# Patient Record
Sex: MALE | Race: WHITE | NOT HISPANIC OR LATINO | Employment: UNEMPLOYED | URBAN - NONMETROPOLITAN AREA
[De-identification: names, ages, dates, MRNs, and addresses within clinical notes are randomized per-mention and may not be internally consistent; named-entity substitution may affect disease eponyms.]

---

## 2017-08-31 ENCOUNTER — OFFICE VISIT (OUTPATIENT)
Dept: URGENT CARE | Facility: PHYSICIAN GROUP | Age: 57
End: 2017-08-31
Payer: COMMERCIAL

## 2017-08-31 VITALS
TEMPERATURE: 98 F | HEIGHT: 74 IN | DIASTOLIC BLOOD PRESSURE: 80 MMHG | RESPIRATION RATE: 16 BRPM | HEART RATE: 85 BPM | OXYGEN SATURATION: 98 % | WEIGHT: 254.3 LBS | BODY MASS INDEX: 32.64 KG/M2 | SYSTOLIC BLOOD PRESSURE: 118 MMHG

## 2017-08-31 DIAGNOSIS — E78.00 HYPERCHOLESTEREMIA: ICD-10-CM

## 2017-08-31 DIAGNOSIS — F32.A DEPRESSION, UNSPECIFIED DEPRESSION TYPE: ICD-10-CM

## 2017-08-31 DIAGNOSIS — E11.9 CONTROLLED TYPE 2 DIABETES MELLITUS WITHOUT COMPLICATION, WITHOUT LONG-TERM CURRENT USE OF INSULIN (HCC): ICD-10-CM

## 2017-08-31 DIAGNOSIS — E03.9 HYPOTHYROIDISM, UNSPECIFIED TYPE: ICD-10-CM

## 2017-08-31 DIAGNOSIS — G47.00 INSOMNIA, UNSPECIFIED TYPE: ICD-10-CM

## 2017-08-31 PROCEDURE — 99203 OFFICE O/P NEW LOW 30 MIN: CPT | Performed by: PHYSICIAN ASSISTANT

## 2017-08-31 RX ORDER — ESCITALOPRAM OXALATE 20 MG/1
20 TABLET ORAL DAILY
COMMUNITY
End: 2017-09-28 | Stop reason: SDUPTHER

## 2017-08-31 RX ORDER — SIMVASTATIN 20 MG
10 TABLET ORAL NIGHTLY
COMMUNITY
End: 2017-09-28 | Stop reason: SDUPTHER

## 2017-08-31 RX ORDER — TRAZODONE HYDROCHLORIDE 100 MG/1
200 TABLET ORAL NIGHTLY PRN
Qty: 60 TAB | Refills: 0 | Status: SHIPPED | OUTPATIENT
Start: 2017-08-31 | End: 2017-09-28 | Stop reason: SDUPTHER

## 2017-08-31 RX ORDER — LEVOTHYROXINE SODIUM 0.03 MG/1
25 TABLET ORAL
COMMUNITY
End: 2017-09-28 | Stop reason: SDUPTHER

## 2017-08-31 RX ORDER — TRAZODONE HYDROCHLORIDE 100 MG/1
200 TABLET ORAL NIGHTLY
COMMUNITY
End: 2017-08-31 | Stop reason: SDUPTHER
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
## 2017-08-31 NOTE — LETTER
August 31, 2017     Farzad Bryant  [address] [place] [address]      Dear Farzad:    Thank you for enrolling in [employer]. Please follow the instructions below to securely access your online medical record. [employer] allows you to send messages to your healthcare team, view certain test results, renew your prescriptions, schedule appointments, and more.     How Do I Sign Up?  1. In your Internet browser, go to  [email or web address]  2. Click on the Sign Up Now link in the Sign In box. You will see the New Member Sign Up page.  3. Enter your [employer] Access Code exactly as it appears below (case sensitive). You will not need to use this code after you’ve completed the sign-up process. If you do not sign up before the expiration date, you must request a new code.  [employer] Access Code: [email or web address]  Expires: 9/30/2017 10:46 AM    4. Enter your Email address and Date of Birth (mm/dd/yyyy) as indicated and click Submit. You will be taken to the next sign-up page.  5. Create a [employer] ID (case sensitive) . This will be your [employer] login ID and cannot be changed, so think of one that is secure and easy to remember.  6. Create a [employer] password  (case sensitive).   · Your password must be a length of at least 6 characters/digits.  · It must include at least 1 numeric.  · You can change your password at any time.  7. Enter your Password Reset Question and Answer. This can be used at a later time if you forget your password.   8. Enter your e-mail address. You will receive e-mail notification when new information is available in [employer].  9. Click Sign Up. You can now view your medical record.     Additional Information  Please contact [employer] Customer Support at [phone] for any questions . Remember, [employer] is NOT to be used for urgent needs. For medical emergencies, dial 911.          Introducing [employer]    [facility]’s Secure, Online Health Connection      [facility] now offers  convenient, online access to your healthcare team and personal health information.  [employer] makes managing your healthcare easier than ever, and allows you to:  • Email your healthcare provider securely and privately  • Access your test results  • Request prescription refills 24 hours a day  • View your personal medical records from home  • Schedule or change your appointments  • View your Insurance and Billing Information  • Pay bills online ? Coming soon!        Sign below to get started:  I hereby request access to [employer] application.  I agree to abide by the [employer] Terms and Conditions, which will be provided to me upon activating my account.       __________________________________        _________________________  Name (Please Print)          Date of Birth     __________________________________       _________________________  Signature          Primary Care Provider      _______________  Date                          *For Internal Use Only: Please scan this form into the patient’s chart. Click on  - Select Patient - Attach to Encounter:  - Document Type: Consent   - Document Description: MyChart Consent

## 2017-09-06 ENCOUNTER — HOSPITAL ENCOUNTER (OUTPATIENT)
Dept: LAB | Facility: MEDICAL CENTER | Age: 57
End: 2017-09-06
Attending: PHYSICIAN ASSISTANT
Payer: COMMERCIAL

## 2017-09-06 DIAGNOSIS — E11.9 CONTROLLED TYPE 2 DIABETES MELLITUS WITHOUT COMPLICATION, WITHOUT LONG-TERM CURRENT USE OF INSULIN (HCC): ICD-10-CM

## 2017-09-06 DIAGNOSIS — G47.00 INSOMNIA, UNSPECIFIED TYPE: ICD-10-CM

## 2017-09-06 DIAGNOSIS — E78.00 HYPERCHOLESTEREMIA: ICD-10-CM

## 2017-09-06 DIAGNOSIS — E03.9 HYPOTHYROIDISM, UNSPECIFIED TYPE: ICD-10-CM

## 2017-09-06 LAB
25(OH)D3 SERPL-MCNC: 32 NG/ML (ref 30–100)
ALBUMIN SERPL BCP-MCNC: 4.1 G/DL (ref 3.2–4.9)
ALBUMIN/GLOB SERPL: 1.2 G/DL
ALP SERPL-CCNC: 83 U/L (ref 30–99)
ALT SERPL-CCNC: 18 U/L (ref 2–50)
ANION GAP SERPL CALC-SCNC: 12 MMOL/L (ref 0–11.9)
AST SERPL-CCNC: 13 U/L (ref 12–45)
BASOPHILS # BLD AUTO: 0.7 % (ref 0–1.8)
BASOPHILS # BLD: 0.04 K/UL (ref 0–0.12)
BILIRUB SERPL-MCNC: 0.6 MG/DL (ref 0.1–1.5)
BUN SERPL-MCNC: 22 MG/DL (ref 8–22)
CALCIUM SERPL-MCNC: 9.1 MG/DL (ref 8.5–10.5)
CHLORIDE SERPL-SCNC: 103 MMOL/L (ref 96–112)
CHOLEST SERPL-MCNC: 190 MG/DL (ref 100–199)
CO2 SERPL-SCNC: 20 MMOL/L (ref 20–33)
CREAT SERPL-MCNC: 0.92 MG/DL (ref 0.5–1.4)
CREAT UR-MCNC: 79.4 MG/DL
EOSINOPHIL # BLD AUTO: 0.09 K/UL (ref 0–0.51)
EOSINOPHIL NFR BLD: 1.5 % (ref 0–6.9)
ERYTHROCYTE [DISTWIDTH] IN BLOOD BY AUTOMATED COUNT: 43.3 FL (ref 35.9–50)
EST. AVERAGE GLUCOSE BLD GHB EST-MCNC: 303 MG/DL
FASTING STATUS PATIENT QL REPORTED: NORMAL
GFR SERPL CREATININE-BSD FRML MDRD: >60 ML/MIN/1.73 M 2
GLOBULIN SER CALC-MCNC: 3.4 G/DL (ref 1.9–3.5)
GLUCOSE SERPL-MCNC: 362 MG/DL (ref 65–99)
HBA1C MFR BLD: 12.2 % (ref 0–5.6)
HCT VFR BLD AUTO: 44 % (ref 42–52)
HDLC SERPL-MCNC: 39 MG/DL
HGB BLD-MCNC: 14.7 G/DL (ref 14–18)
IMM GRANULOCYTES # BLD AUTO: 0.05 K/UL (ref 0–0.11)
IMM GRANULOCYTES NFR BLD AUTO: 0.8 % (ref 0–0.9)
LDLC SERPL CALC-MCNC: 114 MG/DL
LYMPHOCYTES # BLD AUTO: 1.37 K/UL (ref 1–4.8)
LYMPHOCYTES NFR BLD: 22.5 % (ref 22–41)
MCH RBC QN AUTO: 30.2 PG (ref 27–33)
MCHC RBC AUTO-ENTMCNC: 33.4 G/DL (ref 33.7–35.3)
MCV RBC AUTO: 90.5 FL (ref 81.4–97.8)
MICROALBUMIN UR-MCNC: 3.6 MG/DL
MICROALBUMIN/CREAT UR: 45 MG/G (ref 0–30)
MONOCYTES # BLD AUTO: 0.58 K/UL (ref 0–0.85)
MONOCYTES NFR BLD AUTO: 9.5 % (ref 0–13.4)
NEUTROPHILS # BLD AUTO: 3.95 K/UL (ref 1.82–7.42)
NEUTROPHILS NFR BLD: 65 % (ref 44–72)
NRBC # BLD AUTO: 0 K/UL
NRBC BLD AUTO-RTO: 0 /100 WBC
PLATELET # BLD AUTO: 260 K/UL (ref 164–446)
PMV BLD AUTO: 10.7 FL (ref 9–12.9)
POTASSIUM SERPL-SCNC: 3.9 MMOL/L (ref 3.6–5.5)
PROT SERPL-MCNC: 7.5 G/DL (ref 6–8.2)
RBC # BLD AUTO: 4.86 M/UL (ref 4.7–6.1)
SODIUM SERPL-SCNC: 135 MMOL/L (ref 135–145)
T4 FREE SERPL-MCNC: 1.02 NG/DL (ref 0.53–1.43)
TRIGL SERPL-MCNC: 183 MG/DL (ref 0–149)
TSH SERPL DL<=0.005 MIU/L-ACNC: 2.61 UIU/ML (ref 0.3–3.7)
WBC # BLD AUTO: 6.1 K/UL (ref 4.8–10.8)

## 2017-09-06 PROCEDURE — 84439 ASSAY OF FREE THYROXINE: CPT

## 2017-09-06 PROCEDURE — 82306 VITAMIN D 25 HYDROXY: CPT

## 2017-09-06 PROCEDURE — 80053 COMPREHEN METABOLIC PANEL: CPT

## 2017-09-06 PROCEDURE — 82043 UR ALBUMIN QUANTITATIVE: CPT

## 2017-09-06 PROCEDURE — 84443 ASSAY THYROID STIM HORMONE: CPT

## 2017-09-06 PROCEDURE — 82570 ASSAY OF URINE CREATININE: CPT

## 2017-09-06 PROCEDURE — 80061 LIPID PANEL: CPT

## 2017-09-06 PROCEDURE — 85025 COMPLETE CBC W/AUTO DIFF WBC: CPT

## 2017-09-06 PROCEDURE — 83036 HEMOGLOBIN GLYCOSYLATED A1C: CPT

## 2017-09-06 PROCEDURE — 36415 COLL VENOUS BLD VENIPUNCTURE: CPT

## 2017-09-28 ENCOUNTER — OFFICE VISIT (OUTPATIENT)
Dept: MEDICAL GROUP | Facility: PHYSICIAN GROUP | Age: 57
End: 2017-09-28
Payer: COMMERCIAL

## 2017-09-28 VITALS
SYSTOLIC BLOOD PRESSURE: 130 MMHG | TEMPERATURE: 98.2 F | DIASTOLIC BLOOD PRESSURE: 80 MMHG | HEIGHT: 74 IN | OXYGEN SATURATION: 93 % | BODY MASS INDEX: 32.6 KG/M2 | HEART RATE: 118 BPM | WEIGHT: 254 LBS

## 2017-09-28 DIAGNOSIS — F51.01 PRIMARY INSOMNIA: ICD-10-CM

## 2017-09-28 DIAGNOSIS — G89.29 CHRONIC SHOULDER PAIN, UNSPECIFIED LATERALITY: ICD-10-CM

## 2017-09-28 DIAGNOSIS — E11.8 TYPE 2 DIABETES MELLITUS WITH COMPLICATION, WITHOUT LONG-TERM CURRENT USE OF INSULIN (HCC): ICD-10-CM

## 2017-09-28 DIAGNOSIS — M25.519 CHRONIC SHOULDER PAIN, UNSPECIFIED LATERALITY: ICD-10-CM

## 2017-09-28 DIAGNOSIS — F33.9 EPISODE OF RECURRENT MAJOR DEPRESSIVE DISORDER, UNSPECIFIED DEPRESSION EPISODE SEVERITY (HCC): ICD-10-CM

## 2017-09-28 DIAGNOSIS — E66.9 OBESITY (BMI 30-39.9): ICD-10-CM

## 2017-09-28 DIAGNOSIS — E03.9 ACQUIRED HYPOTHYROIDISM: ICD-10-CM

## 2017-09-28 DIAGNOSIS — E78.5 DYSLIPIDEMIA: ICD-10-CM

## 2017-09-28 PROCEDURE — 99204 OFFICE O/P NEW MOD 45 MIN: CPT | Performed by: INTERNAL MEDICINE

## 2017-09-28 RX ORDER — TRAZODONE HYDROCHLORIDE 100 MG/1
200 TABLET ORAL NIGHTLY PRN
Qty: 180 TAB | Refills: 1 | Status: SHIPPED | OUTPATIENT
Start: 2017-09-28 | End: 2018-09-21 | Stop reason: SDUPTHER

## 2017-09-28 RX ORDER — DIPHENHYDRAMINE HCL 25 MG
50 CAPSULE ORAL
COMMUNITY
End: 2021-03-02

## 2017-09-28 RX ORDER — LEVOTHYROXINE SODIUM 0.03 MG/1
25 TABLET ORAL
Qty: 90 TAB | Refills: 1 | Status: SHIPPED | OUTPATIENT
Start: 2017-09-28 | End: 2018-07-27 | Stop reason: SDUPTHER

## 2017-09-28 RX ORDER — UBIDECARENONE 75 MG
100 CAPSULE ORAL DAILY
COMMUNITY
End: 2018-07-10

## 2017-09-28 RX ORDER — SIMVASTATIN 20 MG
10 TABLET ORAL EVERY EVENING
Qty: 45 TAB | Refills: 1 | Status: SHIPPED | OUTPATIENT
Start: 2017-09-28 | End: 2019-04-20 | Stop reason: CLARIF

## 2017-09-28 RX ORDER — GLIPIZIDE 5 MG/1
5 TABLET ORAL 2 TIMES DAILY
Qty: 180 TAB | Refills: 1 | Status: SHIPPED | OUTPATIENT
Start: 2017-09-28 | End: 2018-08-20 | Stop reason: SDUPTHER

## 2017-09-28 RX ORDER — ESCITALOPRAM OXALATE 20 MG/1
20 TABLET ORAL DAILY
Qty: 90 TAB | Refills: 1 | Status: SHIPPED | OUTPATIENT
Start: 2017-09-28 | End: 2018-06-19 | Stop reason: SDUPTHER

## 2017-09-28 RX ORDER — CHLORAL HYDRATE 500 MG
2000 CAPSULE ORAL EVERY MORNING
COMMUNITY

## 2017-09-28 RX ORDER — M-VIT,TX,IRON,MINS/CALC/FOLIC 27MG-0.4MG
1 TABLET ORAL EVERY MORNING
COMMUNITY

## 2017-09-28 RX ORDER — LANCETS 30 GAUGE
EACH MISCELLANEOUS
Qty: 100 EACH | Refills: 3 | Status: SHIPPED | OUTPATIENT
Start: 2017-09-28 | End: 2018-07-10

## 2017-09-28 ASSESSMENT — PATIENT HEALTH QUESTIONNAIRE - PHQ9: CLINICAL INTERPRETATION OF PHQ2 SCORE: 0

## 2017-09-28 ASSESSMENT — PAIN SCALES - GENERAL: PAINLEVEL: 4=SLIGHT-MODERATE PAIN
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
## 2017-09-28 NOTE — ASSESSMENT & PLAN NOTE
Pt has long standing diabetes mellitus. He is currently on metformin 1000 mg BID. Recent A1C was 12.2, pt states he had been off medication for a week when it was drawn.     Pt denies neuropathy, blurry vision. He reports increased urinary frequency at times. Denies symptoms indicative of hypoglycemic episodes (sweating, shaking, loss of consciousness).

## 2017-09-28 NOTE — ASSESSMENT & PLAN NOTE
Pt reports chronic OA pain in his left shoulder. He also had a previous hip replacement surgery that has chronic pain associated with it. He is not on medication for this. He sees a chiropractor for this who told him there was severe OA on x-ray.

## 2017-09-28 NOTE — ASSESSMENT & PLAN NOTE
Pt is on levothyroxine 25 mcg daily. Patient is taking medication as prescribed.  He has difficulty losing weight.   Denies heat/cold intolerance, constipation/diarrhea, changes in skin or hair.

## 2017-09-28 NOTE — ASSESSMENT & PLAN NOTE
Pt is on simvastatin 10 mg daily, fish oil 1000 mg TID. Patient is taking medication as prescribed. Patient denies myalgias

## 2017-09-28 NOTE — ASSESSMENT & PLAN NOTE
Pt is on Lexapro 20 mg daily for known depression. He has been on the medication for about 3-4 years. He states that the medication works well and that his mood is well controlled. He denies suicidal or homicidal ideation.
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
## 2018-02-09 ENCOUNTER — TELEPHONE (OUTPATIENT)
Dept: MEDICAL GROUP | Facility: PHYSICIAN GROUP | Age: 58
End: 2018-02-09

## 2018-02-09 NOTE — TELEPHONE ENCOUNTER
----- Message from Yandy Madrigal M.D. sent at 1/9/2018 12:38 PM PST -----  Please advise patient that I reviewed lab results. There are some results that need further evaluation. Please schedule an office visit.  Yandy Madrigal M.D.

## 2018-02-09 NOTE — TELEPHONE ENCOUNTER
Called [phone] left message for patient to call [phone] to schedule an appointment with [facility] for [facility]

## 2018-03-10 ENCOUNTER — HOSPITAL ENCOUNTER (OUTPATIENT)
Dept: LAB | Facility: MEDICAL CENTER | Age: 58
End: 2018-03-10
Attending: INTERNAL MEDICINE
Payer: COMMERCIAL

## 2018-03-10 DIAGNOSIS — E78.5 DYSLIPIDEMIA: ICD-10-CM

## 2018-03-10 DIAGNOSIS — E11.8 TYPE 2 DIABETES MELLITUS WITH COMPLICATION, WITHOUT LONG-TERM CURRENT USE OF INSULIN (HCC): ICD-10-CM

## 2018-03-10 LAB
ALBUMIN SERPL BCP-MCNC: 4.5 G/DL (ref 3.2–4.9)
ALBUMIN/GLOB SERPL: 1.5 G/DL
ALP SERPL-CCNC: 60 U/L (ref 30–99)
ALT SERPL-CCNC: 34 U/L (ref 2–50)
ANION GAP SERPL CALC-SCNC: 11 MMOL/L (ref 0–11.9)
AST SERPL-CCNC: 20 U/L (ref 12–45)
BILIRUB SERPL-MCNC: 0.4 MG/DL (ref 0.1–1.5)
BUN SERPL-MCNC: 39 MG/DL (ref 8–22)
CALCIUM SERPL-MCNC: 9.6 MG/DL (ref 8.5–10.5)
CHLORIDE SERPL-SCNC: 105 MMOL/L (ref 96–112)
CHOLEST SERPL-MCNC: 185 MG/DL (ref 100–199)
CO2 SERPL-SCNC: 20 MMOL/L (ref 20–33)
CREAT SERPL-MCNC: 1.12 MG/DL (ref 0.5–1.4)
EST. AVERAGE GLUCOSE BLD GHB EST-MCNC: 169 MG/DL
GLOBULIN SER CALC-MCNC: 3.1 G/DL (ref 1.9–3.5)
GLUCOSE SERPL-MCNC: 206 MG/DL (ref 65–99)
HBA1C MFR BLD: 7.5 % (ref 0–5.6)
HDLC SERPL-MCNC: 44 MG/DL
LDLC SERPL CALC-MCNC: 91 MG/DL
POTASSIUM SERPL-SCNC: 4.3 MMOL/L (ref 3.6–5.5)
PROT SERPL-MCNC: 7.6 G/DL (ref 6–8.2)
SODIUM SERPL-SCNC: 136 MMOL/L (ref 135–145)
TRIGL SERPL-MCNC: 251 MG/DL (ref 0–149)

## 2018-03-10 PROCEDURE — 36415 COLL VENOUS BLD VENIPUNCTURE: CPT

## 2018-03-10 PROCEDURE — 80053 COMPREHEN METABOLIC PANEL: CPT

## 2018-03-10 PROCEDURE — 83036 HEMOGLOBIN GLYCOSYLATED A1C: CPT

## 2018-03-10 PROCEDURE — 80061 LIPID PANEL: CPT

## 2018-03-12 ENCOUNTER — TELEPHONE (OUTPATIENT)
Dept: MEDICAL GROUP | Facility: PHYSICIAN GROUP | Age: 58
End: 2018-03-12

## 2018-03-12 NOTE — TELEPHONE ENCOUNTER
----- Message from BENJA Rodriguez sent at 3/12/2018  8:50 AM PDT -----  Please let patient know that labs are back.  He will need to establish with a new PCP as Dr. Shepard has left [facility].  His labs show his Fasting Glucose at 206 which is down from 6 months ago.  His A1c came in at 7.5 which has come down from 12.2.  Great work on this.  Continue with the Metformin and Glipizide as discussed with Dr. Shepard.  Please reach out to scheduling office to get an appointment with a new PCP to help support you in your health goals.  BENJA Rodriguez

## 2018-03-27 ENCOUNTER — OFFICE VISIT (OUTPATIENT)
Dept: MEDICAL GROUP | Facility: PHYSICIAN GROUP | Age: 58
End: 2018-03-27
Payer: COMMERCIAL

## 2018-03-27 VITALS
DIASTOLIC BLOOD PRESSURE: 76 MMHG | BODY MASS INDEX: 35.04 KG/M2 | OXYGEN SATURATION: 98 % | SYSTOLIC BLOOD PRESSURE: 128 MMHG | RESPIRATION RATE: 16 BRPM | WEIGHT: 273 LBS | HEIGHT: 74 IN | HEART RATE: 74 BPM | TEMPERATURE: 98.1 F

## 2018-03-27 DIAGNOSIS — L85.3 DRY SKIN: ICD-10-CM

## 2018-03-27 DIAGNOSIS — E78.5 DYSLIPIDEMIA: ICD-10-CM

## 2018-03-27 DIAGNOSIS — E03.9 ACQUIRED HYPOTHYROIDISM: ICD-10-CM

## 2018-03-27 DIAGNOSIS — E66.9 OBESITY (BMI 30-39.9): ICD-10-CM

## 2018-03-27 DIAGNOSIS — E11.8 TYPE 2 DIABETES MELLITUS WITH COMPLICATION, WITHOUT LONG-TERM CURRENT USE OF INSULIN (HCC): ICD-10-CM

## 2018-03-27 LAB — RETINAL SCREEN: NEGATIVE

## 2018-03-27 PROCEDURE — 99214 OFFICE O/P EST MOD 30 MIN: CPT | Performed by: NURSE PRACTITIONER

## 2018-03-27 PROCEDURE — 92250 FUNDUS PHOTOGRAPHY W/I&R: CPT | Mod: TC | Performed by: NURSE PRACTITIONER

## 2018-03-27 RX ORDER — LOPERAMIDE HYDROCHLORIDE 2 MG/1
4 CAPSULE ORAL
COMMUNITY
End: 2021-03-02

## 2018-03-27 RX ORDER — GUAIFENESIN/EPHEDRINE HCL 200-12.5MG
200 TABLET ORAL
COMMUNITY

## 2018-03-27 NOTE — PATIENT INSTRUCTIONS
Labs in 3 months--then see me and Jenifer    Stay well moisturized and hydrated    Apply emollient (lubiderm, Evi, Cetaphil) to damp skin after shower    Continue to work on diet and exercise

## 2018-03-28 PROBLEM — L85.3 DRY SKIN: Status: ACTIVE | Noted: 2018-03-28

## 2018-03-28 NOTE — ASSESSMENT & PLAN NOTE
This is chronic, uncontrolled, but patient states this is improving. His weight is 273 pounds, BMI is 35.53. He has made significant dietary changes including healthier eating, increase physical activity. We will reassess this at this follow-up appointment in 3 months.

## 2018-03-28 NOTE — ASSESSMENT & PLAN NOTE
This is a chronic condition, stable and fairly well-controlled on current dose of levothyroxine, he is currently on 25 µg daily, last TSH in September 2017 was within normal limits. He tolerates medications well with no significant bothersome side effects, and he denies symptoms of hypothyroidism. He does understand to take this medication on an empty stomach apart from his other medications. We will plan on repeating labs again in September 2018.

## 2018-03-28 NOTE — ASSESSMENT & PLAN NOTE
This is a chronic condition, stable and well controlled on simvastatin 20 mg daily. His last lipid profile is as follows:  Component      Latest Ref Rng & Units 3/10/2018          10:24 AM   Cholesterol,Tot      100 - 199 mg/dL 185   Triglycerides      0 - 149 mg/dL 251 (H)   HDL      >=40 mg/dL 44   LDL      <100 mg/dL 91   Triglycerides have markedly increased since September, but does admit to being on a nearly no carb, increased protein/fat diet consisting mostly of non-lean meats. I did discuss with him this is likely accounts for the increased triglycerides. I did advise him to choose more lean meats and decrease the amounts of cheeses that he is eating. We will plan on rechecking this again in 6 months.

## 2018-03-28 NOTE — ASSESSMENT & PLAN NOTE
Patient has noticed significantly more dry skin to his bilateral lower extremities, he scratches it so much that he will sometimes develop a rash. He has noticed this since he has moved to the area with the claimant is a little bit more dry. We discussed the importance of staying well hydrated and use of emollients type creams after his shower when his skin is a bit damp. If this does not improve his symptoms, we will consider steroid cream.

## 2018-03-28 NOTE — PROGRESS NOTES
Chief Complaint   Patient presents with   • Diabetes     est care, fv labs         This is a 57 y.o.male patient that presents today with the following: Follow-up diabetes    Acquired hypothyroidism  This is a chronic condition, stable and fairly well-controlled on current dose of levothyroxine, he is currently on 25 µg daily, last TSH in September 2017 was within normal limits. He tolerates medications well with no significant bothersome side effects, and he denies symptoms of hypothyroidism. He does understand to take this medication on an empty stomach apart from his other medications. We will plan on repeating labs again in September 2018.    Type 2 diabetes mellitus with complication, without long-term current use of insulin (CMS-[facility])  This is a chronic condition, suboptimally controlled on current regimen significantly improved since September 2017. At that time his hemoglobin A1c was 12.2%, most recently his hemoglobin A1c was down to 7.5%. He is on metformin, 1000 mg twice a day as well as glipizide 5 mg twice daily. He has made significant dietary changes, he has increased his physical activity and he has lost some weight. He would like to continue with his lifestyle modifications instead of making changes to his medication regimen, we will recheck labs again in 3 months. He is going to see me as well as the diabetes nurse educator at that time. He is appropriately on statin, but I do not see that he is on an ACE inhibitor which we will likely start as he does have mild micro-albuminuria. We will do an office treadmill exam today and monofilament exam. He does deny neuropathy.    Dyslipidemia  This is a chronic condition, stable and well controlled on simvastatin 20 mg daily. His last lipid profile is as follows:  Component      Latest Ref Rng & Units 3/10/2018          10:24 AM   Cholesterol,Tot      100 - 199 mg/dL 185   Triglycerides      0 - 149 mg/dL 251 (H)   HDL      >=40 mg/dL 44   LDL      <100  mg/dL 91   Triglycerides have markedly increased since September, but does admit to being on a nearly no carb, increased protein/fat diet consisting mostly of non-lean meats. I did discuss with him this is likely accounts for the increased triglycerides. I did advise him to choose more lean meats and decrease the amounts of cheeses that he is eating. We will plan on rechecking this again in 6 months.    Obesity (BMI 30-39.9)  This is chronic, uncontrolled, but patient states this is improving. His weight is 273 pounds, BMI is 35.53. He has made significant dietary changes including healthier eating, increase physical activity. We will reassess this at this follow-up appointment in 3 months.    Dry skin  Patient has noticed significantly more dry skin to his bilateral lower extremities, he scratches it so much that he will sometimes develop a rash. He has noticed this since he has moved to the area with the claimant is a little bit more dry. We discussed the importance of staying well hydrated and use of emollients type creams after his shower when his skin is a bit damp. If this does not improve his symptoms, we will consider steroid cream.      Hospital Outpatient Visit on 03/10/2018   Component Date Value   • Sodium 03/10/2018 136    • Potassium 03/10/2018 4.3    • Chloride 03/10/2018 105    • Co2 03/10/2018 20    • Anion Gap 03/10/2018 11.0    • Glucose 03/10/2018 206*   • Bun 03/10/2018 39*   • Creatinine 03/10/2018 1.12    • Calcium 03/10/2018 9.6    • AST(SGOT) 03/10/2018 20    • ALT(SGPT) 03/10/2018 34    • Alkaline Phosphatase 03/10/2018 60    • Total Bilirubin 03/10/2018 0.4    • Albumin 03/10/2018 4.5    • Total Protein 03/10/2018 7.6    • Globulin 03/10/2018 3.1    • A-G Ratio 03/10/2018 1.5    • Cholesterol,Tot 03/10/2018 185    • Triglycerides 03/10/2018 251*   • HDL 03/10/2018 44    • LDL 03/10/2018 91    • Glycohemoglobin 03/10/2018 7.5*   • Est Avg Glucose 03/10/2018 169    • GFR If   03/10/2018 >60    • GFR If Non  Ameri* 03/10/2018 >60          clinical course has been stable    Past Medical History:   Diagnosis Date   • Depression    • Diabetes (CMS-HCC)    • Hyperlipidemia        Past Surgical History:   Procedure Laterality Date   • ATHROPLASTY      hip replacement   • CHOLECYSTECTOMY         Family History   Problem Relation Age of Onset   • Diabetes Mother    • Stroke Father        Patient has no known allergies.    Current Outpatient Prescriptions Ordered in [facility]   Medication Sig Dispense Refill   • loperamide (ANTI-DIARRHEAL) 2 MG Cap Take 2 mg by mouth 4 times a day as needed for Diarrhea.     • 5-Hydroxytryptophan (5-HTP) 100 MG Cap Take  by mouth.     • Omega-3 Fatty Acids (FISH OIL) 1000 MG Cap capsule Take 1,000 mg by mouth 3 times a day, with meals.     • diphenhydrAMINE (BENADRYL ALLERGY) 25 MG capsule Take 25 mg by mouth every 6 hours as needed.     • Melatonin 1 MG Cap Take  by mouth.     • CINNAMON PO Take  by mouth.     • therapeutic multivitamin-minerals (THERAGRAN-M) Tab Take 1 Tab by mouth every day.     • B Complex Vitamins (VITAMIN B COMPLEX PO) Take  by mouth.     • cyanocobalamin (VITAMIN B-12) 100 MCG Tab Take 100 mcg by mouth every day.     • Blood Glucose Monitoring Suppl Supplies Misc Test strips order: Test strips for glucometer. Test daily and  Strip 3   • Lancets Misc Lancets order: Lancets for glucometer. Test daily and prn 100 Each 3   • escitalopram (LEXAPRO) 20 MG tablet Take 1 Tab by mouth every day. 90 Tab 1   • levothyroxine (SYNTHROID) 25 MCG Tab Take 1 Tab by mouth Every morning on an empty stomach. 90 Tab 1   • metformin (GLUCOPHAGE) 1000 MG tablet Take 1 Tab by mouth 2 times a day, with meals. 180 Tab 1   • simvastatin (ZOCOR) 20 MG Tab Take 0.5 Tabs by mouth every evening. 45 Tab 1   • trazodone (DESYREL) 100 MG Tab Take 2 Tabs by mouth at bedtime as needed for Sleep. 180 Tab 1   • glipiZIDE (GLUCOTROL) 5 MG Tab Take 1 Tab by mouth 2  "times a day. 180 Tab 1     No current Epic-ordered facility-administered medications on file.        Constitutional ROS: No unexpected change in weight, No weakness, No unexplained fevers, sweats, or chills  Pulmonary ROS: No chronic cough, sputum, or hemoptysis, No shortness of breath, No recent change in breathing  Cardiovascular ROS: No chest pain, No edema, No palpitations  Gastrointestinal ROS: No abdominal pain, No nausea, vomiting, diarrhea, or constipation  Musculoskeletal/Extremities ROS: No clubbing, No peripheral edema, No pain, redness or swelling on the joints  Skin/Integumentary ROS: Positive for dry skin, per history of present illness  Neurologic ROS: Normal development, No seizures, No weakness  Endocrine ROS: Positive per history of present illness    Physical exam:  /76   Pulse 74   Temp 36.7 °C (98.1 °F)   Resp 16   Ht 1.867 m (6' 1.5\")   Wt 123.8 kg (273 lb)   SpO2 98%   BMI 35.53 kg/m²   General Appearance: Middle-aged male, alert, no distress, obese, well-groomed  Skin: Skin color, texture, turgor normal. No rashes or lesions.  Lungs: negative findings: normal respiratory rate and rhythm, lungs clear to auscultation  Heart: negative. RRR without murmur, gallop, or rubs.  No ectopy.  Abdomen: Abdomen soft, non-tender. BS normal. No masses,  No organomegaly  Musculoskeletal: negative findings: ROM of all joints is normal, no evidence of joint instability, strength normal, no deformities present  Neurologic: intact, oriented, mood appropriate, judgment intact. Cranial nerves II through XII grossly intact  Diabetic Foot Exam: No ulcers, erythema or skin lesions present, patient tested with monofilament (10g) and tuning fork found to be sensitive bilaterally throughout the ball of the foot, great toe and heel.      Medical decision making/discussion: Patient to follow-up with me in 3 months with labs done before visit. He is to stay on his current medications as prescribed and call for " refills as needed. He is to continue working on [place] on modifications including healthy diet, regular physical activity and continued efforts towards weight loss. With regards to his dry skin, we discussed the importance of staying well hydrated, as well as applying emollient cream to damp to skin after shower.    Farzad was seen today for diabetes.    Diagnoses and all orders for this visit:    Type 2 diabetes mellitus with complication, without long-term current use of insulin (CMS-[facility])  -     COMP METABOLIC PANEL; Future  -     HEMOGLOBIN A1C; Future  -     MICROALBUMIN CREAT RATIO URINE; Future  -     POCT Retinal Eye Exam  -     Diabetic Monofilament Lower Extremity Exam    Acquired hypothyroidism    Dyslipidemia    Obesity (BMI 30-39.9)    Dry skin          Please note that this dictation was created using voice recognition software. I have made every reasonable attempt to correct obvious errors, but I expect that there are errors of grammar and possibly content that I did not discover before finalizing the note.

## 2018-03-28 NOTE — ASSESSMENT & PLAN NOTE
This is a chronic condition, suboptimally controlled on current regimen significantly improved since September 2017. At that time his hemoglobin A1c was 12.2%, most recently his hemoglobin A1c was down to 7.5%. He is on metformin, 1000 mg twice a day as well as glipizide 5 mg twice daily. He has made significant dietary changes, he has increased his physical activity and he has lost some weight. He would like to continue with his lifestyle modifications instead of making changes to his medication regimen, we will recheck labs again in 3 months. He is going to see me as well as the diabetes nurse educator at that time. He is appropriately on statin, but I do not see that he is on an ACE inhibitor which we will likely start as he does have mild micro-albuminuria. We will do an office treadmill exam today and monofilament exam. He does deny neuropathy.

## 2018-03-31 ENCOUNTER — TELEPHONE (OUTPATIENT)
Dept: MEDICAL GROUP | Facility: PHYSICIAN GROUP | Age: 58
End: 2018-03-31

## 2018-03-31 NOTE — TELEPHONE ENCOUNTER
ARMANDO Curtis. covering for BENJA Curtis    received call on Saturday, patient requesting refill on trazodone as he was completely out. Refill was called in for him.

## 2018-06-19 DIAGNOSIS — F33.9 EPISODE OF RECURRENT MAJOR DEPRESSIVE DISORDER, UNSPECIFIED DEPRESSION EPISODE SEVERITY (HCC): ICD-10-CM

## 2018-06-19 RX ORDER — ESCITALOPRAM OXALATE 20 MG/1
20 TABLET ORAL DAILY
Qty: 90 TAB | Refills: 1 | Status: SHIPPED | OUTPATIENT
Start: 2018-06-19 | End: 2018-07-20

## 2018-06-19 NOTE — TELEPHONE ENCOUNTER
Was the patient seen in the last year in this department? Yes     Does patient have an active prescription for medications requested? No     Received Request Via: Pharmacy      Pt met protocol?: Yes pt last ov 3/2018

## 2018-06-20 RX ORDER — ESCITALOPRAM OXALATE 20 MG/1
20 TABLET ORAL DAILY
Qty: 90 TAB | Refills: 0 | OUTPATIENT
Start: 2018-06-20

## 2018-06-28 ENCOUNTER — OFFICE VISIT (OUTPATIENT)
Dept: MEDICAL GROUP | Facility: PHYSICIAN GROUP | Age: 58
End: 2018-06-28
Payer: COMMERCIAL

## 2018-06-28 VITALS
BODY MASS INDEX: 35.16 KG/M2 | HEIGHT: 74 IN | DIASTOLIC BLOOD PRESSURE: 88 MMHG | SYSTOLIC BLOOD PRESSURE: 142 MMHG | RESPIRATION RATE: 16 BRPM | HEART RATE: 94 BPM | WEIGHT: 274 LBS | TEMPERATURE: 98.7 F | OXYGEN SATURATION: 94 %

## 2018-06-28 DIAGNOSIS — R07.9 CHEST PAIN ON EXERTION: ICD-10-CM

## 2018-06-28 PROCEDURE — 99214 OFFICE O/P EST MOD 30 MIN: CPT | Performed by: NURSE PRACTITIONER

## 2018-06-28 NOTE — ASSESSMENT & PLAN NOTE
Pt started having CP and shortness of breath 2 days ago when walking up stairs at work, this started in the morning. Pain lasted 3-4 hours, off and on. Pt describes the pain as substernal. He also reports that he had bilateral UE pain, an achy dullness. SOB stopped as soon as he caught his breath after climbing stairs. EKG in office reviewed by myself and supervising physician show sinus rhythm, no ST elevation or depression, no Q waves and no inverted T waves.   Denies CP and SOB at visit today and has not had any since last night. Was given very strict ER precautions and will put in cardiology. Was advised to take it very easy at work, try not to climb stairs. Declined letter for work, cannot afford to take the time off work.

## 2018-06-28 NOTE — PROGRESS NOTES
Chief Complaint   Patient presents with   • Chest Pain     dull ache, center chest x 2 days         This is a 57 y.o.male patient that presents today with the following: CP, SOB    Chest pain on exertion  Pt started having CP and shortness of breath 2 days ago when walking up stairs at work, this started in the morning. Pain lasted 3-4 hours, off and on. Pt describes the pain as substernal. He also reports that he had bilateral UE pain, an achy dullness. SOB stopped as soon as he caught his breath after climbing stairs. EKG in office reviewed by myself and supervising physician show sinus rhythm, no ST elevation or depression, no Q waves and no inverted T waves.   Denies CP and SOB at visit today and has not had any since last night. Was given very strict ER precautions and will put in cardiology. Was advised to take it very easy at work, try not to climb stairs. Declined letter for work, cannot afford to take the time off work.       No visits with results within 1 Month(s) from this visit.   Latest known visit with results is:   Hospital Outpatient Visit on 03/10/2018   Component Date Value   • Sodium 03/10/2018 136    • Potassium 03/10/2018 4.3    • Chloride 03/10/2018 105    • Co2 03/10/2018 20    • Anion Gap 03/10/2018 11.0    • Glucose 03/10/2018 206*   • Bun 03/10/2018 39*   • Creatinine 03/10/2018 1.12    • Calcium 03/10/2018 9.6    • AST(SGOT) 03/10/2018 20    • ALT(SGPT) 03/10/2018 34    • Alkaline Phosphatase 03/10/2018 60    • Total Bilirubin 03/10/2018 0.4    • Albumin 03/10/2018 4.5    • Total Protein 03/10/2018 7.6    • Globulin 03/10/2018 3.1    • A-G Ratio 03/10/2018 1.5    • Cholesterol,Tot 03/10/2018 185    • Triglycerides 03/10/2018 251*   • HDL 03/10/2018 44    • LDL 03/10/2018 91    • Glycohemoglobin 03/10/2018 7.5*   • Est Avg Glucose 03/10/2018 169    • GFR If  03/10/2018 >60    • GFR If Non  Ameri* 03/10/2018 >60          Past Medical History:   Diagnosis Date   •  Depression    • Diabetes (HCC)    • Hyperlipidemia        Past Surgical History:   Procedure Laterality Date   • ATHROPLASTY      hip replacement   • CHOLECYSTECTOMY         Family History   Problem Relation Age of Onset   • Diabetes Mother    • Stroke Father        Patient has no known allergies.    Current Outpatient Prescriptions Ordered in [facility]   Medication Sig Dispense Refill   • escitalopram (LEXAPRO) 20 MG tablet Take 1 Tab by mouth every day. 90 Tab 1   • loperamide (ANTI-DIARRHEAL) 2 MG Cap Take 2 mg by mouth 4 times a day as needed for Diarrhea.     • 5-Hydroxytryptophan (5-HTP) 100 MG Cap Take  by mouth.     • Omega-3 Fatty Acids (FISH OIL) 1000 MG Cap capsule Take 1,000 mg by mouth 3 times a day, with meals.     • diphenhydrAMINE (BENADRYL ALLERGY) 25 MG capsule Take 25 mg by mouth every 6 hours as needed.     • Melatonin 1 MG Cap Take  by mouth.     • CINNAMON PO Take  by mouth.     • therapeutic multivitamin-minerals (THERAGRAN-M) Tab Take 1 Tab by mouth every day.     • B Complex Vitamins (VITAMIN B COMPLEX PO) Take  by mouth.     • cyanocobalamin (VITAMIN B-12) 100 MCG Tab Take 100 mcg by mouth every day.     • Blood Glucose Monitoring Suppl Supplies Misc Test strips order: Test strips for glucometer. Test daily and  Strip 3   • Lancets Misc Lancets order: Lancets for glucometer. Test daily and prn 100 Each 3   • levothyroxine (SYNTHROID) 25 MCG Tab Take 1 Tab by mouth Every morning on an empty stomach. 90 Tab 1   • metformin (GLUCOPHAGE) 1000 MG tablet Take 1 Tab by mouth 2 times a day, with meals. 180 Tab 1   • simvastatin (ZOCOR) 20 MG Tab Take 0.5 Tabs by mouth every evening. 45 Tab 1   • trazodone (DESYREL) 100 MG Tab Take 2 Tabs by mouth at bedtime as needed for Sleep. 180 Tab 1   • glipiZIDE (GLUCOTROL) 5 MG Tab Take 1 Tab by mouth 2 times a day. 180 Tab 1     No current Epic-ordered facility-administered medications on file.        Constitutional ROS: No unexpected change in weight, No  "weakness, No unexplained fevers, sweats, or chills  Pulmonary ROS: Positive for SOB per HPI  Cardiovascular ROS: Positive for CP per HPI  Musculoskeletal/Extremities ROS: positive for BUE pain, per HPI  Neurologic ROS: Normal development, No seizures, No weakness    Physical exam:  /88   Pulse 94   Temp 37.1 °C (98.7 °F)   Resp 16   Ht 1.867 m (6' 1.5\")   Wt 124.3 kg (274 lb)   SpO2 94%   BMI 35.66 kg/m²   General Appearance: alert, no distress, obese, well groomed  Skin: Skin color, texture, turgor normal. No rashes or lesions.  Lungs: negative findings: normal respiratory rate and rhythm, lungs clear to auscultation  Heart: negative. RRR, with occasional ectopic beat. No murmur, rub, or gallop.  Abdomen: Abdomen soft, non-tender. BS normal. No masses,  No organomegaly  Musculoskeletal: negative findings: ROM of all joints is normal, no evidence of joint instability, strength normal, no deformities present  Neurologic: intact    Medical decision making/discussion: pt was urgently referred to cardiology and was given very strict ER precautions. He declines note for work until he is seen by cardiology, as he cannot take time off work--even though his office is on the third floor and no elevator available. I would like close follow up with me in 2-3 weeks, sooner if needed    Farzad was seen today for chest pain.    Diagnoses and all orders for this visit:    Chest pain on exertion  -     REFERRAL TO CARDIOLOGY          Please note that this dictation was created using voice recognition software. I have made every reasonable attempt to correct obvious errors, but I expect that there are errors of grammar and possibly content that I did not discover before finalizing the note.        "

## 2018-07-05 DIAGNOSIS — R07.9 CHEST PAIN ON EXERTION: ICD-10-CM

## 2018-07-10 ENCOUNTER — APPOINTMENT (OUTPATIENT)
Dept: RADIOLOGY | Facility: MEDICAL CENTER | Age: 58
End: 2018-07-10
Attending: INTERNAL MEDICINE
Payer: COMMERCIAL

## 2018-07-10 ENCOUNTER — APPOINTMENT (OUTPATIENT)
Dept: RADIOLOGY | Facility: MEDICAL CENTER | Age: 58
End: 2018-07-10
Attending: EMERGENCY MEDICINE
Payer: COMMERCIAL

## 2018-07-10 ENCOUNTER — HOSPITAL ENCOUNTER (OUTPATIENT)
Facility: MEDICAL CENTER | Age: 58
End: 2018-07-11
Attending: EMERGENCY MEDICINE | Admitting: INTERNAL MEDICINE
Payer: COMMERCIAL

## 2018-07-10 DIAGNOSIS — K85.90 ACUTE PANCREATITIS, UNSPECIFIED COMPLICATION STATUS, UNSPECIFIED PANCREATITIS TYPE: ICD-10-CM

## 2018-07-10 DIAGNOSIS — R07.9 CHEST PAIN, UNSPECIFIED TYPE: ICD-10-CM

## 2018-07-10 LAB
ALBUMIN SERPL BCP-MCNC: 3.9 G/DL (ref 3.2–4.9)
ALBUMIN/GLOB SERPL: 1.2 G/DL
ALP SERPL-CCNC: 73 U/L (ref 30–99)
ALT SERPL-CCNC: 29 U/L (ref 2–50)
ANION GAP SERPL CALC-SCNC: 6 MMOL/L (ref 0–11.9)
APTT PPP: 25.8 SEC (ref 24.7–36)
AST SERPL-CCNC: 21 U/L (ref 12–45)
BASOPHILS # BLD AUTO: 0.4 % (ref 0–1.8)
BASOPHILS # BLD: 0.03 K/UL (ref 0–0.12)
BILIRUB SERPL-MCNC: 0.6 MG/DL (ref 0.1–1.5)
BNP SERPL-MCNC: 25 PG/ML (ref 0–100)
BUN SERPL-MCNC: 20 MG/DL (ref 8–22)
CALCIUM SERPL-MCNC: 9.1 MG/DL (ref 8.4–10.2)
CHLORIDE SERPL-SCNC: 103 MMOL/L (ref 96–112)
CO2 SERPL-SCNC: 27 MMOL/L (ref 20–33)
CREAT SERPL-MCNC: 1.08 MG/DL (ref 0.5–1.4)
DEPRECATED D DIMER PPP IA-ACNC: 259 NG/ML(D-DU)
EKG IMPRESSION: NORMAL
EOSINOPHIL # BLD AUTO: 0.23 K/UL (ref 0–0.51)
EOSINOPHIL NFR BLD: 2.9 % (ref 0–6.9)
ERYTHROCYTE [DISTWIDTH] IN BLOOD BY AUTOMATED COUNT: 42.5 FL (ref 35.9–50)
GLOBULIN SER CALC-MCNC: 3.2 G/DL (ref 1.9–3.5)
GLUCOSE BLD-MCNC: 160 MG/DL (ref 65–99)
GLUCOSE SERPL-MCNC: 224 MG/DL (ref 65–99)
HCT VFR BLD AUTO: 41.5 % (ref 42–52)
HGB BLD-MCNC: 13.8 G/DL (ref 14–18)
IMM GRANULOCYTES # BLD AUTO: 0.05 K/UL (ref 0–0.11)
IMM GRANULOCYTES NFR BLD AUTO: 0.6 % (ref 0–0.9)
INR PPP: 0.89 (ref 0.87–1.13)
LIPASE SERPL-CCNC: 141 U/L (ref 7–58)
LYMPHOCYTES # BLD AUTO: 1.45 K/UL (ref 1–4.8)
LYMPHOCYTES NFR BLD: 18.5 % (ref 22–41)
MCH RBC QN AUTO: 30.2 PG (ref 27–33)
MCHC RBC AUTO-ENTMCNC: 33.3 G/DL (ref 33.7–35.3)
MCV RBC AUTO: 90.8 FL (ref 81.4–97.8)
MONOCYTES # BLD AUTO: 0.87 K/UL (ref 0–0.85)
MONOCYTES NFR BLD AUTO: 11.1 % (ref 0–13.4)
NEUTROPHILS # BLD AUTO: 5.21 K/UL (ref 1.82–7.42)
NEUTROPHILS NFR BLD: 66.5 % (ref 44–72)
NRBC # BLD AUTO: 0 K/UL
NRBC BLD-RTO: 0 /100 WBC
PLATELET # BLD AUTO: 253 K/UL (ref 164–446)
PMV BLD AUTO: 9.6 FL (ref 9–12.9)
POTASSIUM SERPL-SCNC: 4.3 MMOL/L (ref 3.6–5.5)
PROT SERPL-MCNC: 7.1 G/DL (ref 6–8.2)
PROTHROMBIN TIME: 12 SEC (ref 12–14.6)
RBC # BLD AUTO: 4.57 M/UL (ref 4.7–6.1)
SODIUM SERPL-SCNC: 136 MMOL/L (ref 135–145)
TROPONIN I SERPL-MCNC: <0.02 NG/ML (ref 0–0.04)
WBC # BLD AUTO: 7.8 K/UL (ref 4.8–10.8)

## 2018-07-10 PROCEDURE — G0378 HOSPITAL OBSERVATION PER HR: HCPCS

## 2018-07-10 PROCEDURE — 700102 HCHG RX REV CODE 250 W/ 637 OVERRIDE(OP): Performed by: INTERNAL MEDICINE

## 2018-07-10 PROCEDURE — 85730 THROMBOPLASTIN TIME PARTIAL: CPT

## 2018-07-10 PROCEDURE — 85610 PROTHROMBIN TIME: CPT

## 2018-07-10 PROCEDURE — 93005 ELECTROCARDIOGRAM TRACING: CPT

## 2018-07-10 PROCEDURE — 99219 PR INITIAL OBSERVATION CARE,LEVL II: CPT | Performed by: INTERNAL MEDICINE

## 2018-07-10 PROCEDURE — 700117 HCHG RX CONTRAST REV CODE 255: Performed by: INTERNAL MEDICINE

## 2018-07-10 PROCEDURE — 93005 ELECTROCARDIOGRAM TRACING: CPT | Performed by: EMERGENCY MEDICINE

## 2018-07-10 PROCEDURE — 80053 COMPREHEN METABOLIC PANEL: CPT

## 2018-07-10 PROCEDURE — 71275 CT ANGIOGRAPHY CHEST: CPT

## 2018-07-10 PROCEDURE — 85025 COMPLETE CBC W/AUTO DIFF WBC: CPT

## 2018-07-10 PROCEDURE — 84484 ASSAY OF TROPONIN QUANT: CPT

## 2018-07-10 PROCEDURE — 85379 FIBRIN DEGRADATION QUANT: CPT

## 2018-07-10 PROCEDURE — 83880 ASSAY OF NATRIURETIC PEPTIDE: CPT

## 2018-07-10 PROCEDURE — 36415 COLL VENOUS BLD VENIPUNCTURE: CPT

## 2018-07-10 PROCEDURE — 71045 X-RAY EXAM CHEST 1 VIEW: CPT

## 2018-07-10 PROCEDURE — 96374 THER/PROPH/DIAG INJ IV PUSH: CPT

## 2018-07-10 PROCEDURE — A9270 NON-COVERED ITEM OR SERVICE: HCPCS | Performed by: INTERNAL MEDICINE

## 2018-07-10 PROCEDURE — 99285 EMERGENCY DEPT VISIT HI MDM: CPT

## 2018-07-10 PROCEDURE — 82962 GLUCOSE BLOOD TEST: CPT

## 2018-07-10 PROCEDURE — 83690 ASSAY OF LIPASE: CPT

## 2018-07-10 PROCEDURE — 700111 HCHG RX REV CODE 636 W/ 250 OVERRIDE (IP): Performed by: INTERNAL MEDICINE

## 2018-07-10 RX ORDER — SIMVASTATIN 10 MG
10 TABLET ORAL EVERY EVENING
Status: DISCONTINUED | OUTPATIENT
Start: 2018-07-10 | End: 2018-07-11 | Stop reason: HOSPADM

## 2018-07-10 RX ORDER — BISACODYL 10 MG
10 SUPPOSITORY, RECTAL RECTAL
Status: DISCONTINUED | OUTPATIENT
Start: 2018-07-10 | End: 2018-07-11 | Stop reason: HOSPADM

## 2018-07-10 RX ORDER — PROMETHAZINE HYDROCHLORIDE 25 MG/1
12.5-25 SUPPOSITORY RECTAL EVERY 4 HOURS PRN
Status: DISCONTINUED | OUTPATIENT
Start: 2018-07-10 | End: 2018-07-11 | Stop reason: HOSPADM

## 2018-07-10 RX ORDER — DEXTROSE MONOHYDRATE 25 G/50ML
25 INJECTION, SOLUTION INTRAVENOUS
Status: DISCONTINUED | OUTPATIENT
Start: 2018-07-10 | End: 2018-07-11 | Stop reason: HOSPADM

## 2018-07-10 RX ORDER — ONDANSETRON 2 MG/ML
4 INJECTION INTRAMUSCULAR; INTRAVENOUS EVERY 4 HOURS PRN
Status: DISCONTINUED | OUTPATIENT
Start: 2018-07-10 | End: 2018-07-11 | Stop reason: HOSPADM

## 2018-07-10 RX ORDER — ENALAPRILAT 1.25 MG/ML
1.25 INJECTION INTRAVENOUS EVERY 6 HOURS PRN
Status: DISCONTINUED | OUTPATIENT
Start: 2018-07-10 | End: 2018-07-11 | Stop reason: HOSPADM

## 2018-07-10 RX ORDER — PROMETHAZINE HYDROCHLORIDE 25 MG/1
12.5-25 TABLET ORAL EVERY 4 HOURS PRN
Status: DISCONTINUED | OUTPATIENT
Start: 2018-07-10 | End: 2018-07-11 | Stop reason: HOSPADM

## 2018-07-10 RX ORDER — OXYCODONE HYDROCHLORIDE 5 MG/1
5 TABLET ORAL EVERY 4 HOURS PRN
Status: DISCONTINUED | OUTPATIENT
Start: 2018-07-10 | End: 2018-07-11 | Stop reason: HOSPADM

## 2018-07-10 RX ORDER — ESCITALOPRAM OXALATE 10 MG/1
20 TABLET ORAL DAILY
Status: DISCONTINUED | OUTPATIENT
Start: 2018-07-10 | End: 2018-07-11 | Stop reason: HOSPADM

## 2018-07-10 RX ORDER — ONDANSETRON 4 MG/1
4 TABLET, ORALLY DISINTEGRATING ORAL EVERY 4 HOURS PRN
Status: DISCONTINUED | OUTPATIENT
Start: 2018-07-10 | End: 2018-07-11 | Stop reason: HOSPADM

## 2018-07-10 RX ORDER — POLYETHYLENE GLYCOL 3350 17 G/17G
1 POWDER, FOR SOLUTION ORAL
Status: DISCONTINUED | OUTPATIENT
Start: 2018-07-10 | End: 2018-07-11 | Stop reason: HOSPADM

## 2018-07-10 RX ORDER — LEVOTHYROXINE SODIUM 0.05 MG/1
25 TABLET ORAL
Status: DISCONTINUED | OUTPATIENT
Start: 2018-07-10 | End: 2018-07-11 | Stop reason: HOSPADM

## 2018-07-10 RX ORDER — TRAZODONE HYDROCHLORIDE 50 MG/1
200 TABLET ORAL NIGHTLY PRN
Status: DISCONTINUED | OUTPATIENT
Start: 2018-07-10 | End: 2018-07-11 | Stop reason: HOSPADM

## 2018-07-10 RX ORDER — LABETALOL HYDROCHLORIDE 5 MG/ML
10 INJECTION, SOLUTION INTRAVENOUS EVERY 4 HOURS PRN
Status: DISCONTINUED | OUTPATIENT
Start: 2018-07-10 | End: 2018-07-11 | Stop reason: HOSPADM

## 2018-07-10 RX ORDER — ASPIRIN 325 MG
325 TABLET ORAL DAILY
Status: DISCONTINUED | OUTPATIENT
Start: 2018-07-10 | End: 2018-07-11 | Stop reason: HOSPADM

## 2018-07-10 RX ORDER — ASPIRIN 81 MG/1
324 TABLET, CHEWABLE ORAL DAILY
Status: DISCONTINUED | OUTPATIENT
Start: 2018-07-10 | End: 2018-07-11 | Stop reason: HOSPADM

## 2018-07-10 RX ORDER — AMOXICILLIN 250 MG
2 CAPSULE ORAL 2 TIMES DAILY
Status: DISCONTINUED | OUTPATIENT
Start: 2018-07-10 | End: 2018-07-11 | Stop reason: HOSPADM

## 2018-07-10 RX ORDER — ASPIRIN 600 MG/1
300 SUPPOSITORY RECTAL DAILY
Status: DISCONTINUED | OUTPATIENT
Start: 2018-07-10 | End: 2018-07-11 | Stop reason: HOSPADM

## 2018-07-10 RX ORDER — ACETAMINOPHEN 325 MG/1
650 TABLET ORAL EVERY 6 HOURS PRN
Status: DISCONTINUED | OUTPATIENT
Start: 2018-07-10 | End: 2018-07-11 | Stop reason: HOSPADM

## 2018-07-10 RX ADMIN — ACETAMINOPHEN 650 MG: 325 TABLET, FILM COATED ORAL at 15:07

## 2018-07-10 RX ADMIN — SIMVASTATIN 10 MG: 10 TABLET, FILM COATED ORAL at 17:10

## 2018-07-10 RX ADMIN — OXYCODONE HYDROCHLORIDE 5 MG: 5 TABLET ORAL at 16:19

## 2018-07-10 RX ADMIN — IOHEXOL 100 ML: 350 INJECTION, SOLUTION INTRAVENOUS at 20:34

## 2018-07-10 RX ADMIN — LIDOCAINE HYDROCHLORIDE 15 ML: 20 SOLUTION OROPHARYNGEAL at 13:08

## 2018-07-10 RX ADMIN — OXYCODONE HYDROCHLORIDE 5 MG: 5 TABLET ORAL at 21:13

## 2018-07-10 RX ADMIN — PROCHLORPERAZINE EDISYLATE 10 MG: 5 INJECTION INTRAMUSCULAR; INTRAVENOUS at 21:13

## 2018-07-10 RX ADMIN — ENOXAPARIN SODIUM 40 MG: 100 INJECTION SUBCUTANEOUS at 13:09

## 2018-07-10 RX ADMIN — TRAZODONE HYDROCHLORIDE 200 MG: 50 TABLET ORAL at 21:12

## 2018-07-10 ASSESSMENT — LIFESTYLE VARIABLES
EVER FELT BAD OR GUILTY ABOUT YOUR DRINKING: NO
EVER HAD A DRINK FIRST THING IN THE MORNING TO STEADY YOUR NERVES TO GET RID OF A HANGOVER: NO
HAVE PEOPLE ANNOYED YOU BY CRITICIZING YOUR DRINKING: NO
TOTAL SCORE: 0
TOTAL SCORE: 0
ON A TYPICAL DAY WHEN YOU DRINK ALCOHOL HOW MANY DRINKS DO YOU HAVE: 6
CONSUMPTION TOTAL: POSITIVE
EVER_SMOKED: NEVER
AVERAGE NUMBER OF DAYS PER WEEK YOU HAVE A DRINK CONTAINING ALCOHOL: 0
ALCOHOL_USE: YES
TOTAL SCORE: 0
HOW MANY TIMES IN THE PAST YEAR HAVE YOU HAD 5 OR MORE DRINKS IN A DAY: 2
HAVE YOU EVER FELT YOU SHOULD CUT DOWN ON YOUR DRINKING: NO

## 2018-07-10 ASSESSMENT — ENCOUNTER SYMPTOMS
DEPRESSION: 0
SHORTNESS OF BREATH: 1
CHILLS: 0
SPUTUM PRODUCTION: 0
ABDOMINAL PAIN: 0
DIZZINESS: 0
MYALGIAS: 0
TINGLING: 0
STRIDOR: 0
NAUSEA: 0
VOMITING: 0
FALLS: 0
CONSTIPATION: 0
COUGH: 0
DIARRHEA: 0
FEVER: 0
PALPITATIONS: 0
WEAKNESS: 0
HEADACHES: 0
LOSS OF CONSCIOUSNESS: 0

## 2018-07-10 ASSESSMENT — COGNITIVE AND FUNCTIONAL STATUS - GENERAL
SUGGESTED CMS G CODE MODIFIER DAILY ACTIVITY: CH
SUGGESTED CMS G CODE MODIFIER MOBILITY: CH
DAILY ACTIVITIY SCORE: 24
MOBILITY SCORE: 24

## 2018-07-10 ASSESSMENT — PATIENT HEALTH QUESTIONNAIRE - PHQ9
SUM OF ALL RESPONSES TO PHQ9 QUESTIONS 1 AND 2: 0
2. FEELING DOWN, DEPRESSED, IRRITABLE, OR HOPELESS: NOT AT ALL
1. LITTLE INTEREST OR PLEASURE IN DOING THINGS: NOT AT ALL

## 2018-07-10 ASSESSMENT — PAIN SCALES - GENERAL
PAINLEVEL_OUTOF10: 5
PAINLEVEL_OUTOF10: 7
PAINLEVEL_OUTOF10: 4
PAINLEVEL_OUTOF10: 8
PAINLEVEL_OUTOF10: 8

## 2018-07-10 NOTE — ED PROVIDER NOTES
"ED Provider Note    CHIEF COMPLAINT  Chief Complaint   Patient presents with   • Chest Pain     started about 2 hours PTA, described as \"jie a dull pain\", radiates down LA       HPI  Farzad Bryant is a 57 y.o. male who presents complaining of intermittent chest pain for the last several days.  He describes it as a dull ache that started in his mid chest and is reading over the left side of his chest.  He states that 2 hours prior to arrival this morning he had pain with exertion and some numbness in his left arm.  He states that intermittently he has had chest pain with exertion.  He has intermittent sweating and nausea.  He has never had symptoms like this before and went to his primary care doctor who referred him to a cardiologist.  He supposed to see cardiology Friday for this problem.  He became concerned today because of the arm numbness and presented for further evaluation.  The patient is a type II diabetic.  He is on cholesterol medication.  He does not smoke.  He does have some high blood pressure.  He has no history of heart attack or stroke in the past.  Denies any slurred speech headache leg swelling PND orthopnea.  He does get dyspnea on exertion.  He has not ever had a stress test.  He states that over the last few days he has had occasionally coughing with green sputum.  He has not had fevers.    REVIEW OF SYSTEMS     HEENT:  No ear pain, congestion or sore throat   EYES: no discharge redness or vision changes  CARDIAC: See history of present illness  PULMONARY: no dyspnea, cough or congestion   GI: no vomiting diarrhea or abdominal pain   : no dysuria, back pain or hematuria   Neuro: no weakness, numbness aphasia or headache  Musculoskeletal: no swelling deformity or pain no joint swelling  Endocrine: no fevers, sweating, weight loss   SKIN: no rash, erythema or contusions     See history of present illness all other systems are negative      PAST MEDICAL HISTORY  Past Medical History: " "  Diagnosis Date   • Depression    • Diabetes (HCC)    • Hyperlipidemia        FAMILY HISTORY  Family History   Problem Relation Age of Onset   • Diabetes Mother    • Stroke Father        SOCIAL HISTORY  Social History     Social History   • Marital status:      Spouse name: N/A   • Number of children: N/A   • Years of education: N/A     Social History Main Topics   • Smoking status: Never Smoker   • Smokeless tobacco: Former User   • Alcohol use Yes   • Drug use: No   • Sexual activity: Not on file     Other Topics Concern   • Not on file     Social History Narrative   • No narrative on file       SURGICAL HISTORY  Past Surgical History:   Procedure Laterality Date   • ATHROPLASTY      hip replacement   • CHOLECYSTECTOMY         CURRENT MEDICATIONS  Home Medications     Reviewed by Giselle Ruiz (Pharmacy Tech) on 07/10/18 at 1020  Med List Status: Complete   Medication Last Dose Status   5-Hydroxytryptophan (5-HTP) 100 MG Cap 7/9/2018 Active   B Complex Vitamins (VITAMIN B COMPLEX PO) 7/9/2018 Active   CINNAMON PO 7/9/2018 Active   diphenhydrAMINE (BENADRYL ALLERGY) 25 MG capsule 7/9/2018 Active   escitalopram (LEXAPRO) 20 MG tablet 7/9/2018 Active   glipiZIDE (GLUCOTROL) 5 MG Tab 7/9/2018 Active   levothyroxine (SYNTHROID) 25 MCG Tab 7/9/2018 Active   loperamide (ANTI-DIARRHEAL) 2 MG Cap 7/9/2018 Active   Melatonin 1 MG Cap 7/9/2018 Active   metformin (GLUCOPHAGE) 1000 MG tablet 7/9/2018 Active   Omega-3 Fatty Acids (FISH OIL) 1000 MG Cap capsule 7/9/2018 Active   simvastatin (ZOCOR) 20 MG Tab 7/9/2018 Active   therapeutic multivitamin-minerals (THERAGRAN-M) Tab 7/9/2018 Active   trazodone (DESYREL) 100 MG Tab 7/9/2018 Active                ALLERGIES  No Known Allergies    PHYSICAL EXAM  VITAL SIGNS: /92   Pulse 90   Temp 36 °C (96.8 °F)   Resp 17   Ht 1.88 m (6' 2\")   Wt (!) 128.8 kg (283 lb 15.2 oz)   SpO2 94%   BMI 36.46 kg/m²  Room air O2: 93    Constitutional: Well developed, " Well nourished, No acute distress, Non-toxic appearance.   HENT: Normocephalic, Atraumatic, Bilateral external ears normal, Oropharynx moist, No oral exudates, Nose normal.   Eyes: PERRLA, EOMI, Conjunctiva normal, No discharge.   Neck: Normal range of motion, No tenderness, Supple, No stridor.   Cardiovascular: Regular rate and rhythm with a gallop no murmurs  Thorax & Lungs: Clear to auscultation bilaterally without wheezes rales or rhonchi  Abdomen: Bowel sounds normal, Soft, No tenderness, No masses, No pulsatile masses.   Skin: Warm, Dry, No erythema, No rash.  Positive bilateral ankle rash where his socks are that is macular and irregularly-shaped no fluctuance or purulent drainage  Back: No tenderness, No CVA tenderness.   Extremities: Intact distal pulses, No tenderness, No cyanosis, No clubbing.  Negative edema negative ccording negative Homans sign  Neurologic: Alert & oriented x 3, Normal motor function, Normal sensory function, No focal deficits noted.     EKG  EKG Interpretation    Interpreted by emergency department physician    Rhythm: normal sinus   Rate: normal  Axis: normal  Ectopy: none  Conduction: normal  ST Segments: normal  T Waves: normal  Q Waves: none    Clinical Impression: Normal EKG        RADIOLOGY/PROCEDURES  DX-CHEST-PORTABLE (1 VIEW)   Final Result      No evidence of acute cardiopulmonary process.      CT-CTA CHEST WITH & W/O-POST PROCESS    (Results Pending)   NM-CARDIAC STRESS TEST    (Results Pending)         COURSE & MEDICAL DECISION MAKING  Pertinent Labs & Imaging studies reviewed. (See chart for details)  Differential diagnosis: Angina, cardiovascular disease, atypical chest pain, bronchitis, pulmonary embolus    The patient took an aspirin prior to arrival.      Results for orders placed or performed during the hospital encounter of 07/10/18   CBC with Differential   Result Value Ref Range    WBC 7.8 4.8 - 10.8 K/uL    RBC 4.57 (L) 4.70 - 6.10 M/uL    Hemoglobin 13.8 (L)  14.0 - 18.0 g/dL    Hematocrit 41.5 (L) 42.0 - 52.0 %    MCV 90.8 81.4 - 97.8 fL    MCH 30.2 27.0 - 33.0 pg    MCHC 33.3 (L) 33.7 - 35.3 g/dL    RDW 42.5 35.9 - 50.0 fL    Platelet Count 253 164 - 446 K/uL    MPV 9.6 9.0 - 12.9 fL    Neutrophils-Polys 66.50 44.00 - 72.00 %    Lymphocytes 18.50 (L) 22.00 - 41.00 %    Monocytes 11.10 0.00 - 13.40 %    Eosinophils 2.90 0.00 - 6.90 %    Basophils 0.40 0.00 - 1.80 %    Immature Granulocytes 0.60 0.00 - 0.90 %    Nucleated RBC 0.00 /100 WBC    Neutrophils (Absolute) 5.21 1.82 - 7.42 K/uL    Lymphs (Absolute) 1.45 1.00 - 4.80 K/uL    Monos (Absolute) 0.87 (H) 0.00 - 0.85 K/uL    Eos (Absolute) 0.23 0.00 - 0.51 K/uL    Baso (Absolute) 0.03 0.00 - 0.12 K/uL    Immature Granulocytes (abs) 0.05 0.00 - 0.11 K/uL    NRBC (Absolute) 0.00 K/uL   Complete Metabolic Panel (CMP)   Result Value Ref Range    Sodium 136 135 - 145 mmol/L    Potassium 4.3 3.6 - 5.5 mmol/L    Chloride 103 96 - 112 mmol/L    Co2 27 20 - 33 mmol/L    Anion Gap 6.0 0.0 - 11.9    Glucose 224 (H) 65 - 99 mg/dL    Bun 20 8 - 22 mg/dL    Creatinine 1.08 0.50 - 1.40 mg/dL    Calcium 9.1 8.4 - 10.2 mg/dL    AST(SGOT) 21 12 - 45 U/L    ALT(SGPT) 29 2 - 50 U/L    Alkaline Phosphatase 73 30 - 99 U/L    Total Bilirubin 0.6 0.1 - 1.5 mg/dL    Albumin 3.9 3.2 - 4.9 g/dL    Total Protein 7.1 6.0 - 8.2 g/dL    Globulin 3.2 1.9 - 3.5 g/dL    A-G Ratio 1.2 g/dL   Btype Natriuretic Peptide (BNP)   Result Value Ref Range    B Natriuretic Peptide 25 0 - 100 pg/mL   Prothrombin Time (PT/INR)   Result Value Ref Range    PT 12.0 12.0 - 14.6 sec    INR 0.89 0.87 - 1.13   APTT   Result Value Ref Range    APTT 25.8 24.7 - 36.0 sec   Lipase   Result Value Ref Range    Lipase 141 (H) 7 - 58 U/L   Troponin STAT   Result Value Ref Range    Troponin I <0.02 0.00 - 0.04 ng/mL   D-DIMER   Result Value Ref Range    D-Dimer Screen 259 (H) <250 ng/mL(D-DU)   ESTIMATED GFR   Result Value Ref Range    GFR If African American >60 >60 mL/min/1.73  m 2    GFR If Non African American >60 >60 mL/min/1.73 m 2   EKG (ER)   Result Value Ref Range    Report       [facility] Emergency Dept.    Test Date:  2018-07-10  Pt Name:    LEANDRA DEVINE                Department: [facility]  MRN:        [id number]                      Room:  Gender:     Male                         Technician: ALE  :        1960                   Requested By:ER TRIAGE PROTOCOL  Order #:    [id number]                    Reading MD:    Measurements  Intervals                                Axis  Rate:       92                           P:          70  LA:         157                          QRS:        47  QRSD:       109                          T:          25  QT:         359  QTc:        445    Interpretive Statements  Sinus rhythm  No previous ECG available for comparison            10:54 AM   The patient's lab work shows elevated lipase slightly elevated d-dimer.  His cardiac enzymes are negative so far.  Given his risk factors he does need a stress test.  He will be admitted to the hospitalist service for further chest pain workup and a stress test.  The patient understands his need to stay for further workup of this problem.  He will be admitted in stable condition.  He is currently pain-free.      11:00 am I spoke with the hospitalist Dr. Nguyen  who accepts the patient for admission    FINAL IMPRESSION  1. Chest pain, unspecified type    2. Acute pancreatitis, unspecified complication status, unspecified pancreatitis type            Electronically signed by: Palmira Dunlap, 7/10/2018 10:01 AM

## 2018-07-10 NOTE — CARE PLAN
Problem: Communication  Goal: The ability to communicate needs accurately and effectively will improve    Intervention: [place] patient and significant other/support system to call light to alert staff of needs   07/10/18 1330   OTHER   Oriented to: All of the Following : Location of Bathroom, Visiting Policy, Unit Routine, Call Light and Bedside Controls, Bedside Rail Policy, Smoking Policy, Rights and Responsibilities, Bedside Report, and Patient Education Notebook         Problem: Safety  Goal: Will remain free from falls    Intervention: Implement fall precautions   07/10/18 1330   OTHER   Environmental Precautions Treaded Slipper Socks on Patient;Report Given to Other Health Care Providers Regarding Fall Risk;Mobility Assessed & Appropriate Sign Placed;Bed in Low Position;Communication Sign for Patients & Families;Transferred to Stronger Side;Personal Belongings, Wastebasket, Call Bell etc. in Easy Reach   IV Pole on Same Side of Bed as Bathroom Yes   Bedrails Bedrails Closest to Bathroom Down   Chair/Bed Strip Alarm Patient Educated Regarding Fall Risk and Need for Bed Alarm, Understands and Continues to Refuse

## 2018-07-10 NOTE — ED NOTES
"Chief Complaint   Patient presents with   • Chest Pain     started about 2 hours PTA, described as \"jie a dull pain\", radiates down LA     /92   Pulse 92   Temp 36 °C (96.8 °F)   Resp 16   Ht 1.88 m (6' 2\")   Wt (!) 128.8 kg (283 lb 15.2 oz)   SpO2 93%   BMI 36.46 kg/m²     "

## 2018-07-10 NOTE — PROGRESS NOTES
Received patient from ED, admit profile completed. Assessment completed. Pt sitting up eating lunch at this time.

## 2018-07-10 NOTE — H&P
Hospital Medicine History & Physical Note    Date of Service  7/10/2018    Primary Care Physician  ARMANDO Curtis.    Consultants  None    Code Status  Full    Chief Complaint  Chest pain    History of Presenting Illness  57 y.o. male who presented 7/10/2018 with chest pain.  Patient states he has had intermittent chest pain for weeks, this is worse when he is at work.  When he is off work his pain only last for approximately 10 minutes, when he is at work it lasts for approximately an hour.  Patient states that initially was substernal, now it is more on the left side of his chest.  Patient states it is usually a dull pain, today it is sharp, 7/10 at its worse, associated with shortness of breath.  Patient states the first time he noticed that he was asleep and he was woken up with shortness of breath which became chest pain.  Patient states the pain is worse with a deep breath.  Patient states today he began having numbness in his left fingers during chest pain episode.  Patient has seen his primary care doctor for this, has a cardiology appointment on Friday.  Patient states he does have left shoulder arthritis however her shoulder was not significantly bothering him when he had the numbness.    Review of Systems  Review of Systems   Constitutional: Negative for chills, fever and malaise/fatigue.   HENT: Negative for congestion.    Respiratory: Positive for shortness of breath. Negative for cough, sputum production and stridor.    Cardiovascular: Positive for chest pain. Negative for palpitations and leg swelling.   Gastrointestinal: Negative for abdominal pain, constipation, diarrhea, nausea and vomiting.   Genitourinary: Negative for dysuria and urgency.   Musculoskeletal: Negative for falls and myalgias.   Neurological: Negative for dizziness, tingling, loss of consciousness, weakness and headaches.   Psychiatric/Behavioral: Negative for depression and suicidal ideas.   All other systems reviewed  and are negative.      Past Medical History   has a past medical history of Depression; Diabetes (HCC); and Hyperlipidemia.    Surgical History   has a past surgical history that includes athroplasty and cholecystectomy.     Family History  family history includes Diabetes in his mother; Stroke in his father.  MI in multiple family members     Social History   reports that he has never smoked. He has quit using smokeless tobacco. He reports that he drinks alcohol. He reports that he does not use drugs.    Allergies  No Known Allergies    Medications  Prior to Admission Medications   Prescriptions Last Dose Informant Patient Reported? Taking?   5-Hydroxytryptophan (5-HTP) 100 MG Cap 7/9/2018 at am Patient Yes No   Sig: Take 1 Cap by mouth every day.   B Complex Vitamins (VITAMIN B COMPLEX PO) 7/9/2018 at am Patient Yes No   Sig: Take 1 Tab by mouth every day.   CINNAMON PO 7/9/2018 at am Patient Yes No   Sig: Take 1 Cap by mouth every day.   Melatonin 1 MG Cap 7/9/2018 at pm Patient Yes No   Sig: Take 1 Cap by mouth every bedtime.   Omega-3 Fatty Acids (FISH OIL) 1000 MG Cap capsule 7/9/2018 at pm Patient Yes No   Sig: Take 1,000 mg by mouth 3 times a day, with meals.   diphenhydrAMINE (BENADRYL ALLERGY) 25 MG capsule 7/9/2018 at pm Patient Yes No   Sig: Take 25 mg by mouth every 6 hours as needed.   escitalopram (LEXAPRO) 20 MG tablet 7/9/2018 at am Patient No No   Sig: Take 1 Tab by mouth every day.   glipiZIDE (GLUCOTROL) 5 MG Tab 7/9/2018 at pm Patient No No   Sig: Take 1 Tab by mouth 2 times a day.   levothyroxine (SYNTHROID) 25 MCG Tab 7/9/2018 at am Patient No No   Sig: Take 1 Tab by mouth Every morning on an empty stomach.   loperamide (ANTI-DIARRHEAL) 2 MG Cap 7/9/2018 at pm Patient Yes No   Sig: Take 2 mg by mouth 4 times a day as needed for Diarrhea.   metformin (GLUCOPHAGE) 1000 MG tablet 7/9/2018 at pm Patient No No   Sig: Take 1 Tab by mouth 2 times a day, with meals.   simvastatin (ZOCOR) 20 MG Tab  7/9/2018 at pm Patient No No   Sig: Take 0.5 Tabs by mouth every evening.   therapeutic multivitamin-minerals (THERAGRAN-M) Tab 7/9/2018 at am Patient Yes No   Sig: Take 1 Tab by mouth every day.   trazodone (DESYREL) 100 MG Tab 7/9/2018 at pm Patient No No   Sig: Take 2 Tabs by mouth at bedtime as needed for Sleep.      Facility-Administered Medications: None       Physical Exam  Blood Pressure: 155/92   Temperature: 36 °C (96.8 °F)   Pulse: 87   Respiration: (!) 21   Pulse Oximetry: 94 %     Physical Exam   Constitutional: He is oriented to person, place, and time. He appears well-developed.  Non-toxic appearance. No distress.   HENT:   Head: Normocephalic and atraumatic.   Mouth/Throat: Oropharynx is clear and moist. No oropharyngeal exudate.   Eyes: Right eye exhibits no discharge. Left eye exhibits no discharge.   Neck: Neck supple. No tracheal deviation, no edema and no erythema present.   Cardiovascular: Normal rate and regular rhythm.  Exam reveals no gallop and no friction rub.    No murmur heard.  Pulmonary/Chest: Effort normal and breath sounds normal. No stridor. No respiratory distress. He has no wheezes. He has no rales. He exhibits no tenderness.   Abdominal: Soft. Bowel sounds are normal. He exhibits no distension. There is no tenderness.   Musculoskeletal: Normal range of motion. He exhibits no edema or tenderness.   Lymphadenopathy:     He has no cervical adenopathy.   Neurological: He is alert and oriented to person, place, and time. No cranial nerve deficit.   Skin: Skin is warm and dry. No rash noted. He is not diaphoretic. No erythema.   Psychiatric: He has a normal mood and affect. His behavior is normal. Judgment and thought content normal.   Nursing note and vitals reviewed.      Laboratory:  Recent Labs      07/10/18   1020   WBC  7.8   RBC  4.57*   HEMOGLOBIN  13.8*   HEMATOCRIT  41.5*   MCV  90.8   MCH  30.2   MCHC  33.3*   RDW  42.5   PLATELETCT  253   MPV  9.6     Recent Labs       07/10/18   1020   SODIUM  136   POTASSIUM  4.3   CHLORIDE  103   CO2  27   GLUCOSE  224*   BUN  20   CREATININE  1.08   CALCIUM  9.1     Recent Labs      07/10/18   1020   ALTSGPT  29   ASTSGOT  21   ALKPHOSPHAT  73   TBILIRUBIN  0.6   LIPASE  141*   GLUCOSE  224*     Recent Labs      07/10/18   1020   APTT  25.8   INR  0.89     Recent Labs      07/10/18   1020   BNPBTYPENAT  25         Lab Results   Component Value Date    TROPONINI <0.02 07/10/2018       Urinalysis:    No results found for: SPECGRAVITY, GLUCOSEUR, KETONES, NITRITE, WBCURINE, RBCURINE, BACTERIA, EPITHELCELL     Imaging:  DX-CHEST-PORTABLE (1 VIEW)   Final Result      No evidence of acute cardiopulmonary process.      NM-CARDIAC STRESS TEST    (Results Pending)   CT-CTA CHEST PULMONARY ARTERY W/ RECONS    (Results Pending)         Assessment/Plan:  I anticipate this patient is appropriate for observation status at this time.    Chest pain- (present on admission)   Assessment & Plan    -Significant past medical history of coronary artery disease with myocardial infarction in the family  -Continue to trend troponin as well as monitor on telemetry and repeat EKG  -If no worsening, obtain stress test  -Patient also states the pain is worse whenever he has a deep breath, d-dimer was obtained which was mildly elevated, unlikely to be a pulmonary embolism but due to these 2 considerations will obtain CTA of the chest  -Symptomatic care        Episode of recurrent major depressive disorder (HCC)- (present on admission)   Assessment & Plan    -Continue Lexapro        Dyslipidemia- (present on admission)   Assessment & Plan    -Continue statin        Type 2 diabetes mellitus with complication, without long-term current use of insulin (HCC)- (present on admission)   Assessment & Plan    -Hold oral home medication  -Start insulin sliding scale and adjust as needed        Acquired hypothyroidism- (present on admission)   Assessment & Plan    -Continue  Synthroid            VTE prophylaxis: Lovenox

## 2018-07-10 NOTE — ASSESSMENT & PLAN NOTE
-Significant past medical history of coronary artery disease with myocardial infarction in the family  -Continue to trend troponin as well as monitor on telemetry and repeat EKG  -If no worsening, obtain stress test  -Patient also states the pain is worse whenever he has a deep breath, d-dimer was obtained which was mildly elevated, unlikely to be a pulmonary embolism but due to these 2 considerations will obtain CTA of the chest  -Symptomatic care

## 2018-07-10 NOTE — ED NOTES
Pt transferred to Tele - 3012-2 with bedside report to be given. Pt transferred via gurney on monitor with all belongings.

## 2018-07-11 ENCOUNTER — APPOINTMENT (OUTPATIENT)
Dept: RADIOLOGY | Facility: MEDICAL CENTER | Age: 58
End: 2018-07-11
Attending: INTERNAL MEDICINE
Payer: COMMERCIAL

## 2018-07-11 VITALS
OXYGEN SATURATION: 94 % | WEIGHT: 280.65 LBS | BODY MASS INDEX: 36.02 KG/M2 | HEIGHT: 74 IN | HEART RATE: 92 BPM | SYSTOLIC BLOOD PRESSURE: 163 MMHG | TEMPERATURE: 97.7 F | RESPIRATION RATE: 17 BRPM | DIASTOLIC BLOOD PRESSURE: 101 MMHG

## 2018-07-11 PROBLEM — M94.0 COSTOCHONDRITIS: Status: ACTIVE | Noted: 2018-07-11

## 2018-07-11 LAB
GLUCOSE BLD-MCNC: 175 MG/DL (ref 65–99)
GLUCOSE BLD-MCNC: 280 MG/DL (ref 65–99)

## 2018-07-11 PROCEDURE — G0378 HOSPITAL OBSERVATION PER HR: HCPCS

## 2018-07-11 PROCEDURE — 700102 HCHG RX REV CODE 250 W/ 637 OVERRIDE(OP): Performed by: INTERNAL MEDICINE

## 2018-07-11 PROCEDURE — A9502 TC99M TETROFOSMIN: HCPCS

## 2018-07-11 PROCEDURE — A9270 NON-COVERED ITEM OR SERVICE: HCPCS | Performed by: INTERNAL MEDICINE

## 2018-07-11 PROCEDURE — 82962 GLUCOSE BLOOD TEST: CPT

## 2018-07-11 PROCEDURE — 99217 PR OBSERVATION CARE DISCHARGE: CPT | Performed by: HOSPITALIST

## 2018-07-11 PROCEDURE — 700111 HCHG RX REV CODE 636 W/ 250 OVERRIDE (IP)

## 2018-07-11 RX ORDER — REGADENOSON 0.08 MG/ML
INJECTION, SOLUTION INTRAVENOUS
Status: COMPLETED
Start: 2018-07-11 | End: 2018-07-11

## 2018-07-11 RX ADMIN — REGADENOSON 0.4 MG: 0.08 INJECTION, SOLUTION INTRAVENOUS at 08:48

## 2018-07-11 RX ADMIN — INSULIN HUMAN 5 UNITS: 100 INJECTION, SOLUTION PARENTERAL at 11:20

## 2018-07-11 RX ADMIN — OXYCODONE HYDROCHLORIDE 5 MG: 5 TABLET ORAL at 14:39

## 2018-07-11 ASSESSMENT — PATIENT HEALTH QUESTIONNAIRE - PHQ9
2. FEELING DOWN, DEPRESSED, IRRITABLE, OR HOPELESS: NOT AT ALL
SUM OF ALL RESPONSES TO PHQ9 QUESTIONS 1 AND 2: 0
1. LITTLE INTEREST OR PLEASURE IN DOING THINGS: NOT AT ALL

## 2018-07-11 ASSESSMENT — PAIN SCALES - GENERAL
PAINLEVEL_OUTOF10: 4
PAINLEVEL_OUTOF10: 0

## 2018-07-11 NOTE — PROGRESS NOTES
[facility] Plan of care discussed. Safety measures in place. Pt resting in bed, no complaints as of this time.

## 2018-07-11 NOTE — DISCHARGE INSTRUCTIONS
Discharge Instructions    Discharged to home by car with relative. Discharged via walking, hospital escort: Yes.  Special equipment needed: Not Applicable    Be sure to schedule a follow-up appointment with your primary care doctor or any specialists as instructed.     Discharge Plan:   Diet Plan: Discussed  Activity Level: Discussed  Confirmed Follow up Appointment: Appointment Scheduled  Confirmed Symptoms Management: Discussed  Medication Reconciliation Updated: Yes  Pneumococcal Vaccine Administered/Refused: Not given - Patient refused pneumococcal vaccine  Influenza Vaccine Indication: Patient Refuses    I understand that a diet low in cholesterol, fat, and sodium is recommended for good health. Unless I have been given specific instructions below for another diet, I accept this instruction as my diet prescription.   Other diet: Diabetic    Special Instructions: None    · Is patient discharged on Warfarin / Coumadin?   No     Depression / Suicide Risk    As you are discharged from this [facility] facility, it is important to learn how to keep safe from harming yourself.    Recognize the warning signs:  · Abrupt changes in personality, positive or negative- including increase in energy   · Giving away possessions  · Change in eating patterns- significant weight changes-  positive or negative  · Change in sleeping patterns- unable to sleep or sleeping all the time   · Unwillingness or inability to communicate  · Depression  · Unusual sadness, discouragement and loneliness  · Talk of wanting to die  · Neglect of personal appearance   · Rebelliousness- reckless behavior  · Withdrawal from people/activities they love  · Confusion- inability to concentrate     If you or a loved one observes any of these behaviors or has concerns about self-harm, here's what you can do:  · Talk about it- your feelings and reasons for harming yourself  · Remove any means that you might use to hurt yourself (examples: pills, rope,  extension cords, firearm)  · Get professional help from the community (Mental Health, Substance Abuse, psychological counseling)  · Do not be alone:Call your Safe Contact- someone whom you trust who will be there for you.  · Call your local CRISIS HOTLINE 249-1577 or 098-177-3237  · Call your local Children's Mobile Crisis Response Team [place] (282) 385-1382 or www.[employer]  · Call the toll free National Suicide Prevention Hotlines   · National Suicide Prevention Lifeline 649-179-JFPE (5708)  · [employer] Line Network 800-SUICIDE (392-6761)      Arthritis  Introduction  Arthritis means joint pain. It can also mean joint disease. A joint is a place where bones come together. People who have arthritis may have:  · Red joints.  · Swollen joints.  · Stiff joints.  · Warm joints.  · A fever.  · A feeling of being sick.  Follow these instructions at home:  Pay attention to any changes in your symptoms. Take these actions to help with your pain and swelling.  Medicines  · Take over-the-counter and prescription medicines only as told by your doctor.  · Do not take aspirin for pain if your doctor says that you may have gout.  Activity  · Rest your joint if your doctor tells you to.  · Avoid activities that make the pain worse.  · Exercise your joint regularly as told by your doctor. Try doing exercises like:  ¨ Swimming.  ¨ Water aerobics.  ¨ Biking.  ¨ Walking.  Joint Care   · If your joint is swollen, keep it raised (elevated) if told by your doctor.  · If your joint feels stiff in the morning, try taking a warm shower.  · If you have diabetes, do not apply heat without asking your doctor.  · If told, apply heat to the joint:  ¨ Put a towel between the joint and the hot pack or heating pad.  ¨ Leave the heat on the area for 20-30 minutes.  · If told, apply ice to the joint:  ¨ Put ice in a plastic bag.  ¨ Place a towel between your skin and the bag.  ¨ Leave the ice on for 20 minutes, 2-3 times per  day.  · Keep all follow-up visits as told by your doctor.  Contact a doctor if:  · The pain gets worse.  · You have a fever.  Get help right away if:  · You have very bad pain in your joint.  · You have swelling in your joint.  · Your joint is red.  · Many joints become painful and swollen.  · You have very bad back pain.  · Your leg is very weak.  · You cannot control your pee (urine) or poop (stool).  This information is not intended to replace advice given to you by your health care provider. Make sure you discuss any questions you have with your health care provider.  Document Released: 03/14/2011 Document Revised: 05/25/2017 Document Reviewed: 03/14/2016  © 2017 Elsevier

## 2018-07-11 NOTE — PROGRESS NOTES
A&O x 4, no complaints made, discharge instructions discussed, pt and spouse verbalized understanding, pt stated they will be driving home, pt left by foot with spouse, steady on feet, no assistance required.

## 2018-07-11 NOTE — CARE PLAN
Problem: Safety  Goal: Will remain free from injury  Outcome: PROGRESSING AS EXPECTED  Pt encouraged to call for assistance as needed. Safety precautions in place. Regular rounding.    Problem: Pain Management  Goal: Pain level will decrease to patient's comfort goal  Outcome: PROGRESSING AS EXPECTED  Pain assessed throughout shift. Meds given per MAR. Non pharmacologic interventions offered.

## 2018-07-11 NOTE — PROGRESS NOTES
Initial assessments done, A&O x 4, no complaints of pain made, no signs of distress noted, educated pt on up coming procedure, pt able to turn from side to side and follow simple commands, laying in bed resting.

## 2018-07-11 NOTE — PROGRESS NOTES
Pt in bed asleep, eyes closed, no signs of pain/distress. Unlabored breathing, will continue to monitor.

## 2018-07-11 NOTE — DISCHARGE SUMMARY
"Discharge Summary    CHIEF COMPLAINT ON ADMISSION  Chief Complaint   Patient presents with   • Chest Pain     started about 2 hours PTA, described as \"jie a dull pain\", radiates down LA       Reason for Admission  chest pain     Admission Date  7/10/2018    CODE STATUS  Full Code    HPI & HOSPITAL COURSE  This is a 57 y.o. male here with chest pain, radiating down the left arm and associated with some shortness of breath.  He had a CT the chest which ruled out any kind of intrathoracic process including pulmonary embolism.  Troponin and EKG were within normal limits.  Patient underwent myocardial perfusion imaging study which was within normal limits as well.  He is having some chest discomfort still but this is reproducible and he has tenderness at the left costochondral joints.  He does perform repetitive motions of pushing and pulling at work.  I advised him to follow-up with his primary care provider tomorrow as he has scheduled and follow-up with his telemedicine and a cardiology appointment on Friday as well.       Therefore, he is discharged in good and stable condition to home with close outpatient follow-up.        Discharge Date  July 11, 2018    FOLLOW UP ITEMS POST DISCHARGE  Primary care and cardiology    DISCHARGE DIAGNOSES  Active Problems:    Chest pain POA: Yes    Acquired hypothyroidism POA: Yes    Type 2 diabetes mellitus with complication, without long-term current use of insulin (HCC) POA: Yes    Dyslipidemia POA: Yes    Episode of recurrent major depressive disorder (HCC) POA: Yes    Costochondritis POA: Unknown  Resolved Problems:    * No resolved hospital problems. *      FOLLOW UP  Future Appointments  Date Time Provider Department Center   7/12/2018 4:40 PM BENJA Curtis   7/13/2018 8:00 AM ILENE Cruz None   8/16/2018 3:40 PM [facility] ANAI MAHMOOD     No follow-up provider specified.    MEDICATIONS ON DISCHARGE     Medication List    "   CONTINUE taking these medications      Instructions   5- MG Caps   Take 1 Cap by mouth every day.  Dose:  1 Cap     ANTI-DIARRHEAL 2 MG Caps  Generic drug:  loperamide   Take 2 mg by mouth 4 times a day as needed for Diarrhea.  Dose:  2 mg     BENADRYL ALLERGY 25 MG capsule  Generic drug:  diphenhydrAMINE   Take 25 mg by mouth every 6 hours as needed.  Dose:  25 mg     CINNAMON PO   Take 1 Cap by mouth every day.  Dose:  1 Cap     escitalopram 20 MG tablet  Commonly known as:  LEXAPRO   Take 1 Tab by mouth every day.  Dose:  20 mg     fish oil 1000 MG Caps capsule   Take 1,000 mg by mouth 3 times a day, with meals.  Dose:  1000 mg     glipiZIDE 5 MG Tabs  Commonly known as:  GLUCOTROL   Take 1 Tab by mouth 2 times a day.  Dose:  5 mg     levothyroxine 25 MCG Tabs  Commonly known as:  SYNTHROID   Take 1 Tab by mouth Every morning on an empty stomach.  Dose:  25 mcg     Melatonin 1 MG Caps   Take 1 Cap by mouth every bedtime.  Dose:  1 Cap     metformin 1000 MG tablet  Commonly known as:  GLUCOPHAGE   Take 1 Tab by mouth 2 times a day, with meals.  Dose:  1000 mg     simvastatin 20 MG Tabs  Commonly known as:  ZOCOR   Take 0.5 Tabs by mouth every evening.  Dose:  10 mg     therapeutic multivitamin-minerals Tabs   Take 1 Tab by mouth every day.  Dose:  1 Tab     traZODone 100 MG Tabs  Commonly known as:  DESYREL   Take 2 Tabs by mouth at bedtime as needed for Sleep.  Dose:  200 mg     VITAMIN B COMPLEX PO   Take 1 Tab by mouth every day.  Dose:  1 Tab            Allergies  No Known Allergies    DIET  Orders Placed This Encounter   Procedures   • Diet Order Diabetic     Standing Status:   Standing     Number of Occurrences:   1     Order Specific Question:   Diet:     Answer:   Diabetic [3]     Order Specific Question:   Miscellaneous modifications:     Answer:   No Decaf, No Caffeine(for test) [11]     Comments:   Protocol 1313 Patient to have no caffeine for 12 hours prior to exam (decaf, coffee, cola, tea,  chocolate)       ACTIVITY  As tolerated.  Weight bearing as tolerated    CONSULTATIONS  None    PROCEDURES  None    LABORATORY  Lab Results   Component Value Date    SODIUM 136 07/10/2018    POTASSIUM 4.3 07/10/2018    CHLORIDE 103 07/10/2018    CO2 27 07/10/2018    GLUCOSE 224 (H) 07/10/2018    BUN 20 07/10/2018    CREATININE 1.08 07/10/2018        Lab Results   Component Value Date    WBC 7.8 07/10/2018    HEMOGLOBIN 13.8 (L) 07/10/2018    HEMATOCRIT 41.5 (L) 07/10/2018    PLATELETCT 253 07/10/2018

## 2018-07-11 NOTE — PROGRESS NOTES
Pt complained of L chest/shoulder pain, VS WDL, pt has history of arthritis in the shoulder. PRN meds given (See MARs)

## 2018-07-11 NOTE — CARE PLAN
Problem: Safety  Goal: Will remain free from injury  Outcome: PROGRESSING AS EXPECTED  Pt up self, educated on fall risk, pt verbalized understanding, safety precautions in place.    Problem: Knowledge Deficit  Goal: Knowledge of disease process/condition, treatment plan, diagnostic tests, and medications will improve  Outcome: PROGRESSING AS EXPECTED  Educated pt on current plan of care, allowed time for questions, all questions answered.

## 2018-07-11 NOTE — PROGRESS NOTES
Bedside report received from Stephany OSPINA. Plan of care discussed. Pt resting in bed with safety precautions in place.

## 2018-07-11 NOTE — PROGRESS NOTES
Nursing care plan includes knowledge deficit, potential for discomfort, potential for compromised cardiac output.  POC includes teaching, comfort measures and reassurance, and access to code cart, cardiology stand by and availability of rapid response team.  Pt verbalizes good understanding of benefits and risks of pharmacological cardiac stress test.  Informed consent obtained.  Lexiscan given, pt developed the following signs/symptoms:flush.    VS stable, symptoms resolved.  To waiting room, fluids and/or snack given, awaiting second scan.  Nursing goals met.

## 2018-07-11 NOTE — PROGRESS NOTES
Bedside report given to Grayson OSPINA. Plan of care discussed. Pt resting in bed with safety precautions in place.

## 2018-07-12 ENCOUNTER — OFFICE VISIT (OUTPATIENT)
Dept: MEDICAL GROUP | Facility: PHYSICIAN GROUP | Age: 58
End: 2018-07-12
Payer: COMMERCIAL

## 2018-07-12 VITALS
HEART RATE: 84 BPM | DIASTOLIC BLOOD PRESSURE: 82 MMHG | WEIGHT: 275 LBS | BODY MASS INDEX: 35.29 KG/M2 | RESPIRATION RATE: 16 BRPM | SYSTOLIC BLOOD PRESSURE: 168 MMHG | OXYGEN SATURATION: 98 % | HEIGHT: 74 IN | TEMPERATURE: 97.8 F

## 2018-07-12 DIAGNOSIS — M25.532 LEFT WRIST PAIN: ICD-10-CM

## 2018-07-12 DIAGNOSIS — E11.8 TYPE 2 DIABETES MELLITUS WITH COMPLICATION, WITHOUT LONG-TERM CURRENT USE OF INSULIN (HCC): ICD-10-CM

## 2018-07-12 DIAGNOSIS — R07.9 CHEST PAIN, UNSPECIFIED TYPE: ICD-10-CM

## 2018-07-12 DIAGNOSIS — M94.0 COSTOCHONDRITIS: ICD-10-CM

## 2018-07-12 DIAGNOSIS — R03.0 ELEVATED BLOOD PRESSURE READING: ICD-10-CM

## 2018-07-12 PROCEDURE — 99214 OFFICE O/P EST MOD 30 MIN: CPT | Performed by: NURSE PRACTITIONER

## 2018-07-12 RX ORDER — NAPROXEN 500 MG/1
500 TABLET ORAL 2 TIMES DAILY WITH MEALS
Qty: 60 TAB | Refills: 0 | Status: SHIPPED | OUTPATIENT
Start: 2018-07-12 | End: 2019-04-20 | Stop reason: CLARIF

## 2018-07-12 RX ORDER — LISINOPRIL 20 MG/1
20 TABLET ORAL DAILY
Qty: 90 TAB | Refills: 1 | Status: SHIPPED | OUTPATIENT
Start: 2018-07-12 | End: 2018-07-20

## 2018-07-12 NOTE — ASSESSMENT & PLAN NOTE
Pt was in hospital for atypical chest pain, but had negative work. Diagnosed with costochondritis. He was advised to take the anti-inflammatory as prescribed. He was given educational material on costochondritis.

## 2018-07-12 NOTE — PROGRESS NOTES
Tele strip at 0717 shows SR w/ HR of 71  Measurements: .20/.08/.40    Tele Shift Summary:  Rhythm: SR  Rate: 70's-90's  Ectopy: Per CCT Maura, pt had frequent PAC, occ PVC's.    Telemetry monitoring strips placed in pt chart.

## 2018-07-12 NOTE — PROGRESS NOTES
Chief Complaint   Patient presents with   • Hospital Follow-up     chest pain, SOB         This is a 57 y.o.male patient that presents today with the following:hospital follow up    Costochondritis  Pt was in hospital for atypical chest pain, but had negative work. Diagnosed with costochondritis. He was advised to take the anti-inflammatory as prescribed. He was given educational material on costochondritis.    Type 2 diabetes mellitus with complication, without long-term current use of insulin (HCC)  This is a chronic condition, he is on oral medications at this this time. Last know A1c was 7.5, he is due for labs before he sees me again in August.     Left wrist pain  Pt continues to have L wrist and arm pain, but describes it as pain that starts in the L wrist and hand and radiates up him arm. He was having this pain associated with the atypical chest pain (see additional notes). He does have a negative Phalen and Tinel's test in office today.     Chest pain  Pt recently hospitalized for chest pain. Was told that pain was not due to cardiac cause, but due to costochondritis and was discharged on anti-inflammatories. He has upcoming appt with cardiology and is wondering if he should still keep it. He was advised to keep appt as he continue to have intermittent chest pain. States he will keep appt.       Admission on 07/10/2018, Discharged on 2018   Component Date Value   • Report 07/10/2018                      Value:[facility].    Test Date:  2018-07-10  Pt Name:    LEANDRA DEVINE                Department: [facility]  MRN:        [id number]                      Room:  Gender:     Male                         Technician: ALE  :        1960                   Requested By:ER TRIAGE PROTOCOL  Order #:    [id number]                    Wero MATTHEW:    Measurements  Intervals                                Axis  Rate:       92                           P:          70  CO:          157                          QRS:        47  QRSD:       109                          T:          25  QT:         359  QTc:        445    Interpretive Statements  Sinus rhythm  No previous ECG available for comparison     • WBC 07/10/2018 7.8    • RBC 07/10/2018 4.57*   • Hemoglobin 07/10/2018 13.8*   • Hematocrit 07/10/2018 41.5*   • MCV 07/10/2018 90.8    • MCH 07/10/2018 30.2    • MCHC 07/10/2018 33.3*   • RDW 07/10/2018 42.5    • Platelet Count 07/10/2018 253    • MPV 07/10/2018 9.6    • Neutrophils-Polys 07/10/2018 66.50    • Lymphocytes 07/10/2018 18.50*   • Monocytes 07/10/2018 11.10    • Eosinophils 07/10/2018 2.90    • Basophils 07/10/2018 0.40    • Immature Granulocytes 07/10/2018 0.60    • Nucleated RBC 07/10/2018 0.00    • Neutrophils (Absolute) 07/10/2018 5.21    • Lymphs (Absolute) 07/10/2018 1.45    • Monos (Absolute) 07/10/2018 0.87*   • Eos (Absolute) 07/10/2018 0.23    • Baso (Absolute) 07/10/2018 0.03    • Immature Granulocytes (a* 07/10/2018 0.05    • NRBC (Absolute) 07/10/2018 0.00    • Sodium 07/10/2018 136    • Potassium 07/10/2018 4.3    • Chloride 07/10/2018 103    • Co2 07/10/2018 27    • Anion Gap 07/10/2018 6.0    • Glucose 07/10/2018 224*   • Bun 07/10/2018 20    • Creatinine 07/10/2018 1.08    • Calcium 07/10/2018 9.1    • AST(SGOT) 07/10/2018 21    • ALT(SGPT) 07/10/2018 29    • Alkaline Phosphatase 07/10/2018 73    • Total Bilirubin 07/10/2018 0.6    • Albumin 07/10/2018 3.9    • Total Protein 07/10/2018 7.1    • Globulin 07/10/2018 3.2    • A-G Ratio 07/10/2018 1.2    • B Natriuretic Peptide 07/10/2018 25    • PT 07/10/2018 12.0    • INR 07/10/2018 0.89    • APTT 07/10/2018 25.8    • Lipase 07/10/2018 141*   • Troponin I 07/10/2018 <0.02    • D-Dimer Screen 07/10/2018 259*   • GFR If  07/10/2018 >60    • GFR If Non  Ameri* 07/10/2018 >60    • Troponin I 07/10/2018 <0.02    • Troponin I 07/10/2018 <0.02    • Glucose - Accu-Ck 07/10/2018 160*   • Glucose -  Accu-Ck 07/11/2018 175*   • Glucose - Accu-Ck 07/11/2018 280*             Past Medical History:   Diagnosis Date   • Depression    • Diabetes (HCC)    • Hyperlipidemia        Past Surgical History:   Procedure Laterality Date   • ATHROPLASTY      hip replacement   • CHOLECYSTECTOMY         Family History   Problem Relation Age of Onset   • Diabetes Mother    • Stroke Father        Patient has no known allergies.    Current Outpatient Prescriptions Ordered in [facility]   Medication Sig Dispense Refill   • lisinopril (PRINIVIL) 20 MG Tab Take 1 Tab by mouth every day. 90 Tab 1   • naproxen (NAPROSYN) 500 MG Tab Take 1 Tab by mouth 2 times a day, with meals. 60 Tab 0   • escitalopram (LEXAPRO) 20 MG tablet Take 1 Tab by mouth every day. 90 Tab 1   • loperamide (ANTI-DIARRHEAL) 2 MG Cap Take 2 mg by mouth 4 times a day as needed for Diarrhea.     • 5-Hydroxytryptophan (5-HTP) 100 MG Cap Take 1 Cap by mouth every day.     • Omega-3 Fatty Acids (FISH OIL) 1000 MG Cap capsule Take 1,000 mg by mouth every day.     • diphenhydrAMINE (BENADRYL ALLERGY) 25 MG capsule Take 25 mg by mouth every bedtime.     • Melatonin 1 MG Cap Take 1 Cap by mouth every bedtime.     • CINNAMON PO Take 1 Cap by mouth every day.     • therapeutic multivitamin-minerals (THERAGRAN-M) Tab Take 1 Tab by mouth every day.     • B Complex Vitamins (VITAMIN B COMPLEX PO) Take 1 Tab by mouth every day.     • levothyroxine (SYNTHROID) 25 MCG Tab Take 1 Tab by mouth Every morning on an empty stomach. 90 Tab 1   • simvastatin (ZOCOR) 20 MG Tab Take 0.5 Tabs by mouth every evening. 45 Tab 1   • trazodone (DESYREL) 100 MG Tab Take 2 Tabs by mouth at bedtime as needed for Sleep. 180 Tab 1   • metformin (GLUCOPHAGE) 1000 MG tablet Take 1 Tab by mouth 2 times a day, with meals. 180 Tab 1   • glipiZIDE (GLUCOTROL) 5 MG Tab Take 1 Tab by mouth 2 times a day. 180 Tab 1     No current Epic-ordered facility-administered medications on file.        Constitutional ROS: No  "unexpected change in weight, No weakness, No unexplained fevers, sweats, or chills  Pulmonary ROS: No chronic cough, sputum, or hemoptysis, No shortness of breath, No recent change in breathing  Cardiovascular ROS: positive per HPI  Gastrointestinal ROS: No abdominal pain, No nausea, vomiting, diarrhea, or constipation  Musculoskeletal/Extremities ROS: positive for L hand and wrist pain  Neurologic ROS: Normal development, No seizures, No weakness  Endocrine ROS: positive per HPI    Physical exam:  BP (!) 168/82   Pulse 84   Temp 36.6 °C (97.8 °F)   Resp 16   Ht 1.867 m (6' 1.5\")   Wt 124.7 kg (275 lb)   SpO2 98%   BMI 35.79 kg/m²   General Appearance: alert, no distress, obese, well groomed  Skin: Skin color, texture, turgor normal. No rashes or lesions.  Lungs: negative findings: normal respiratory rate and rhythm, lungs clear to auscultation  Heart: negative. RRR without murmur, gallop, or rubs.  No ectopy.  Abdomen: Abdomen soft, non-tender. BS normal. No masses,  No organomegaly  Musculoskeletal: negative findings: ROM of all joints is normal, no evidence of muscle atrophy, no deformities present, negative Phalen and Tinel's test bilaterally  Neurologic: intact, CN 2-12 grossly intact    Medical decision making/discussion: pt to keep upcoming appt with cardiology and was given strict ER precautions. He was advised to take naproxen for costochondritis pain, he can try applying ice. He is to keep upcoming appt with me in August to discuss lab results.    Farzad was seen today for hospital follow-up.    Diagnoses and all orders for this visit:    Costochondritis  -     naproxen (NAPROSYN) 500 MG Tab; Take 1 Tab by mouth 2 times a day, with meals.    Chest pain, unspecified type    Left wrist pain    Type 2 diabetes mellitus with complication, without long-term current use of insulin (HCC)  -     lisinopril (PRINIVIL) 20 MG Tab; Take 1 Tab by mouth every day.    Elevated blood pressure reading  -     " lisinopril (PRINIVIL) 20 MG Tab; Take 1 Tab by mouth every day.          Please note that this dictation was created using voice recognition software. I have made every reasonable attempt to correct obvious errors, but I expect that there are errors of grammar and possibly content that I did not discover before finalizing the note.

## 2018-07-12 NOTE — PATIENT INSTRUCTIONS
Costochondritis    Can take anti-inflammatory, Naproxen 500 mg twice daily with food    Ice, rest when you can    Costochondritis  Costochondritis is swelling and irritation (inflammation) of the tissue (cartilage) that connects your ribs to your breastbone (sternum). This causes pain in the front of your chest. Usually, the pain:  · Starts gradually.  · Is in more than one rib.  This condition usually goes away on its own over time.  Follow these instructions at home:  · Do not do anything that makes your pain worse.  · If directed, put ice on the painful area:  ¨ Put ice in a plastic bag.  ¨ Place a towel between your skin and the bag.  ¨ Leave the ice on for 20 minutes, 2-3 times a day.  · If directed, put heat on the affected area as often as told by your doctor. Use the heat source that your doctor tells you to use, such as a moist heat pack or a heating pad.  ¨ Place a towel between your skin and the heat source.  ¨ Leave the heat on for 20-30 minutes.  ¨ Take off the heat if your skin turns bright red. This is very important if you cannot feel pain, heat, or cold. You may have a greater risk of getting burned.  · Take over-the-counter and prescription medicines only as told by your doctor.  · Return to your normal activities as told by your doctor. Ask your doctor what activities are safe for you.  · Keep all follow-up visits as told by your doctor. This is important.  Contact a doctor if:  · You have chills or a fever.  · Your pain does not go away or it gets worse.  · You have a cough that does not go away.  Get help right away if:  · You are short of breath.  This information is not intended to replace advice given to you by your health care provider. Make sure you discuss any questions you have with your health care provider.  Document Released: 06/05/2009 Document Revised: 07/07/2017 Document Reviewed: 04/12/2017  Elsevier Interactive Patient Education © 2017 [employer]

## 2018-07-13 ENCOUNTER — TELEPHONE (OUTPATIENT)
Dept: CARDIOLOGY | Facility: MEDICAL CENTER | Age: 58
End: 2018-07-13

## 2018-07-13 ENCOUNTER — TELEMEDICINE2 (OUTPATIENT)
Dept: CARDIOLOGY | Facility: MEDICAL CENTER | Age: 58
End: 2018-07-13
Payer: COMMERCIAL

## 2018-07-13 VITALS
HEIGHT: 74 IN | OXYGEN SATURATION: 96 % | DIASTOLIC BLOOD PRESSURE: 86 MMHG | BODY MASS INDEX: 35.65 KG/M2 | WEIGHT: 277.8 LBS | RESPIRATION RATE: 18 BRPM | TEMPERATURE: 98.2 F | HEART RATE: 104 BPM | SYSTOLIC BLOOD PRESSURE: 144 MMHG

## 2018-07-13 DIAGNOSIS — I10 ESSENTIAL HYPERTENSION, BENIGN: ICD-10-CM

## 2018-07-13 DIAGNOSIS — E78.5 DYSLIPIDEMIA: ICD-10-CM

## 2018-07-13 DIAGNOSIS — R93.1 ABNORMAL NUCLEAR CARDIAC IMAGING TEST: ICD-10-CM

## 2018-07-13 DIAGNOSIS — R07.89 ATYPICAL CHEST PAIN: ICD-10-CM

## 2018-07-13 PROCEDURE — 99204 OFFICE O/P NEW MOD 45 MIN: CPT | Performed by: INTERNAL MEDICINE

## 2018-07-13 ASSESSMENT — ENCOUNTER SYMPTOMS
ORTHOPNEA: 0
SHORTNESS OF BREATH: 1
FEVER: 0
INSOMNIA: 0
ABDOMINAL PAIN: 0
PND: 0
LOSS OF CONSCIOUSNESS: 0
CHILLS: 0
DIZZINESS: 0
PALPITATIONS: 0
MYALGIAS: 0
BLURRED VISION: 0

## 2018-07-13 NOTE — PROGRESS NOTES
Chief Complaint   Patient presents with   • Chest Pain       Subjective:   Farzad Bryant is a 57 y.o. male who presents today referred by his PCP Cleo MARES for cardiology consultation for evaluation of chest pain and abnormal myocardial perfusion scan.    The patient developed a left parasternal sharp chest pain with some left hand and forearm numbness which occurs with and without physical activity.  His chest pain symptoms became severe which prompted him to seek medical attention at [facility] on 7/10/2018.  In hospital evaluation included negative troponin level, slightly elevated d-dimer prompting a chest CT angiogram which was negative for pulmonary emboli leading to a pharmacologic MPI on 2018 which was abnormal showing a fixed basal and mid inferolateral infarction with minimal lili-infarction ischemia with a left ventricular ejection fraction of 49%.    The patient was told that his heart test was normal and that he had an inflammation of his chest wall and take Naprosyn which she is yet to  and use.    The patient continues to have his chest pain symptoms and notices exertional shortness of breath with moderate physical activity.    The patient has significant past medical history of diabetes mellitus, hypercholesterolemia, hypothyroidism on thyroid supplementation ×1 year and hypertension recently started on lisinopril.    The patient has a family history of heart disease with his father age 78 currently hospitalized to undergo a repeat coronary angiogram 6 months after having a coronary intervention.  His mother also 78 years old has a history of a heart attack.  He had a younger brother age 45 that  of unknown reasons.    The patient reports significant insomnia and that he has undergone 2 sleep studies both negative for sleep apnea.    Past Medical History:   Diagnosis Date   • Depression    • Diabetes (HCC)    • Hyperlipidemia      Past Surgical  History:   Procedure Laterality Date   • ATHROPLASTY      hip replacement   • CHOLECYSTECTOMY Laparoscopic   1993     Family History   Problem Relation Age of Onset   • Diabetes Mother    • Stroke Father      Social History     Social History   • Marital status:      Spouse name: N/A   • Number of children: N/A   • Years of education: N/A     Occupational History   • Not on file.     Social History Main Topics   • Smoking status: Never Smoker   • Smokeless tobacco: Former User     Types: Chew     Quit date: 1/1/1986   • Alcohol use Yes   • Drug use: No   • Sexual activity: Not on file     Other Topics Concern   • Not on file     Social History Narrative   • No narrative on file     No Known Allergies  Outpatient Encounter Prescriptions as of 7/13/2018   Medication Sig Dispense Refill   • lisinopril (PRINIVIL) 20 MG Tab Take 1 Tab by mouth every day. 90 Tab 1   • naproxen (NAPROSYN) 500 MG Tab Take 1 Tab by mouth 2 times a day, with meals. 60 Tab 0   • escitalopram (LEXAPRO) 20 MG tablet Take 1 Tab by mouth every day. 90 Tab 1   • loperamide (ANTI-DIARRHEAL) 2 MG Cap Take 2 mg by mouth 4 times a day as needed for Diarrhea.     • 5-Hydroxytryptophan (5-HTP) 100 MG Cap Take 1 Cap by mouth every day.     • Omega-3 Fatty Acids (FISH OIL) 1000 MG Cap capsule Take 1,000 mg by mouth every day.     • diphenhydrAMINE (BENADRYL ALLERGY) 25 MG capsule Take 25 mg by mouth every bedtime.     • Melatonin 1 MG Cap Take 1 Cap by mouth every bedtime.     • CINNAMON PO Take 1 Cap by mouth every day.     • therapeutic multivitamin-minerals (THERAGRAN-M) Tab Take 1 Tab by mouth every day.     • B Complex Vitamins (VITAMIN B COMPLEX PO) Take 1 Tab by mouth every day.     • levothyroxine (SYNTHROID) 25 MCG Tab Take 1 Tab by mouth Every morning on an empty stomach. 90 Tab 1   • metformin (GLUCOPHAGE) 1000 MG tablet Take 1 Tab by mouth 2 times a day, with meals. 180 Tab 1   • simvastatin (ZOCOR) 20 MG Tab Take 0.5 Tabs by mouth every  "evening. 45 Tab 1   • trazodone (DESYREL) 100 MG Tab Take 2 Tabs by mouth at bedtime as needed for Sleep. 180 Tab 1   • glipiZIDE (GLUCOTROL) 5 MG Tab Take 1 Tab by mouth 2 times a day. 180 Tab 1     No facility-administered encounter medications on file as of 7/13/2018.      Review of Systems   Constitutional: Negative for chills and fever.   HENT: Negative for congestion.    Eyes: Negative for blurred vision.   Respiratory: Positive for shortness of breath.    Cardiovascular: Positive for chest pain. Negative for palpitations, orthopnea, leg swelling and PND.   Gastrointestinal: Negative for abdominal pain.   Genitourinary: Negative for dysuria.   Musculoskeletal: Negative for joint pain and myalgias.   Skin: Negative for rash.   Neurological: Negative for dizziness and loss of consciousness.   Psychiatric/Behavioral: The patient does not have insomnia.         Objective:   /86   Pulse (!) 104   Temp 36.8 °C (98.2 °F)   Resp 18   Ht 1.88 m (6' 2\")   Wt (!) 126 kg (277 lb 12.8 oz)   SpO2 96%   BMI 35.67 kg/m²     Physical Exam   Constitutional: He is oriented to person, place, and time. He appears well-developed and well-nourished.   Cardiovascular: Normal rate, regular rhythm and normal heart sounds.    Pulmonary/Chest: Effort normal and breath sounds normal. No respiratory distress. He has no wheezes. He has no rales.   Abdominal:   Protuberant.   Musculoskeletal: He exhibits no edema.   Neurological: He is alert and oriented to person, place, and time.   Skin: Skin is warm and dry.   Psychiatric: He has a normal mood and affect. His behavior is normal.     07/10/2018 EKG: Normal sinus rhythm, rate 92.  Reviewed by myself.    Assessment:     1. Atypical chest pain     2. Abnormal nuclear cardiac imaging test     3. Essential hypertension, benign     4. Dyslipidemia         Medical Decision Making:  Today's Assessment / Status / Plan:   1.  Atypical chest pain but with an abnormal myocardial perfusion " scan indicating ischemic heart disease in addition to multiple coronary risk factors including diabetes mellitus, hypertension, hyperlipidemia and family history of heart disease necessitates excluding significant obstructive coronary artery disease.    Recommendation and discussion  1.  After lengthy detailed discussion which I reviewed my above impression with the patient I recommended proceeding with a diagnostic cardiac catheterization.  2. I have discussed the risks and benefits of cardiac catheterization as well as the procedure itself, rationale and appropriateness in detail with the patient today. Complications including but not limited to death, stroke, MI, urgent bypass surgery, contrast nephropathy, vascular complications, bleeding and infection were explained to the patient. The potential outcomes associated with the procedure (possible PCI, possible CABG, possible medical Rx only) were also discussed at length. The patient agrees to proceed.    Thank you for allow me to see this gentleman in consultation.    .Telemedicine

## 2018-07-13 NOTE — TELEPHONE ENCOUNTER
Patient had Telemed appt this morning and needs call back   Received: Today   Message Contents   STACIE George/Katalina     Patient had a Telemed appt this morning with Dr Booth and the camera shut off before the appt was completed. Patient needs a call back at [phone] to find out what the rest of his instructions are.      Contacted patient, discussed the need for an cath which has been explained by MD already.  Parked call for Rebecca to schedule cath.

## 2018-07-13 NOTE — TELEPHONE ENCOUNTER
Telemed visit today, ABDULAZIZ requested Cath scheduled.  Information provided to Rebecca who will call patient to schedule.

## 2018-07-13 NOTE — LETTER
[facility]   [address]  [place] [address]  Phone: [phone]  Fax: [phone]              Farzad Bryant  1960    Encounter Date: 7/13/2018    Renan Booth M.D.          PROGRESS NOTE:  Chief Complaint   Patient presents with   • Chest Pain       Subjective:   Farzad Bryant is a 57 y.o. male who presents today referred by his PCP Cleo MARES for cardiology consultation for evaluation of chest pain and abnormal myocardial perfusion scan.    The patient developed a left parasternal sharp chest pain with some left hand and forearm numbness which occurs with and without physical activity.  His chest pain symptoms became severe which prompted him to seek medical attention at [facility] on 7/10/2018.  In hospital evaluation included negative troponin level, slightly elevated d-dimer prompting a chest CT angiogram which was negative for pulmonary emboli leading to a pharmacologic MPI on 7/11/2018 which was abnormal showing a fixed basal and mid inferolateral infarction with minimal lili-infarction ischemia with a left ventricular ejection fraction of 49%.    The patient was told that his heart test was normal and that he had an inflammation of his chest wall and take Naprosyn which she is yet to  and use.    The patient continues to have his chest pain symptoms and notices exertional shortness of breath with moderate physical activity.    The patient has significant past medical history of diabetes mellitus, hypercholesterolemia, hypothyroidism on thyroid supplementation ×1 year and hypertension recently started on lisinopril.    The patient has a family history of heart disease with his father age 78 currently hospitalized to undergo a repeat coronary angiogram 6 months after having a coronary intervention.  His mother also 78 years old has a history of a heart attack.  He had a younger brother age  45 that  of unknown reasons.    The patient reports significant insomnia and that he has undergone 2 sleep studies both negative for sleep apnea.    Past Medical History:   Diagnosis Date   • Depression    • Diabetes (HCC)    • Hyperlipidemia      Past Surgical History:   Procedure Laterality Date   • ATHROPLASTY      hip replacement   • CHOLECYSTECTOMY Laparoscopic        Family History   Problem Relation Age of Onset   • Diabetes Mother    • Stroke Father      Social History     Social History   • Marital status:      Spouse name: N/A   • Number of children: N/A   • Years of education: N/A     Occupational History   • Not on file.     Social History Main Topics   • Smoking status: Never Smoker   • Smokeless tobacco: Former User     Types: Chew     Quit date: 1986   • Alcohol use Yes   • Drug use: No   • Sexual activity: Not on file     Other Topics Concern   • Not on file     Social History Narrative   • No narrative on file     No Known Allergies  Outpatient Encounter Prescriptions as of 2018   Medication Sig Dispense Refill   • lisinopril (PRINIVIL) 20 MG Tab Take 1 Tab by mouth every day. 90 Tab 1   • naproxen (NAPROSYN) 500 MG Tab Take 1 Tab by mouth 2 times a day, with meals. 60 Tab 0   • escitalopram (LEXAPRO) 20 MG tablet Take 1 Tab by mouth every day. 90 Tab 1   • loperamide (ANTI-DIARRHEAL) 2 MG Cap Take 2 mg by mouth 4 times a day as needed for Diarrhea.     • 5-Hydroxytryptophan (5-HTP) 100 MG Cap Take 1 Cap by mouth every day.     • Omega-3 Fatty Acids (FISH OIL) 1000 MG Cap capsule Take 1,000 mg by mouth every day.     • diphenhydrAMINE (BENADRYL ALLERGY) 25 MG capsule Take 25 mg by mouth every bedtime.     • Melatonin 1 MG Cap Take 1 Cap by mouth every bedtime.     • CINNAMON PO Take 1 Cap by mouth every day.     • therapeutic multivitamin-minerals (THERAGRAN-M) Tab Take 1 Tab by mouth every day.     • B Complex Vitamins (VITAMIN B COMPLEX PO) Take 1 Tab by mouth every day.     "  • levothyroxine (SYNTHROID) 25 MCG Tab Take 1 Tab by mouth Every morning on an empty stomach. 90 Tab 1   • metformin (GLUCOPHAGE) 1000 MG tablet Take 1 Tab by mouth 2 times a day, with meals. 180 Tab 1   • simvastatin (ZOCOR) 20 MG Tab Take 0.5 Tabs by mouth every evening. 45 Tab 1   • trazodone (DESYREL) 100 MG Tab Take 2 Tabs by mouth at bedtime as needed for Sleep. 180 Tab 1   • glipiZIDE (GLUCOTROL) 5 MG Tab Take 1 Tab by mouth 2 times a day. 180 Tab 1     No facility-administered encounter medications on file as of 7/13/2018.      Review of Systems   Constitutional: Negative for chills and fever.   HENT: Negative for congestion.    Eyes: Negative for blurred vision.   Respiratory: Positive for shortness of breath.    Cardiovascular: Positive for chest pain. Negative for palpitations, orthopnea, leg swelling and PND.   Gastrointestinal: Negative for abdominal pain.   Genitourinary: Negative for dysuria.   Musculoskeletal: Negative for joint pain and myalgias.   Skin: Negative for rash.   Neurological: Negative for dizziness and loss of consciousness.   Psychiatric/Behavioral: The patient does not have insomnia.         Objective:   /86   Pulse (!) 104   Temp 36.8 °C (98.2 °F)   Resp 18   Ht 1.88 m (6' 2\")   Wt (!) 126 kg (277 lb 12.8 oz)   SpO2 96%   BMI 35.67 kg/m²      Physical Exam   Constitutional: He is oriented to person, place, and time. He appears well-developed and well-nourished.   Cardiovascular: Normal rate, regular rhythm and normal heart sounds.    Pulmonary/Chest: Effort normal and breath sounds normal. No respiratory distress. He has no wheezes. He has no rales.   Abdominal:   Protuberant.   Musculoskeletal: He exhibits no edema.   Neurological: He is alert and oriented to person, place, and time.   Skin: Skin is warm and dry.   Psychiatric: He has a normal mood and affect. His behavior is normal.     07/10/2018 EKG: Normal sinus rhythm, rate 92.  Reviewed by myself.    Assessment: "     1. Atypical chest pain     2. Abnormal nuclear cardiac imaging test     3. Essential hypertension, benign     4. Dyslipidemia         Medical Decision Making:  Today's Assessment / Status / Plan:   1.  Atypical chest pain but with an abnormal myocardial perfusion scan indicating ischemic heart disease in addition to multiple coronary risk factors including diabetes mellitus, hypertension, hyperlipidemia and family history of heart disease necessitates excluding significant obstructive coronary artery disease.    Recommendation and discussion  1.  After lengthy detailed discussion which I reviewed my above impression with the patient I recommended proceeding with a diagnostic cardiac catheterization.  2. I have discussed the risks and benefits of cardiac catheterization as well as the procedure itself, rationale and appropriateness in detail with the patient today. Complications including but not limited to death, stroke, MI, urgent bypass surgery, contrast nephropathy, vascular complications, bleeding and infection were explained to the patient. The potential outcomes associated with the procedure (possible PCI, possible CABG, possible medical Rx only) were also discussed at length. The patient agrees to proceed.    Thank you for allow me to see this gentleman in consultation.    .Telemedicine      Cleo Valdez, UTE.P.R.N.  [address] [place] [address]  VIA In Basket

## 2018-07-13 NOTE — TELEPHONE ENCOUNTER
Patient scheduled for [facility] w/poss on 7-25-18 at [facility] with Dr. Velazquez. I was unable to coordinate a day with Dr. Booth due to the patient's work schedule.

## 2018-07-14 PROBLEM — R03.0 ELEVATED BLOOD PRESSURE READING: Status: ACTIVE | Noted: 2018-07-14

## 2018-07-14 NOTE — ASSESSMENT & PLAN NOTE
This is a chronic condition, he is on oral medications at this this time. Last know A1c was 7.5, he is due for labs before he sees me again in August.

## 2018-07-14 NOTE — ASSESSMENT & PLAN NOTE
Pt recently hospitalized for chest pain. Was told that pain was not due to cardiac cause, but due to costochondritis and was discharged on anti-inflammatories. He has upcoming appt with cardiology and is wondering if he should still keep it. He was advised to keep appt as he continue to have intermittent chest pain. States he will keep appt.

## 2018-07-14 NOTE — ASSESSMENT & PLAN NOTE
Pt continues to have L wrist and arm pain, but describes it as pain that starts in the L wrist and hand and radiates up him arm. He was having this pain associated with the atypical chest pain (see additional notes). He does have a negative Phalen and Tinel's test in office today.

## 2018-07-20 ENCOUNTER — HOSPITAL ENCOUNTER (OUTPATIENT)
Dept: RADIOLOGY | Facility: MEDICAL CENTER | Age: 58
End: 2018-07-20
Attending: INTERNAL MEDICINE | Admitting: INTERNAL MEDICINE
Payer: COMMERCIAL

## 2018-07-20 DIAGNOSIS — Z01.810 PRE-OPERATIVE CARDIOVASCULAR EXAMINATION: ICD-10-CM

## 2018-07-20 DIAGNOSIS — Z01.811 PRE-OPERATIVE RESPIRATORY EXAMINATION: ICD-10-CM

## 2018-07-20 DIAGNOSIS — Z01.812 PRE-OPERATIVE LABORATORY EXAMINATION: ICD-10-CM

## 2018-07-20 LAB
ALBUMIN SERPL BCP-MCNC: 4.4 G/DL (ref 3.2–4.9)
ALBUMIN/GLOB SERPL: 1.5 G/DL
ALP SERPL-CCNC: 75 U/L (ref 30–99)
ALT SERPL-CCNC: 21 U/L (ref 2–50)
ANION GAP SERPL CALC-SCNC: 12 MMOL/L (ref 0–11.9)
APTT PPP: 28 SEC (ref 24.7–36)
AST SERPL-CCNC: 16 U/L (ref 12–45)
BILIRUB SERPL-MCNC: 0.7 MG/DL (ref 0.1–1.5)
BUN SERPL-MCNC: 19 MG/DL (ref 8–22)
CALCIUM SERPL-MCNC: 9.8 MG/DL (ref 8.5–10.5)
CHLORIDE SERPL-SCNC: 103 MMOL/L (ref 96–112)
CO2 SERPL-SCNC: 22 MMOL/L (ref 20–33)
CREAT SERPL-MCNC: 1.1 MG/DL (ref 0.5–1.4)
EKG IMPRESSION: NORMAL
ERYTHROCYTE [DISTWIDTH] IN BLOOD BY AUTOMATED COUNT: 43.2 FL (ref 35.9–50)
GLOBULIN SER CALC-MCNC: 2.9 G/DL (ref 1.9–3.5)
GLUCOSE SERPL-MCNC: 348 MG/DL (ref 65–99)
HCT VFR BLD AUTO: 44.4 % (ref 42–52)
HGB BLD-MCNC: 14.7 G/DL (ref 14–18)
INR PPP: 1.02 (ref 0.87–1.13)
MCH RBC QN AUTO: 30.2 PG (ref 27–33)
MCHC RBC AUTO-ENTMCNC: 33.1 G/DL (ref 33.7–35.3)
MCV RBC AUTO: 91.4 FL (ref 81.4–97.8)
PLATELET # BLD AUTO: 248 K/UL (ref 164–446)
PMV BLD AUTO: 10.4 FL (ref 9–12.9)
POTASSIUM SERPL-SCNC: 4.7 MMOL/L (ref 3.6–5.5)
PROT SERPL-MCNC: 7.3 G/DL (ref 6–8.2)
PROTHROMBIN TIME: 13.1 SEC (ref 12–14.6)
RBC # BLD AUTO: 4.86 M/UL (ref 4.7–6.1)
SODIUM SERPL-SCNC: 137 MMOL/L (ref 135–145)
WBC # BLD AUTO: 8.3 K/UL (ref 4.8–10.8)

## 2018-07-20 PROCEDURE — 71046 X-RAY EXAM CHEST 2 VIEWS: CPT

## 2018-07-20 PROCEDURE — 85730 THROMBOPLASTIN TIME PARTIAL: CPT

## 2018-07-20 PROCEDURE — 36415 COLL VENOUS BLD VENIPUNCTURE: CPT

## 2018-07-20 PROCEDURE — 85610 PROTHROMBIN TIME: CPT

## 2018-07-20 PROCEDURE — 93005 ELECTROCARDIOGRAM TRACING: CPT

## 2018-07-20 PROCEDURE — 80053 COMPREHEN METABOLIC PANEL: CPT

## 2018-07-20 PROCEDURE — 93010 ELECTROCARDIOGRAM REPORT: CPT | Performed by: INTERNAL MEDICINE

## 2018-07-20 PROCEDURE — 85027 COMPLETE CBC AUTOMATED: CPT

## 2018-07-20 RX ORDER — LISINOPRIL 20 MG/1
20 TABLET ORAL EVERY MORNING
COMMUNITY
End: 2019-01-14

## 2018-07-20 RX ORDER — ESCITALOPRAM OXALATE 20 MG/1
20 TABLET ORAL EVERY MORNING
COMMUNITY
End: 2018-12-21

## 2018-07-20 RX ORDER — AMPICILLIN TRIHYDRATE 250 MG
1000 CAPSULE ORAL EVERY MORNING
COMMUNITY

## 2018-07-23 ENCOUNTER — TELEPHONE (OUTPATIENT)
Dept: CARDIOLOGY | Facility: MEDICAL CENTER | Age: 58
End: 2018-07-23

## 2018-07-23 NOTE — TELEPHONE ENCOUNTER
Result Notes     Notes recorded by BENJA Chinchilla on 7/20/2018 at 2:41 PM PDT  Pre-admit cath results show elevated glucose-please let patient know and FU with PCP on what he should do. SC       Called pt and no answer. Left message on personal voicemail that blood sugar on lab work was very elevated and should call his PCP to discuss treatment prior to his scheduled cath on 7/25. Call if questions or concerns.

## 2018-07-24 ENCOUNTER — TELEPHONE (OUTPATIENT)
Dept: CARDIOLOGY | Facility: MEDICAL CENTER | Age: 58
End: 2018-07-24

## 2018-07-24 DIAGNOSIS — E11.8 TYPE 2 DIABETES MELLITUS WITH COMPLICATION, WITHOUT LONG-TERM CURRENT USE OF INSULIN (HCC): ICD-10-CM

## 2018-07-24 NOTE — TELEPHONE ENCOUNTER
Called back and spoke to Cleo MARES. She is concerned re: pt's glucose drawn on 7/20 (see email 7/24/18) and cath scheduled for tomorrow. He admits he was not fasting and ate Hong's prior to lab work. He is unable to have repeat done today as he works until 7pm. Advised labs would be drawn again in the a.m. prior to procedure and if not acceptable procedure could be postponed. POC blood glucose ordered upon arrival and faxed to cath lab.

## 2018-07-24 NOTE — TELEPHONE ENCOUNTER
----- Message from Hailey Wells sent at 7/24/2018  3:00 PM PDT -----  Regarding: Dr. Valdez is asking for a call back  Dayanara Brumfield at [facility] called. Dr. Valdez is asking to speak to Edyta Liang or Ryan. Dayanara was informed Ryan is not in the office today. Ph. #[phone], please ask them to get Dayanara. Pt is scheduled for cath with Dr. Velazquez tomorrow.

## 2018-07-25 ENCOUNTER — HOSPITAL ENCOUNTER (OUTPATIENT)
Facility: MEDICAL CENTER | Age: 58
End: 2018-07-25
Attending: INTERNAL MEDICINE | Admitting: INTERNAL MEDICINE
Payer: COMMERCIAL

## 2018-07-25 VITALS
OXYGEN SATURATION: 95 % | WEIGHT: 273 LBS | HEART RATE: 87 BPM | SYSTOLIC BLOOD PRESSURE: 152 MMHG | TEMPERATURE: 97.4 F | RESPIRATION RATE: 18 BRPM | BODY MASS INDEX: 35.04 KG/M2 | HEIGHT: 74 IN | DIASTOLIC BLOOD PRESSURE: 72 MMHG

## 2018-07-25 LAB — GLUCOSE BLD-MCNC: 222 MG/DL (ref 65–99)

## 2018-07-25 PROCEDURE — 93458 L HRT ARTERY/VENTRICLE ANGIO: CPT

## 2018-07-25 PROCEDURE — 82962 GLUCOSE BLOOD TEST: CPT

## 2018-07-25 PROCEDURE — 307093 HCHG TR BAND RADIAL

## 2018-07-25 PROCEDURE — 99153 MOD SED SAME PHYS/QHP EA: CPT

## 2018-07-25 PROCEDURE — 160002 HCHG RECOVERY MINUTES (STAT)

## 2018-07-25 PROCEDURE — C1894 INTRO/SHEATH, NON-LASER: HCPCS

## 2018-07-25 PROCEDURE — 700102 HCHG RX REV CODE 250 W/ 637 OVERRIDE(OP)

## 2018-07-25 PROCEDURE — 360979 HCHG DIAGNOSTIC CATH

## 2018-07-25 PROCEDURE — 99152 MOD SED SAME PHYS/QHP 5/>YRS: CPT

## 2018-07-25 PROCEDURE — A9270 NON-COVERED ITEM OR SERVICE: HCPCS

## 2018-07-25 PROCEDURE — 700101 HCHG RX REV CODE 250

## 2018-07-25 PROCEDURE — C1769 GUIDE WIRE: HCPCS

## 2018-07-25 PROCEDURE — 700111 HCHG RX REV CODE 636 W/ 250 OVERRIDE (IP)

## 2018-07-25 PROCEDURE — 700117 HCHG RX CONTRAST REV CODE 255: Performed by: INTERNAL MEDICINE

## 2018-07-25 RX ORDER — SCOLOPAMINE TRANSDERMAL SYSTEM 1 MG/1
1 PATCH, EXTENDED RELEASE TRANSDERMAL
Status: DISCONTINUED | OUTPATIENT
Start: 2018-07-25 | End: 2018-07-25 | Stop reason: HOSPADM

## 2018-07-25 RX ORDER — HEPARIN SODIUM,PORCINE 1000/ML
VIAL (ML) INJECTION
Status: COMPLETED
Start: 2018-07-25 | End: 2018-07-25

## 2018-07-25 RX ORDER — MIDAZOLAM HYDROCHLORIDE 1 MG/ML
INJECTION INTRAMUSCULAR; INTRAVENOUS
Status: COMPLETED
Start: 2018-07-25 | End: 2018-07-25

## 2018-07-25 RX ORDER — VERAPAMIL HYDROCHLORIDE 2.5 MG/ML
INJECTION, SOLUTION INTRAVENOUS
Status: COMPLETED
Start: 2018-07-25 | End: 2018-07-25

## 2018-07-25 RX ORDER — ONDANSETRON 2 MG/ML
4 INJECTION INTRAMUSCULAR; INTRAVENOUS EVERY 4 HOURS PRN
Status: DISCONTINUED | OUTPATIENT
Start: 2018-07-25 | End: 2018-07-25 | Stop reason: HOSPADM

## 2018-07-25 RX ORDER — SODIUM CHLORIDE 9 MG/ML
INJECTION, SOLUTION INTRAVENOUS
Status: DISCONTINUED | OUTPATIENT
Start: 2018-07-25 | End: 2018-07-25 | Stop reason: HOSPADM

## 2018-07-25 RX ORDER — HALOPERIDOL 5 MG/ML
1 INJECTION INTRAMUSCULAR EVERY 6 HOURS PRN
Status: DISCONTINUED | OUTPATIENT
Start: 2018-07-25 | End: 2018-07-25 | Stop reason: HOSPADM

## 2018-07-25 RX ORDER — LIDOCAINE HYDROCHLORIDE 20 MG/ML
INJECTION, SOLUTION INFILTRATION; PERINEURAL
Status: COMPLETED
Start: 2018-07-25 | End: 2018-07-25

## 2018-07-25 RX ORDER — DIPHENHYDRAMINE HYDROCHLORIDE 50 MG/ML
25 INJECTION INTRAMUSCULAR; INTRAVENOUS EVERY 6 HOURS PRN
Status: DISCONTINUED | OUTPATIENT
Start: 2018-07-25 | End: 2018-07-25 | Stop reason: HOSPADM

## 2018-07-25 RX ORDER — DEXAMETHASONE SODIUM PHOSPHATE 4 MG/ML
4 INJECTION, SOLUTION INTRA-ARTICULAR; INTRALESIONAL; INTRAMUSCULAR; INTRAVENOUS; SOFT TISSUE
Status: DISCONTINUED | OUTPATIENT
Start: 2018-07-25 | End: 2018-07-25 | Stop reason: HOSPADM

## 2018-07-25 RX ADMIN — ASPIRIN 81 MG: 81 TABLET, COATED ORAL at 10:15

## 2018-07-25 RX ADMIN — HEPARIN SODIUM: 1000 INJECTION, SOLUTION INTRAVENOUS; SUBCUTANEOUS at 09:26

## 2018-07-25 RX ADMIN — IOHEXOL 96 ML: 350 INJECTION, SOLUTION INTRAVENOUS at 09:44

## 2018-07-25 RX ADMIN — NITROGLYCERIN 10 ML: 20 INJECTION INTRAVENOUS at 09:27

## 2018-07-25 RX ADMIN — HEPARIN SODIUM 2000 UNITS: 200 INJECTION, SOLUTION INTRAVENOUS at 09:27

## 2018-07-25 RX ADMIN — MIDAZOLAM HYDROCHLORIDE 2 MG: 1 INJECTION, SOLUTION INTRAMUSCULAR; INTRAVENOUS at 09:26

## 2018-07-25 RX ADMIN — SODIUM CHLORIDE: 9 INJECTION, SOLUTION INTRAVENOUS at 07:45

## 2018-07-25 RX ADMIN — LIDOCAINE HYDROCHLORIDE: 20 INJECTION, SOLUTION INFILTRATION; PERINEURAL at 09:26

## 2018-07-25 RX ADMIN — FENTANYL CITRATE 100 MCG: 50 INJECTION, SOLUTION INTRAMUSCULAR; INTRAVENOUS at 09:39

## 2018-07-25 RX ADMIN — VERAPAMIL HYDROCHLORIDE 5 MG: 2.5 INJECTION, SOLUTION INTRAVENOUS at 09:26

## 2018-07-25 ASSESSMENT — PAIN SCALES - GENERAL
PAINLEVEL_OUTOF10: 0

## 2018-07-25 NOTE — OR NURSING
0955 patient arrived from cath lab s/p left heart cath right radial approach TR band in place clean dry intact, patient wide awake, no c/o pain, shortness of breath, nausea vomiting n/t  1030 family at the bed side plan of care discussed with both patient and family agreeable.  1040 3 cc removed from hemoband site clean  1055 3 cc removed from hemoband site clean  1110 3 cc removed from hemoband site clean  1125 3 cc removed from hemoband site clean, Tr band removed gauze and tegaderm applied no bleeding no hematoma, vss  1143 discharge instructions given to patient, patient and family verbalize understanding of the orders, iv discontinued tip intact. Patient not in any distress.  1150 patient escorted via walking with all his personal belongings.

## 2018-07-25 NOTE — DISCHARGE INSTRUCTIONS
ACTIVITY: Rest and take it easy for the first 24 hours.  A responsible adult is recommended to remain with you during that time.  It is normal to feel sleepy.  We encourage you to not do anything that requires balance, judgment or coordination.    MILD FLU-LIKE SYMPTOMS ARE NORMAL. YOU MAY EXPERIENCE GENERALIZED MUSCLE ACHES, THROAT IRRITATION, HEADACHE AND/OR SOME NAUSEA.    FOR 24 HOURS DO NOT:  Drive, operate machinery or run household appliances.  Drink beer or alcoholic beverages.   Make important decisions or sign legal documents.    SPECIAL INSTRUCTIONS: follow up with primary care physician as needed  If you experience chest pain, severe shortness of breath call 911 return to ER  Resume your home medications  Take prescribed medications as ordered  Follow up with your cardiologist     DIET: To avoid nausea, slowly advance diet as tolerated, avoiding spicy or greasy foods for the first day.  Add more substantial food to your diet according to your physician's instructions.  Babies can be fed formula or breast milk as soon as they are hungry.  INCREASE FLUIDS AND FIBER TO AVOID CONSTIPATION.    SURGICAL DRESSING/BATHING: keep dressing clean dry intact for 24 hours you may remove dressing after 24 hours.    FOLLOW-UP APPOINTMENT:  A follow-up appointment should be arranged with your doctor in [phone]; call to schedule.    You should CALL YOUR PHYSICIAN if you develop:  Fever greater than 101 degrees F.  Pain not relieved by medication, or persistent nausea or vomiting.  Excessive bleeding (blood soaking through dressing) or unexpected drainage from the wound.  Extreme redness or swelling around the incision site, drainage of pus or foul smelling drainage.  Inability to urinate or empty your bladder within 8 hours.  Problems with breathing or chest pain.    You should call 911 if you develop problems with breathing or chest pain.  If you are unable to contact your doctor or surgical center, you should go to  the nearest emergency room or urgent care center.  Physician's telephone #: [phone]    If any questions arise, call your doctor.  If your doctor is not available, please feel free to call the Surgical Center at [phone] The Center is open Monday through Friday from 7AM to 7PM.  You can also call the HEALTH HOTLINE open 24 hours/day, 7 days/week and speak to a nurse at [phone], or toll free at [phone].    A registered nurse may call you a few days after your surgery to see how you are doing after your procedure.    MEDICATIONS: Resume taking daily medication.  Take prescribed pain medication with food.  If no medication is prescribed, you may take non-aspirin pain medication if needed.  PAIN MEDICATION CAN BE VERY CONSTIPATING.  Take a stool softener or laxative such as senokot, pericolace, or milk of magnesia if needed.    If your physician has prescribed pain medication that includes Acetaminophen (Tylenol), do not take additional Acetaminophen (Tylenol) while taking the prescribed medication.    Depression / Suicide Risk    As you are discharged from this [facility] facility, it is important to learn how to keep safe from harming yourself.    Recognize the warning signs:  · Abrupt changes in personality, positive or negative- including increase in energy   · Giving away possessions  · Change in eating patterns- significant weight changes-  positive or negative  · Change in sleeping patterns- unable to sleep or sleeping all the time   · Unwillingness or inability to communicate  · Depression  · Unusual sadness, discouragement and loneliness  · Talk of wanting to die  · Neglect of personal appearance   · Rebelliousness- reckless behavior  · Withdrawal from people/activities they love  · Confusion- inability to concentrate     If you or a loved one observes any of these behaviors or has concerns about self-harm, here's what you can do:  · Talk about it- your feelings and reasons for harming  yourself  · Remove any means that you might use to hurt yourself (examples: pills, rope, extension cords, firearm)  · Get professional help from the community (Mental Health, Substance Abuse, psychological counseling)  · Do not be alone:Call your Safe Contact- someone whom you trust who will be there for you.  · Call your local CRISIS HOTLINE 584-0376 or 300-949-0530  · Call your local Children's Mobile Crisis Response Team [place] (652) 736-8337 or [employer]  · Call the toll free National Suicide Prevention Hotlines   · National Suicide Prevention Lifeline 524-529-DIRZ (7517)  National [place] 800-SUICIDE (432-3928)  Radial Catherization Discharge Instructions      · Do not subject hand/arm to any forceful movements for 24 hours    i.e. supporting weight when rising from the chair or bed.   · Do not drive a car for 24 hours  · You may remove the dressing tomorrow  · You may shower on the day following your procedure.  Do not take a tub bath or submerge the puncture site in water for 3 days following the procedure.  · No Lifting more than 3-5 pounds with affected wrist for 5 days  · Follow up with Dr. Velazquez  2-4 weeks.  · Increase fluids for 2 days post procedure.  · Continue all previous medications unless otherwise instructed.    If bleeding should occur following discharge:  · Sit down and apply firm pressure to site with your fingers for 10 minutes  · If the bleeding stops, continue to sit quietly, keeping your wrist straight for 2 hours.  Notify physician as soon as possible ( [phone])  · If bleeding does not stop after 10 minutes, or if there is a large amount of bleeding or spurting, call 911 immediately.  Do not drive yourself to the hospital.

## 2018-07-25 NOTE — PROCEDURES
DATE OF SERVICE:  07/25/2018    REFERRING PHYSICIAN:  Renan Booth MD    PROCEDURES:  1.  Left heart catheterization.  2.  Coronary angiography.  3.  Left ventriculogram.    PREPROCEDURE DIAGNOSIS:  Chest pain with abnormal myocardial perfusion scan.    POSTPROCEDURE DIAGNOSES:  1.  Nonobstructive coronary artery disease.  2.  Normal left ventricular systolic function.  3.  Mildly elevated left ventricular end-diastolic pressure.    INDICATION:  The patient is a 57-year-old male with past medical history   significant for dyslipidemia and diabetes.  He has been experiencing chest   pain and underwent a myocardial perfusion scan, which was abnormal.  He was   scheduled for cardiac catheterization.    DESCRIPTION OF PROCEDURE:  After informed consent was signed by the patient,   patient was brought to the cardiac catheterization laboratory.  He was prepped   and draped in the usual sterile manner.  The right wrist area was   anesthetized with 2% Xylocaine.  A 6-Omani sheath was inserted into the right   radial artery using modified Seldinger technique.  Intra-arterial verapamil   and nitroglycerin were given.  IV heparin was given.  A 4-Omani pigtail   catheter was positioned into the left ventricle.  Left ventriculography was   performed.  This was exchanged for a JL4 catheter, which was positioned into   the left main coronary artery selecting the left circumflex artery.  Coronary   angiography was performed.  This was exchanged for JL3.5 catheter, which was   positioned into the left main coronary artery selecting the left anterior   descending artery and angiography was performed.  This was exchanged for a JR4   catheter, which was positioned into the right coronary artery.  Coronary   angiography was performed.  Patient tolerated the procedure well.  At the end   of the procedure, all catheters and sheaths were removed.  Hemoband was placed   in the right wrist.  He was transferred to [facility] in stable  condition.    HEMODYNAMIC DATA:  Hemodynamic data shows aortic pressures of 120/70 with mean   of 80 mmHg and /0 with LVEDP of 15 mmHg.    AORTIC VALVE:  There was no significant gradient noted.    LEFT VENTRICULOGRAM:  Ten mL of contrast was delivered for 3 seconds.    Ejection fraction was estimated to be 65%.  There was no segmental wall motion   abnormalities noted.    ANGIOGRAM:  LEFT MAIN CORONARY ARTERY:  Left main coronary is a very short large-caliber   vessel free of disease.  LEFT ANTERIOR DESCENDING ARTERY:  Left anterior descending artery is a long   large-caliber vessel, which wraps around the apex.  Mid portion of the vessel,   there are 2 diffuse 40% stenosis noted.  There are small first and second   diagonal branches noted free of disease.  LEFT CIRCUMFLEX ARTERY:  Left circumflex artery is a codominant large-caliber   vessel with large first obtuse marginal branch, moderate left second obtuse   marginal branch and small to moderate left posterolateral branch.  Left   circumflex artery and its branches are free of disease.  RIGHT CORONARY ARTERY:  Right coronary artery is a codominant moderate caliber   vessel with proximal concentric 40% stenosis.  There is a small to moderate   posterior descending artery branch noted free of disease.    IMPRESSION:  1.  Nonobstructive coronary artery disease.  2.  Normal left ventricular systolic function.  3.  Mildly elevated left ventricular end-diastolic pressure.    RECOMMENDATIONS:  Recommend medical therapy.       ____________________________________     MD JAG GUTIERRES / MARICEL    DD:  07/25/2018 09:50:29  DT:  07/25/2018 10:09:18    D#:  [id number]  Job#:  [id number]

## 2018-07-27 DIAGNOSIS — E11.8 TYPE 2 DIABETES MELLITUS WITH COMPLICATION, WITHOUT LONG-TERM CURRENT USE OF INSULIN (HCC): ICD-10-CM

## 2018-07-27 DIAGNOSIS — E03.9 ACQUIRED HYPOTHYROIDISM: ICD-10-CM

## 2018-07-27 NOTE — TELEPHONE ENCOUNTER
Was the patient seen in the last year in this department? Yes    Does patient have an active prescription for medications requested? No     Received Request Via: Pharmacy      Pt met protocol?: Yes pt last ov 7/18 has upcoming appt 8/16/18   Lab Results   Component Value Date/Time    HBA1C 7.5 (H) 03/10/2018 10:24 AM      TSH   Date Value Ref Range Status   09/06/2017 2.610 0.300 - 3.700 uIU/mL Final       Lab Results  Component Value Date/Time   CHOLSTRLTOT 185 03/10/2018 1024   TRIGLYCERIDE 251 (H) 03/10/2018 1024   HDL 44 03/10/2018 1024   LDL 91 03/10/2018 1024

## 2018-07-29 RX ORDER — LEVOTHYROXINE SODIUM 0.03 MG/1
25 TABLET ORAL
Qty: 90 TAB | Refills: 0 | Status: SHIPPED | OUTPATIENT
Start: 2018-07-29 | End: 2018-10-25 | Stop reason: SDUPTHER

## 2018-07-30 ENCOUNTER — HOSPITAL ENCOUNTER (OUTPATIENT)
Dept: LAB | Facility: MEDICAL CENTER | Age: 58
End: 2018-07-30
Attending: NURSE PRACTITIONER
Payer: COMMERCIAL

## 2018-07-30 DIAGNOSIS — E11.8 TYPE 2 DIABETES MELLITUS WITH COMPLICATION, WITHOUT LONG-TERM CURRENT USE OF INSULIN (HCC): ICD-10-CM

## 2018-07-30 LAB
ALBUMIN SERPL BCP-MCNC: 4.3 G/DL (ref 3.2–4.9)
ALBUMIN/GLOB SERPL: 1.6 G/DL
ALP SERPL-CCNC: 70 U/L (ref 30–99)
ALT SERPL-CCNC: 28 U/L (ref 2–50)
ANION GAP SERPL CALC-SCNC: 13 MMOL/L (ref 0–11.9)
AST SERPL-CCNC: 13 U/L (ref 12–45)
BILIRUB SERPL-MCNC: 0.4 MG/DL (ref 0.1–1.5)
BUN SERPL-MCNC: 30 MG/DL (ref 8–22)
CALCIUM SERPL-MCNC: 9 MG/DL (ref 8.5–10.5)
CHLORIDE SERPL-SCNC: 104 MMOL/L (ref 96–112)
CO2 SERPL-SCNC: 19 MMOL/L (ref 20–33)
CREAT SERPL-MCNC: 1.03 MG/DL (ref 0.5–1.4)
CREAT UR-MCNC: 118.1 MG/DL
GLOBULIN SER CALC-MCNC: 2.7 G/DL (ref 1.9–3.5)
GLUCOSE SERPL-MCNC: 279 MG/DL (ref 65–99)
MICROALBUMIN UR-MCNC: 1.4 MG/DL
MICROALBUMIN/CREAT UR: 12 MG/G (ref 0–30)
POTASSIUM SERPL-SCNC: 4.4 MMOL/L (ref 3.6–5.5)
PROT SERPL-MCNC: 7 G/DL (ref 6–8.2)
SODIUM SERPL-SCNC: 136 MMOL/L (ref 135–145)

## 2018-07-30 PROCEDURE — 36415 COLL VENOUS BLD VENIPUNCTURE: CPT

## 2018-07-30 PROCEDURE — 82570 ASSAY OF URINE CREATININE: CPT

## 2018-07-30 PROCEDURE — 82043 UR ALBUMIN QUANTITATIVE: CPT

## 2018-07-30 PROCEDURE — 83036 HEMOGLOBIN GLYCOSYLATED A1C: CPT

## 2018-07-30 PROCEDURE — 80053 COMPREHEN METABOLIC PANEL: CPT

## 2018-07-31 LAB
EST. AVERAGE GLUCOSE BLD GHB EST-MCNC: 237 MG/DL
HBA1C MFR BLD: 9.9 % (ref 0–5.6)

## 2018-08-20 DIAGNOSIS — E11.8 TYPE 2 DIABETES MELLITUS WITH COMPLICATION, WITHOUT LONG-TERM CURRENT USE OF INSULIN (HCC): ICD-10-CM

## 2018-08-20 RX ORDER — GLIPIZIDE 5 MG/1
5 TABLET ORAL 2 TIMES DAILY
Qty: 180 TAB | Refills: 0 | Status: SHIPPED | OUTPATIENT
Start: 2018-08-20 | End: 2018-11-20 | Stop reason: SDUPTHER

## 2018-08-20 NOTE — TELEPHONE ENCOUNTER
Was the patient seen in the last year in this department? Yes    Does patient have an active prescription for medications requested? No     Received Request Via: Pharmacy      Pt met protocol?: Yes pt last ov 7/2018   Lab Results   Component Value Date/Time    HBA1C 9.9 (H) 07/30/2018 09:40 AM        Lab Results  Component Value Date/Time   CHOLSTRLTOT 185 03/10/2018 1024   TRIGLYCERIDE 251 (H) 03/10/2018 1024   HDL 44 03/10/2018 1024   LDL 91 03/10/2018 1024

## 2018-08-23 ENCOUNTER — OFFICE VISIT (OUTPATIENT)
Dept: MEDICAL GROUP | Facility: PHYSICIAN GROUP | Age: 58
End: 2018-08-23
Payer: COMMERCIAL

## 2018-08-23 VITALS
DIASTOLIC BLOOD PRESSURE: 70 MMHG | WEIGHT: 271 LBS | SYSTOLIC BLOOD PRESSURE: 122 MMHG | RESPIRATION RATE: 16 BRPM | OXYGEN SATURATION: 97 % | TEMPERATURE: 97.7 F | BODY MASS INDEX: 34.78 KG/M2 | HEART RATE: 102 BPM | HEIGHT: 74 IN

## 2018-08-23 DIAGNOSIS — E66.9 OBESITY (BMI 30-39.9): ICD-10-CM

## 2018-08-23 DIAGNOSIS — E11.8 TYPE 2 DIABETES MELLITUS WITH COMPLICATION, WITHOUT LONG-TERM CURRENT USE OF INSULIN (HCC): ICD-10-CM

## 2018-08-23 PROCEDURE — 99214 OFFICE O/P EST MOD 30 MIN: CPT | Performed by: NURSE PRACTITIONER

## 2018-08-23 RX ORDER — PEN NEEDLE, DIABETIC 30 GX5/16"
15 NEEDLE, DISPOSABLE MISCELLANEOUS DAILY
Qty: 100 EACH | Refills: 1 | Status: SHIPPED | OUTPATIENT
Start: 2018-08-23 | End: 2019-02-18 | Stop reason: SDUPTHER

## 2018-08-23 NOTE — ASSESSMENT & PLAN NOTE
This is a chronic condition, uncontrolled.  His last known hemoglobin A1c was 7.5%, it is now up to 9.9%.  He is currently on glipizide 5 mg twice daily and metformin 1000 mg twice daily.  Discussed with him the need to make changes to his medications and would recommend that he start on a long-acting insulin, he is agreeable to this today.  We will have him stop metformin, will replace this with Synjardy, 12.5-1000 mg twice daily.  We will add Lantus, 15 units at bedtime for 1 week and then he is to increase by 2 units every 3 days to keep fasting blood sugar between 1/21/1930.  He is to have labs before he follows up with me in 3 months.  He is appropriately on a low-dose aspirin, ACE inhibitor, and a statin.

## 2018-08-23 NOTE — ASSESSMENT & PLAN NOTE
This is a chronic ongoing condition, however weight has improved since his last visit with me in mid July.  He has increased his physical activity and will continue working on healthy diet.

## 2018-08-23 NOTE — PATIENT INSTRUCTIONS
Stop the metformin, start Synjardy    Stay on glipizide, but start lantus 15 units at bedtime, after a week go up by 2 units every 3 days for FBS of 120-130    See me in 3 months with lab

## 2018-08-23 NOTE — PROGRESS NOTES
Chief Complaint   Patient presents with   • Diabetes     fv labs         This is a 57 y.o.male patient that presents today with the following: Follow-up, review labs    Type 2 diabetes mellitus with complication, without long-term current use of insulin (HCC)  This is a chronic condition, uncontrolled.  His last known hemoglobin A1c was 7.5%, it is now up to 9.9%.  He is currently on glipizide 5 mg twice daily and metformin 1000 mg twice daily.  Discussed with him the need to make changes to his medications and would recommend that he start on a long-acting insulin, he is agreeable to this today.  We will have him stop metformin, will replace this with Synjardy, 12.5-1000 mg twice daily.  We will add Lantus, 15 units at bedtime for 1 week and then he is to increase by 2 units every 3 days to keep fasting blood sugar between 1/21/1930.  He is to have labs before he follows up with me in 3 months.  He is appropriately on a low-dose aspirin, ACE inhibitor, and a statin.    Obesity (BMI 30-39.9)  This is a chronic ongoing condition, however weight has improved since his last visit with me in mid July.  He has increased his physical activity and will continue working on healthy diet.      Hospital Outpatient Visit on 07/30/2018   Component Date Value   • Sodium 07/30/2018 136    • Potassium 07/30/2018 4.4    • Chloride 07/30/2018 104    • Co2 07/30/2018 19*   • Anion Gap 07/30/2018 13.0*   • Glucose 07/30/2018 279*   • Bun 07/30/2018 30*   • Creatinine 07/30/2018 1.03    • Calcium 07/30/2018 9.0    • AST(SGOT) 07/30/2018 13    • ALT(SGPT) 07/30/2018 28    • Alkaline Phosphatase 07/30/2018 70    • Total Bilirubin 07/30/2018 0.4    • Albumin 07/30/2018 4.3    • Total Protein 07/30/2018 7.0    • Globulin 07/30/2018 2.7    • A-G Ratio 07/30/2018 1.6    • Glycohemoglobin 07/30/2018 9.9*   • Est Avg Glucose 07/30/2018 237    • Creatinine, Urine 07/30/2018 118.10    • Microalbumin, Urine Rand* 07/30/2018 1.4    • Micro Alb  "Creat Ratio 07/30/2018 12    • GFR If  07/30/2018 >60    • GFR If Non  Ameri* 07/30/2018 >60    Admission on 07/25/2018, Discharged on 07/25/2018   Component Date Value   • Glucose - Accu-Ck 07/25/2018 222*         clinical course has been stable    Past Medical History:   Diagnosis Date   • Arthritis    • Breath shortness    • Depression     depression   • Diabetes (HCC)     oral medication   • High cholesterol    • Hyperlipidemia    • Hypertension        Past Surgical History:   Procedure Laterality Date   • ATHROPLASTY Left     hip replacement   • CHOLECYSTECTOMY         Family History   Problem Relation Age of Onset   • Diabetes Mother    • Stroke Father        Patient has no known allergies.    Current Outpatient Prescriptions Ordered in [facility]   Medication Sig Dispense Refill   • aspirin 81 MG tablet Take 81 mg by mouth every day.     • Empagliflozin-Metformin HCl (SYNJARDY) 12.5-1000 MG Tab Take 1 Tab by mouth 2 times a day. 180 Tab 1   • insulin glargine (LANTUS) 100 UNIT/ML Solution Pen-injector injection Inject 15 Units as instructed every evening. 5 PEN 3   • Insulin Pen Needle (PEN NEEDLES 5/16\") 30G X 8 MM Misc 15 Units by Does not apply route every day. To be used with lantus pen 100 Each 1   • glipiZIDE (GLUCOTROL) 5 MG Tab Take 1 Tab by mouth 2 times a day. 180 Tab 0   • levothyroxine (SYNTHROID) 25 MCG Tab Take 1 Tab by mouth Every morning on an empty stomach. 90 Tab 0   • Cinnamon 500 MG Cap Take 1,000 mg by mouth every morning.     • escitalopram (LEXAPRO) 20 MG tablet Take 20 mg by mouth every morning.     • lisinopril (PRINIVIL) 20 MG Tab Take 20 mg by mouth every morning.     • naproxen (NAPROSYN) 500 MG Tab Take 1 Tab by mouth 2 times a day, with meals. 60 Tab 0   • loperamide (ANTI-DIARRHEAL) 2 MG Cap Take 2 mg by mouth 4 times a day as needed for Diarrhea.     • 5-Hydroxytryptophan (5-HTP) 100 MG Cap Take 1 Cap by mouth every bedtime.     • Omega-3 Fatty Acids (FISH OIL) " "1000 MG Cap capsule Take 1,000 mg by mouth every morning.     • diphenhydrAMINE (BENADRYL ALLERGY) 25 MG capsule Take 50 mg by mouth every bedtime.     • Melatonin 1 MG Cap Take 1 Cap by mouth every bedtime.     • therapeutic multivitamin-minerals (THERAGRAN-M) Tab Take 1 Tab by mouth every morning.     • B Complex Vitamins (VITAMIN B COMPLEX PO) Take 1 Tab by mouth every morning.     • simvastatin (ZOCOR) 20 MG Tab Take 0.5 Tabs by mouth every evening. 45 Tab 1   • trazodone (DESYREL) 100 MG Tab Take 2 Tabs by mouth at bedtime as needed for Sleep. 180 Tab 1     No current Epic-ordered facility-administered medications on file.        Constitutional ROS: No unexpected change in weight, No weakness, No unexplained fevers, sweats, or chills  Pulmonary ROS: No chronic cough, sputum, or hemoptysis, No shortness of breath, No recent change in breathing  Cardiovascular ROS: No chest pain  Neurologic ROS: Normal development, No seizures, No weakness  Endocrine ROS: Positive per HPI    Physical exam:  /70   Pulse (!) 102   Temp 36.5 °C (97.7 °F)   Resp 16   Ht 1.867 m (6' 1.5\")   Wt 122.9 kg (271 lb)   SpO2 97%   BMI 35.27 kg/m²   General Appearance: Middle-aged male, alert, no distress, obese, well-groomed  Skin: Skin color, texture, turgor normal. No rashes or lesions.  Lungs: negative findings: normal respiratory rate and rhythm, lungs clear to auscultation  Musculoskeletal: negative findings: no evidence of joint instability, no evidence of muscle atrophy, no deformities present  Neurologic: intact, CN II through XII grossly intact    Medical decision making/discussion: Medication adjustments made as mentioned above.  He is to follow-up with me in 3 months with labs done before visit.  He was encouraged to continue with his dietary changes and increase his physical activity as well as efforts towards weight loss.    Farzad was seen today for diabetes.    Diagnoses and all orders for this visit:    Type 2 " "diabetes mellitus with complication, without long-term current use of insulin (HCC)  -     Empagliflozin-Metformin HCl (SYNJARDY) 12.5-1000 MG Tab; Take 1 Tab by mouth 2 times a day.  -     insulin glargine (LANTUS) 100 UNIT/ML Solution Pen-injector injection; Inject 15 Units as instructed every evening.  -     Insulin Pen Needle (PEN NEEDLES 5/16\") 30G X 8 MM Misc; 15 Units by Does not apply route every day. To be used with lantus pen  -     COMP METABOLIC PANEL; Future  -     HEMOGLOBIN A1C; Future    Obesity (BMI 30-39.9)          Please note that this dictation was created using voice recognition software. I have made every reasonable attempt to correct obvious errors, but I expect that there are errors of grammar and possibly content that I did not discover before finalizing the note.        "

## 2018-09-21 DIAGNOSIS — F51.01 PRIMARY INSOMNIA: ICD-10-CM

## 2018-09-24 ENCOUNTER — OFFICE VISIT (OUTPATIENT)
Dept: URGENT CARE | Facility: PHYSICIAN GROUP | Age: 58
End: 2018-09-24
Payer: COMMERCIAL

## 2018-09-24 VITALS
WEIGHT: 266 LBS | HEIGHT: 73 IN | BODY MASS INDEX: 35.25 KG/M2 | HEART RATE: 120 BPM | RESPIRATION RATE: 18 BRPM | TEMPERATURE: 99.4 F | OXYGEN SATURATION: 93 % | SYSTOLIC BLOOD PRESSURE: 130 MMHG | DIASTOLIC BLOOD PRESSURE: 80 MMHG

## 2018-09-24 DIAGNOSIS — J22 ACUTE RESPIRATORY INFECTION: ICD-10-CM

## 2018-09-24 DIAGNOSIS — R05.9 COUGH: ICD-10-CM

## 2018-09-24 PROCEDURE — 99214 OFFICE O/P EST MOD 30 MIN: CPT | Performed by: FAMILY MEDICINE

## 2018-09-24 RX ORDER — TRAZODONE HYDROCHLORIDE 100 MG/1
TABLET ORAL
Qty: 180 TAB | Refills: 0 | Status: SHIPPED | OUTPATIENT
Start: 2018-09-24 | End: 2018-12-21 | Stop reason: SDUPTHER

## 2018-09-24 RX ORDER — AZITHROMYCIN 250 MG/1
TABLET, FILM COATED ORAL
Qty: 6 TAB | Refills: 0 | Status: SHIPPED | OUTPATIENT
Start: 2018-09-24 | End: 2019-04-20

## 2018-09-24 NOTE — TELEPHONE ENCOUNTER
Was the patient seen in the last year in this department? Yes    Does patient have an active prescription for medications requested? No     Received Request Via: Pharmacy      Pt met protocol?: Yes, OV last month

## 2018-09-24 NOTE — PROGRESS NOTES
Chief Complaint:    Chief Complaint   Patient presents with   • Runny Nose     with cough x2d       History of Present Illness:    This is a new problem. Symptoms x 2-3 days; has fever up to 100 F, nasal symptoms with purulent mucus from nose, sore throat, and cough productive of purulent mucus. Symptoms are moderate-severe overall and not getting better. OTC meds have not been helpful for symptoms.      Review of Systems:    Constitutional: See HPI.  Eyes: Negative for change in vision, photophobia, pain, redness, and discharge.  ENT: See HPI.   Respiratory: See HPI.   Cardiovascular: Negative for chest pain, palpitations, orthopnea, claudication, leg swelling, and PND.   Gastrointestinal: Negative for abdominal pain, nausea, vomiting, diarrhea, constipation, blood in stool, and melena.   Genitourinary: Negative for dysuria, urinary urgency, urinary frequency, hematuria, and flank pain.   Musculoskeletal: Negative for myalgias, joint pain, neck pain, and back pain.   Skin: Negative for rash and itching.   Neurological: Negative for dizziness, tingling, tremors, sensory change, speech change, focal weakness, seizures, loss of consciousness, and headaches.   Endo: Diabetic, on medication.  Heme: Does not bruise/bleed easily.   Psychiatric/Behavioral: No new symptoms.      Past Medical History:    Past Medical History:   Diagnosis Date   • Arthritis    • Breath shortness    • Depression     depression   • Diabetes (HCC)     oral medication   • High cholesterol    • Hyperlipidemia    • Hypertension      Past Surgical History:    Past Surgical History:   Procedure Laterality Date   • ATHROPLASTY Left     hip replacement   • CHOLECYSTECTOMY       Social History:    Social History     Social History   • Marital status:      Spouse name: N/A   • Number of children: N/A   • Years of education: N/A     Occupational History   • Not on file.     Social History Main Topics   • Smoking status: Never Smoker   • Smokeless  "tobacco: Former User     Types: Chew     Quit date: 1/1/1986   • Alcohol use Yes      Comment: 1 per month    • Drug use: No   • Sexual activity: Not on file     Other Topics Concern   • Not on file     Social History Narrative   • No narrative on file     Family History:    Family History   Problem Relation Age of Onset   • Diabetes Mother    • Stroke Father      Medications:    Current Outpatient Prescriptions on File Prior to Visit   Medication Sig Dispense Refill   • aspirin 81 MG tablet Take 81 mg by mouth every day.     • Empagliflozin-Metformin HCl (SYNJARDY) 12.5-1000 MG Tab Take 1 Tab by mouth 2 times a day. 180 Tab 1   • insulin glargine (LANTUS) 100 UNIT/ML Solution Pen-injector injection Inject 15 Units as instructed every evening. 5 PEN 3   • Insulin Pen Needle (PEN NEEDLES 5/16\") 30G X 8 MM Misc 15 Units by Does not apply route every day. To be used with lantus pen 100 Each 1   • glipiZIDE (GLUCOTROL) 5 MG Tab Take 1 Tab by mouth 2 times a day. 180 Tab 0   • levothyroxine (SYNTHROID) 25 MCG Tab Take 1 Tab by mouth Every morning on an empty stomach. 90 Tab 0   • Cinnamon 500 MG Cap Take 1,000 mg by mouth every morning.     • escitalopram (LEXAPRO) 20 MG tablet Take 20 mg by mouth every morning.     • lisinopril (PRINIVIL) 20 MG Tab Take 20 mg by mouth every morning.     • naproxen (NAPROSYN) 500 MG Tab Take 1 Tab by mouth 2 times a day, with meals. 60 Tab 0   • loperamide (ANTI-DIARRHEAL) 2 MG Cap Take 2 mg by mouth 4 times a day as needed for Diarrhea.     • 5-Hydroxytryptophan (5-HTP) 100 MG Cap Take 1 Cap by mouth every bedtime.     • Omega-3 Fatty Acids (FISH OIL) 1000 MG Cap capsule Take 1,000 mg by mouth every morning.     • diphenhydrAMINE (BENADRYL ALLERGY) 25 MG capsule Take 50 mg by mouth every bedtime.     • Melatonin 1 MG Cap Take 1 Cap by mouth every bedtime.     • therapeutic multivitamin-minerals (THERAGRAN-M) Tab Take 1 Tab by mouth every morning.     • B Complex Vitamins (VITAMIN B " "COMPLEX PO) Take 1 Tab by mouth every morning.     • simvastatin (ZOCOR) 20 MG Tab Take 0.5 Tabs by mouth every evening. 45 Tab 1   • trazodone (DESYREL) 100 MG Tab Take 2 Tabs by mouth at bedtime as needed for Sleep. 180 Tab 1     No current facility-administered medications on file prior to visit.      Allergies:    No Known Allergies      Vitals:    Vitals:    09/24/18 1252   BP: 130/80   BP Location: Left arm   Patient Position: Sitting   BP Cuff Size: Large adult   Pulse: (!) 120   Resp: 18   Temp: 37.4 °C (99.4 °F)   TempSrc: Temporal   SpO2: 93%   Weight: 120.7 kg (266 lb)   Height: 1.854 m (6' 1\")       Physical Exam:    Constitutional: Vital signs reviewed. Appears well-developed and well-nourished. Hoarse, occl cough. No acute distress.   Eyes: Sclera white, conjunctivae clear.   ENT: External ears normal. External auditory canals normal without discharge. TMs translucent and non-bulging. Hearing normal. Nasal mucosa erythematous.. Lips/teeth are normal. Oral mucosa pink and moist. Posterior pharynx: WNL.  Neck: Neck supple.   Cardiovascular: Regular rate and rhythm. No murmur.  Pulmonary/Chest: Respirations non-labored. Mildly coarse breath sounds bilaterally.  Lymph: Cervical nodes without tenderness or enlargement.  Musculoskeletal: Normal gait. Normal range of motion. No muscular atrophy or weakness.  Neurological: Alert and oriented to person, place, and time. Muscle tone normal. Coordination normal.   Skin: No rashes or lesions. Warm, dry, normal turgor.  Psychiatric: Normal mood and affect. Behavior is normal. Judgment and thought content normal.       Assessment / Plan:    1. Acute respiratory infection  - Hydrocod Polst-CPM Polst ER (TUSSIONEX) 10-8 MG/5ML Suspension Extended Release; Take 5 mL by mouth every 12 hours as needed (TAKE IF NEEDED FOR NASAL SYMPTOMS OR COUGH. MAY CAUSE DROWSINESS.) for up to 10 days.  Dispense: 100 mL; Refill: 0  - azithromycin (ZITHROMAX) 250 MG Tab; 2 TABS ON DAY 1, " 1 TAB ON DAYS 2-5.  Dispense: 6 Tab; Refill: 0    2. Cough  - Hydrocod Polst-CPM Polst ER (TUSSIONEX) 10-8 MG/5ML Suspension Extended Release; Take 5 mL by mouth every 12 hours as needed (TAKE IF NEEDED FOR NASAL SYMPTOMS OR COUGH. MAY CAUSE DROWSINESS.) for up to 10 days.  Dispense: 100 mL; Refill: 0      Discussed with him DDX, management options, and risks, benefits, and alternatives to treatment plan agreed upon.    Agreeable to medications prescribed.  report checked - no Rx x 3 years.    Discussed expected course of duration, time for improvement, and to seek follow-up in Emergency Room, urgent care, or with PCP if getting worse at any time or not improving within expected time frame.

## 2018-10-25 DIAGNOSIS — E03.9 ACQUIRED HYPOTHYROIDISM: ICD-10-CM

## 2018-10-29 RX ORDER — LEVOTHYROXINE SODIUM 0.03 MG/1
TABLET ORAL
Qty: 90 TAB | Refills: 0 | Status: SHIPPED | OUTPATIENT
Start: 2018-10-29 | End: 2019-02-04 | Stop reason: SDUPTHER

## 2018-10-29 NOTE — TELEPHONE ENCOUNTER
Was the patient seen in the last year in this department? Yes    Does patient have an active prescription for medications requested? No     Received Request Via: Pharmacy    Pt met protocol?: Yes     Last OV 08/2018    TSH   Date Value Ref Range Status   09/06/2017 2.610 0.300 - 3.700 uIU/mL Final

## 2018-11-20 ENCOUNTER — HOSPITAL ENCOUNTER (OUTPATIENT)
Dept: LAB | Facility: MEDICAL CENTER | Age: 58
End: 2018-11-20
Attending: NURSE PRACTITIONER
Payer: COMMERCIAL

## 2018-11-20 DIAGNOSIS — E03.9 ACQUIRED HYPOTHYROIDISM: ICD-10-CM

## 2018-11-20 DIAGNOSIS — E11.8 TYPE 2 DIABETES MELLITUS WITH COMPLICATION, WITHOUT LONG-TERM CURRENT USE OF INSULIN (HCC): ICD-10-CM

## 2018-11-20 LAB
ALBUMIN SERPL BCP-MCNC: 4.3 G/DL (ref 3.2–4.9)
ALBUMIN/GLOB SERPL: 1.5 G/DL
ALP SERPL-CCNC: 61 U/L (ref 30–99)
ALT SERPL-CCNC: 23 U/L (ref 2–50)
ANION GAP SERPL CALC-SCNC: 10 MMOL/L (ref 0–11.9)
AST SERPL-CCNC: 15 U/L (ref 12–45)
BILIRUB SERPL-MCNC: 0.4 MG/DL (ref 0.1–1.5)
BUN SERPL-MCNC: 34 MG/DL (ref 8–22)
CALCIUM SERPL-MCNC: 9.1 MG/DL (ref 8.5–10.5)
CHLORIDE SERPL-SCNC: 106 MMOL/L (ref 96–112)
CO2 SERPL-SCNC: 20 MMOL/L (ref 20–33)
CREAT SERPL-MCNC: 1.06 MG/DL (ref 0.5–1.4)
EST. AVERAGE GLUCOSE BLD GHB EST-MCNC: 192 MG/DL
FASTING STATUS PATIENT QL REPORTED: NORMAL
GLOBULIN SER CALC-MCNC: 2.9 G/DL (ref 1.9–3.5)
GLUCOSE SERPL-MCNC: 161 MG/DL (ref 65–99)
HBA1C MFR BLD: 8.3 % (ref 0–5.6)
POTASSIUM SERPL-SCNC: 4.6 MMOL/L (ref 3.6–5.5)
PROT SERPL-MCNC: 7.2 G/DL (ref 6–8.2)
SODIUM SERPL-SCNC: 136 MMOL/L (ref 135–145)
T4 FREE SERPL-MCNC: 0.79 NG/DL (ref 0.53–1.43)
TSH SERPL DL<=0.005 MIU/L-ACNC: 2.88 UIU/ML (ref 0.38–5.33)

## 2018-11-20 PROCEDURE — 84439 ASSAY OF FREE THYROXINE: CPT

## 2018-11-20 PROCEDURE — 84443 ASSAY THYROID STIM HORMONE: CPT

## 2018-11-20 PROCEDURE — 80053 COMPREHEN METABOLIC PANEL: CPT

## 2018-11-20 PROCEDURE — 36415 COLL VENOUS BLD VENIPUNCTURE: CPT

## 2018-11-20 PROCEDURE — 83036 HEMOGLOBIN GLYCOSYLATED A1C: CPT

## 2018-11-21 NOTE — TELEPHONE ENCOUNTER
Was the patient seen in the last year in this department? Yes    Does patient have an active prescription for medications requested? No     Received Request Via: Pharmacy      Pt met protocol?: Yes  pt last ov 8/2018    Lab Results  Component Value Date/Time   CHOLSTRLTOT 185 03/10/2018 1024   TRIGLYCERIDE 251 (H) 03/10/2018 1024   HDL 44 03/10/2018 1024   LDL 91 03/10/2018 1024         Lab Results   Component Value Date/Time    HBA1C 8.3 (H) 11/20/2018 11:24 AM

## 2018-11-26 RX ORDER — GLIPIZIDE 5 MG/1
TABLET ORAL
Qty: 180 TAB | Refills: 0 | Status: SHIPPED | OUTPATIENT
Start: 2018-11-26 | End: 2019-02-25 | Stop reason: SDUPTHER

## 2018-11-29 ENCOUNTER — OFFICE VISIT (OUTPATIENT)
Dept: MEDICAL GROUP | Facility: PHYSICIAN GROUP | Age: 58
End: 2018-11-29
Payer: COMMERCIAL

## 2018-11-29 VITALS
SYSTOLIC BLOOD PRESSURE: 122 MMHG | RESPIRATION RATE: 16 BRPM | WEIGHT: 276 LBS | OXYGEN SATURATION: 94 % | BODY MASS INDEX: 36.58 KG/M2 | HEIGHT: 73 IN | DIASTOLIC BLOOD PRESSURE: 78 MMHG | TEMPERATURE: 97.1 F | HEART RATE: 96 BPM

## 2018-11-29 DIAGNOSIS — R05.9 COUGH: ICD-10-CM

## 2018-11-29 DIAGNOSIS — E11.8 TYPE 2 DIABETES MELLITUS WITH COMPLICATION, WITHOUT LONG-TERM CURRENT USE OF INSULIN (HCC): ICD-10-CM

## 2018-11-29 DIAGNOSIS — J30.9 ALLERGIC RHINITIS, UNSPECIFIED SEASONALITY, UNSPECIFIED TRIGGER: ICD-10-CM

## 2018-11-29 PROCEDURE — 99214 OFFICE O/P EST MOD 30 MIN: CPT | Performed by: NURSE PRACTITIONER

## 2018-11-29 RX ORDER — CETIRIZINE HYDROCHLORIDE 10 MG/1
10 TABLET ORAL DAILY
Qty: 90 TAB | Refills: 1 | Status: SHIPPED | OUTPATIENT
Start: 2018-11-29 | End: 2019-04-20 | Stop reason: CLARIF

## 2018-11-29 RX ORDER — DEXTROMETHORPHAN HYDROBROMIDE AND PROMETHAZINE HYDROCHLORIDE 15; 6.25 MG/5ML; MG/5ML
5 SYRUP ORAL EVERY 4 HOURS PRN
Qty: 120 ML | Refills: 0 | Status: SHIPPED | OUTPATIENT
Start: 2018-11-29 | End: 2019-04-20

## 2018-11-29 RX ORDER — FLUTICASONE PROPIONATE 50 MCG
1 SPRAY, SUSPENSION (ML) NASAL DAILY
Qty: 3 BOTTLE | Refills: 1 | Status: SHIPPED | OUTPATIENT
Start: 2018-11-29 | End: 2019-04-20

## 2018-11-29 ASSESSMENT — PATIENT HEALTH QUESTIONNAIRE - PHQ9
8. MOVING OR SPEAKING SO SLOWLY THAT OTHER PEOPLE COULD HAVE NOTICED. OR THE OPPOSITE, BEING SO FIGETY OR RESTLESS THAT YOU HAVE BEEN MOVING AROUND A LOT MORE THAN USUAL: NOT AT ALL
4. FEELING TIRED OR HAVING LITTLE ENERGY: NOT AT ALL
6. FEELING BAD ABOUT YOURSELF - OR THAT YOU ARE A FAILURE OR HAVE LET YOURSELF OR YOUR FAMILY DOWN: NOT AL ALL
SUM OF ALL RESPONSES TO PHQ9 QUESTIONS 1 AND 2: 0
3. TROUBLE FALLING OR STAYING ASLEEP OR SLEEPING TOO MUCH: NOT AT ALL
7. TROUBLE CONCENTRATING ON THINGS, SUCH AS READING THE NEWSPAPER OR WATCHING TELEVISION: NOT AT ALL
SUM OF ALL RESPONSES TO PHQ QUESTIONS 1-9: 0
1. LITTLE INTEREST OR PLEASURE IN DOING THINGS: NOT AT ALL
9. THOUGHTS THAT YOU WOULD BE BETTER OFF DEAD, OR OF HURTING YOURSELF: NOT AT ALL
2. FEELING DOWN, DEPRESSED, IRRITABLE, OR HOPELESS: NOT AT ALL
5. POOR APPETITE OR OVEREATING: NOT AT ALL

## 2018-11-29 NOTE — PATIENT INSTRUCTIONS
Switch the antihistamine to Allegra, Zrytec, Xyzal or Claritin     flonase 1  Spray to each nostril twice a day    Generous use of nasal saline    Cough medication    See me in 3 months with labs

## 2018-11-29 NOTE — PROGRESS NOTES
Chief Complaint   Patient presents with   • Cough     x 2 months         This is a 58 y.o.male patient that presents today with the following: Cough    Cough  See additional notes, will start allergy regimen.     Allergic rhinitis  Pt reporting s/sx consistent with untreated allergic rhinitis. He has mildly productive cough, bring up light yellow to clear phlegm, clear runny nose, sneezing, and nasal congestion. Denies fever, chest congestion, ear pain, sinus pressure or pain. Discussed treatment including 2nd generation antihistamine, flonase and generous use of nasal saline for rinsing and nasal congestion.     Type 2 diabetes mellitus with complication, without long-term current use of insulin (HCC)  This is chronic, stable, suboptimally improved on current regimen. A1c 3 months ago was 9.9%, it is now down to 8.3%. Currently on Metformin 1000 mg twice daily, glipizide 5 mg twice daily and now on Lantus insulin as well as Synjardy. He was instructed to go up by 2 units on Lantus every 3 days to keep FBG between 120 and 130. Appropriately on ACEI and statin. He to follow up in 3 months with labs before visit.       Hospital Outpatient Visit on 11/20/2018   Component Date Value   • Sodium 11/20/2018 136    • Potassium 11/20/2018 4.6    • Chloride 11/20/2018 106    • Co2 11/20/2018 20    • Anion Gap 11/20/2018 10.0    • Glucose 11/20/2018 161*   • Bun 11/20/2018 34*   • Creatinine 11/20/2018 1.06    • Calcium 11/20/2018 9.1    • AST(SGOT) 11/20/2018 15    • ALT(SGPT) 11/20/2018 23    • Alkaline Phosphatase 11/20/2018 61    • Total Bilirubin 11/20/2018 0.4    • Albumin 11/20/2018 4.3    • Total Protein 11/20/2018 7.2    • Globulin 11/20/2018 2.9    • A-G Ratio 11/20/2018 1.5    • Glycohemoglobin 11/20/2018 8.3*   • Est Avg Glucose 11/20/2018 192    • TSH 11/20/2018 2.880    • Free T-4 11/20/2018 0.79    • Fasting Status 11/20/2018 Fasting    • GFR If  11/20/2018 >60    • GFR If Non  Ameri*  "11/20/2018 >60          clinical course has been stable    Past Medical History:   Diagnosis Date   • Arthritis    • Breath shortness    • Depression     depression   • Diabetes (HCC)     oral medication   • High cholesterol    • Hyperlipidemia    • Hypertension        Past Surgical History:   Procedure Laterality Date   • ATHROPLASTY Left     hip replacement   • CHOLECYSTECTOMY         Family History   Problem Relation Age of Onset   • Diabetes Mother    • Stroke Father        Patient has no known allergies.    Current Outpatient Prescriptions Ordered in [facility]   Medication Sig Dispense Refill   • GuaiFENesin (MUCUS RELIEF ADULT PO) Take  by mouth.     • promethazine-dextromethorphan (PROMETHAZINE-DM) 6.25-15 MG/5ML syrup Take 5 mL by mouth every four hours as needed for Cough. 120 mL 0   • cetirizine (ZYRTEC) 10 MG Tab Take 1 Tab by mouth every day. 90 Tab 1   • fluticasone (FLONASE) 50 MCG/ACT nasal spray Spray 1 Spray in nose every day. 3 Bottle 1   • glipiZIDE (GLUCOTROL) 5 MG Tab TAKE 1 TABLET BY MOUTH TWICE DAILY 180 Tab 0   • levothyroxine (SYNTHROID) 25 MCG Tab TAKE 1 TABLET BY MOUTH ONCE DAILY IN THE MORNING ON AN EMPTY STOMACH 90 Tab 0   • traZODone (DESYREL) 100 MG Tab TAKE 2 TABLETS BY MOUTH AT BEDTIME 180 Tab 0   • aspirin 81 MG tablet Take 81 mg by mouth every day.     • Empagliflozin-Metformin HCl (SYNJARDY) 12.5-1000 MG Tab Take 1 Tab by mouth 2 times a day. 180 Tab 1   • insulin glargine (LANTUS) 100 UNIT/ML Solution Pen-injector injection Inject 15 Units as instructed every evening. 5 PEN 3   • Insulin Pen Needle (PEN NEEDLES 5/16\") 30G X 8 MM Misc 15 Units by Does not apply route every day. To be used with lantus pen 100 Each 1   • Cinnamon 500 MG Cap Take 1,000 mg by mouth every morning.     • escitalopram (LEXAPRO) 20 MG tablet Take 20 mg by mouth every morning.     • lisinopril (PRINIVIL) 20 MG Tab Take 20 mg by mouth every morning.     • naproxen (NAPROSYN) 500 MG Tab Take 1 Tab by mouth 2 " "times a day, with meals. 60 Tab 0   • 5-Hydroxytryptophan (5-HTP) 100 MG Cap Take 1 Cap by mouth every bedtime.     • Omega-3 Fatty Acids (FISH OIL) 1000 MG Cap capsule Take 1,000 mg by mouth every morning.     • diphenhydrAMINE (BENADRYL ALLERGY) 25 MG capsule Take 50 mg by mouth every bedtime.     • Melatonin 1 MG Cap Take 1 Cap by mouth every bedtime.     • therapeutic multivitamin-minerals (THERAGRAN-M) Tab Take 1 Tab by mouth every morning.     • B Complex Vitamins (VITAMIN B COMPLEX PO) Take 1 Tab by mouth every morning.     • simvastatin (ZOCOR) 20 MG Tab Take 0.5 Tabs by mouth every evening. 45 Tab 1   • azithromycin (ZITHROMAX) 250 MG Tab 2 TABS ON DAY 1, 1 TAB ON DAYS 2-5. 6 Tab 0   • loperamide (ANTI-DIARRHEAL) 2 MG Cap Take 2 mg by mouth 4 times a day as needed for Diarrhea.       No current Epic-ordered facility-administered medications on file.        Constitutional ROS: No unexpected change in weight, No weakness, No unexplained fevers, sweats, or chills  Pulmonary ROS: Positive for cough   Cardiovascular ROS: No chest pain, No edema, No palpitations, Positive for hypertension and hyperlipidemia  Musculoskeletal/Extremities ROS: No clubbing, No peripheral edema, No pain, redness or swelling on the joints  Neurologic ROS: Normal development, No seizures, No weakness  Endocrine ROS: Positive for HPI    Physical exam:  /78 (BP Location: Right arm, Patient Position: Sitting, BP Cuff Size: Adult long)   Pulse 96   Temp 36.2 °C (97.1 °F) (Temporal)   Resp 16   Ht 1.854 m (6' 1\")   Wt (!) 125.2 kg (276 lb)   SpO2 94%   BMI 36.41 kg/m²   General Appearance: Middle-aged male, alert, no distress, obese, well-groomed  Skin: Skin color, texture, turgor normal. No rashes or lesions.  Eyes: conjunctivae/corneas clear. PERRL, EOM's intact. Fundi benign  Ears: positive findings: R TM - retracted and discolored, L TM - retracted and discolored  Nose/Sinuses: positive findings: mucosa erythematous and " swollen  Oropharynx: positive findings: mild oropharyngeal erythema  Neck: Neck supple. No adenopathy. Thyroid symmetric, normal size, and without nodularity  Lungs: negative findings: normal respiratory rate and rhythm, lungs clear to auscultation  Heart: negative. RRR without murmur, gallop, or rubs.  No ectopy.  Abdomen: Abdomen soft, non-tender. BS normal. No masses,  No organomegaly  Musculoskeletal: negative findings: no evidence of joint instability, strength normal, no deformities present  Neurologic: intact, CN II through XII grossly intact    Medical decision making/discussion: Patient will start with allergy regimen including second-generation antihistamine, Flonase and generous use of nasal saline as mentioned above.  We will also give him cough medication they can use at night.  He is to follow-up with me in 3 months with labs done before visit.  He will continue to increase his Lantus insulin by 2 units every 3 days to keep his fasting blood sugar between 1/21/1929.    Farzad was seen today for cough.    Diagnoses and all orders for this visit:    Cough  -     promethazine-dextromethorphan (PROMETHAZINE-DM) 6.25-15 MG/5ML syrup; Take 5 mL by mouth every four hours as needed for Cough.    Allergic rhinitis, unspecified seasonality, unspecified trigger  -     cetirizine (ZYRTEC) 10 MG Tab; Take 1 Tab by mouth every day.  -     fluticasone (FLONASE) 50 MCG/ACT nasal spray; Spray 1 Spray in nose every day.    Type 2 diabetes mellitus with complication, without long-term current use of insulin (HCC)  -     HEMOGLOBIN A1C; Future          Please note that this dictation was created using voice recognition software. I have made every reasonable attempt to correct obvious errors, but I expect that there are errors of grammar and possibly content that I did not discover before finalizing the note.

## 2018-12-02 NOTE — ASSESSMENT & PLAN NOTE
This is chronic, stable, suboptimally improved on current regimen. A1c 3 months ago was 9.9%, it is now down to 8.3%. Currently on Metformin 1000 mg twice daily, glipizide 5 mg twice daily and now on Lantus insulin as well as Synjardy. He was instructed to go up by 2 units on Lantus every 3 days to keep FBG between 120 and 130. Appropriately on ACEI and statin. He to follow up in 3 months with labs before visit.

## 2018-12-02 NOTE — ASSESSMENT & PLAN NOTE
Pt reporting s/sx consistent with untreated allergic rhinitis. He has mildly productive cough, bring up light yellow to clear phlegm, clear runny nose, sneezing, and nasal congestion. Denies fever, chest congestion, ear pain, sinus pressure or pain. Discussed treatment including 2nd generation antihistamine, flonase and generous use of nasal saline for rinsing and nasal congestion.

## 2018-12-21 DIAGNOSIS — F33.9 EPISODE OF RECURRENT MAJOR DEPRESSIVE DISORDER, UNSPECIFIED DEPRESSION EPISODE SEVERITY (HCC): ICD-10-CM

## 2018-12-21 DIAGNOSIS — F51.01 PRIMARY INSOMNIA: ICD-10-CM

## 2018-12-21 RX ORDER — TRAZODONE HYDROCHLORIDE 100 MG/1
TABLET ORAL
Qty: 180 TAB | Refills: 0 | Status: SHIPPED | OUTPATIENT
Start: 2018-12-21 | End: 2019-03-18 | Stop reason: SDUPTHER

## 2018-12-21 RX ORDER — ESCITALOPRAM OXALATE 20 MG/1
TABLET ORAL
Qty: 90 TAB | Refills: 0 | Status: SHIPPED | OUTPATIENT
Start: 2018-12-21 | End: 2019-04-04 | Stop reason: SDUPTHER

## 2018-12-22 DIAGNOSIS — F51.01 PRIMARY INSOMNIA: ICD-10-CM

## 2018-12-24 RX ORDER — TRAZODONE HYDROCHLORIDE 100 MG/1
TABLET ORAL
Refills: 0 | OUTPATIENT
Start: 2018-12-24

## 2019-01-14 ENCOUNTER — OFFICE VISIT (OUTPATIENT)
Dept: MEDICAL GROUP | Facility: PHYSICIAN GROUP | Age: 59
End: 2019-01-14
Payer: COMMERCIAL

## 2019-01-14 VITALS
DIASTOLIC BLOOD PRESSURE: 74 MMHG | SYSTOLIC BLOOD PRESSURE: 120 MMHG | HEIGHT: 73 IN | WEIGHT: 275 LBS | BODY MASS INDEX: 36.45 KG/M2 | HEART RATE: 104 BPM | RESPIRATION RATE: 18 BRPM | OXYGEN SATURATION: 94 % | TEMPERATURE: 98.3 F

## 2019-01-14 DIAGNOSIS — R05.9 COUGH: ICD-10-CM

## 2019-01-14 DIAGNOSIS — I10 ESSENTIAL HYPERTENSION, BENIGN: ICD-10-CM

## 2019-01-14 DIAGNOSIS — E11.8 TYPE 2 DIABETES MELLITUS WITH COMPLICATION, WITHOUT LONG-TERM CURRENT USE OF INSULIN (HCC): ICD-10-CM

## 2019-01-14 PROCEDURE — 99214 OFFICE O/P EST MOD 30 MIN: CPT | Performed by: NURSE PRACTITIONER

## 2019-01-14 RX ORDER — LOSARTAN POTASSIUM 50 MG/1
50 TABLET ORAL DAILY
Qty: 90 TAB | Refills: 3 | Status: SHIPPED | OUTPATIENT
Start: 2019-01-14 | End: 2019-04-20 | Stop reason: CLARIF

## 2019-01-14 NOTE — PROGRESS NOTES
Chief Complaint   Patient presents with   • Cough     x 4 months         This is a 58 y.o.male patient that presents today with the following: Cough    Cough  Patient has had chronic cough since September that he has tried multiple different over-the-counter cough medications as well as prescription medications none that seem to help.  He was started on lisinopril back in July 2018, I did discuss with him that this could certainly be an ACE inhibitor cough and that we can try switching him over to losartan, he is agreeable to this.  His blood pressure is well controlled however, it is 120/74.  Of note he also has chronic allergic rhinitis, he was also advised to take these medications consistently as cough may also be related to chronic allergies.  He is going to follow-up with me as already scheduled appointment next month.    Essential hypertension, benign  See additional notes, patient's medications were switched from lisinopril to losartan.    Type 2 diabetes mellitus with complication, without long-term current use of insulin (HCC)  This is chronic, stable.  He is due for follow-up next month with labs done before visit.  Currently takes metformin, glipizide and now on Lantus insulin and Synjardy.      No visits with results within 1 Month(s) from this visit.   Latest known visit with results is:   Hospital Outpatient Visit on 11/20/2018   Component Date Value   • Sodium 11/20/2018 136    • Potassium 11/20/2018 4.6    • Chloride 11/20/2018 106    • Co2 11/20/2018 20    • Anion Gap 11/20/2018 10.0    • Glucose 11/20/2018 161*   • Bun 11/20/2018 34*   • Creatinine 11/20/2018 1.06    • Calcium 11/20/2018 9.1    • AST(SGOT) 11/20/2018 15    • ALT(SGPT) 11/20/2018 23    • Alkaline Phosphatase 11/20/2018 61    • Total Bilirubin 11/20/2018 0.4    • Albumin 11/20/2018 4.3    • Total Protein 11/20/2018 7.2    • Globulin 11/20/2018 2.9    • A-G Ratio 11/20/2018 1.5    • Glycohemoglobin 11/20/2018 8.3*   • Est Avg Glucose  "11/20/2018 192    • TSH 11/20/2018 2.880    • Free T-4 11/20/2018 0.79    • Fasting Status 11/20/2018 Fasting    • GFR If  11/20/2018 >60    • GFR If Non  Ameri* 11/20/2018 >60          clinical course has been stable    Past Medical History:   Diagnosis Date   • Arthritis    • Breath shortness    • Depression     depression   • Diabetes (HCC)     oral medication   • High cholesterol    • Hyperlipidemia    • Hypertension        Past Surgical History:   Procedure Laterality Date   • ATHROPLASTY Left     hip replacement   • CHOLECYSTECTOMY         Family History   Problem Relation Age of Onset   • Diabetes Mother    • Stroke Father        Patient has no known allergies.    Current Outpatient Prescriptions Ordered in [facility]   Medication Sig Dispense Refill   • losartan (COZAAR) 50 MG Tab Take 1 Tab by mouth every day. 90 Tab 3   • traZODone (DESYREL) 100 MG Tab TAKE 2 TABLETS BY MOUTH ONCE DAILY AT BEDTIME 180 Tab 0   • escitalopram (LEXAPRO) 20 MG tablet TAKE 1 TABLET BY MOUTH ONCE DAILY 90 Tab 0   • GuaiFENesin (MUCUS RELIEF ADULT PO) Take  by mouth.     • cetirizine (ZYRTEC) 10 MG Tab Take 1 Tab by mouth every day. 90 Tab 1   • glipiZIDE (GLUCOTROL) 5 MG Tab TAKE 1 TABLET BY MOUTH TWICE DAILY 180 Tab 0   • levothyroxine (SYNTHROID) 25 MCG Tab TAKE 1 TABLET BY MOUTH ONCE DAILY IN THE MORNING ON AN EMPTY STOMACH 90 Tab 0   • aspirin 81 MG tablet Take 81 mg by mouth every day.     • Empagliflozin-Metformin HCl (SYNJARDY) 12.5-1000 MG Tab Take 1 Tab by mouth 2 times a day. 180 Tab 1   • insulin glargine (LANTUS) 100 UNIT/ML Solution Pen-injector injection Inject 15 Units as instructed every evening. 5 PEN 3   • Insulin Pen Needle (PEN NEEDLES 5/16\") 30G X 8 MM Misc 15 Units by Does not apply route every day. To be used with lantus pen 100 Each 1   • Cinnamon 500 MG Cap Take 1,000 mg by mouth every morning.     • naproxen (NAPROSYN) 500 MG Tab Take 1 Tab by mouth 2 times a day, with meals. 60 Tab 0 " "  • 5-Hydroxytryptophan (5-HTP) 100 MG Cap Take 1 Cap by mouth every bedtime.     • Omega-3 Fatty Acids (FISH OIL) 1000 MG Cap capsule Take 1,000 mg by mouth every morning.     • Melatonin 1 MG Cap Take 1 Cap by mouth every bedtime.     • therapeutic multivitamin-minerals (THERAGRAN-M) Tab Take 1 Tab by mouth every morning.     • B Complex Vitamins (VITAMIN B COMPLEX PO) Take 1 Tab by mouth every morning.     • simvastatin (ZOCOR) 20 MG Tab Take 0.5 Tabs by mouth every evening. 45 Tab 1   • promethazine-dextromethorphan (PROMETHAZINE-DM) 6.25-15 MG/5ML syrup Take 5 mL by mouth every four hours as needed for Cough. (Patient not taking: Reported on 1/14/2019) 120 mL 0   • fluticasone (FLONASE) 50 MCG/ACT nasal spray Spray 1 Spray in nose every day. 3 Bottle 1   • azithromycin (ZITHROMAX) 250 MG Tab 2 TABS ON DAY 1, 1 TAB ON DAYS 2-5. 6 Tab 0   • loperamide (ANTI-DIARRHEAL) 2 MG Cap Take 2 mg by mouth 4 times a day as needed for Diarrhea.     • diphenhydrAMINE (BENADRYL ALLERGY) 25 MG capsule Take 50 mg by mouth every bedtime.       No current Epic-ordered facility-administered medications on file.        Constitutional ROS: No unexpected change in weight, No weakness, No unexplained fevers, sweats, or chills  Pulmonary ROS: Positive per HPI  Cardiovascular ROS: No chest pain, No edema, No palpitations, Positive for hypertension   Musculoskeletal/Extremities ROS: No clubbing, No peripheral edema, No pain, redness or swelling on the joints  Neurologic ROS: Normal development, No seizures, No weakness  Endocrine ROS: positive per HPI    Physical exam:  /74 (BP Location: Left arm, Patient Position: Sitting, BP Cuff Size: Adult long)   Pulse (!) 104   Temp 36.8 °C (98.3 °F) (Temporal)   Resp 18   Ht 1.854 m (6' 1\")   Wt 124.7 kg (275 lb)   SpO2 94%   BMI 36.28 kg/m²   General Appearance: middle aged male, alert, no distress, obese, well groomed  Skin: Skin color, texture, turgor normal. No rashes or " lesions.  Lungs: negative findings: normal respiratory rate and rhythm, lungs clear to auscultation  Heart: negative. RRR without murmur, gallop, or rubs.  No ectopy.  Abdomen: Abdomen soft, non-tender. BS normal. No masses,  No organomegaly  Musculoskeletal: negative findings: no evidence of joint instability, no evidence of muscle atrophy, no deformities present  Neurologic: intact, and 2 through 12 grossly intact    Medical decision making/discussion: Patient to follow-up with me as already scheduled visit with labs done before visit.  We will switch lisinopril to losartan.  He is also advised to take his allergy medications consistently.    Farzad was seen today for cough.    Diagnoses and all orders for this visit:    Cough    Essential hypertension, benign  -     losartan (COZAAR) 50 MG Tab; Take 1 Tab by mouth every day.    Type 2 diabetes mellitus with complication, without long-term current use of insulin (HCC)          Please note that this dictation was created using voice recognition software. I have made every reasonable attempt to correct obvious errors, but I expect that there are errors of grammar and possibly content that I did not discover before finalizing the note.

## 2019-01-15 NOTE — ASSESSMENT & PLAN NOTE
Patient has had chronic cough since September that he has tried multiple different over-the-counter cough medications as well as prescription medications none that seem to help.  He was started on lisinopril back in July 2018, I did discuss with him that this could certainly be an ACE inhibitor cough and that we can try switching him over to losartan, he is agreeable to this.  His blood pressure is well controlled however, it is 120/74.  Of note he also has chronic allergic rhinitis, he was also advised to take these medications consistently as cough may also be related to chronic allergies.  He is going to follow-up with me as already scheduled appointment next month.

## 2019-01-15 NOTE — ASSESSMENT & PLAN NOTE
This is chronic, stable.  He is due for follow-up next month with labs done before visit.  Currently takes metformin, glipizide and now on Lantus insulin and Synjardy.

## 2019-01-28 RX ORDER — AMOXICILLIN AND CLAVULANATE POTASSIUM 875; 125 MG/1; MG/1
1 TABLET, FILM COATED ORAL 2 TIMES DAILY
Qty: 14 TAB | Refills: 0 | Status: SHIPPED | OUTPATIENT
Start: 2019-01-28 | End: 2019-04-20

## 2019-02-04 DIAGNOSIS — E03.9 ACQUIRED HYPOTHYROIDISM: ICD-10-CM

## 2019-02-05 RX ORDER — LEVOTHYROXINE SODIUM 0.03 MG/1
25 TABLET ORAL
Qty: 90 TAB | Refills: 2 | Status: SHIPPED | OUTPATIENT
Start: 2019-02-05 | End: 2019-04-20 | Stop reason: CLARIF

## 2019-02-05 NOTE — TELEPHONE ENCOUNTER
Was the patient seen in the last year in this department? Yes    Does patient have an active prescription for medications requested? No     Received Request Via: Pharmacy    Pt met protocol?: Yes     Last OV 01/2019    TSH   Date Value Ref Range Status   11/20/2018 2.880 0.380 - 5.330 uIU/mL Final     Comment:     Please note new reference ranges effective 12/14/2017 10:00 AM  Pregnant Females, 1st Trimester  0.050-3.700  Pregnant Females, 2nd Trimester  0.310-4.350  Pregnant Females, 3rd Trimester  0.410-5.180

## 2019-02-16 ENCOUNTER — HOSPITAL ENCOUNTER (OUTPATIENT)
Dept: LAB | Facility: MEDICAL CENTER | Age: 59
End: 2019-02-16
Attending: NURSE PRACTITIONER
Payer: COMMERCIAL

## 2019-02-16 DIAGNOSIS — E11.8 TYPE 2 DIABETES MELLITUS WITH COMPLICATION, WITHOUT LONG-TERM CURRENT USE OF INSULIN (HCC): ICD-10-CM

## 2019-02-16 LAB
EST. AVERAGE GLUCOSE BLD GHB EST-MCNC: 174 MG/DL
HBA1C MFR BLD: 7.7 % (ref 0–5.6)

## 2019-02-16 PROCEDURE — 83036 HEMOGLOBIN GLYCOSYLATED A1C: CPT

## 2019-02-16 PROCEDURE — 36415 COLL VENOUS BLD VENIPUNCTURE: CPT

## 2019-02-18 DIAGNOSIS — E11.8 TYPE 2 DIABETES MELLITUS WITH COMPLICATION, WITHOUT LONG-TERM CURRENT USE OF INSULIN (HCC): ICD-10-CM

## 2019-02-20 DIAGNOSIS — E11.8 TYPE 2 DIABETES MELLITUS WITH COMPLICATION, WITH LONG-TERM CURRENT USE OF INSULIN (HCC): ICD-10-CM

## 2019-02-20 DIAGNOSIS — Z79.4 TYPE 2 DIABETES MELLITUS WITH COMPLICATION, WITH LONG-TERM CURRENT USE OF INSULIN (HCC): ICD-10-CM

## 2019-02-20 RX ORDER — EMPAGLIFLOZIN AND METFORMIN HYDROCHLORIDE 12.5; 1 MG/1; MG/1
TABLET ORAL
Qty: 180 TAB | Refills: 1 | Status: SHIPPED | OUTPATIENT
Start: 2019-02-20 | End: 2019-08-31 | Stop reason: SDUPTHER

## 2019-02-20 RX ORDER — PEN NEEDLE, DIABETIC 29 G X1/2"
NEEDLE, DISPOSABLE MISCELLANEOUS
Qty: 100 EACH | Refills: 3 | Status: SHIPPED | OUTPATIENT
Start: 2019-02-20 | End: 2019-04-20 | Stop reason: CLARIF

## 2019-02-20 NOTE — TELEPHONE ENCOUNTER
Was the patient seen in the last year in this department? Yes    Does patient have an active prescription for medications requested? No     Received Request Via: Pharmacy      Pt met protocol?: Yes   Pt last ov 1/19   Lab Results   Component Value Date/Time    HBA1C 7.7 (H) 02/16/2019 08:05 AM        Lab Results  Component Value Date/Time   CHOLSTRLTOT 185 03/10/2018 1024   TRIGLYCERIDE 251 (H) 03/10/2018 1024   HDL 44 03/10/2018 1024   LDL 91 03/10/2018 1024

## 2019-02-20 NOTE — TELEPHONE ENCOUNTER
Patient has recently been seen by PCP within the last 6 months per protocol (1/19). Will refill medications for 6 months.  Lab Results   Component Value Date/Time    HBA1C 7.7 (H) 02/16/2019 08:05 AM      Lab Results   Component Value Date/Time    MALBCRT 12 07/30/2018 09:39 AM    MICROALBUR 1.4 07/30/2018 09:39 AM      Lab Results   Component Value Date/Time    ALKPHOSPHAT 61 11/20/2018 11:24 AM    ASTSGOT 15 11/20/2018 11:24 AM    ALTSGPT 23 11/20/2018 11:24 AM    TBILIRUBIN 0.4 11/20/2018 11:24 AM

## 2019-02-25 DIAGNOSIS — E11.8 TYPE 2 DIABETES MELLITUS WITH COMPLICATION, WITHOUT LONG-TERM CURRENT USE OF INSULIN (HCC): ICD-10-CM

## 2019-02-26 RX ORDER — GLIPIZIDE 5 MG/1
TABLET ORAL
Qty: 180 TAB | Refills: 1 | Status: SHIPPED | OUTPATIENT
Start: 2019-02-26 | End: 2019-04-20 | Stop reason: CLARIF

## 2019-02-26 NOTE — TELEPHONE ENCOUNTER
*PT NEEDS SOME LABS UPDATED*  Was the patient seen in the last year in this department? Yes    Does patient have an active prescription for medications requested? No     Received Request Via: Pharmacy      Pt met protocol?: Yes    LAST OV 01/14/2019      Lab Results  Component Value Date/Time   HBA1C 7.7 (H) 02/16/2019 0805       Lab Results  Component Value Date/Time   AVGLUC 174 02/16/2019 0805       Lab Results  Component Value Date/Time   CHOLSTRLTOT 185 03/10/2018 1024       Lab Results  Component Value Date/Time   TRIGLYCERIDE 251 (H) 03/10/2018 1024       Lab Results  Component Value Date/Time   HDL 44 03/10/2018 1024       Lab Results  Component Value Date/Time   LDL 91 03/10/2018 1024

## 2019-03-18 DIAGNOSIS — F51.01 PRIMARY INSOMNIA: ICD-10-CM

## 2019-03-19 RX ORDER — TRAZODONE HYDROCHLORIDE 100 MG/1
TABLET ORAL
Qty: 180 TAB | Refills: 1 | Status: SHIPPED | OUTPATIENT
Start: 2019-03-19 | End: 2019-04-20 | Stop reason: CLARIF

## 2019-03-19 NOTE — TELEPHONE ENCOUNTER
Was the patient seen in the last year in this department? Yes    Does patient have an active prescription for medications requested? No     Received Request Via: Patient      Pt met protocol?: YES      OV 1/19

## 2019-04-04 DIAGNOSIS — F33.9 EPISODE OF RECURRENT MAJOR DEPRESSIVE DISORDER, UNSPECIFIED DEPRESSION EPISODE SEVERITY (HCC): ICD-10-CM

## 2019-04-04 NOTE — TELEPHONE ENCOUNTER
Was the patient seen in the last year in this department? Yes    Does patient have an active prescription for medications requested? No     Received Request Via: Pharmacy      Pt met protocol?: Yes    LAST OV 01/14/2019

## 2019-04-07 RX ORDER — ESCITALOPRAM OXALATE 20 MG/1
TABLET ORAL
Qty: 90 TAB | Refills: 0 | Status: SHIPPED | OUTPATIENT
Start: 2019-04-07 | End: 2019-04-20 | Stop reason: CLARIF

## 2019-04-20 ENCOUNTER — HOSPITAL ENCOUNTER (OUTPATIENT)
Facility: MEDICAL CENTER | Age: 59
End: 2019-04-21
Attending: EMERGENCY MEDICINE | Admitting: INTERNAL MEDICINE
Payer: COMMERCIAL

## 2019-04-20 ENCOUNTER — APPOINTMENT (OUTPATIENT)
Dept: RADIOLOGY | Facility: MEDICAL CENTER | Age: 59
End: 2019-04-20
Attending: EMERGENCY MEDICINE
Payer: COMMERCIAL

## 2019-04-20 DIAGNOSIS — I25.119 CORONARY ARTERY DISEASE INVOLVING NATIVE HEART WITH ANGINA PECTORIS, UNSPECIFIED VESSEL OR LESION TYPE (HCC): ICD-10-CM

## 2019-04-20 DIAGNOSIS — R07.9 CHEST PAIN, UNSPECIFIED TYPE: ICD-10-CM

## 2019-04-20 LAB
ALBUMIN SERPL BCP-MCNC: 4.1 G/DL (ref 3.2–4.9)
ALBUMIN/GLOB SERPL: 1.2 G/DL
ALP SERPL-CCNC: 72 U/L (ref 30–99)
ALT SERPL-CCNC: 28 U/L (ref 2–50)
ANION GAP SERPL CALC-SCNC: 13 MMOL/L (ref 0–11.9)
AST SERPL-CCNC: 25 U/L (ref 12–45)
BASOPHILS # BLD AUTO: 0.5 % (ref 0–1.8)
BASOPHILS # BLD: 0.06 K/UL (ref 0–0.12)
BILIRUB SERPL-MCNC: 0.5 MG/DL (ref 0.1–1.5)
BUN SERPL-MCNC: 20 MG/DL (ref 8–22)
CALCIUM SERPL-MCNC: 8.9 MG/DL (ref 8.4–10.2)
CHLORIDE SERPL-SCNC: 101 MMOL/L (ref 96–112)
CO2 SERPL-SCNC: 20 MMOL/L (ref 20–33)
CREAT SERPL-MCNC: 0.94 MG/DL (ref 0.5–1.4)
D DIMER PPP IA.FEU-MCNC: 0.97 UG/ML (FEU) (ref 0–0.5)
EKG IMPRESSION: NORMAL
EOSINOPHIL # BLD AUTO: 0.19 K/UL (ref 0–0.51)
EOSINOPHIL NFR BLD: 1.6 % (ref 0–6.9)
ERYTHROCYTE [DISTWIDTH] IN BLOOD BY AUTOMATED COUNT: 43.7 FL (ref 35.9–50)
GLOBULIN SER CALC-MCNC: 3.5 G/DL (ref 1.9–3.5)
GLUCOSE SERPL-MCNC: 109 MG/DL (ref 65–99)
HCT VFR BLD AUTO: 45.7 % (ref 42–52)
HGB BLD-MCNC: 14.9 G/DL (ref 14–18)
IMM GRANULOCYTES # BLD AUTO: 0.06 K/UL (ref 0–0.11)
IMM GRANULOCYTES NFR BLD AUTO: 0.5 % (ref 0–0.9)
LYMPHOCYTES # BLD AUTO: 2 K/UL (ref 1–4.8)
LYMPHOCYTES NFR BLD: 17.2 % (ref 22–41)
MCH RBC QN AUTO: 29.3 PG (ref 27–33)
MCHC RBC AUTO-ENTMCNC: 32.6 G/DL (ref 33.7–35.3)
MCV RBC AUTO: 90 FL (ref 81.4–97.8)
MONOCYTES # BLD AUTO: 1.04 K/UL (ref 0–0.85)
MONOCYTES NFR BLD AUTO: 8.9 % (ref 0–13.4)
NEUTROPHILS # BLD AUTO: 8.29 K/UL (ref 1.82–7.42)
NEUTROPHILS NFR BLD: 71.3 % (ref 44–72)
NRBC # BLD AUTO: 0 K/UL
NRBC BLD-RTO: 0 /100 WBC
PLATELET # BLD AUTO: 306 K/UL (ref 164–446)
PMV BLD AUTO: 9.6 FL (ref 9–12.9)
POTASSIUM SERPL-SCNC: 4.1 MMOL/L (ref 3.6–5.5)
PROT SERPL-MCNC: 7.6 G/DL (ref 6–8.2)
RBC # BLD AUTO: 5.08 M/UL (ref 4.7–6.1)
SODIUM SERPL-SCNC: 134 MMOL/L (ref 135–145)
TROPONIN I SERPL-MCNC: <0.01 NG/ML (ref 0–0.04)
TROPONIN I SERPL-MCNC: <0.01 NG/ML (ref 0–0.04)
WBC # BLD AUTO: 11.6 K/UL (ref 4.8–10.8)

## 2019-04-20 PROCEDURE — 85025 COMPLETE CBC W/AUTO DIFF WBC: CPT

## 2019-04-20 PROCEDURE — G0378 HOSPITAL OBSERVATION PER HR: HCPCS

## 2019-04-20 PROCEDURE — 99220 PR INITIAL OBSERVATION CARE,LEVL III: CPT | Performed by: INTERNAL MEDICINE

## 2019-04-20 PROCEDURE — 36415 COLL VENOUS BLD VENIPUNCTURE: CPT

## 2019-04-20 PROCEDURE — 80053 COMPREHEN METABOLIC PANEL: CPT

## 2019-04-20 PROCEDURE — 71045 X-RAY EXAM CHEST 1 VIEW: CPT

## 2019-04-20 PROCEDURE — 71275 CT ANGIOGRAPHY CHEST: CPT

## 2019-04-20 PROCEDURE — 93005 ELECTROCARDIOGRAM TRACING: CPT | Performed by: EMERGENCY MEDICINE

## 2019-04-20 PROCEDURE — 700117 HCHG RX CONTRAST REV CODE 255: Performed by: EMERGENCY MEDICINE

## 2019-04-20 PROCEDURE — 84484 ASSAY OF TROPONIN QUANT: CPT

## 2019-04-20 PROCEDURE — A9270 NON-COVERED ITEM OR SERVICE: HCPCS | Performed by: EMERGENCY MEDICINE

## 2019-04-20 PROCEDURE — 700102 HCHG RX REV CODE 250 W/ 637 OVERRIDE(OP): Performed by: EMERGENCY MEDICINE

## 2019-04-20 PROCEDURE — 85379 FIBRIN DEGRADATION QUANT: CPT

## 2019-04-20 PROCEDURE — 93005 ELECTROCARDIOGRAM TRACING: CPT

## 2019-04-20 PROCEDURE — 99285 EMERGENCY DEPT VISIT HI MDM: CPT

## 2019-04-20 RX ORDER — LEVOTHYROXINE SODIUM 0.03 MG/1
25 TABLET ORAL
COMMUNITY
End: 2019-05-01 | Stop reason: SDUPTHER

## 2019-04-20 RX ORDER — CETIRIZINE HYDROCHLORIDE 10 MG/1
10 TABLET ORAL DAILY
COMMUNITY
End: 2019-06-17

## 2019-04-20 RX ORDER — ACETAMINOPHEN 500 MG
1000 TABLET ORAL ONCE
Status: COMPLETED | OUTPATIENT
Start: 2019-04-20 | End: 2019-04-20

## 2019-04-20 RX ORDER — INSULIN GLARGINE 100 [IU]/ML
32 INJECTION, SOLUTION SUBCUTANEOUS EVERY EVENING
COMMUNITY
End: 2019-05-01

## 2019-04-20 RX ORDER — GLIPIZIDE 5 MG/1
5 TABLET ORAL 2 TIMES DAILY
COMMUNITY
End: 2019-09-03

## 2019-04-20 RX ORDER — TRAZODONE HYDROCHLORIDE 100 MG/1
200 TABLET ORAL NIGHTLY
COMMUNITY
End: 2019-08-26

## 2019-04-20 RX ORDER — ESCITALOPRAM OXALATE 20 MG/1
20 TABLET ORAL DAILY
COMMUNITY
End: 2019-07-22

## 2019-04-20 RX ORDER — LOSARTAN POTASSIUM 50 MG/1
50 TABLET ORAL DAILY
COMMUNITY
End: 2020-03-17

## 2019-04-20 RX ORDER — ASPIRIN 81 MG/1
324 TABLET, CHEWABLE ORAL ONCE
Status: DISCONTINUED | OUTPATIENT
Start: 2019-04-20 | End: 2019-04-20

## 2019-04-20 RX ADMIN — IOHEXOL 70 ML: 350 INJECTION, SOLUTION INTRAVENOUS at 21:15

## 2019-04-20 RX ADMIN — ACETAMINOPHEN 1000 MG: 500 TABLET ORAL at 19:34

## 2019-04-20 ASSESSMENT — LIFESTYLE VARIABLES: DO YOU DRINK ALCOHOL: NO

## 2019-04-20 ASSESSMENT — PAIN DESCRIPTION - DESCRIPTORS: DESCRIPTORS: SHARP

## 2019-04-21 ENCOUNTER — PATIENT OUTREACH (OUTPATIENT)
Dept: HEALTH INFORMATION MANAGEMENT | Facility: OTHER | Age: 59
End: 2019-04-21

## 2019-04-21 VITALS
RESPIRATION RATE: 16 BRPM | OXYGEN SATURATION: 94 % | HEART RATE: 89 BPM | DIASTOLIC BLOOD PRESSURE: 74 MMHG | SYSTOLIC BLOOD PRESSURE: 136 MMHG | TEMPERATURE: 97.8 F | BODY MASS INDEX: 35.88 KG/M2 | HEIGHT: 74 IN | WEIGHT: 279.54 LBS

## 2019-04-21 PROBLEM — R07.9 CHEST PAIN: Status: RESOLVED | Noted: 2018-06-28 | Resolved: 2019-04-21

## 2019-04-21 LAB
EKG IMPRESSION: NORMAL
GLUCOSE BLD-MCNC: 137 MG/DL (ref 65–99)
TROPONIN I SERPL-MCNC: <0.01 NG/ML (ref 0–0.04)
TROPONIN I SERPL-MCNC: <0.01 NG/ML (ref 0–0.04)

## 2019-04-21 PROCEDURE — 99217 PR OBSERVATION CARE DISCHARGE: CPT | Performed by: INTERNAL MEDICINE

## 2019-04-21 PROCEDURE — 93005 ELECTROCARDIOGRAM TRACING: CPT | Performed by: INTERNAL MEDICINE

## 2019-04-21 PROCEDURE — 82962 GLUCOSE BLOOD TEST: CPT

## 2019-04-21 PROCEDURE — G0378 HOSPITAL OBSERVATION PER HR: HCPCS

## 2019-04-21 PROCEDURE — 36415 COLL VENOUS BLD VENIPUNCTURE: CPT

## 2019-04-21 PROCEDURE — 93010 ELECTROCARDIOGRAM REPORT: CPT | Performed by: INTERNAL MEDICINE

## 2019-04-21 PROCEDURE — 84484 ASSAY OF TROPONIN QUANT: CPT | Mod: 91

## 2019-04-21 PROCEDURE — 700102 HCHG RX REV CODE 250 W/ 637 OVERRIDE(OP): Performed by: INTERNAL MEDICINE

## 2019-04-21 PROCEDURE — A9270 NON-COVERED ITEM OR SERVICE: HCPCS | Performed by: INTERNAL MEDICINE

## 2019-04-21 RX ORDER — ONDANSETRON 2 MG/ML
4 INJECTION INTRAMUSCULAR; INTRAVENOUS EVERY 4 HOURS PRN
Status: DISCONTINUED | OUTPATIENT
Start: 2019-04-21 | End: 2019-04-21 | Stop reason: HOSPADM

## 2019-04-21 RX ORDER — BISACODYL 10 MG
10 SUPPOSITORY, RECTAL RECTAL
Status: DISCONTINUED | OUTPATIENT
Start: 2019-04-20 | End: 2019-04-21 | Stop reason: HOSPADM

## 2019-04-21 RX ORDER — LOSARTAN POTASSIUM 50 MG/1
50 TABLET ORAL DAILY
Status: DISCONTINUED | OUTPATIENT
Start: 2019-04-21 | End: 2019-04-21 | Stop reason: HOSPADM

## 2019-04-21 RX ORDER — TRAZODONE HYDROCHLORIDE 50 MG/1
200 TABLET ORAL NIGHTLY
Status: DISCONTINUED | OUTPATIENT
Start: 2019-04-21 | End: 2019-04-21 | Stop reason: HOSPADM

## 2019-04-21 RX ORDER — NITROGLYCERIN 0.4 MG/1
0.4 TABLET SUBLINGUAL
Status: DISCONTINUED | OUTPATIENT
Start: 2019-04-21 | End: 2019-04-21 | Stop reason: HOSPADM

## 2019-04-21 RX ORDER — LOPERAMIDE HYDROCHLORIDE 2 MG/1
4 CAPSULE ORAL EVERY MORNING
Status: DISCONTINUED | OUTPATIENT
Start: 2019-04-21 | End: 2019-04-21 | Stop reason: HOSPADM

## 2019-04-21 RX ORDER — GLIPIZIDE 5 MG/1
5 TABLET ORAL 2 TIMES DAILY
Status: DISCONTINUED | OUTPATIENT
Start: 2019-04-21 | End: 2019-04-21 | Stop reason: HOSPADM

## 2019-04-21 RX ORDER — POLYETHYLENE GLYCOL 3350 17 G/17G
1 POWDER, FOR SOLUTION ORAL
Status: DISCONTINUED | OUTPATIENT
Start: 2019-04-20 | End: 2019-04-21 | Stop reason: HOSPADM

## 2019-04-21 RX ORDER — CETIRIZINE HYDROCHLORIDE 10 MG/1
10 TABLET ORAL DAILY
Status: DISCONTINUED | OUTPATIENT
Start: 2019-04-21 | End: 2019-04-21 | Stop reason: HOSPADM

## 2019-04-21 RX ORDER — DEXTROSE MONOHYDRATE 25 G/50ML
25 INJECTION, SOLUTION INTRAVENOUS
Status: DISCONTINUED | OUTPATIENT
Start: 2019-04-21 | End: 2019-04-21 | Stop reason: HOSPADM

## 2019-04-21 RX ORDER — ACETAMINOPHEN 325 MG/1
650 TABLET ORAL EVERY 6 HOURS PRN
Status: DISCONTINUED | OUTPATIENT
Start: 2019-04-21 | End: 2019-04-21 | Stop reason: HOSPADM

## 2019-04-21 RX ORDER — ATORVASTATIN CALCIUM 40 MG/1
40 TABLET, FILM COATED ORAL EVERY EVENING
Qty: 30 TAB | Refills: 1 | Status: SHIPPED | OUTPATIENT
Start: 2019-04-21 | End: 2019-05-01 | Stop reason: SDUPTHER

## 2019-04-21 RX ORDER — ONDANSETRON 4 MG/1
4 TABLET, ORALLY DISINTEGRATING ORAL EVERY 4 HOURS PRN
Status: DISCONTINUED | OUTPATIENT
Start: 2019-04-21 | End: 2019-04-21 | Stop reason: HOSPADM

## 2019-04-21 RX ORDER — INSULIN GLARGINE 100 [IU]/ML
32 INJECTION, SOLUTION SUBCUTANEOUS EVERY EVENING
Status: DISCONTINUED | OUTPATIENT
Start: 2019-04-21 | End: 2019-04-21 | Stop reason: HOSPADM

## 2019-04-21 RX ORDER — DIPHENHYDRAMINE HCL 25 MG
50 TABLET ORAL
Status: DISCONTINUED | OUTPATIENT
Start: 2019-04-21 | End: 2019-04-21 | Stop reason: HOSPADM

## 2019-04-21 RX ORDER — LEVOTHYROXINE SODIUM 0.03 MG/1
25 TABLET ORAL
Status: DISCONTINUED | OUTPATIENT
Start: 2019-04-21 | End: 2019-04-21 | Stop reason: HOSPADM

## 2019-04-21 RX ORDER — ESCITALOPRAM OXALATE 10 MG/1
20 TABLET ORAL DAILY
Status: DISCONTINUED | OUTPATIENT
Start: 2019-04-21 | End: 2019-04-21 | Stop reason: HOSPADM

## 2019-04-21 RX ORDER — TRAZODONE HYDROCHLORIDE 50 MG/1
200 TABLET ORAL NIGHTLY
Status: DISCONTINUED | OUTPATIENT
Start: 2019-04-21 | End: 2019-04-21

## 2019-04-21 RX ORDER — ISOSORBIDE MONONITRATE 30 MG/1
30 TABLET, EXTENDED RELEASE ORAL EVERY MORNING
Qty: 30 TAB | Refills: 1 | Status: SHIPPED | OUTPATIENT
Start: 2019-04-21 | End: 2019-05-01 | Stop reason: SDUPTHER

## 2019-04-21 RX ORDER — AMOXICILLIN 250 MG
2 CAPSULE ORAL 2 TIMES DAILY
Status: DISCONTINUED | OUTPATIENT
Start: 2019-04-21 | End: 2019-04-21 | Stop reason: HOSPADM

## 2019-04-21 RX ORDER — DIPHENHYDRAMINE HYDROCHLORIDE 50 MG/ML
INJECTION INTRAMUSCULAR; INTRAVENOUS
Status: COMPLETED
Start: 2019-04-21 | End: 2019-04-21

## 2019-04-21 RX ADMIN — DIPHENHYDRAMINE HCL 50 MG: 25 TABLET ORAL at 01:34

## 2019-04-21 RX ADMIN — LOSARTAN POTASSIUM 50 MG: 50 TABLET ORAL at 05:59

## 2019-04-21 RX ADMIN — ESCITALOPRAM OXALATE 20 MG: 10 TABLET ORAL at 05:59

## 2019-04-21 RX ADMIN — LOPERAMIDE HYDROCHLORIDE 4 MG: 2 CAPSULE ORAL at 10:16

## 2019-04-21 RX ADMIN — TRAZODONE HYDROCHLORIDE 200 MG: 50 TABLET ORAL at 01:34

## 2019-04-21 RX ADMIN — LEVOTHYROXINE SODIUM 25 MCG: 25 TABLET ORAL at 06:00

## 2019-04-21 RX ADMIN — GLIPIZIDE 5 MG: 5 TABLET ORAL at 10:16

## 2019-04-21 RX ADMIN — CETIRIZINE HYDROCHLORIDE 10 MG: 10 TABLET, FILM COATED ORAL at 05:59

## 2019-04-21 ASSESSMENT — ENCOUNTER SYMPTOMS
DIARRHEA: 0
BACK PAIN: 0
SEIZURES: 0
SPUTUM PRODUCTION: 0
FEVER: 0
PALPITATIONS: 0
ABDOMINAL PAIN: 0
FOCAL WEAKNESS: 0
BLURRED VISION: 0
HEADACHES: 0
SORE THROAT: 0
NECK PAIN: 0
BLOOD IN STOOL: 0
WHEEZING: 0
DIAPHORESIS: 0
COUGH: 0
NAUSEA: 0
DIZZINESS: 0
MYALGIAS: 0
FLANK PAIN: 0
VOMITING: 0
CHILLS: 0
BRUISES/BLEEDS EASILY: 0
SHORTNESS OF BREATH: 1

## 2019-04-21 ASSESSMENT — COPD QUESTIONNAIRES
HAVE YOU SMOKED AT LEAST 100 CIGARETTES IN YOUR ENTIRE LIFE: NO/DON'T KNOW
DO YOU EVER COUGH UP ANY MUCUS OR PHLEGM?: NO/ONLY WITH OCCASIONAL COLDS OR INFECTIONS
COPD SCREENING SCORE: 1
DURING THE PAST 4 WEEKS HOW MUCH DID YOU FEEL SHORT OF BREATH: NONE/LITTLE OF THE TIME

## 2019-04-21 ASSESSMENT — LIFESTYLE VARIABLES
EVER_SMOKED: NEVER
EVER_SMOKED: NEVER

## 2019-04-21 NOTE — ED NOTES
Med Rec Updated and Complete per Pt at bedside with permission to do so in front of family/visitor  Allergies Reviewed  No PO ABX last 30 days.    Pt reports he has been out of his insulin for 3 days and that he has been off of Simvastatin for 6 months but thinks he should be on it now.    Pt on LD ASA daily.    Pt knows medication HX well.

## 2019-04-21 NOTE — ED PROVIDER NOTES
ED Provider Note    Scribed for Melody Cardoso M.D. by Yolette Nicholas. 4/20/2019  6:54 PM    Means of arrival: Walk in   History obtained from: Patient  History limited by: None      CHIEF COMPLAINT  Chief Complaint   Patient presents with   • Chest Pain   • Hypertension       HPI  Farzad Bryant Jr. is a 58 y.o. male with a history of hypertension and diabetes who presents to the Emergency Department for chest pain that began this morning. He describes it as feeling sharp in quality. He has associated shortness of breath. Denies nausea, vomiting, fever, cough, pedal edema. He states that he has had similar intermittent chest pain in the past which he rates as being a 5/10 in severity. At this time, he rates his chest pain as 2/10 and reports a headache. The patient took an 81 mg Aspirin this morning and was given three tablets of Aspirin by EMT this afternoon. He has not had his diabetic and hypertension medication for three days due to insurance reasons. He does not have a history of blood clots and no recent travel.  He does endorse prior abnormal stress test and angiography demonstrating minor blockages in his heart.      REVIEW OF SYSTEMS  Pertinent positive include chest pain, shortness of breath, headache. Pertinent negative include fever, nausea, vomiting, cough, pedal edema. All other systems reviewed and are negative.      PAST MEDICAL HISTORY   has a past medical history of Arthritis; Breath shortness; Depression; Diabetes (HCC); High cholesterol; Hyperlipidemia; and Hypertension.    SOCIAL HISTORY  Social History     Social History Main Topics   • Smoking status: Never Smoker   • Smokeless tobacco: Former User     Types: Chew     Quit date: 1/1/1986   • Alcohol use Yes      Comment: 1 per month    • Drug use: No   • Sexual activity: Not on file       SURGICAL HISTORY   has a past surgical history that includes athroplasty (Left) and cholecystectomy.    CURRENT MEDICATIONS  Home Medications      "Reviewed by Kelli Mendez R.N. (Registered Nurse) on 04/20/19 at 1812  Med List Status: Complete   Medication Last Dose Status   5-Hydroxytryptophan (5-HTP) 100 MG Cap 4/19/2019 Active   aspirin 81 MG tablet 4/20/2019 Active   B Complex Vitamins (VITAMIN B COMPLEX PO) 4/20/2019 Active   cetirizine (ZYRTEC) 10 MG Tab 4/20/2019 Active   Cinnamon 500 MG Cap 4/20/2019 Active   diphenhydrAMINE (BENADRYL ALLERGY) 25 MG capsule 4/19/2019 Active   escitalopram (LEXAPRO) 20 MG tablet 4/20/2019 Active   glipiZIDE (GLUCOTROL) 5 MG Tab 4/20/2019 Active   insulin glargine (LANTUS) 100 UNIT/ML Solution Pen-injector injection not taking Active   levothyroxine (SYNTHROID) 25 MCG Tab 4/20/2019 Active   loperamide (ANTI-DIARRHEAL) 2 MG Cap  Active   losartan (COZAAR) 50 MG Tab 4/20/2019 Active   Melatonin 1 MG Cap  Active   naproxen (NAPROSYN) 500 MG Tab  Active   Omega-3 Fatty Acids (FISH OIL) 1000 MG Cap capsule 4/20/2019 Active   RELION PEN NEEDLE 31G/8MM  Active   simvastatin (ZOCOR) 20 MG Tab out of medication Active   SYNJARDY 12.5-1000 MG Tab 4/20/2019 Active   therapeutic multivitamin-minerals (THERAGRAN-M) Tab 4/20/2019 Active   traZODone (DESYREL) 100 MG Tab 4/19/2019 Active                ALLERGIES  No Known Allergies    PHYSICAL EXAM   VITAL SIGNS: BP (!) 178/95   Pulse 95   Temp 37.1 °C (98.7 °F) (Temporal)   Resp 16   Ht 1.88 m (6' 2\")   Wt 121.6 kg (268 lb 1.3 oz)   SpO2 94%   BMI 34.42 kg/m²    Constitutional: Non toxic appearing middle aged male. Alert in no apparent distress.  HENT: Normocephalic, Atraumatic. Bilateral external ears normal. Nose normal.  Moist mucous membranes.  Oropharynx clear.  Eyes: Pupils are equal and reactive. Conjunctiva normal.   Neck: Supple, full range of motion  Heart: Regular rate and rhythm.  No murmurs.    Lungs: No respiratory distress, normal work of breathing. Lungs clear to auscultation bilaterally.  Abdomen Soft, no distention.  No tenderness to " palpation.  Musculoskeletal: Atraumatic. No obvious deformities noted.  No lower extremity edema.  Skin: Warm, Dry.  No erythema, No rash.   Neurologic: Alert and oriented x3. Moving all extremities spontaneously without focal deficits.  Psychiatric: Affect normal, Mood normal, Appears appropriate and not intoxicated.      DIAGNOSTIC STUDIES    EKG  Results for orders placed or performed during the hospital encounter of 19   EKG (NOW)   Result Value Ref Range    Report       [facility] Emergency Dept.    Test Date:  2019  Pt Name:    LEANDRA DEVINE                Department: ER  MRN:        [id number]                      Room:  Gender:     Male                         Technician: [id number]  :        1960                   Requested By:ER TRIAGE PROTOCOL  Order #:    [id number]                    Reading MD: Melody Cardoso MD    Measurements  Intervals                                Axis  Rate:       98                           P:          73  LA:         150                          QRS:        68  QRSD:       105                          T:          48  QT:         361  QTc:        461    Interpretive Statements  Sinus rhythm  Normal axis and intervals  No ectopy or hypertrophy  No ST or T wave change  Compared to ECG 2018 13:02:01  Atrial premature complex(es) no longer present  No significant change    Electronically Signed On 2019 20:11:56 PDT by Melody Cardoso MD             LABS  Personally reviewed by me  Labs Reviewed   CBC WITH DIFFERENTIAL - Abnormal; Notable for the following:        Result Value    WBC 11.6 (*)     MCHC 32.6 (*)     Lymphocytes 17.20 (*)     Neutrophils (Absolute) 8.29 (*)     Monos (Absolute) 1.04 (*)     All other components within normal limits   COMP METABOLIC PANEL - Abnormal; Notable for the following:     Sodium 134 (*)     Anion Gap 13.0 (*)     Glucose 109 (*)     All other components within normal limits   D-DIMER - Abnormal; Notable  for the following:     D-Dimer Screen 0.97 (*)     All other components within normal limits   TROPONIN   ESTIMATED GFR   TROPONIN       RADIOLOGY  Personally reviewed by me  CT-CTA CHEST PULMONARY ARTERY W/ RECONS   Final Result         1.  No pulmonary embolus appreciated.   2.  Atherosclerosis and atherosclerotic coronary artery disease.      DX-CHEST-PORTABLE (1 VIEW)   Final Result      No radiographic evidence of acute cardiopulmonary process.          ED COURSE  Vitals:    04/20/19 1804 04/20/19 1834 04/20/19 2000 04/20/19 2101   BP:       Pulse:  95 92 89   Resp:  16 17 16   Temp:       TempSrc:       SpO2:  94% 94% 93%   Weight: 121.6 kg (268 lb 1.3 oz)      Height:             Medications administered:  Medications   acetaminophen (TYLENOL) tablet 1,000 mg (1,000 mg Oral Given 4/20/19 1934)   iohexol (OMNIPAQUE) 350 mg/mL (70 mL Intravenous Given 4/20/19 2115)         Old records personally reviewed:  Patient had a left heart catheterization on July 25th with non obstructive coronary artery disease. He also had a stress test preformed at that time.  His results showed that he was 20-40% occluded.     6:54 PM Patient seen and examined at bedside. The patient presents with chest pain. Ordered for DX chest, D- dimer, CBC, CMP, troponin, EKG to evaluate. Patient will be treated with Tylenol 1,000 mg for his symptoms.     MEDICAL DECISION MAKING  Patient with history of nonocclusive coronary artery disease in addition to hypertension and diabetes who presents with chest pain.  He is afebrile, mildly hypertensive on arrival with otherwise normal vital signs.  EKG is unchanged from prior without evidence of acute ischemia or arrhythmia.  Initial troponin is negative.  Clinically doubt aortic dissection based on history and exam.  Chest x-ray is negative for pneumonia, pneumothorax, pulmonary edema.  D-dimer is mildly elevated therefore CTA of the chest was performed without evidence of pulmonary embolism or other  acute pathology.  Patient does have evidence of atherosclerosis however.  Patient has a HEART score of 4 with an known history of coronary arteries disease therefore will be admitted for further observation and monitoring.    9:27 PM I re-evaluated the patient at bedside. I informed the patient about their laboratory and radiology results as outlined above. The patient understands and verbalizes agreement with the treatment plan for admission.    9:32 PM I spoke with Dr. Peters, Hospitalist, about the patient's condition. They agree to accept the patient for admission. Care is being turned over to the admitting physician at this time.     DISPOSITION:  Patient will be admitted to Dr. Peters in guarded condition.      IMPRESSION  (R07.9) Chest pain, unspecified type  (I25.119) Coronary artery disease involving native heart with angina pectoris, unspecified vessel or lesion type (HCC)    Results, diagnoses, and treatment options were discussed with the patient and/or family. Patient verbalized understanding of plan of care.       Yolette WALTERS (Scribe), am scribing for, and in the presence of, Melody Cardoso M.D..    Electronically signed by: Yolette Nicholas (Scribe), 4/20/2019    Melody WALTERS M.D. personally performed the services described in this documentation, as scribed by Yolette Nicholas in my presence, and it is both accurate and complete. C    The note accurately reflects work and decisions made by me.  Melody Cardoso  4/20/2019  10:36 PM

## 2019-04-21 NOTE — PROGRESS NOTES
Assumed pt care at 0700. Received report from Taylor OSPINA. A&O x4. Pt denies pain at this time. Respirations even and unlabored on RA. Cardiac monitor applied, SR noted. Pt reports no chest pain, SOB, nausea, or palpitations.   Updated on POC, communication board updated. Bed locked and in lowest position. Call light and belongings within reach. Non-skid socks in place. Needs met, will continue to monitor.

## 2019-04-21 NOTE — ED NOTES
Assumed care at this time. Patient stable w no ss of distress noted. No needs or complaints verbalized.

## 2019-04-21 NOTE — DISCHARGE PLANNING
Care Transition Team Assessment    Wife will drive home when discharged.    Information Source  Orientation : Oriented x 4  Information Given By: Patient  Who is responsible for making decisions for patient? : Patient         Elopement Risk  Legal Hold: No  Ambulatory or Self Mobile in Wheelchair: Yes  Disoriented: No  Psychiatric Symptoms: None  History of Wandering: No  Elopement this Admit: No  Vocalizing Wanting to Leave: No    Interdisciplinary Discharge Planning  Does Admitting Nurse Feel This Could be a Complex Discharge?: No  Primary Care Physician: Cleo MARES  Lives with - Patient's Self Care Capacity: Spouse  Patient or legal guardian wants to designate a caregiver (see row info): No  Support Systems: Family Member(s)  Housing / Facility: [address]  Do You Take your Prescribed Medications Regularly: Yes  Able to Return to Previous ADL's: Yes  Mobility Issues: No  Prior Services: None  Patient Expects to be Discharged to:: home  Assistance Needed: No  Durable Medical Equipment: Walker (walker, cane, commode over toilet)    Discharge Preparedness  What is your plan after discharge?: Other (comment) (home)  What are your discharge supports?: Spouse  Prior Functional Level: Drives Self, Independent with Activities of Daily Living, Independent with Medication Management  Difficulity with ADLs: None    Functional Assesment  Prior Functional Level: Drives Self, Independent with Activities of Daily Living, Independent with Medication Management    Finances  Financial Barriers to Discharge: No  Prescription Coverage: Yes    Vision / Hearing Impairment  Vision Impairment : Yes  Right Eye Vision: Wears Glasses  Left Eye Vision: Wears Glasses  Hearing Impairment : No         Advance Directive  Advance Directive?: None  Advance Directive offered?: AD Booklet refused    Domestic Abuse  Have you ever been the victim of abuse or violence?: No  Physical Abuse or Sexual Abuse: No  Verbal Abuse or Emotional  Abuse: No  Possible Abuse Reported to:: Not Applicable    Psychological Assessment  History of Substance Abuse: None  History of Psychiatric Problems: No    Discharge Risks or Barriers  Discharge risks or barriers?: No    Anticipated Discharge Information  Anticipated discharge disposition: Home  Discharge Address: [address] [place] [address]  Discharge Contact Phone Number: [phone]

## 2019-04-21 NOTE — CARE PLAN
Problem: Knowledge Deficit  Goal: Knowledge of the prescribed therapeutic regimen will improve  Pt aware for stress test in am

## 2019-04-21 NOTE — PROGRESS NOTES
Transported pt from ER to T204 via wheelchair, all belongings and charts with patient, hooked in the monitor. SR on the monitor

## 2019-04-21 NOTE — ED NOTES
[facility] Pt placed in gown. Pt reports CP at 1030 and has been consistent since. Pt reports hypertension at the time of the CP. Pt doesn't report numbness, SOB, or N/V.  Pt reports pain of 4 out of 10. Pt hooked to monitor.Primary assessment complete. Pt denies any further needs at this time. Call light within reach. Bed in low locked position. Comfort measures provided.

## 2019-04-21 NOTE — DISCHARGE SUMMARY
"Discharge Summary    CHIEF COMPLAINT ON ADMISSION  Chief Complaint   Patient presents with   • Chest Pain   • Hypertension       Reason for Admission  High Blood Pressure     Admission Date  4/20/2019    CODE STATUS  Full Code    HPI & HOSPITAL COURSE  This is a 58 y.o. male with a past medical history of hypertension, diabetes, hyperlipidemia here with complaints of chest pain. The patient was noted to have had a prior stress test in July 2018 showing a prior infarct with minimal lili-infarct ischemia.  The patient therefore underwent cardiac angiogram at that time showing nonobstructive coronary artery disease and was started on medical management at that time.  On admission EKG was stable.  Troponins remain negative.  CTA chest and pulmonary artery was negative for pulmonary embolus.  At this time there is no evidence of ACS.  The patient does report not taking any of his medications for the last 6 months as his doctor \"forgot to fill them\".  His pain is likely secondary to his noncompliance with his cardiac medications.  The patient was started on Imdur 30 mg daily and Lipitor 40 mg daily.  He was encouraged to maintain compliance with his other home medications especially his insulin to ensure proper management of his diabetes.  At this time the patient is stable and has maintained stable vital signs.  He has had no further chest pain.  The patient at this time has no further need for acute intervention.  He is safe for discharge and is recommended to follow closely with his outpatient cardiologist for further work-up and recommendations.       Therefore, he is discharged in good and stable condition to home with close outpatient follow-up.    Discharge Date  4/21/2019    DISCHARGE DIAGNOSES  Active Problems:    Acquired hypothyroidism POA: Yes    Type 2 diabetes mellitus with complication, without long-term current use of insulin (HCC) POA: Yes    Dyslipidemia POA: Yes    Primary insomnia POA: Yes    Episode " of recurrent major depressive disorder (HCC) POA: Yes  Resolved Problems:    Chest pain POA: Yes      FOLLOW UP  He will follow with his PCP in 1 to 2 weeks.    MEDICATIONS ON DISCHARGE     Medication List      START taking these medications      Instructions   atorvastatin 40 MG Tabs  Commonly known as:  LIPITOR   Take 1 Tab by mouth every evening.  Dose:  40 mg     isosorbide mononitrate SR 30 MG Tb24  Commonly known as:  IMDUR   Take 1 Tab by mouth every morning.  Dose:  30 mg        CONTINUE taking these medications      Instructions   5- MG Caps   Take 100 mg by mouth every bedtime.  Dose:  100 mg     ANTI-DIARRHEAL 2 MG Caps  Generic drug:  loperamide   Take 4 mg by mouth every morning.  Dose:  4 mg     aspirin 81 MG tablet   Take 81 mg by mouth every day.  Dose:  81 mg     BENADRYL ALLERGY 25 MG capsule  Generic drug:  diphenhydrAMINE   Take 50 mg by mouth every bedtime.  Dose:  50 mg     cetirizine 10 MG Tabs  Commonly known as:  ZYRTEC   Take 10 mg by mouth every day.  Dose:  10 mg     Cinnamon 500 MG Caps   Take 1,000 mg by mouth every morning.  Dose:  1000 mg     fish oil 1000 MG Caps capsule   Take 1,000 mg by mouth every morning.  Dose:  1000 mg     glipiZIDE 5 MG Tabs  Commonly known as:  GLUCOTROL   Take 5 mg by mouth 2 times a day.  Dose:  5 mg     insulin glargine 100 UNIT/ML Soln  Commonly known as:  LANTUS   Inject 32 Units as instructed every evening.  Dose:  32 Units     levothyroxine 25 MCG Tabs  Commonly known as:  SYNTHROID   Take 25 mcg by mouth Every morning on an empty stomach.  Dose:  25 mcg     LEXAPRO 20 MG tablet  Generic drug:  escitalopram   Take 20 mg by mouth every day.  Dose:  20 mg     losartan 50 MG Tabs  Commonly known as:  COZAAR   Take 50 mg by mouth every day.  Dose:  50 mg     Melatonin 1 MG Caps   Take 1 mg by mouth every bedtime.  Dose:  1 mg     SYNJARDY 12.5-1000 MG Tabs  Generic drug:  Empagliflozin-Metformin HCl   TAKE 1 TABLET BY MOUTH TWICE DAILY      therapeutic multivitamin-minerals Tabs   Take 1 Tab by mouth every morning.  Dose:  1 Tab     traZODone 100 MG Tabs  Commonly known as:  DESYREL   Take 200 mg by mouth every evening.  Dose:  200 mg     VITAMIN B COMPLEX PO   Take 1 Dose by mouth every morning.  Dose:  1 Dose            Allergies  No Known Allergies    DIET  Orders Placed This Encounter   Procedures   • Diet Order Cardiac     Standing Status:   Standing     Number of Occurrences:   1     Order Specific Question:   Diet:     Answer:   Cardiac [6]       ACTIVITY  As tolerated.    LABORATORY  Lab Results   Component Value Date    SODIUM 134 (L) 04/20/2019    POTASSIUM 4.1 04/20/2019    CHLORIDE 101 04/20/2019    CO2 20 04/20/2019    GLUCOSE 109 (H) 04/20/2019    BUN 20 04/20/2019    CREATININE 0.94 04/20/2019        Lab Results   Component Value Date    WBC 11.6 (H) 04/20/2019    HEMOGLOBIN 14.9 04/20/2019    HEMATOCRIT 45.7 04/20/2019    PLATELETCT 306 04/20/2019

## 2019-04-21 NOTE — H&P
Hospital Medicine History & Physical Note    Date of Service  4/20/2019    Primary Care Physician  BENJA Curtis    Consultants  None    Code Status  Full Code    Chief Complaint  Chest Pain    History of Presenting Illness  58 y.o. male with a PMH of HTN and DM 2, HLD who presented 4/20/2019 with chest pain that started earlier today.  The patient reports substernal chest pain, sharp in quality rated at 5/10 in intensity.  He reported associated shortness of breath.  He denied any fevers, chills, cough, nausea, vomiting or diaphoresis.  He denies any leg swelling.  He denies any relieving or exacerbating factors..  Received a full dose of aspirin prior to arrival.  He denies any previous history of MI or PE.    EKG interpreted by me reveals sinus rhythm with no ST elevation or ST depression  Chest x-ray interpreted by me revealed no acute cardiopulmonary process  CTA chest with IV contrast revealed no PE    Review of Systems  Review of Systems   Constitutional: Negative for chills, diaphoresis and fever.   HENT: Negative for hearing loss and sore throat.    Eyes: Negative for blurred vision.   Respiratory: Positive for shortness of breath. Negative for cough, sputum production and wheezing.    Cardiovascular: Positive for chest pain. Negative for palpitations and leg swelling.   Gastrointestinal: Negative for abdominal pain, blood in stool, diarrhea, nausea and vomiting.   Genitourinary: Negative for dysuria, flank pain and urgency.   Musculoskeletal: Negative for back pain, joint pain, myalgias and neck pain.   Skin: Negative for rash.   Neurological: Negative for dizziness, focal weakness, seizures and headaches.   Endo/Heme/Allergies: Does not bruise/bleed easily.   Psychiatric/Behavioral: Negative for suicidal ideas.   All other systems reviewed and are negative.      Past Medical History   has a past medical history of Arthritis; Breath shortness; Depression; Diabetes (HCC); High cholesterol;  Hyperlipidemia; and Hypertension.    Surgical History   has a past surgical history that includes athroplasty (Left) and cholecystectomy.     Family History  family history includes Diabetes in his mother; Stroke in his father.     Social History   reports that he has never smoked. He quit smokeless tobacco use about 33 years ago. His smokeless tobacco use included Chew. He reports that he drinks alcohol. He reports that he does not use drugs.    Allergies  No Known Allergies    Medications  Prior to Admission Medications   Prescriptions Last Dose Informant Patient Reported? Taking?   5-Hydroxytryptophan (5-HTP) 100 MG Cap 4/19/2019 at PM Patient Yes No   Sig: Take 100 mg by mouth every bedtime.   B Complex Vitamins (VITAMIN B COMPLEX PO) 4/20/2019 at AM Patient Yes No   Sig: Take 1 Dose by mouth every morning.   Cinnamon 500 MG Cap 4/20/2019 at AM Patient Yes No   Sig: Take 1,000 mg by mouth every morning.   Melatonin 1 MG Cap 4/19/2019 at PM Patient Yes No   Sig: Take 1 mg by mouth every bedtime.   Omega-3 Fatty Acids (FISH OIL) 1000 MG Cap capsule 4/20/2019 at AM Patient Yes No   Sig: Take 1,000 mg by mouth every morning.   SYNJARDY 12.5-1000 MG Tab 4/20/2019 at AM Patient No No   Sig: TAKE 1 TABLET BY MOUTH TWICE DAILY   aspirin 81 MG tablet 4/20/2019 at AM Patient Yes No   Sig: Take 81 mg by mouth every day.   cetirizine (ZYRTEC) 10 MG Tab 4/20/2019 at AM Patient Yes Yes   Sig: Take 10 mg by mouth every day.   diphenhydrAMINE (BENADRYL ALLERGY) 25 MG capsule 4/19/2019 at PM Patient Yes No   Sig: Take 50 mg by mouth every bedtime.   escitalopram (LEXAPRO) 20 MG tablet 4/19/2019 at AM Patient Yes Yes   Sig: Take 20 mg by mouth every day.   glipiZIDE (GLUCOTROL) 5 MG Tab 4/20/2019 at AM Patient Yes Yes   Sig: Take 5 mg by mouth 2 times a day.   insulin glargine (LANTUS) 100 UNIT/ML Solution 4/17/2019 at PM Patient Yes Yes   Sig: Inject 32 Units as instructed every evening.   levothyroxine (SYNTHROID) 25 MCG Tab  4/20/2019 at AM Patient Yes Yes   Sig: Take 25 mcg by mouth Every morning on an empty stomach.   loperamide (ANTI-DIARRHEAL) 2 MG Cap 4/20/2019 at AM Patient Yes No   Sig: Take 4 mg by mouth every morning.   losartan (COZAAR) 50 MG Tab 4/20/2019 at AM Patient Yes Yes   Sig: Take 50 mg by mouth every day.   therapeutic multivitamin-minerals (THERAGRAN-M) Tab 4/20/2019 at AM Patient Yes No   Sig: Take 1 Tab by mouth every morning.   traZODone (DESYREL) 100 MG Tab 4/19/2019 at PM Patient Yes Yes   Sig: Take 200 mg by mouth every evening.      Facility-Administered Medications: None       Physical Exam  Temp:  [37.1 °C (98.7 °F)] 37.1 °C (98.7 °F)  Pulse:  [] 89  Resp:  [14-17] 16  BP: (178)/(95) 178/95  SpO2:  [93 %-94 %] 93 %    Physical Exam   Constitutional: He is oriented to person, place, and time. He appears well-developed and well-nourished. No distress.   HENT:   Head: Normocephalic and atraumatic.   Mouth/Throat: Oropharynx is clear and moist.   Eyes: Pupils are equal, round, and reactive to light. Conjunctivae are normal. No scleral icterus.   Neck: Normal range of motion. Neck supple.   Cardiovascular: Normal rate, regular rhythm and normal heart sounds.    Pulmonary/Chest: Effort normal and breath sounds normal. No respiratory distress. He has no wheezes. He has no rales.   Abdominal: Soft. Bowel sounds are normal. He exhibits no distension. There is no tenderness. There is no rebound.   Musculoskeletal: Normal range of motion. He exhibits no edema or tenderness.   Lymphadenopathy:     He has no cervical adenopathy.   Neurological: He is alert and oriented to person, place, and time. No cranial nerve deficit. Coordination normal.   Skin: Skin is warm. No rash noted.   Psychiatric: He has a normal mood and affect. His behavior is normal.   Nursing note and vitals reviewed.      Laboratory:  Recent Labs      04/20/19   1822   WBC  11.6*   RBC  5.08   HEMOGLOBIN  14.9   HEMATOCRIT  45.7   MCV  90.0   MCH   29.3   MCHC  32.6*   RDW  43.7   PLATELETCT  306   MPV  9.6     Recent Labs      04/20/19   1822   SODIUM  134*   POTASSIUM  4.1   CHLORIDE  101   CO2  20   GLUCOSE  109*   BUN  20   CREATININE  0.94   CALCIUM  8.9     Recent Labs      04/20/19 1822   ALTSGPT  28   ASTSGOT  25   ALKPHOSPHAT  72   TBILIRUBIN  0.5   GLUCOSE  109*                 Recent Labs      04/20/19 1822  04/20/19 2053   TROPONINI  <0.01  <0.01       Urinalysis:    No results found     Imaging:  CT-CTA CHEST PULMONARY ARTERY W/ RECONS   Final Result         1.  No pulmonary embolus appreciated.   2.  Atherosclerosis and atherosclerotic coronary artery disease.      DX-CHEST-PORTABLE (1 VIEW)   Final Result      No radiographic evidence of acute cardiopulmonary process.      NM-CARDIAC STRESS TEST    (Results Pending)         Assessment/Plan:  I anticipate this patient is appropriate for observation status at this time.    Chest pain- (present on admission)   Assessment & Plan    Rule out ACS  Continuous cardiac monitoring with serial EKG and troponin  Nuclear medicine cardiac stress test in the morning if troponin remains negative  Patient has been given full dose of aspirin  Check lipid panel, TSH and hemoglobin A1c  Nitro when necessary for chest pain       Episode of recurrent major depressive disorder (HCC)- (present on admission)   Assessment & Plan    Continue Lexapro     Primary insomnia- (present on admission)   Assessment & Plan    Continue trazodone and Benadryl     Type 2 diabetes mellitus with complication, without long-term current use of insulin (HCC)- (present on admission)   Assessment & Plan    Start on insulin sliding scale with serial Accu-Checks  Check hemoglobin A1c  Hypoglycemic protocol in place         Acquired hypothyroidism- (present on admission)   Assessment & Plan    Check TSH  Continue Synthroid 25         VTE prophylaxis: SCD

## 2019-04-21 NOTE — DISCHARGE INSTRUCTIONS
Discharge Instructions    Discharged to home by car with relative. Discharged via wheelchair, hospital escort: Yes.  Special equipment needed: Not Applicable    Be sure to schedule a follow-up appointment with your primary care doctor or any specialists as instructed.     Discharge Plan:   Diet Plan: Discussed  Activity Level: Discussed  Confirmed Follow up Appointment: Patient to Call and Schedule Appointment  Confirmed Symptoms Management: Discussed  Medication Reconciliation Updated: Yes  Pneumococcal Vaccine Administered/Refused: Not given - Patient refused pneumococcal vaccine  Influenza Vaccine Indication: Patient Refuses    I understand that a diet low in cholesterol, fat, and sodium is recommended for good health. Unless I have been given specific instructions below for another diet, I accept this instruction as my diet prescription.   Other diet: Heart healthy    Special Instructions: None    · Is patient discharged on Warfarin / Coumadin?   No     Depression / Suicide Risk    As you are discharged from this [facility] facility, it is important to learn how to keep safe from harming yourself.    Recognize the warning signs:  · Abrupt changes in personality, positive or negative- including increase in energy   · Giving away possessions  · Change in eating patterns- significant weight changes-  positive or negative  · Change in sleeping patterns- unable to sleep or sleeping all the time   · Unwillingness or inability to communicate  · Depression  · Unusual sadness, discouragement and loneliness  · Talk of wanting to die  · Neglect of personal appearance   · Rebelliousness- reckless behavior  · Withdrawal from people/activities they love  · Confusion- inability to concentrate     If you or a loved one observes any of these behaviors or has concerns about self-harm, here's what you can do:  · Talk about it- your feelings and reasons for harming yourself  · Remove any means that you might use to hurt yourself  (examples: pills, rope, extension cords, firearm)  · Get professional help from the community (Mental Health, Substance Abuse, psychological counseling)  · Do not be alone:Call your Safe Contact- someone whom you trust who will be there for you.  · Call your local CRISIS HOTLINE 572-8513 or 717-029-6656  · Call your local Children's Mobile Crisis Response Team [place] (128) 792-5297 or www.[employer]  · Call the toll free National Suicide Prevention Hotlines   · National Suicide Prevention Lifeline 268-539-BDTT (5378)  · National Hope Line Network 800-SUICIDE (603-2958)

## 2019-04-21 NOTE — ED TRIAGE NOTES
Patient presents to ED triage with complaints of Chest pain. 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 Noticed chest pain starting about 1030. It was the worst 9/10 today at about 4 pm. He states EMS was called to assess his VS and he did have /110. Reports left side chest pain that radiates into left arm. Sharp. +sOB. No dizziness or nausea. Better with rest. Did have heart cath in the past but no stent.     Pt educated on ED process and asked to wait in lobby. Patient educated on importance of alerting staff to new or worsening symptoms or concerns.

## 2019-04-21 NOTE — PROGRESS NOTES
A/o,respirations are even and unlabored on room air ,assessment completed, vital signs stable,  Pt complaining of chest pain 2/10, denies any nausea and headache at the moment, SR on the monitor, updated communication board,  poc discussed and understood, verbalized understanding, pt aware NPO for stress test, all questions answered at this time , fall precautions in place, call button within reach, will continue to monitor

## 2019-04-22 DIAGNOSIS — E11.8 TYPE 2 DIABETES MELLITUS WITH COMPLICATION, WITHOUT LONG-TERM CURRENT USE OF INSULIN (HCC): ICD-10-CM

## 2019-04-22 DIAGNOSIS — E78.5 DYSLIPIDEMIA: ICD-10-CM

## 2019-05-01 ENCOUNTER — OFFICE VISIT (OUTPATIENT)
Dept: MEDICAL GROUP | Facility: PHYSICIAN GROUP | Age: 59
End: 2019-05-01
Payer: COMMERCIAL

## 2019-05-01 VITALS
WEIGHT: 281 LBS | DIASTOLIC BLOOD PRESSURE: 72 MMHG | OXYGEN SATURATION: 97 % | HEART RATE: 86 BPM | RESPIRATION RATE: 16 BRPM | HEIGHT: 73 IN | TEMPERATURE: 97.6 F | SYSTOLIC BLOOD PRESSURE: 130 MMHG | BODY MASS INDEX: 37.24 KG/M2

## 2019-05-01 DIAGNOSIS — I10 ESSENTIAL HYPERTENSION, BENIGN: ICD-10-CM

## 2019-05-01 DIAGNOSIS — E03.9 ACQUIRED HYPOTHYROIDISM: ICD-10-CM

## 2019-05-01 DIAGNOSIS — E78.5 DYSLIPIDEMIA: ICD-10-CM

## 2019-05-01 DIAGNOSIS — E11.8 TYPE 2 DIABETES MELLITUS WITH COMPLICATION, WITHOUT LONG-TERM CURRENT USE OF INSULIN (HCC): ICD-10-CM

## 2019-05-01 PROCEDURE — 99214 OFFICE O/P EST MOD 30 MIN: CPT | Performed by: NURSE PRACTITIONER

## 2019-05-01 RX ORDER — ISOSORBIDE MONONITRATE 30 MG/1
30 TABLET, EXTENDED RELEASE ORAL EVERY MORNING
Qty: 90 TAB | Refills: 3 | Status: SHIPPED | OUTPATIENT
Start: 2019-05-01 | End: 2020-06-09

## 2019-05-01 RX ORDER — ATORVASTATIN CALCIUM 40 MG/1
40 TABLET, FILM COATED ORAL EVERY EVENING
Qty: 90 TAB | Refills: 3 | Status: SHIPPED | OUTPATIENT
Start: 2019-05-01 | End: 2020-06-09

## 2019-05-01 RX ORDER — LEVOTHYROXINE SODIUM 0.03 MG/1
25 TABLET ORAL
Qty: 90 TAB | Refills: 3 | Status: SHIPPED | OUTPATIENT
Start: 2019-05-01 | End: 2020-05-19

## 2019-05-01 NOTE — PATIENT INSTRUCTIONS
See me in 3 months with labs before visit    When you send me message about med refills, tell them I told you to not send to pharmacy pool, but send directly to me

## 2019-05-01 NOTE — PROGRESS NOTES
Chief Complaint   Patient presents with   • Hypertension     HF          This is a 58 y.o.male patient that presents today with the following: Post hospital follow-up    Acquired hypothyroidism  Chronic and stable, well controlled on levothyroxine 25 mcg daily.  He is due for labs, these have been ordered.  He does  need refills on medication.  Patient denies symptoms of hypothyroidism and does understand to take this on an empty stomach first thing in the morning apart from other medications.    Type 2 diabetes mellitus with complication, without long-term current use of insulin (HCC)  This is a chronic condition, stable and suboptimally controlled on current medications.  He is on Lantus insulin 32 units at bedtime, he also takes Synjardy and glipizide.  He is due for labs before he follows up with me in 3 months, these have been ordered.  He is appropriately on statin, low-dose aspirin and ARB.    Dyslipidemia  The 10-year ASCVD risk score (Francy DOS SANTOS Jr., et al., 2013) is: 15.2%    Values used to calculate the score:      Age: 58 years      Sex: Male      Is Non- : No      Diabetic: Yes      Tobacco smoker: No      Systolic Blood Pressure: 130 mmHg      Is BP treated: Yes      HDL Cholesterol: 44 mg/dL      Total Cholesterol: 185 mg/dL  Patient currently taking atorvastatin 40 mg daily.  He will be due for labs before he follows up with me in 3 months.    Essential hypertension, benign  Patient here post hospital visit for elevated blood pressure.  He initially presented to the emergency room with chest pain which was found to not be cardiac in nature.  However his blood pressure was elevated and changes were made to his regimen.  He is currently taking losartan 50 mg daily, he is also taking Imdur 30 mg daily.  Blood pressure today is well within normal limits.  He does need refills on the Imdur, this is called in for him.  He is going to follow-up in 3 months with labs done before  visit.      Admission on 2019, Discharged on 2019   Component Date Value   • Report 2019                      Value:[facility].    Test Date:  2019  Pt Name:    LEANDRA DEVINE                Department: ER  MRN:        [id number]                      Room:  Gender:     Male                         Technician: [id number]  :        1960                   Requested By:ER TRIAGE PROTOCOL  Order #:    [id number]                    Reading MD: Melody Cardoso MD    Measurements  Intervals                                Axis  Rate:       98                           P:          73  IA:         150                          QRS:        68  QRSD:       105                          T:          48  QT:         361  QTc:        461    Interpretive Statements  Sinus rhythm  Normal axis and intervals  No ectopy or hypertrophy  No ST or T wave change  Compared to ECG 2018 13:02:01  Atrial premature complex(es) no longer present  No significant change    Electronically Signed On 2019 20:11:56 PDT by Melody Cardoso MD     • WBC 2019 11.6*   • RBC 2019 5.08    • Hemoglobin 2019 14.9    • Hematocrit 2019 45.7    • MCV 2019 90.0    • MCH 2019 29.3    • MCHC 2019 32.6*   • RDW 2019 43.7    • Platelet Count 2019 306    • MPV 2019 9.6    • Neutrophils-Polys 2019 71.30    • Lymphocytes 2019 17.20*   • Monocytes 2019 8.90    • Eosinophils 2019 1.60    • Basophils 2019 0.50    • Immature Granulocytes 2019 0.50    • Nucleated RBC 2019 0.00    • Neutrophils (Absolute) 2019 8.29*   • Lymphs (Absolute) 2019 2.00    • Monos (Absolute) 2019 1.04*   • Eos (Absolute) 2019 0.19    • Baso (Absolute) 2019 0.06    • Immature Granulocytes (a* 2019 0.06    • NRBC (Absolute) 2019 0.00    • Sodium 2019 134*   • Potassium 2019 4.1    •  Chloride 2019 101    • Co2 2019 20    • Anion Gap 2019 13.0*   • Glucose 2019 109*   • Bun 2019 20    • Creatinine 2019 0.94    • Calcium 2019 8.9    • AST(SGOT) 2019 25    • ALT(SGPT) 2019 28    • Alkaline Phosphatase 2019 72    • Total Bilirubin 2019 0.5    • Albumin 2019 4.1    • Total Protein 2019 7.6    • Globulin 2019 3.5    • A-G Ratio 2019 1.2    • Troponin I 2019 <0.01    • D-Dimer Screen 2019 0.97*   • GFR If  2019 >60    • GFR If Non  Ameri* 2019 >60    • Troponin I 2019 <0.01    • Troponin I 2019 <0.01    • Troponin I 2019 <0.01    • Report 2019                      Value:[facility]    Test Date:  2019  Pt Name:    LEANDRA DEVINE                Department: ER  MRN:        [id number]                      Room:       T204  Gender:     Male                         Technician: [facility]  :        1960                   Requested By:SHOAIB MARAVILLA  Order #:    [id number]                    Reading MD: Daniela Castro MD    Measurements  Intervals                                Axis  Rate:       86                           P:          60  MS:         160                          QRS:        27  QRSD:       100                          T:          7  QT:         372  QTc:        445    Interpretive Statements  SINUS RHYTHM  Compared to ECG 2019 18:02:07  No significant changes    Electronically Signed On 2019 7:09:06 PDT by Dnaiela Castro MD     • Glucose - Accu-Ck 2019 137*         clinical course has been stable    Past Medical History:   Diagnosis Date   • Arthritis    • Breath shortness    • Depression     depression   • Diabetes (HCC)     oral medication   • High cholesterol    • Hyperlipidemia    • Hypertension        Past Surgical History:   Procedure Laterality Date   • ATHROPLASTY Left     hip replacement   •  CHOLECYSTECTOMY         Family History   Problem Relation Age of Onset   • Diabetes Mother    • Stroke Father        Patient has no known allergies.    Current Outpatient Prescriptions Ordered in [facility]   Medication Sig Dispense Refill   • isosorbide mononitrate SR (IMDUR) 30 MG TABLET SR 24 HR Take 1 Tab by mouth every morning. 90 Tab 3   • atorvastatin (LIPITOR) 40 MG Tab Take 1 Tab by mouth every evening. 90 Tab 3   • levothyroxine (SYNTHROID) 25 MCG Tab Take 1 Tab by mouth Every morning on an empty stomach. 90 Tab 3   • insulin glargine (LANTUS) 100 UNIT/ML Solution Pen-injector injection Inject 32 Units as instructed every evening. 15 PEN 3   • cetirizine (ZYRTEC) 10 MG Tab Take 10 mg by mouth every day.     • escitalopram (LEXAPRO) 20 MG tablet Take 20 mg by mouth every day.     • glipiZIDE (GLUCOTROL) 5 MG Tab Take 5 mg by mouth 2 times a day.     • losartan (COZAAR) 50 MG Tab Take 50 mg by mouth every day.     • traZODone (DESYREL) 100 MG Tab Take 200 mg by mouth every evening.     • SYNJARDY 12.5-1000 MG Tab TAKE 1 TABLET BY MOUTH TWICE DAILY 180 Tab 1   • aspirin 81 MG tablet Take 81 mg by mouth every day.     • Cinnamon 500 MG Cap Take 1,000 mg by mouth every morning.     • loperamide (ANTI-DIARRHEAL) 2 MG Cap Take 4 mg by mouth every morning.     • 5-Hydroxytryptophan (5-HTP) 100 MG Cap Take 100 mg by mouth every bedtime.     • Omega-3 Fatty Acids (FISH OIL) 1000 MG Cap capsule Take 1,000 mg by mouth every morning.     • diphenhydrAMINE (BENADRYL ALLERGY) 25 MG capsule Take 50 mg by mouth every bedtime.     • Melatonin 1 MG Cap Take 1 mg by mouth every bedtime.     • therapeutic multivitamin-minerals (THERAGRAN-M) Tab Take 1 Tab by mouth every morning.     • B Complex Vitamins (VITAMIN B COMPLEX PO) Take 1 Dose by mouth every morning.       No current [facility]-ordered facility-administered medications on file.        Constitutional ROS: No unexpected change in weight, No weakness, No unexplained fevers,  "sweats, or chills  Pulmonary ROS: No chronic cough, sputum, or hemoptysis, No shortness of breath, No recent change in breathing  Cardiovascular ROS: No chest pain, No edema, No palpitations, Positive for hypertension and hyperlipidemia  Gastrointestinal ROS: No abdominal pain, No nausea, vomiting, diarrhea, or constipation  Musculoskeletal/Extremities ROS: No clubbing, No peripheral edema, No pain, redness or swelling on the joints  Neurologic ROS: Normal development, No seizures, No weakness  Endocrine ROS: Positive per HPI    Physical exam:  /72 (BP Location: Left arm, Patient Position: Sitting, BP Cuff Size: Adult long)   Pulse 86   Temp 36.4 °C (97.6 °F) (Temporal)   Resp 16   Ht 1.854 m (6' 1\")   Wt (!) 127.5 kg (281 lb)   SpO2 97%   BMI 37.07 kg/m²   General Appearance: Very pleasant middle-aged male, alert, no distress, obese, well-groomed  Skin: Skin color, texture, turgor normal. No rashes or lesions.  Lungs: negative findings: normal respiratory rate and rhythm, lungs clear to auscultation  Heart: negative. RRR without murmur, gallop, or rubs.  No ectopy.  Abdomen: Abdomen soft, non-tender. BS normal. No masses,  No organomegaly  Musculoskeletal: negative findings: no evidence of joint instability, no evidence of muscle atrophy, no deformities present  Neurologic: intact    Medical decision making/discussion: Patient is going to follow-up with me in 3 months with labs done before visit.  New medications prescribed in hospital refilled including Imdur and atorvastatin.  He has been given strict ER precautions as mentioned above.    Farzad was seen today for hypertension.    Diagnoses and all orders for this visit:    Essential hypertension, benign  -     isosorbide mononitrate SR (IMDUR) 30 MG TABLET SR 24 HR; Take 1 Tab by mouth every morning.  -     Comp Metabolic Panel; Future  -     Lipid Profile; Future    Dyslipidemia  -     atorvastatin (LIPITOR) 40 MG Tab; Take 1 Tab by mouth every " evening.  -     Comp Metabolic Panel; Future  -     Lipid Profile; Future    Acquired hypothyroidism  -     levothyroxine (SYNTHROID) 25 MCG Tab; Take 1 Tab by mouth Every morning on an empty stomach.  -     TSH WITH REFLEX TO FT4; Future    Type 2 diabetes mellitus with complication, without long-term current use of insulin (HCC)  -     HEMOGLOBIN A1C; Future  -     MICROALBUMIN CREAT RATIO URINE; Future        Return in about 3 months (around 8/1/2019) for Follow-up, Discuss Labs.        Please note that this dictation was created using voice recognition software. I have made every reasonable attempt to correct obvious errors, but I expect that there are errors of grammar and possibly content that I did not discover before finalizing the note.

## 2019-05-02 NOTE — ASSESSMENT & PLAN NOTE
This is a chronic condition, stable and suboptimally controlled on current medications.  He is on Lantus insulin 32 units at bedtime, he also takes Synjardy and glipizide.  He is due for labs before he follows up with me in 3 months, these have been ordered.  He is appropriately on statin, low-dose aspirin and ARB.

## 2019-05-02 NOTE — ASSESSMENT & PLAN NOTE
Patient here post hospital visit for elevated blood pressure.  He initially presented to the emergency room with chest pain which was found to not be cardiac in nature.  However his blood pressure was elevated and changes were made to his regimen.  He is currently taking losartan 50 mg daily, he is also taking Imdur 30 mg daily.  Blood pressure today is well within normal limits.  He does need refills on the Imdur, this is called in for him.  He is going to follow-up in 3 months with labs done before visit.

## 2019-05-02 NOTE — ASSESSMENT & PLAN NOTE
Chronic and stable, well controlled on levothyroxine 25 mcg daily.  He is due for labs, these have been ordered.  He does  need refills on medication.  Patient denies symptoms of hypothyroidism and does understand to take this on an empty stomach first thing in the morning apart from other medications.

## 2019-05-02 NOTE — ASSESSMENT & PLAN NOTE
The 10-year ASCVD risk score (Francy DOS SANTOS Jr., et al., 2013) is: 15.2%    Values used to calculate the score:      Age: 58 years      Sex: Male      Is Non- : No      Diabetic: Yes      Tobacco smoker: No      Systolic Blood Pressure: 130 mmHg      Is BP treated: Yes      HDL Cholesterol: 44 mg/dL      Total Cholesterol: 185 mg/dL  Patient currently taking atorvastatin 40 mg daily.  He will be due for labs before he follows up with me in 3 months.

## 2019-06-14 DIAGNOSIS — J30.9 ALLERGIC RHINITIS, UNSPECIFIED SEASONALITY, UNSPECIFIED TRIGGER: ICD-10-CM

## 2019-06-17 RX ORDER — CETIRIZINE HYDROCHLORIDE 10 MG/1
TABLET, FILM COATED ORAL
Qty: 90 TAB | Refills: 1 | Status: SHIPPED | OUTPATIENT
Start: 2019-06-17 | End: 2019-12-17

## 2019-06-17 NOTE — TELEPHONE ENCOUNTER
Was the patient seen in the last year in this department? Yes    Does patient have an active prescription for medications requested? No     Received Request Via: Pharmacy      Pt met protocol?: Yes    OV 5/19

## 2019-07-20 DIAGNOSIS — F33.9 EPISODE OF RECURRENT MAJOR DEPRESSIVE DISORDER, UNSPECIFIED DEPRESSION EPISODE SEVERITY (HCC): ICD-10-CM

## 2019-07-22 RX ORDER — ESCITALOPRAM OXALATE 20 MG/1
TABLET ORAL
Qty: 90 TAB | Refills: 1 | Status: SHIPPED | OUTPATIENT
Start: 2019-07-22 | End: 2020-01-21

## 2019-07-25 ENCOUNTER — PATIENT MESSAGE (OUTPATIENT)
Dept: MEDICAL GROUP | Facility: PHYSICIAN GROUP | Age: 59
End: 2019-07-25

## 2019-07-26 ENCOUNTER — HOSPITAL ENCOUNTER (OUTPATIENT)
Dept: LAB | Facility: MEDICAL CENTER | Age: 59
End: 2019-07-26
Attending: NURSE PRACTITIONER
Payer: COMMERCIAL

## 2019-07-26 DIAGNOSIS — E03.9 ACQUIRED HYPOTHYROIDISM: ICD-10-CM

## 2019-07-26 DIAGNOSIS — I10 ESSENTIAL HYPERTENSION, BENIGN: ICD-10-CM

## 2019-07-26 DIAGNOSIS — E11.8 TYPE 2 DIABETES MELLITUS WITH COMPLICATION, WITHOUT LONG-TERM CURRENT USE OF INSULIN (HCC): ICD-10-CM

## 2019-07-26 DIAGNOSIS — E11.8 TYPE 2 DIABETES MELLITUS WITH COMPLICATION, WITH LONG-TERM CURRENT USE OF INSULIN (HCC): ICD-10-CM

## 2019-07-26 DIAGNOSIS — Z79.4 TYPE 2 DIABETES MELLITUS WITH COMPLICATION, WITH LONG-TERM CURRENT USE OF INSULIN (HCC): ICD-10-CM

## 2019-07-26 DIAGNOSIS — E78.5 DYSLIPIDEMIA: ICD-10-CM

## 2019-07-26 LAB
ALBUMIN SERPL BCP-MCNC: 4.6 G/DL (ref 3.2–4.9)
ALBUMIN/GLOB SERPL: 1.4 G/DL
ALP SERPL-CCNC: 70 U/L (ref 30–99)
ALT SERPL-CCNC: 23 U/L (ref 2–50)
ANION GAP SERPL CALC-SCNC: 11 MMOL/L (ref 0–11.9)
AST SERPL-CCNC: 18 U/L (ref 12–45)
BILIRUB SERPL-MCNC: 0.4 MG/DL (ref 0.1–1.5)
BUN SERPL-MCNC: 28 MG/DL (ref 8–22)
CALCIUM SERPL-MCNC: 9.3 MG/DL (ref 8.5–10.5)
CHLORIDE SERPL-SCNC: 105 MMOL/L (ref 96–112)
CHOLEST SERPL-MCNC: 102 MG/DL (ref 100–199)
CO2 SERPL-SCNC: 21 MMOL/L (ref 20–33)
CREAT SERPL-MCNC: 1.22 MG/DL (ref 0.5–1.4)
CREAT UR-MCNC: 86.8 MG/DL
EST. AVERAGE GLUCOSE BLD GHB EST-MCNC: 183 MG/DL
FASTING STATUS PATIENT QL REPORTED: NORMAL
GLOBULIN SER CALC-MCNC: 3.3 G/DL (ref 1.9–3.5)
GLUCOSE SERPL-MCNC: 218 MG/DL (ref 65–99)
HBA1C MFR BLD: 8 % (ref 0–5.6)
HDLC SERPL-MCNC: 41 MG/DL
LDLC SERPL CALC-MCNC: 37 MG/DL
MICROALBUMIN UR-MCNC: <0.7 MG/DL
MICROALBUMIN/CREAT UR: NORMAL MG/G (ref 0–30)
POTASSIUM SERPL-SCNC: 4.6 MMOL/L (ref 3.6–5.5)
PROT SERPL-MCNC: 7.9 G/DL (ref 6–8.2)
SODIUM SERPL-SCNC: 137 MMOL/L (ref 135–145)
TRIGL SERPL-MCNC: 120 MG/DL (ref 0–149)
TSH SERPL DL<=0.005 MIU/L-ACNC: 4.08 UIU/ML (ref 0.38–5.33)

## 2019-07-26 PROCEDURE — 80061 LIPID PANEL: CPT

## 2019-07-26 PROCEDURE — 82570 ASSAY OF URINE CREATININE: CPT

## 2019-07-26 PROCEDURE — 83036 HEMOGLOBIN GLYCOSYLATED A1C: CPT

## 2019-07-26 PROCEDURE — 84443 ASSAY THYROID STIM HORMONE: CPT

## 2019-07-26 PROCEDURE — 82043 UR ALBUMIN QUANTITATIVE: CPT

## 2019-07-26 PROCEDURE — 36415 COLL VENOUS BLD VENIPUNCTURE: CPT

## 2019-07-26 PROCEDURE — 80053 COMPREHEN METABOLIC PANEL: CPT

## 2019-07-30 ENCOUNTER — OFFICE VISIT (OUTPATIENT)
Dept: MEDICAL GROUP | Facility: PHYSICIAN GROUP | Age: 59
End: 2019-07-30
Payer: COMMERCIAL

## 2019-07-30 VITALS
SYSTOLIC BLOOD PRESSURE: 128 MMHG | HEIGHT: 73 IN | WEIGHT: 276 LBS | TEMPERATURE: 98.8 F | DIASTOLIC BLOOD PRESSURE: 82 MMHG | HEART RATE: 108 BPM | BODY MASS INDEX: 36.58 KG/M2 | RESPIRATION RATE: 16 BRPM | OXYGEN SATURATION: 95 %

## 2019-07-30 DIAGNOSIS — E11.8 TYPE 2 DIABETES MELLITUS WITH COMPLICATION, WITHOUT LONG-TERM CURRENT USE OF INSULIN (HCC): ICD-10-CM

## 2019-07-30 DIAGNOSIS — E78.5 DYSLIPIDEMIA: ICD-10-CM

## 2019-07-30 DIAGNOSIS — Z12.11 SCREENING FOR COLON CANCER: ICD-10-CM

## 2019-07-30 DIAGNOSIS — I10 ESSENTIAL HYPERTENSION, BENIGN: ICD-10-CM

## 2019-07-30 PROCEDURE — 99214 OFFICE O/P EST MOD 30 MIN: CPT | Performed by: NURSE PRACTITIONER

## 2019-07-30 ASSESSMENT — PAIN SCALES - GENERAL: PAINLEVEL: NO PAIN

## 2019-07-30 NOTE — PROGRESS NOTES
Chief Complaint   Patient presents with   • Labs Only   • Other     last eye exam 5/2019 [facility]         This is a 58 y.o.male patient that presents today with the following: Diabetes follow-up    Dyslipidemia  The ASCVD Risk score ([place] RAYA Jr, et al., 2013) failed to calculate.  Patient currently on statin, takes 40 mg daily.  He will be due for labs before his follow-up appointment    Essential hypertension, benign  Chronic, stable well-controlled on medications.  Blood pressure today within normal limits and denies symptoms of hypertension.  He will be due for labs before he follows up with me in 3 months.    Type 2 diabetes mellitus with complication, without long-term current use of insulin (HCC)  This is a chronic condition, stable and poorly controlled on current medications including Lantus at 36 units at bedtime, Synjardy and glipizide.  His A1c has gone up to 8.0%, previously 7.5%.  He is appropriately on statin, low-dose aspirin and ARB.  He feels like he eats healthy but upon discussion of his diet he does drink quite a bit of Mountain Dew as well as several fruits per day.  We discussed the importance of decreasing soda intake as well as excessive fruits.  We also discussed the importance of decreasing carbohydrate intake and getting more physical activity.  He does agree to starting Trulicity 0.75 mg once a week.  He will follow-up with me in 3 months with labs done before visit.      Hospital Outpatient Visit on 07/26/2019   Component Date Value   • Glycohemoglobin 07/26/2019 8.0*   • Est Avg Glucose 07/26/2019 183    • Sodium 07/26/2019 137    • Potassium 07/26/2019 4.6    • Chloride 07/26/2019 105    • Co2 07/26/2019 21    • Anion Gap 07/26/2019 11.0    • Glucose 07/26/2019 218*   • Bun 07/26/2019 28*   • Creatinine 07/26/2019 1.22    • Calcium 07/26/2019 9.3    • AST(SGOT) 07/26/2019 18    • ALT(SGPT) 07/26/2019 23    • Alkaline Phosphatase 07/26/2019 70    • Total Bilirubin 07/26/2019 0.4     • Albumin 07/26/2019 4.6    • Total Protein 07/26/2019 7.9    • Globulin 07/26/2019 3.3    • A-G Ratio 07/26/2019 1.4    • Cholesterol,Tot 07/26/2019 102    • Triglycerides 07/26/2019 120    • HDL 07/26/2019 41    • LDL 07/26/2019 37    • TSH 07/26/2019 4.080    • Creatinine, Urine 07/26/2019 86.80    • Microalbumin, Urine Rand* 07/26/2019 <0.7    • Micro Alb Creat Ratio 07/26/2019 see below    • Fasting Status 07/26/2019 Fasting    • GFR If  07/26/2019 >60    • GFR If Non  Ameri* 07/26/2019 >60          clinical course has been stable    Past Medical History:   Diagnosis Date   • Arthritis    • Breath shortness    • Depression     depression   • Diabetes (HCC)     oral medication   • High cholesterol    • Hyperlipidemia    • Hypertension        Past Surgical History:   Procedure Laterality Date   • ATHROPLASTY Left     hip replacement   • CHOLECYSTECTOMY         Family History   Problem Relation Age of Onset   • Diabetes Mother    • Stroke Father        Patient has no known allergies.    Current Outpatient Medications Ordered in Epic   Medication Sig Dispense Refill   • Dulaglutide (TRULICITY) 0.75 MG/0.5ML Solution Pen-injector Inject 0.75 mg as instructed every 7 days. 12 PEN 3   • escitalopram (LEXAPRO) 20 MG tablet TAKE 1 TABLET BY MOUTH ONCE DAILY 90 Tab 1   • EQ ALLERGY RELIEF, CETIRIZINE, 10 MG Tab TAKE 1 TABLET BY MOUTH ONCE DAILY 90 Tab 1   • isosorbide mononitrate SR (IMDUR) 30 MG TABLET SR 24 HR Take 1 Tab by mouth every morning. 90 Tab 3   • atorvastatin (LIPITOR) 40 MG Tab Take 1 Tab by mouth every evening. 90 Tab 3   • levothyroxine (SYNTHROID) 25 MCG Tab Take 1 Tab by mouth Every morning on an empty stomach. 90 Tab 3   • insulin glargine (LANTUS) 100 UNIT/ML Solution Pen-injector injection Inject 32 Units as instructed every evening. (Patient taking differently: Inject 36 Units as instructed every evening.) 15 PEN 3   • glipiZIDE (GLUCOTROL) 5 MG Tab Take 5 mg by mouth 2  "times a day.     • losartan (COZAAR) 50 MG Tab Take 50 mg by mouth every day.     • traZODone (DESYREL) 100 MG Tab Take 200 mg by mouth every evening.     • SYNJARDY 12.5-1000 MG Tab TAKE 1 TABLET BY MOUTH TWICE DAILY 180 Tab 1   • aspirin 81 MG tablet Take 81 mg by mouth every day.     • loperamide (ANTI-DIARRHEAL) 2 MG Cap Take 4 mg by mouth every morning.     • 5-Hydroxytryptophan (5-HTP) 100 MG Cap Take 100 mg by mouth every bedtime.     • Omega-3 Fatty Acids (FISH OIL) 1000 MG Cap capsule Take 1,000 mg by mouth every morning.     • diphenhydrAMINE (BENADRYL ALLERGY) 25 MG capsule Take 50 mg by mouth every bedtime.     • Melatonin 1 MG Cap Take 1 mg by mouth every bedtime.     • therapeutic multivitamin-minerals (THERAGRAN-M) Tab Take 1 Tab by mouth every morning.     • Cinnamon 500 MG Cap Take 1,000 mg by mouth every morning.     • B Complex Vitamins (VITAMIN B COMPLEX PO) Take 1 Dose by mouth every morning.       No current [facility]-ordered facility-administered medications on file.        Constitutional ROS: No unexpected change in weight, No weakness, No unexplained fevers, sweats, or chills  Pulmonary ROS: No chronic cough, sputum, or hemoptysis, No shortness of breath, No recent change in breathing  Cardiovascular ROS: No chest pain, No edema, No palpitations, Positive for hypertension and hyperlipidemia  Musculoskeletal/Extremities ROS: No clubbing, No peripheral edema, No pain, redness or swelling on the joints  Neurologic ROS: Normal development, No seizures, No weakness  Endocrine ROS: Positive per HPI    Physical exam:  /82 (BP Location: Left arm, Patient Position: Sitting, BP Cuff Size: Adult)   Pulse (!) 108   Temp 37.1 °C (98.8 °F) (*RETIRED* Temporal)   Resp 16   Ht 1.854 m (6' 1\")   Wt (!) 125.2 kg (276 lb)   SpO2 95%   BMI 36.41 kg/m²   General Appearance: Very pleasant middle-aged male, alert, no distress, obese, well-groomed  Skin: Skin color, texture, turgor normal. No rashes or " lesions.  Lungs: negative findings: normal respiratory rate and rhythm, normal effort  Musculoskeletal: negative findings: no evidence of joint instability, no evidence of muscle atrophy, no deformities present  Neurologic: intact    Medical decision making/discussion: Patient agrees to starting Trulicity 0.75 mg once a week.  He is going to follow-up in 3 months with labs done before visit.  We did discuss at length appropriate foods in the setting of type 2 diabetes he is going to decrease carbohydrate intake as well as decrease the amount of fruits he eats in a day.  Patient is due for colon cancer screening, he agrees to do fit test.    Farzad was seen today for labs only and other.    Diagnoses and all orders for this visit:    Type 2 diabetes mellitus with complication, without long-term current use of insulin ([facility])  -     Dulaglutide (TRULICITY) 0.75 MG/0.5ML Solution Pen-injector; Inject 0.75 mg as instructed every 7 days.  -     HEMOGLOBIN A1C; Future    Dyslipidemia    Essential hypertension, benign    Screening for colon cancer  -     OCCULT BLOOD FECES IMMUNOASSAY (FIT); Future        Return in about 3 months (around 10/30/2019) for Discuss Labs, Follow-up.        Please note that this dictation was created using voice recognition software. I have made every reasonable attempt to correct obvious errors, but I expect that there are errors of grammar and possibly content that I did not discover before finalizing the note.

## 2019-07-30 NOTE — PATIENT INSTRUCTIONS
Will add Trulicity 0.75 mg subcutaneous once a week    Follow-up with me in 3 months with labs done before visit    Fit test for colon cancer screening

## 2019-07-31 NOTE — ASSESSMENT & PLAN NOTE
This is a chronic condition, stable and poorly controlled on current medications including Lantus at 36 units at bedtime, Synjardy and glipizide.  His A1c has gone up to 8.0%, previously 7.5%.  He is appropriately on statin, low-dose aspirin and ARB.  He feels like he eats healthy but upon discussion of his diet he does drink quite a bit of Mountain Dew as well as several fruits per day.  We discussed the importance of decreasing soda intake as well as excessive fruits.  We also discussed the importance of decreasing carbohydrate intake and getting more physical activity.  He does agree to starting Trulicity 0.75 mg once a week.  He will follow-up with me in 3 months with labs done before visit.

## 2019-07-31 NOTE — ASSESSMENT & PLAN NOTE
The ASCVD Risk score (Francyranjit DOS SANTOS Jr, et al., 2013) failed to calculate.  Patient currently on statin, takes 40 mg daily.  He will be due for labs before his follow-up appointment

## 2019-07-31 NOTE — ASSESSMENT & PLAN NOTE
Chronic, stable well-controlled on medications.  Blood pressure today within normal limits and denies symptoms of hypertension.  He will be due for labs before he follows up with me in 3 months.

## 2019-08-22 DIAGNOSIS — F51.01 PRIMARY INSOMNIA: ICD-10-CM

## 2019-08-23 NOTE — TELEPHONE ENCOUNTER
Was the patient seen in the last year in this department? Yes    Does patient have an active prescription for medications requested? No     Received Request Via: Pharmacy      Pt met protocol?: Yes    LAST OV 07/30/2019

## 2019-08-26 RX ORDER — TRAZODONE HYDROCHLORIDE 100 MG/1
TABLET ORAL
Qty: 180 TAB | Refills: 0 | Status: SHIPPED | OUTPATIENT
Start: 2019-08-26 | End: 2019-11-17 | Stop reason: SDUPTHER

## 2019-08-31 DIAGNOSIS — E11.8 TYPE 2 DIABETES MELLITUS WITH COMPLICATION, WITHOUT LONG-TERM CURRENT USE OF INSULIN (HCC): ICD-10-CM

## 2019-09-03 RX ORDER — EMPAGLIFLOZIN AND METFORMIN HYDROCHLORIDE 12.5; 1 MG/1; MG/1
TABLET ORAL
Qty: 180 TAB | Refills: 1 | Status: SHIPPED | OUTPATIENT
Start: 2019-09-03 | End: 2020-03-02 | Stop reason: SDUPTHER

## 2019-09-03 RX ORDER — GLIPIZIDE 5 MG/1
TABLET ORAL
Qty: 180 TAB | Refills: 1 | Status: SHIPPED | OUTPATIENT
Start: 2019-09-03 | End: 2020-03-02 | Stop reason: SDUPTHER

## 2019-09-03 NOTE — TELEPHONE ENCOUNTER
Was the patient seen in the last year in this department? Yes    Does patient have an active prescription for medications requested? No     Received Request Via: Pharmacy      Pt met protocol?: Yes, OV 7/19   Lab Results   Component Value Date/Time    HBA1C 8.0 (H) 07/26/2019 09:20 AM

## 2019-09-03 NOTE — TELEPHONE ENCOUNTER
Patient has recently been seen by PCP within the last 6 months per protocol (7/19). Will refill medications for 6 months.  Lab Results   Component Value Date/Time    HBA1C 8.0 (H) 07/26/2019 09:20 AM      Lab Results   Component Value Date/Time    MALBCRT see below 07/26/2019 09:20 AM    MICROALBUR <0.7 07/26/2019 09:20 AM      Lab Results   Component Value Date/Time    ALKPHOSPHAT 70 07/26/2019 09:20 AM    ASTSGOT 18 07/26/2019 09:20 AM    ALTSGPT 23 07/26/2019 09:20 AM    TBILIRUBIN 0.4 07/26/2019 09:20 AM

## 2019-10-16 DIAGNOSIS — E11.8 TYPE 2 DIABETES MELLITUS WITH COMPLICATION, WITHOUT LONG-TERM CURRENT USE OF INSULIN (HCC): ICD-10-CM

## 2019-10-17 RX ORDER — INSULIN GLARGINE 100 [IU]/ML
INJECTION, SOLUTION SUBCUTANEOUS
Qty: 30 ML | Refills: 0 | Status: SHIPPED | OUTPATIENT
Start: 2019-10-17 | End: 2019-12-30

## 2019-10-17 NOTE — TELEPHONE ENCOUNTER
Was the patient seen in the last year in this department? Yes    Does patient have an active prescription for medications requested? No     Received Request Via: Pharmacy      Pt met protocol?: Yes    LAST OV 07/30/2019    Lab Results   Component Value Date/Time    HBA1C 8.0 (H) 07/26/2019 0920     Lab Results   Component Value Date/Time    AVGLUC 183 07/26/2019 0920     Lab Results   Component Value Date/Time    CHOLSTRLTOT 102 07/26/2019 0920     Lab Results   Component Value Date/Time    TRIGLYCERIDE 120 07/26/2019 0920     No components found for: HLD  Lab Results   Component Value Date/Time    LDL 37 07/26/2019 0920

## 2019-10-26 ENCOUNTER — HOSPITAL ENCOUNTER (OUTPATIENT)
Dept: LAB | Facility: MEDICAL CENTER | Age: 59
End: 2019-10-26
Attending: NURSE PRACTITIONER
Payer: COMMERCIAL

## 2019-10-26 DIAGNOSIS — E11.8 TYPE 2 DIABETES MELLITUS WITH COMPLICATION, WITHOUT LONG-TERM CURRENT USE OF INSULIN (HCC): ICD-10-CM

## 2019-10-26 LAB
EST. AVERAGE GLUCOSE BLD GHB EST-MCNC: 177 MG/DL
HBA1C MFR BLD: 7.8 % (ref 0–5.6)

## 2019-10-26 PROCEDURE — 36415 COLL VENOUS BLD VENIPUNCTURE: CPT

## 2019-10-26 PROCEDURE — 83036 HEMOGLOBIN GLYCOSYLATED A1C: CPT

## 2019-10-30 ENCOUNTER — PATIENT MESSAGE (OUTPATIENT)
Dept: MEDICAL GROUP | Facility: PHYSICIAN GROUP | Age: 59
End: 2019-10-30

## 2019-10-30 ENCOUNTER — OFFICE VISIT (OUTPATIENT)
Dept: MEDICAL GROUP | Facility: PHYSICIAN GROUP | Age: 59
End: 2019-10-30
Payer: COMMERCIAL

## 2019-10-30 VITALS
DIASTOLIC BLOOD PRESSURE: 80 MMHG | SYSTOLIC BLOOD PRESSURE: 128 MMHG | TEMPERATURE: 98 F | WEIGHT: 277 LBS | HEIGHT: 73 IN | RESPIRATION RATE: 18 BRPM | OXYGEN SATURATION: 98 % | HEART RATE: 90 BPM | BODY MASS INDEX: 36.71 KG/M2

## 2019-10-30 DIAGNOSIS — E66.9 OBESITY (BMI 30-39.9): ICD-10-CM

## 2019-10-30 DIAGNOSIS — E11.8 TYPE 2 DIABETES MELLITUS WITH COMPLICATION, WITHOUT LONG-TERM CURRENT USE OF INSULIN (HCC): ICD-10-CM

## 2019-10-30 DIAGNOSIS — Z23 NEED FOR VACCINATION: ICD-10-CM

## 2019-10-30 DIAGNOSIS — M25.561 RECURRENT PAIN OF RIGHT KNEE: ICD-10-CM

## 2019-10-30 DIAGNOSIS — F33.1 MODERATE EPISODE OF RECURRENT MAJOR DEPRESSIVE DISORDER (HCC): ICD-10-CM

## 2019-10-30 PROCEDURE — 99214 OFFICE O/P EST MOD 30 MIN: CPT | Mod: 25 | Performed by: NURSE PRACTITIONER

## 2019-10-30 PROCEDURE — 90471 IMMUNIZATION ADMIN: CPT | Performed by: NURSE PRACTITIONER

## 2019-10-30 PROCEDURE — 90472 IMMUNIZATION ADMIN EACH ADD: CPT | Performed by: NURSE PRACTITIONER

## 2019-10-30 PROCEDURE — 90715 TDAP VACCINE 7 YRS/> IM: CPT | Performed by: NURSE PRACTITIONER

## 2019-10-30 PROCEDURE — 90686 IIV4 VACC NO PRSV 0.5 ML IM: CPT | Performed by: NURSE PRACTITIONER

## 2019-10-30 RX ORDER — PEN NEEDLE, DIABETIC 29 G X1/2"
NEEDLE, DISPOSABLE MISCELLANEOUS
Refills: 3 | COMMUNITY
Start: 2019-08-22 | End: 2020-03-17

## 2019-10-30 ASSESSMENT — PATIENT HEALTH QUESTIONNAIRE - PHQ9
SUM OF ALL RESPONSES TO PHQ9 QUESTIONS 1 AND 2: 2
7. TROUBLE CONCENTRATING ON THINGS, SUCH AS READING THE NEWSPAPER OR WATCHING TELEVISION: NOT AT ALL
5. POOR APPETITE OR OVEREATING: NEARLY EVERY DAY
8. MOVING OR SPEAKING SO SLOWLY THAT OTHER PEOPLE COULD HAVE NOTICED. OR THE OPPOSITE, BEING SO FIGETY OR RESTLESS THAT YOU HAVE BEEN MOVING AROUND A LOT MORE THAN USUAL: NOT AT ALL
2. FEELING DOWN, DEPRESSED, IRRITABLE, OR HOPELESS: SEVERAL DAYS
9. THOUGHTS THAT YOU WOULD BE BETTER OFF DEAD, OR OF HURTING YOURSELF: NOT AT ALL
SUM OF ALL RESPONSES TO PHQ QUESTIONS 1-9: 10
3. TROUBLE FALLING OR STAYING ASLEEP OR SLEEPING TOO MUCH: NEARLY EVERY DAY
4. FEELING TIRED OR HAVING LITTLE ENERGY: SEVERAL DAYS
1. LITTLE INTEREST OR PLEASURE IN DOING THINGS: SEVERAL DAYS
6. FEELING BAD ABOUT YOURSELF - OR THAT YOU ARE A FAILURE OR HAVE LET YOURSELF OR YOUR FAMILY DOWN: SEVERAL DAYS

## 2019-10-30 NOTE — PROGRESS NOTES
Chief Complaint   Patient presents with   • Knee Pain     Right knee pain x 2 months    • Diabetes     lab follow up          This is a 58 y.o.male patient that presents today with the following: Follow-up, review labs, knee pain    Episode of recurrent major depressive disorder (HCC)  Chronic and stable, well controlled on current medications.  He does deny suicidal or homicidal ideations, hallucinations, racing thoughts and flights of ideas, does not currently need refills at this time.    Obesity (BMI 30-39.9)  Chronic, stable, essentially unchanged from previous visit, weight is 277 pounds, BMI 36.55.  Does admit to poor eating as of late and is going to get back on track with healthy eating.    Recurrent pain of right knee  Patient does have recurrent pain in the right knee not associated with past injury.  States that pain is improved when he does wear supportive knee brace, denies erythema and swelling.  He reports to me that he will let me know if his pain does not continue to be unchanged or get better.  He does understand he may need referral to physical therapy, orthopedic specialist as well as further imaging including MRI.    Type 2 diabetes mellitus with complication, without long-term current use of insulin (HCC)  This is a chronic condition, stable, suboptimally controlled on current medications.  Previously started on Trulicity and has been taking consistently 0.5 mg once a week.  He is also on Lantus, 39 units at bedtime, Synjardy as well as glipizide.  A1c is unchanged, previously 7.5%, currently 7.5%.  He does agree to increase dose of Trulicity to 1.5 mg once a week and he is going to get back on track with healthy eating and regular physical activity.      Hospital Outpatient Visit on 10/26/2019   Component Date Value   • Glycohemoglobin 10/26/2019 7.8*   • Est Avg Glucose 10/26/2019 177          clinical course has been stable    Past Medical History:   Diagnosis Date   • Arthritis    • Breath  shortness    • Depression     depression   • Diabetes (HCC)     oral medication   • High cholesterol    • Hyperlipidemia    • Hypertension        Past Surgical History:   Procedure Laterality Date   • ATHROPLASTY Left     hip replacement   • CHOLECYSTECTOMY         Family History   Problem Relation Age of Onset   • Diabetes Mother    • Stroke Father        Patient has no known allergies.    Current Outpatient Medications Ordered in Epic   Medication Sig Dispense Refill   • RELION PEN NEEDLE 31G/8MM USE WITH LANTUS DAILY.  3   • TURMERIC CURCUMIN PO Take  by mouth.     • Dulaglutide 1.5 MG/0.5ML Solution Pen-injector Inject 1.5 mg as instructed every 7 days. 12 PEN 3   • LANTUS SOLOSTAR 100 UNIT/ML Solution Pen-injector injection  INJECT 32 UNITS SUBCUTANEOUSLY AS INSTRUCTED EVERY EVENING. (Patient taking differently: 39 Units.) 30 mL 0   • glipiZIDE (GLUCOTROL) 5 MG Tab TAKE 1 TABLET BY MOUTH TWICE DAILY 180 Tab 1   • SYNJARDY 12.5-1000 MG Tab TAKE 1 TABLET BY MOUTH TWICE DAILY 180 Tab 1   • traZODone (DESYREL) 100 MG Tab TAKE 2 TABLETS BY MOUTH AT BEDTIME 180 Tab 0   • Dulaglutide (TRULICITY) 0.75 MG/0.5ML Solution Pen-injector Inject 0.75 mg as instructed every 7 days. 12 PEN 3   • escitalopram (LEXAPRO) 20 MG tablet TAKE 1 TABLET BY MOUTH ONCE DAILY 90 Tab 1   • EQ ALLERGY RELIEF, CETIRIZINE, 10 MG Tab TAKE 1 TABLET BY MOUTH ONCE DAILY 90 Tab 1   • isosorbide mononitrate SR (IMDUR) 30 MG TABLET SR 24 HR Take 1 Tab by mouth every morning. 90 Tab 3   • atorvastatin (LIPITOR) 40 MG Tab Take 1 Tab by mouth every evening. 90 Tab 3   • levothyroxine (SYNTHROID) 25 MCG Tab Take 1 Tab by mouth Every morning on an empty stomach. 90 Tab 3   • losartan (COZAAR) 50 MG Tab Take 50 mg by mouth every day.     • aspirin 81 MG tablet Take 81 mg by mouth every day.     • Cinnamon 500 MG Cap Take 1,000 mg by mouth every morning.     • loperamide (ANTI-DIARRHEAL) 2 MG Cap Take 4 mg by mouth every morning.     • 5-Hydroxytryptophan  "(5-HTP) 100 MG Cap Take 100 mg by mouth every bedtime.     • Omega-3 Fatty Acids (FISH OIL) 1000 MG Cap capsule Take 1,000 mg by mouth every morning.     • diphenhydrAMINE (BENADRYL ALLERGY) 25 MG capsule Take 50 mg by mouth every bedtime.     • Melatonin 1 MG Cap Take 1 mg by mouth every bedtime.     • therapeutic multivitamin-minerals (THERAGRAN-M) Tab Take 1 Tab by mouth every morning.       No current [facility]-ordered facility-administered medications on file.        Constitutional ROS: No unexpected change in weight, No weakness, No unexplained fevers, sweats, or chills  Pulmonary ROS: No chronic cough, sputum, or hemoptysis, No shortness of breath, No recent change in breathing  Cardiovascular ROS: No chest pain  Gastrointestinal ROS: No abdominal pain, No nausea, vomiting, diarrhea, or constipation  Musculoskeletal/Extremities ROS: Positive per HPI  Neurologic ROS: Normal development, No seizures, No weakness  Endocrine ROS: Positive per HPI    Physical exam:  /80   Pulse 90   Temp 36.7 °C (98 °F) (Temporal)   Resp 18   Ht 1.854 m (6' 1\")   Wt (!) 125.6 kg (277 lb)   SpO2 98%   BMI 36.55 kg/m²   General Appearance: Very pleasant middle-aged male, alert, no distress, obese, well-groomed  Skin: Skin color, texture, turgor normal. No rashes or lesions.  Lungs: negative findings: normal respiratory rate and rhythm, lungs clear to auscultation  Heart: negative. RRR without murmur, gallop, or rubs.  No ectopy.  Abdomen: Abdomen soft, non-tender. BS normal. No masses,  No organomegaly  Extremities: positive findings: Very mildly limited range of motion to right knee, no evidence of swelling or erythema  Musculoskeletal: negative findings: no evidence of joint instability, no evidence of muscle atrophy, no deformities present  Neurologic: intact    Medical decision making/discussion: Patient will increase Trulicity to 1.5 mg once a week, new prescription called in.  He is going to follow-up with me in 3 " months with labs done before visit.  He will continue with supportive care for his knee and follow-up with me if his pain does not improve or if he notes it to be worsening.  He does agree to influenza and Tdap today.  We discussed weight loss strategies, he is going to get back on track with healthy eating and regular physical activity.    Farzad was seen today for knee pain and diabetes.    Diagnoses and all orders for this visit:    Type 2 diabetes mellitus with complication, without long-term current use of insulin (HCC)  -     Dulaglutide 1.5 MG/0.5ML Solution Pen-injector; Inject 1.5 mg as instructed every 7 days.  -     HEMOGLOBIN A1C; Future    Recurrent pain of right knee    Need for vaccination  -     Influenza Vaccine Quad Injection (PF)  -     TDAP VACCINE =>8YO IM    Moderate episode of recurrent major depressive disorder (HCC)    Obesity (BMI 30-39.9)  -     Patient identified as having weight management issue.  Appropriate orders and counseling given.        Return in about 3 months (around 1/30/2020) for Follow-up, Discuss Labs.        Please note that this dictation was created using voice recognition software. I have made every reasonable attempt to correct obvious errors, but I expect that there are errors of grammar and possibly content that I did not discover before finalizing the note.

## 2019-10-31 NOTE — ASSESSMENT & PLAN NOTE
Patient does have recurrent pain in the right knee not associated with past injury.  States that pain is improved when he does wear supportive knee brace, denies erythema and swelling.  He reports to me that he will let me know if his pain does not continue to be unchanged or get better.  He does understand he may need referral to physical therapy, orthopedic specialist as well as further imaging including MRI.

## 2019-10-31 NOTE — ASSESSMENT & PLAN NOTE
Chronic, stable, essentially unchanged from previous visit, weight is 277 pounds, BMI 36.55.  Does admit to poor eating as of late and is going to get back on track with healthy eating.

## 2019-10-31 NOTE — ASSESSMENT & PLAN NOTE
This is a chronic condition, stable, suboptimally controlled on current medications.  Previously started on Trulicity and has been taking consistently 0.5 mg once a week.  He is also on Lantus, 39 units at bedtime, Synjardy as well as glipizide.  A1c is unchanged, previously 7.5%, currently 7.5%.  He does agree to increase dose of Trulicity to 1.5 mg once a week and he is going to get back on track with healthy eating and regular physical activity.

## 2019-10-31 NOTE — ASSESSMENT & PLAN NOTE
Chronic and stable, well controlled on current medications.  He does deny suicidal or homicidal ideations, hallucinations, racing thoughts and flights of ideas, does not currently need refills at this time.

## 2019-10-31 NOTE — TELEPHONE ENCOUNTER
From: Farzad Bryant Jr.  To: BENJA Curtis  Sent: 10/30/2019 6:51 PM PDT  Subject: Prescription Question    The insurance will not cover it or fill it without talking to you. The new Trulisity.

## 2019-11-01 ENCOUNTER — TELEPHONE (OUTPATIENT)
Dept: URGENT CARE | Facility: PHYSICIAN GROUP | Age: 59
End: 2019-11-01

## 2019-11-01 NOTE — TELEPHONE ENCOUNTER
MEDICATION PRIOR AUTHORIZATION NEEDED:    1. Name of Medication: Trulicity    2. Requested By (Name of Pharmacy): [facility]     3. Is insurance on file current? Yes    4. What is the name & phone number of the 3rd party payor? Cover My Meds

## 2019-11-06 ENCOUNTER — TELEPHONE (OUTPATIENT)
Dept: URGENT CARE | Facility: PHYSICIAN GROUP | Age: 59
End: 2019-11-06

## 2019-11-06 NOTE — TELEPHONE ENCOUNTER
MEDICATION PRIOR AUTHORIZATION NEEDED:    1. Name of Medication: Dulaglutide 1.5 MG Pen Injector     2. Requested By (Name of Pharmacy): [facility]     3. Is insurance on file current? Yes    4. What is the name & phone number of the 3rd party payor? [employer]

## 2019-11-12 DIAGNOSIS — M25.561 RECURRENT PAIN OF RIGHT KNEE: ICD-10-CM

## 2019-11-13 ENCOUNTER — APPOINTMENT (OUTPATIENT)
Dept: RADIOLOGY | Facility: IMAGING CENTER | Age: 59
End: 2019-11-13
Attending: NURSE PRACTITIONER
Payer: COMMERCIAL

## 2019-11-13 ENCOUNTER — APPOINTMENT (OUTPATIENT)
Dept: URGENT CARE | Facility: PHYSICIAN GROUP | Age: 59
End: 2019-11-13
Payer: COMMERCIAL

## 2019-11-13 DIAGNOSIS — M25.561 RECURRENT PAIN OF RIGHT KNEE: ICD-10-CM

## 2019-11-13 PROCEDURE — 73564 X-RAY EXAM KNEE 4 OR MORE: CPT | Mod: TC,FY,RT | Performed by: NURSE PRACTITIONER

## 2019-11-17 DIAGNOSIS — F51.01 PRIMARY INSOMNIA: ICD-10-CM

## 2019-11-19 RX ORDER — TRAZODONE HYDROCHLORIDE 100 MG/1
TABLET ORAL
Qty: 180 TAB | Refills: 1 | Status: SHIPPED | OUTPATIENT
Start: 2019-11-19 | End: 2020-05-12

## 2019-12-16 DIAGNOSIS — J30.9 ALLERGIC RHINITIS, UNSPECIFIED SEASONALITY, UNSPECIFIED TRIGGER: ICD-10-CM

## 2019-12-17 RX ORDER — CETIRIZINE HYDROCHLORIDE 10 MG/1
TABLET, FILM COATED ORAL
Qty: 90 TAB | Refills: 1 | Status: SHIPPED | OUTPATIENT
Start: 2019-12-17 | End: 2020-06-09

## 2019-12-27 DIAGNOSIS — E11.8 TYPE 2 DIABETES MELLITUS WITH COMPLICATION, WITHOUT LONG-TERM CURRENT USE OF INSULIN (HCC): ICD-10-CM

## 2019-12-30 NOTE — TELEPHONE ENCOUNTER
Was the patient seen in the last year in this department? Yes    Does patient have an active prescription for medications requested? No     Received Request Via: Pharmacy      Pt met protocol?: Yes, OV 10/19   Lab Results   Component Value Date/Time    HBA1C 7.8 (H) 10/26/2019 09:37 AM

## 2020-01-20 DIAGNOSIS — F33.9 EPISODE OF RECURRENT MAJOR DEPRESSIVE DISORDER, UNSPECIFIED DEPRESSION EPISODE SEVERITY (HCC): ICD-10-CM

## 2020-01-21 RX ORDER — ESCITALOPRAM OXALATE 20 MG/1
TABLET ORAL
Qty: 90 TAB | Refills: 1 | Status: SHIPPED | OUTPATIENT
Start: 2020-01-21 | End: 2020-07-22

## 2020-02-06 ENCOUNTER — OCCUPATIONAL MEDICINE (OUTPATIENT)
Dept: URGENT CARE | Facility: CLINIC | Age: 60
End: 2020-02-06
Payer: COMMERCIAL

## 2020-02-06 VITALS
SYSTOLIC BLOOD PRESSURE: 130 MMHG | TEMPERATURE: 98.7 F | DIASTOLIC BLOOD PRESSURE: 86 MMHG | OXYGEN SATURATION: 98 % | RESPIRATION RATE: 16 BRPM | WEIGHT: 271.17 LBS | BODY MASS INDEX: 37.96 KG/M2 | HEIGHT: 71 IN | HEART RATE: 82 BPM

## 2020-02-06 DIAGNOSIS — Z02.89 ENCOUNTER FOR OCCUPATIONAL HEALTH ASSESSMENT: ICD-10-CM

## 2020-02-06 DIAGNOSIS — S01.111A: ICD-10-CM

## 2020-02-06 DIAGNOSIS — E16.1 REACTIVE HYPOGLYCEMIA: ICD-10-CM

## 2020-02-06 LAB — GLUCOSE BLD-MCNC: 81 MG/DL (ref 70–100)

## 2020-02-06 PROCEDURE — 81002 URINALYSIS NONAUTO W/O SCOPE: CPT | Performed by: EMERGENCY MEDICINE

## 2020-02-06 PROCEDURE — 99202 OFFICE O/P NEW SF 15 MIN: CPT | Mod: 25 | Performed by: EMERGENCY MEDICINE

## 2020-02-06 PROCEDURE — 12011 RPR F/E/E/N/L/M 2.5 CM/<: CPT | Performed by: EMERGENCY MEDICINE

## 2020-02-06 PROCEDURE — 82962 GLUCOSE BLOOD TEST: CPT | Performed by: EMERGENCY MEDICINE

## 2020-02-06 NOTE — LETTER
[facility]  [address] [facility] [address], [address] [place] [address]  Phone:  [phone] - Fax:  [phone]   [facility] Progress Report and Disability Certification  Date of Service: 2/6/2020   No Show:  No  Date / Time of Next Visit:     Claim Information   Patient Name: Farzad Bryant Jr.  Claim Number:     Employer:   [employer] Date of Injury: 2/6/2020     Insurer / TPA: Ozzy Kaye [facility]  ID / SSN:     Occupation:   Diagnosis: Diagnoses of Laceration of skin of eyebrow, right, initial encounter, Reactive hypoglycemia, and Encounter for occupational health assessment were pertinent to this visit.    Medical Information   Related to Industrial Injury? Yes    Subjective Complaints:  Date of injury: 02/06/2020.  Injured at work: Yes; while moving plate, bumped into safety glasses, cutting right eyebrow.  No loss of consciousness, no other trauma.   Contributing medical illness: Diabetes mellitus.  Location: Right eyebrow.  Prior treatment: None.  Notes some lightheadedness, headache after incident.  Tetanus immunization up-to-date.   Objective Findings: General: Alert, cooperative, no acute distress.  Head: Normocephalic.  Right eyebrow region without bony tenderness, deformity.  Eyes: Pupils equal round and reactive to light, extraocular movement intact.  Nose: No rhinorrhea.  Mouth: Lips pink.  Neck: Supple, normal phonation.  Skin: 2 cm irregular flap laceration right lateral eyebrow region.  No foreign body.  Neurological: Alert and oriented, no tremor, no focal deficit.   Pre-Existing Condition(s):     Assessment:   Initial Visit    Status: Discharged /  MMI  Permanent Disability:No    Plan:      Diagnostics: Laboratory  Comments:Glucose level 81    Comments:  Feels improved after eating.    Disability Information   Status: Released to Full Duty    From:     Through:   Restrictions are:     Physical Restrictions   Sitting:    Standing:    Stooping:   Bending:      Squatting:    Walking:    Climbing:    Pushing:      Pulling:    Other:    Reaching Above Shoulder (L):   Reaching Above Shoulder (R):       Reaching Below Shoulder (L):    Reaching Below Shoulder (R):      Not to exceed Weight Limits   Carrying(hrs):   Weight Limit(lb):   Lifting(hrs):   Weight  Limit(lb):     Comments:      Repetitive Actions   Hands: i.e. Fine Manipulations from Grasping:     Feet: i.e. Operating Foot Controls:     Driving / Operate Machinery:     Physician Name: Jordan Amos M.D. Physician Signature: JORDAN Velarde M.D. e-Signature: Dr. Edouard Causey, Medical Director   Clinic Name / Location: [address], [address] [place] [address], [place] [address] Clinic Phone Number: Dept: [phone]   Appointment Time: 4:30 Pm Visit Start Time: 4:48 PM   Check-In Time:  4:30 Pm Visit Discharge Time:  6:13 PM   Original-Treating Physician or Chiropractor    Page 2-Insurer/TPA    Page 3-Employer    Page 4-Employee

## 2020-02-06 NOTE — LETTER
"EMPLOYEE’S CLAIM FOR COMPENSATION/ REPORT OF INITIAL TREATMENT  FORM C-4    EMPLOYEE’S CLAIM - PROVIDE ALL INFORMATION REQUESTED   First Name  Farzad Last Name  Annette Birthdate                    1960                Sex  male Claim Number   Home Address  [address] Age  59 y.o. Height  1.803 m (5' 11\") Weight  123 kg (271 lb 2.7 oz) [place] [place]     [facility] Zip  [address] Telephone  [phone] (home)    Mailing Address  [address] [place]  [address] Primary Language Spoken  English    Insurer  Constitution [facility] Third Party   Constitution [facility]   Employee's Occupation (Job Title) When Injury or Occupational Disease Occurred      Employer's Name    [employer]      Employer Address   [address] [facility]   [address]   Date of Injury  2/6/2020               Hour of Injury  3:00 PM Date Employer Notified  2/6/2020 Last Day of Work after Injury or Occupational Disease  2/6/2020 Supervisor to Whom Injury Reported  [place]   Address or Location of Accident (if applicable)  [[employer]]   What were you doing at the time of accident? (if applicable)  Moving a part    How did this injury or occupational disease occur? (Be specific an answer in detail. Use additional sheet if necessary)  Picked up part tried moving quickly to box. hit my eye knocking off safety glasses.   If you believe that you have an occupational disease, when did you first have knowledge of the disability and it relationship to your employment?  n/a Witnesses to the Accident  no      Nature of Injury or Occupational Disease  Workers' Compensation  Part(s) of Body Injured or Affected  Eye (R), Facial Bones, Skull    I certify that the above is true and correct to the best of my knowledge and that I have provided this information in order to obtain the benefits of " [place]  I hereby authorize any physician, chiropractor, surgeon, practitioner, or other person, any hospital, including [facility] or [facility], any medical service organization, any insurance company, or other institution or organization to release to each other, any medical or other information, including benefits paid or payable, pertinent to this injury or disease, except information relative to diagnosis, treatment and/or counseling for AIDS, psychological conditions, alcohol or controlled substances, for which I must give specific authorization.  A Photostat of this authorization shall be as valid as the original.     Date 2/6/20   Place [facility]   Employee’s Signature   THIS REPORT MUST BE COMPLETED AND MAILED WITHIN 3 WORKING DAYS OF TREATMENT   Place  [facility]  Name of Facility  [facility]   Date  2/6/2020 Diagnosis  (S01.111A) Laceration of skin of eyebrow, right, initial encounter  (E16.1) Reactive hypoglycemia  (Z02.89) Encounter for occupational health assessment Is there evidence the injured employee was under the influence of alcohol and/or another controlled substance at the time of accident?   Hour  4:48 PM Description of Injury or Disease  Diagnoses of Laceration of skin of eyebrow, right, initial encounter, Reactive hypoglycemia, and Encounter for occupational health assessment were pertinent to this visit. No   Treatment  Wound cleansed and repaired.  Feeding and drinking.  Have you advised the patient to remain off work five days or more? No   X-Ray Findings      If Yes   From Date  To Date      From information given by the employee, together with medical evidence, can you directly connect this injury or occupational disease as job incurred?  Yes If No Full Duty Yes Modified Duty      Is additional medical care by a physician  "indicated?  No If Modified Duty, Specify any Limitations / Restrictions      Do you know of any previous injury or disease contributing to this condition or occupational disease?                            Yes  Comments:diabetes mellitus   Date  2/6/2020 Print Doctor’s Name Jordan Amos M.D. I certify the employer’s copy of  this form was mailed on:   Address  [address], [facility] [address] Insurer’s Use Only     [place] Zip  [address]    Provider’s Tax ID Number  [id number] Telephone  Dept: [phone]        tobi-JORDAN Edwards M.D.   e-Signature: Dr. Edouard Causey,   Medical Director Degree  MD        ORIGINAL-TREATING PHYSICIAN OR CHIROPRACTOR    PAGE 2-INSURER/TPA    PAGE 3-EMPLOYER    PAGE 4-EMPLOYEE             Form C-4 (rev.10/07)              BRIEF DESCRIPTION OF RIGHTS AND BENEFITS  (Pursuant to [place] 616C.050)    Notice of Injury or Occupational Disease (Incident Report Form C-1): If an injury or occupational disease (OD) arises out of and in the course of employment, you must provide written notice to your employer as soon as practicable, but no later than 7 days after the accident or OD. Your employer shall maintain a sufficient supply of the required forms.    Claim for Compensation (Form C-4): If medical treatment is sought, the form C-4 is available at the place of initial treatment. A completed \"Claim for Compensation\" (Form C-4) must be filed within 90 days after an accident or OD. The treating physician or chiropractor must, within 3 working days after treatment, complete and mail to the employer, the employer's insurer and third-party , the Claim for Compensation.    Medical Treatment: If you require medical treatment for your on-the-job injury or OD, you may be required to select a physician or chiropractor from a list provided by your workers’ compensation insurer, if it has contracted with an Organization for Managed Care (MCO) or Preferred Provider Organization (PPO) " or providers of health care. If your employer has not entered into a contract with an MCO or PPO, you may select a physician or chiropractor from the Panel of Physicians and Chiropractors. Any medical costs related to your industrial injury or OD will be paid by your insurer.    Temporary Total Disability (TTD): If your doctor has certified that you are unable to work for a period of at least 5 consecutive days, or 5 cumulative days in a 20-day period, or places restrictions on you that your employer does not accommodate, you may be entitled to TTD compensation.    Temporary Partial Disability (TPD): If the wage you receive upon reemployment is less than the compensation for TTD to which you are entitled, the insurer may be required to pay you TPD compensation to make up the difference. TPD can only be paid for a maximum of 24 months.    Permanent Partial Disability (PPD): When your medical condition is stable and there is an indication of a PPD as a result of your injury or OD, within 30 days, your insurer must arrange for an evaluation by a rating physician or chiropractor to determine the degree of your PPD. The amount of your PPD award depends on the date of injury, the results of the PPD evaluation and your age and wage.    Permanent Total Disability (PTD): If you are medically certified by a treating physician or chiropractor as permanently and totally disabled and have been granted a PTD status by your insurer, you are entitled to receive monthly benefits not to exceed 66 2/3% of your average monthly wage. The amount of your PTD payments is subject to reduction if you previously received a PPD award.    Vocational Rehabilitation Services: You may be eligible for vocational rehabilitation services if you are unable to return to the job due to a permanent physical impairment or permanent restrictions as a result of your injury or occupational disease.    Transportation and Per Ally Reimbursement: You may be  eligible for travel expenses and per agustin associated with medical treatment.    Reopening: You may be able to reopen your claim if your condition worsens after claim closure.    Appeal Process: If you disagree with a written determination issued by the insurer or the insurer does not respond to your request, you may appeal to the Department of Administration, , by following the instructions contained in your determination letter. You must appeal the determination within 70 days from the date of the determination letter at [address], [address], [place] [address], or [address], [place], [place] [address]. If you disagree with the  decision, you may appeal to the Department of Administration, . You must file your appeal within 30 days from the date of the  decision letter at [address], [address], [place], [place] [address], or [address], [place], [place] [address]. If you disagree with a decision of an , you may file a petition for judicial review with the District Court. You must do so within 30 days of the Appeal Officer’s decision. You may be represented by an  at your own expense or you may contact the [facility] for possible representation.    [place]  for Injured Workers (NAIW): If you disagree with a  decision, you may request that NAIW represent you without charge at an  Hearing. For information regarding denial of benefits, you may contact the [facility] at: [address], [address] [address], [phone], or [address], [place] [address], [phone]    To File a Complaint with the Division: If you wish to file a complaint with the  of the Division of Industrial Relations (DIR),  please contact the Workers’ Compensation Section, [address], [place], [place] [address], telephone [phone], or [address], [place], [place] [address], telephone [phone].    For assistance with Workers’ Compensation Issues: You may contact the Office of the Governor Consumer Health Assistance, [address] [place] [address], [address], [place], [place] [address], Toll Free 1-634.363.2932, Web site: [email or web address]., E-mail [email or web address].                   __________________________________________________________________                                                     ____2/6/20_____        Employee Name / Signature                                                                                                                                              Date                                                                                                                                                                                                     D-2 (rev. 06/18)

## 2020-02-07 NOTE — PATIENT INSTRUCTIONS
Tissue Adhesive Wound Care  Some cuts and wounds can be closed with tissue adhesive. Adhesive is like glue. It holds the skin together and helps a wound heal faster. This adhesive goes away on its own as the wound heals.   HOME CARE   · Showers are allowed. Do not soak the wound in water. Do not take baths, swim, or use hot tubs. Do not use soaps or creams on your wound.  · If a bandage (dressing) was put on, change it as often as told by your doctor.  · Keep the bandage dry.  · Do not scratch, pick, or rub the adhesive.  · Do not put tape over the adhesive. The adhesive could come off.  · Protect the wound from another injury.  · Protect the wound from sun and tanning beds.  · Only take medicine as told by your doctor.  · Keep all doctor visits as told.  GET HELP RIGHT AWAY IF:   · Your wound is red, puffy (swollen), hot, or tender.  · You get a rash after the glue is put on.  · You have more pain in the wound.  · You have a red streak going away from the wound.  · You have yellowish-white fluid (pus) coming from the wound.  · You have more bleeding.  · You have a fever.  · You have chills and start to shake.  · You notice a bad smell coming from the wound.  · Your wound or adhesive breaks open.  MAKE SURE YOU:   · Understand these instructions.  · Will watch your condition.  · Will get help right away if you are not doing well or get worse.  This information is not intended to replace advice given to you by your health care provider. Make sure you discuss any questions you have with your health care provider.  Document Released: 09/26/2009 Document Revised: 10/08/2014 Document Reviewed: 07/09/2014  [employer] Interactive Patient Education © 2017 [employer]

## 2020-02-07 NOTE — PROGRESS NOTES
"Subjective:      Farzad Bryant Jr. is a 59 y.o. male who presents with Head Injury (x RT eye brown lac / head ache )      Date of injury: 02/06/2020.  Injured at work: Yes; while moving plate, bumped into safety glasses, cutting right eyebrow.  No loss of consciousness, no other trauma.   Contributing medical illness: Diabetes mellitus.  Location: Right eyebrow.  Prior treatment: None.  Notes some lightheadedness, headache after incident.  Tetanus immunization up-to-date.     HPI    ROS  PMH: Insulin-dependent diabetes mellitus  MEDS: Medications were reviewed in EMR  ALLERGIES: Allergies were reviewed in EMR  SOCHX: Works as a operator  FH: No pertinent family history to this problem       Objective:     /86   Pulse 82   Temp 37.1 °C (98.7 °F) (Temporal)   Resp 16   Ht 1.803 m (5' 11\")   Wt 123 kg (271 lb 2.7 oz)   SpO2 98%   BMI 37.82 kg/m²      Physical Exam    General: Alert, cooperative, no acute distress.  Head: Normocephalic.  Right eyebrow region without bony tenderness, deformity.  Eyes: Pupils equal round and reactive to light, extraocular movement intact.  Nose: No rhinorrhea.  Mouth: Lips pink.  Neck: Supple, normal phonation.  Skin: 2 cm irregular flap laceration right lateral eyebrow region.  No foreign body.  Neurological: Alert and oriented, no tremor, no focal deficit.    Procedure: Wound repair  Clean/sterile technique.  The wound site was prepped/cleansed with Hibiclens antiseptic solution.  Closure with Dermabond tissue adhesive.  Patient tolerated procedure well.   Assessment/Plan:       1. Laceration of skin of eyebrow, right, initial encounter  Wound cleansed and repaired.  C4/D39 forms completed.  Follow-up not needed.  2. Reactive hypoglycemia  81- POCT Glucose  Treated with eating crackers/chips, oral hydration here; feels resolved.  Advised to eat a full meal, continue monitoring glucose.  "

## 2020-02-15 ENCOUNTER — HOSPITAL ENCOUNTER (OUTPATIENT)
Dept: LAB | Facility: MEDICAL CENTER | Age: 60
End: 2020-02-15
Attending: NURSE PRACTITIONER
Payer: COMMERCIAL

## 2020-02-15 DIAGNOSIS — E11.8 TYPE 2 DIABETES MELLITUS WITH COMPLICATION, WITHOUT LONG-TERM CURRENT USE OF INSULIN (HCC): ICD-10-CM

## 2020-02-15 LAB
EST. AVERAGE GLUCOSE BLD GHB EST-MCNC: 151 MG/DL
HBA1C MFR BLD: 6.9 % (ref 0–5.6)

## 2020-02-15 PROCEDURE — 83036 HEMOGLOBIN GLYCOSYLATED A1C: CPT

## 2020-02-15 PROCEDURE — 36415 COLL VENOUS BLD VENIPUNCTURE: CPT

## 2020-02-29 DIAGNOSIS — E11.8 TYPE 2 DIABETES MELLITUS WITH COMPLICATION, WITHOUT LONG-TERM CURRENT USE OF INSULIN (HCC): ICD-10-CM

## 2020-03-02 DIAGNOSIS — E11.8 TYPE 2 DIABETES MELLITUS WITH COMPLICATION, WITHOUT LONG-TERM CURRENT USE OF INSULIN (HCC): ICD-10-CM

## 2020-03-02 RX ORDER — EMPAGLIFLOZIN AND METFORMIN HYDROCHLORIDE 12.5; 1 MG/1; MG/1
1 TABLET ORAL 2 TIMES DAILY
Qty: 180 TAB | Refills: 0 | Status: SHIPPED | OUTPATIENT
Start: 2020-03-02 | End: 2020-05-27

## 2020-03-02 RX ORDER — EMPAGLIFLOZIN AND METFORMIN HYDROCHLORIDE 12.5; 1 MG/1; MG/1
1 TABLET ORAL
Qty: 180 TAB | Refills: 1 | Status: CANCELLED | OUTPATIENT
Start: 2020-03-02

## 2020-03-02 RX ORDER — GLIPIZIDE 5 MG/1
5 TABLET ORAL
Qty: 180 TAB | Refills: 1 | Status: CANCELLED | OUTPATIENT
Start: 2020-03-02

## 2020-03-02 RX ORDER — GLIPIZIDE 5 MG/1
TABLET ORAL
Qty: 180 TAB | Refills: 0 | Status: SHIPPED | OUTPATIENT
Start: 2020-03-02 | End: 2020-05-27

## 2020-03-03 ENCOUNTER — OFFICE VISIT (OUTPATIENT)
Dept: MEDICAL GROUP | Facility: PHYSICIAN GROUP | Age: 60
End: 2020-03-03
Payer: COMMERCIAL

## 2020-03-03 VITALS
HEART RATE: 81 BPM | BODY MASS INDEX: 35.68 KG/M2 | SYSTOLIC BLOOD PRESSURE: 122 MMHG | DIASTOLIC BLOOD PRESSURE: 86 MMHG | TEMPERATURE: 98.2 F | OXYGEN SATURATION: 99 % | HEIGHT: 74 IN | RESPIRATION RATE: 16 BRPM | WEIGHT: 278 LBS

## 2020-03-03 DIAGNOSIS — E11.8 TYPE 2 DIABETES MELLITUS WITH COMPLICATION, WITHOUT LONG-TERM CURRENT USE OF INSULIN (HCC): ICD-10-CM

## 2020-03-03 DIAGNOSIS — Z23 NEED FOR VACCINATION: ICD-10-CM

## 2020-03-03 DIAGNOSIS — M77.9 TENDONITIS: ICD-10-CM

## 2020-03-03 DIAGNOSIS — E03.9 ACQUIRED HYPOTHYROIDISM: ICD-10-CM

## 2020-03-03 PROCEDURE — 99214 OFFICE O/P EST MOD 30 MIN: CPT | Mod: 25 | Performed by: NURSE PRACTITIONER

## 2020-03-03 PROCEDURE — 90732 PPSV23 VACC 2 YRS+ SUBQ/IM: CPT | Performed by: NURSE PRACTITIONER

## 2020-03-03 PROCEDURE — 90471 IMMUNIZATION ADMIN: CPT | Performed by: NURSE PRACTITIONER

## 2020-03-03 ASSESSMENT — FIBROSIS 4 INDEX: FIB4 SCORE: 0.71

## 2020-03-03 NOTE — LETTER
March 3, 2020        RE: Farzad Bryant Jr.    To Whom it May Concern:     The above named patient is under my care and has chronic orthopedic condition that make certain job duties difficult that in turn exacerbates his symptom. Please accommodate him by permitting him not to lift over 10 pounds when having symptoms, this will be intermittent.    If you have any questions or concerns, please do not hesitate to call    Sincerely,      ARMANDO Curtis.

## 2020-03-04 PROBLEM — M77.9 TENDONITIS: Status: ACTIVE | Noted: 2020-03-04

## 2020-03-04 NOTE — ASSESSMENT & PLAN NOTE
This is a chronic condition, stable well-controlled on current regimen.  At his last visit with me in late October A1c was 7.8%, he is on Trulicity 0.75 mg once a week he is also on Lantus 39 units at bedtime, Synjardy as well as glipizide.  His Trulicity was increased to 1.5 mg, this was called in for him.  He is continue to work on healthy lifestyle modifications.  His A1c is now down to 6.9%.  He is appropriately on statin but not on ACE inhibitor.  He will follow-up with me in 4 months with labs done before visit.

## 2020-03-04 NOTE — ASSESSMENT & PLAN NOTE
Patient has chronic tendinitis over bilateral epicondyles.  He has difficulty at work at times with heavy objects to lift or pull and is requesting a note for accommodation, this was provided for him.

## 2020-03-04 NOTE — ASSESSMENT & PLAN NOTE
Chronic and stable, well controlled on levothyroxine 25 mcg daily, recent TSH within normal limits denies symptoms of hypothyroidism.

## 2020-03-04 NOTE — PROGRESS NOTES
Chief Complaint   Patient presents with   • Diabetes   • Elbow Pain     x 2 months    • Letter for School/Work     for lifting at work          This is a 59 y.o.male patient that presents today with the following: diabetes follow up, note for work    Tendonitis  Patient has chronic tendinitis over bilateral epicondyles.  He has difficulty at work at times with heavy objects to lift or pull and is requesting a note for accommodation, this was provided for him.    Type 2 diabetes mellitus with complication, without long-term current use of insulin (HCC)  This is a chronic condition, stable well-controlled on current regimen.  At his last visit with me in late October A1c was 7.8%, he is on Trulicity 0.75 mg once a week he is also on Lantus 39 units at bedtime, Synjardy as well as glipizide.  His Trulicity was increased to 1.5 mg, this was called in for him.  He is continue to work on healthy lifestyle modifications.  His A1c is now down to 6.9%.  He is appropriately on statin but not on ACE inhibitor.  He will follow-up with me in 4 months with labs done before visit.    Acquired hypothyroidism  Chronic and stable, well controlled on levothyroxine 25 mcg daily, recent TSH within normal limits denies symptoms of hypothyroidism.      Hospital Outpatient Visit on 02/15/2020   Component Date Value   • Glycohemoglobin 02/15/2020 6.9*   • Est Avg Glucose 02/15/2020 151    Occupational Medicine on 02/06/2020   Component Date Value   • Glucose - Accu-Ck 02/06/2020 81          clinical course has been stable    Past Medical History:   Diagnosis Date   • Arthritis    • Breath shortness    • Depression     depression   • Diabetes (HCC)     oral medication   • High cholesterol    • Hyperlipidemia    • Hypertension        Past Surgical History:   Procedure Laterality Date   • ATHROPLASTY Left     hip replacement   • CHOLECYSTECTOMY         Family History   Problem Relation Age of Onset   • Diabetes Mother    • Stroke Father         Patient has no known allergies.    Current Outpatient Medications Ordered in Epic   Medication Sig Dispense Refill   • glipiZIDE (GLUCOTROL) 5 MG Tab TAKE 1 TABLET BY MOUTH TWICE DAILY 180 Tab 0   • Empagliflozin-metFORMIN HCl (SYNJARDY) 12.5-1000 MG Tab Take 1 Tab by mouth 2 Times a Day. 180 Tab 00   • escitalopram (LEXAPRO) 20 MG tablet TAKE 1 TABLET BY MOUTH ONCE DAILY 90 Tab 1   • insulin glargine (LANTUS SOLOSTAR) 100 UNIT/ML Solution Pen-injector injection INJECT 32 UNITS SUBCUTANEOUSLY AS INSTRUCTED EVERY EVENING. 30 mL 0   • EQ ALLERGY RELIEF, CETIRIZINE, 10 MG Tab TAKE 1 TABLET BY MOUTH ONCE DAILY 90 Tab 1   • traZODone (DESYREL) 100 MG Tab TAKE 2 TABLETS BY MOUTH AT BEDTIME 180 Tab 1   • RELION PEN NEEDLE 31G/8MM USE WITH LANTUS DAILY.  3   • TURMERIC CURCUMIN PO Take  by mouth.     • Dulaglutide (TRULICITY) 0.75 MG/0.5ML Solution Pen-injector Inject 0.75 mg as instructed every 7 days. 12 PEN 3   • isosorbide mononitrate SR (IMDUR) 30 MG TABLET SR 24 HR Take 1 Tab by mouth every morning. 90 Tab 3   • atorvastatin (LIPITOR) 40 MG Tab Take 1 Tab by mouth every evening. 90 Tab 3   • levothyroxine (SYNTHROID) 25 MCG Tab Take 1 Tab by mouth Every morning on an empty stomach. 90 Tab 3   • losartan (COZAAR) 50 MG Tab Take 50 mg by mouth every day.     • aspirin 81 MG tablet Take 81 mg by mouth every day.     • Cinnamon 500 MG Cap Take 1,000 mg by mouth every morning.     • loperamide (ANTI-DIARRHEAL) 2 MG Cap Take 4 mg by mouth every morning.     • 5-Hydroxytryptophan (5-HTP) 100 MG Cap Take 100 mg by mouth every bedtime.     • Omega-3 Fatty Acids (FISH OIL) 1000 MG Cap capsule Take 1,000 mg by mouth every morning.     • diphenhydrAMINE (BENADRYL ALLERGY) 25 MG capsule Take 50 mg by mouth every bedtime.     • Melatonin 1 MG Cap Take 1 mg by mouth every bedtime.     • therapeutic multivitamin-minerals (THERAGRAN-M) Tab Take 1 Tab by mouth every morning.     • Dulaglutide 1.5 MG/0.5ML Solution Pen-injector  "Inject 1.5 mg as instructed every 7 days. 12 PEN 3     No current Epic-ordered facility-administered medications on file.        Constitutional ROS: No unexpected change in weight, No weakness, No unexplained fevers, sweats, or chills  Pulmonary ROS: No chronic cough, sputum, or hemoptysis, No shortness of breath, No recent change in breathing  Cardiovascular ROS: No chest pain  Gastrointestinal ROS: No abdominal pain, No nausea, vomiting, diarrhea, or constipation  Musculoskeletal/Extremities ROS: Positive per HPI  Neurologic ROS: Normal development, No seizures, No weakness  Endocrine ROS: Positive per HPI    Physical exam:  /86   Pulse 81   Temp 36.8 °C (98.2 °F) (Temporal)   Resp 16   Ht 1.88 m (6' 2\")   Wt (!) 126.1 kg (278 lb)   SpO2 99%   BMI 35.69 kg/m²   General Appearance: Pleasant middle-aged male, alert, no distress, obese, well-groomed  Skin: Skin color, texture, turgor normal. No rashes or lesions.  Lungs: negative findings: normal respiratory rate and rhythm, lungs clear to auscultation  Extremities: positive findings: tenderness over lateral epicondyle bilaterally  Musculoskeletal: negative findings: no evidence of joint instability, no evidence of muscle atrophy, no deformities present  Neurologic: intact, CN II through XII grossly intact  Diabetic Foot Exam: No ulcers, erythema or skin lesions present, patient tested with monofilament (10g) and tuning fork found to be sensitive bilaterally throughout the ball of the foot, great toe and heel.      Medical decision making/discussion:     Note for work    Labs before you see me in 4 months    Immunizations today      Farzad was seen today for diabetes, elbow pain and letter for school/work.    Diagnoses and all orders for this visit:    Type 2 diabetes mellitus with complication, without long-term current use of insulin (HCC)  -     Diabetic Monofilament LE Exam  -     Comp Metabolic Panel; Future  -     HEMOGLOBIN A1C; Future  -     " Lipid Profile; Future    Acquired hypothyroidism  -     TSH WITH REFLEX TO FT4; Future    Tendonitis    Need for vaccination  -     Pneumovax Vaccine (PPSV23)  -     Zoster Vac Recomb Adjuvanted (SHINGRIX) 50 MCG/0.5ML Recon Susp; 0.5 mL by Intramuscular route Once for 1 dose.        Return in about 4 months (around 7/3/2020) for Follow-up, Discuss Labs.        Please note that this dictation was created using voice recognition software. I have made every reasonable attempt to correct obvious errors, but I expect that there are errors of grammar and possibly content that I did not discover before finalizing the note.

## 2020-03-17 RX ORDER — LOSARTAN POTASSIUM 50 MG/1
TABLET ORAL
Qty: 90 TAB | Refills: 1 | Status: SHIPPED | OUTPATIENT
Start: 2020-03-17 | End: 2020-09-15

## 2020-03-17 RX ORDER — PEN NEEDLE, DIABETIC 29 G X1/2"
NEEDLE, DISPOSABLE MISCELLANEOUS
Qty: 100 EACH | Refills: 3 | Status: SHIPPED | OUTPATIENT
Start: 2020-03-17 | End: 2021-04-13

## 2020-03-17 NOTE — TELEPHONE ENCOUNTER
Was the patient seen in the last year in this department? Yes    Does patient have an active prescription for medications requested? No     Received Request Via: Pharmacy      Pt met protocol?: Yes    OV 3/20

## 2020-03-17 NOTE — TELEPHONE ENCOUNTER
Refill X 6 months, sent to pharmacy.Pt. Seen in the last 6 months per protocol.   Lab Results   Component Value Date/Time    SODIUM 137 07/26/2019 09:20 AM    POTASSIUM 4.6 07/26/2019 09:20 AM    CHLORIDE 105 07/26/2019 09:20 AM    CO2 21 07/26/2019 09:20 AM    GLUCOSE 218 (H) 07/26/2019 09:20 AM    BUN 28 (H) 07/26/2019 09:20 AM    CREATININE 1.22 07/26/2019 09:20 AM

## 2020-03-18 DIAGNOSIS — E11.8 TYPE 2 DIABETES MELLITUS WITH COMPLICATION, WITHOUT LONG-TERM CURRENT USE OF INSULIN (HCC): ICD-10-CM

## 2020-03-20 RX ORDER — INSULIN GLARGINE 100 [IU]/ML
INJECTION, SOLUTION SUBCUTANEOUS
Qty: 30 ML | Refills: 0 | Status: SHIPPED | OUTPATIENT
Start: 2020-03-20 | End: 2020-06-09

## 2020-04-30 ENCOUNTER — HOSPITAL ENCOUNTER (OUTPATIENT)
Dept: LAB | Facility: MEDICAL CENTER | Age: 60
End: 2020-04-30
Attending: NURSE PRACTITIONER
Payer: COMMERCIAL

## 2020-04-30 DIAGNOSIS — E11.8 TYPE 2 DIABETES MELLITUS WITH COMPLICATION, WITHOUT LONG-TERM CURRENT USE OF INSULIN (HCC): ICD-10-CM

## 2020-04-30 DIAGNOSIS — E03.9 ACQUIRED HYPOTHYROIDISM: ICD-10-CM

## 2020-04-30 LAB
ALBUMIN SERPL BCP-MCNC: 4.3 G/DL (ref 3.2–4.9)
ALBUMIN/GLOB SERPL: 1.7 G/DL
ALP SERPL-CCNC: 72 U/L (ref 30–99)
ALT SERPL-CCNC: 40 U/L (ref 2–50)
ANION GAP SERPL CALC-SCNC: 14 MMOL/L (ref 7–16)
AST SERPL-CCNC: 27 U/L (ref 12–45)
BILIRUB SERPL-MCNC: 0.5 MG/DL (ref 0.1–1.5)
BUN SERPL-MCNC: 19 MG/DL (ref 8–22)
CALCIUM SERPL-MCNC: 8.6 MG/DL (ref 8.5–10.5)
CHLORIDE SERPL-SCNC: 101 MMOL/L (ref 96–112)
CHOLEST SERPL-MCNC: 119 MG/DL (ref 100–199)
CO2 SERPL-SCNC: 22 MMOL/L (ref 20–33)
CREAT SERPL-MCNC: 1.12 MG/DL (ref 0.5–1.4)
FASTING STATUS PATIENT QL REPORTED: NORMAL
GLOBULIN SER CALC-MCNC: 2.5 G/DL (ref 1.9–3.5)
GLUCOSE SERPL-MCNC: 147 MG/DL (ref 65–99)
HDLC SERPL-MCNC: 43 MG/DL
LDLC SERPL CALC-MCNC: 47 MG/DL
POTASSIUM SERPL-SCNC: 4.5 MMOL/L (ref 3.6–5.5)
PROT SERPL-MCNC: 6.8 G/DL (ref 6–8.2)
SODIUM SERPL-SCNC: 137 MMOL/L (ref 135–145)
TRIGL SERPL-MCNC: 143 MG/DL (ref 0–149)

## 2020-04-30 PROCEDURE — 80053 COMPREHEN METABOLIC PANEL: CPT

## 2020-04-30 PROCEDURE — 36415 COLL VENOUS BLD VENIPUNCTURE: CPT

## 2020-04-30 PROCEDURE — 83036 HEMOGLOBIN GLYCOSYLATED A1C: CPT

## 2020-04-30 PROCEDURE — 84443 ASSAY THYROID STIM HORMONE: CPT

## 2020-04-30 PROCEDURE — 80061 LIPID PANEL: CPT

## 2020-05-01 LAB
EST. AVERAGE GLUCOSE BLD GHB EST-MCNC: 166 MG/DL
HBA1C MFR BLD: 7.4 % (ref 0–5.6)
TSH SERPL DL<=0.005 MIU/L-ACNC: 2.83 UIU/ML (ref 0.38–5.33)

## 2020-05-10 DIAGNOSIS — F51.01 PRIMARY INSOMNIA: ICD-10-CM

## 2020-05-12 RX ORDER — TRAZODONE HYDROCHLORIDE 100 MG/1
TABLET ORAL
Qty: 180 TAB | Refills: 1 | Status: SHIPPED | OUTPATIENT
Start: 2020-05-12 | End: 2020-11-11

## 2020-05-18 DIAGNOSIS — E03.9 ACQUIRED HYPOTHYROIDISM: ICD-10-CM

## 2020-05-19 RX ORDER — LEVOTHYROXINE SODIUM 25 UG/1
TABLET ORAL
Qty: 90 TAB | Refills: 3 | Status: SHIPPED | OUTPATIENT
Start: 2020-05-19 | End: 2021-05-04 | Stop reason: SDUPTHER

## 2020-05-25 DIAGNOSIS — E11.8 TYPE 2 DIABETES MELLITUS WITH COMPLICATION, WITHOUT LONG-TERM CURRENT USE OF INSULIN (HCC): ICD-10-CM

## 2020-05-27 RX ORDER — EMPAGLIFLOZIN AND METFORMIN HYDROCHLORIDE 12.5; 1 MG/1; MG/1
TABLET ORAL
Qty: 180 TAB | Refills: 1 | Status: SHIPPED | OUTPATIENT
Start: 2020-05-27 | End: 2020-11-18 | Stop reason: SDUPTHER

## 2020-05-27 RX ORDER — GLIPIZIDE 5 MG/1
TABLET ORAL
Qty: 180 TAB | Refills: 1 | Status: SHIPPED | OUTPATIENT
Start: 2020-05-27 | End: 2020-11-18 | Stop reason: SDUPTHER

## 2020-05-27 NOTE — TELEPHONE ENCOUNTER
Patient has recently been seen by PCP within the last 6 months per protocol (3/20). Will refill medications for 6 months.  Lab Results   Component Value Date/Time    HBA1C 7.4 (H) 04/30/2020 11:03 AM      Lab Results   Component Value Date/Time    MALBCRT see below 07/26/2019 09:20 AM    MICROALBUR <0.7 07/26/2019 09:20 AM      Lab Results   Component Value Date/Time    ALKPHOSPHAT 72 04/30/2020 11:03 AM    ASTSGOT 27 04/30/2020 11:03 AM    ALTSGPT 40 04/30/2020 11:03 AM    TBILIRUBIN 0.5 04/30/2020 11:03 AM

## 2020-06-07 DIAGNOSIS — I10 ESSENTIAL HYPERTENSION, BENIGN: ICD-10-CM

## 2020-06-07 DIAGNOSIS — E78.5 DYSLIPIDEMIA: ICD-10-CM

## 2020-06-07 DIAGNOSIS — J30.9 ALLERGIC RHINITIS, UNSPECIFIED SEASONALITY, UNSPECIFIED TRIGGER: ICD-10-CM

## 2020-06-07 DIAGNOSIS — E11.8 TYPE 2 DIABETES MELLITUS WITH COMPLICATION, WITHOUT LONG-TERM CURRENT USE OF INSULIN (HCC): ICD-10-CM

## 2020-06-09 RX ORDER — CETIRIZINE HYDROCHLORIDE 10 MG/1
TABLET, FILM COATED ORAL
Qty: 90 TAB | Refills: 1 | Status: SHIPPED | OUTPATIENT
Start: 2020-06-09 | End: 2020-12-04

## 2020-06-09 RX ORDER — ISOSORBIDE MONONITRATE 30 MG/1
TABLET, EXTENDED RELEASE ORAL
Qty: 90 TAB | Refills: 1 | Status: SHIPPED | OUTPATIENT
Start: 2020-06-09 | End: 2020-12-04

## 2020-06-09 RX ORDER — INSULIN GLARGINE 100 [IU]/ML
INJECTION, SOLUTION SUBCUTANEOUS
Qty: 30 ML | Refills: 1 | Status: SHIPPED | OUTPATIENT
Start: 2020-06-09 | End: 2020-11-18 | Stop reason: SDUPTHER

## 2020-06-09 RX ORDER — ATORVASTATIN CALCIUM 40 MG/1
TABLET, FILM COATED ORAL
Qty: 90 TAB | Refills: 1 | Status: SHIPPED | OUTPATIENT
Start: 2020-06-09 | End: 2020-12-04

## 2020-07-20 DIAGNOSIS — F33.9 EPISODE OF RECURRENT MAJOR DEPRESSIVE DISORDER, UNSPECIFIED DEPRESSION EPISODE SEVERITY (HCC): ICD-10-CM

## 2020-07-22 RX ORDER — ESCITALOPRAM OXALATE 20 MG/1
TABLET ORAL
Qty: 90 TAB | Refills: 3 | Status: SHIPPED | OUTPATIENT
Start: 2020-07-22 | End: 2021-07-04 | Stop reason: SDUPTHER

## 2020-09-09 DIAGNOSIS — E11.8 TYPE 2 DIABETES MELLITUS WITH COMPLICATION, WITHOUT LONG-TERM CURRENT USE OF INSULIN (HCC): ICD-10-CM

## 2020-09-15 RX ORDER — LOSARTAN POTASSIUM 50 MG/1
TABLET ORAL
Qty: 90 TAB | Refills: 1 | Status: SHIPPED | OUTPATIENT
Start: 2020-09-15 | End: 2021-03-10

## 2020-09-15 NOTE — TELEPHONE ENCOUNTER
Refill X 6 months, sent to pharmacy.Pt. Seen in the last 6 months per protocol.   Lab Results   Component Value Date/Time    SODIUM 137 04/30/2020 11:03 AM    POTASSIUM 4.5 04/30/2020 11:03 AM    CHLORIDE 101 04/30/2020 11:03 AM    CO2 22 04/30/2020 11:03 AM    GLUCOSE 147 (H) 04/30/2020 11:03 AM    BUN 19 04/30/2020 11:03 AM    CREATININE 1.12 04/30/2020 11:03 AM

## 2020-10-12 ENCOUNTER — HOSPITAL ENCOUNTER (OUTPATIENT)
Dept: LAB | Facility: MEDICAL CENTER | Age: 60
End: 2020-10-12
Attending: NURSE PRACTITIONER
Payer: COMMERCIAL

## 2020-10-12 DIAGNOSIS — E11.8 TYPE 2 DIABETES MELLITUS WITH COMPLICATION, WITHOUT LONG-TERM CURRENT USE OF INSULIN (HCC): ICD-10-CM

## 2020-10-12 LAB
CREAT UR-MCNC: 88.68 MG/DL
EST. AVERAGE GLUCOSE BLD GHB EST-MCNC: 128 MG/DL
HBA1C MFR BLD: 6.1 % (ref 0–5.6)
MICROALBUMIN UR-MCNC: <1.2 MG/DL
MICROALBUMIN/CREAT UR: NORMAL MG/G (ref 0–30)

## 2020-10-12 PROCEDURE — 82043 UR ALBUMIN QUANTITATIVE: CPT

## 2020-10-12 PROCEDURE — 82570 ASSAY OF URINE CREATININE: CPT

## 2020-10-12 PROCEDURE — 83036 HEMOGLOBIN GLYCOSYLATED A1C: CPT

## 2020-10-12 PROCEDURE — 36415 COLL VENOUS BLD VENIPUNCTURE: CPT

## 2020-10-16 ENCOUNTER — OFFICE VISIT (OUTPATIENT)
Dept: MEDICAL GROUP | Facility: PHYSICIAN GROUP | Age: 60
End: 2020-10-16
Payer: COMMERCIAL

## 2020-10-16 ENCOUNTER — APPOINTMENT (OUTPATIENT)
Dept: RADIOLOGY | Facility: IMAGING CENTER | Age: 60
End: 2020-10-16
Attending: FAMILY MEDICINE
Payer: COMMERCIAL

## 2020-10-16 VITALS
BODY MASS INDEX: 33.24 KG/M2 | DIASTOLIC BLOOD PRESSURE: 62 MMHG | TEMPERATURE: 97.6 F | SYSTOLIC BLOOD PRESSURE: 110 MMHG | HEART RATE: 96 BPM | OXYGEN SATURATION: 99 % | HEIGHT: 74 IN | WEIGHT: 259 LBS | RESPIRATION RATE: 16 BRPM

## 2020-10-16 DIAGNOSIS — M25.532 LEFT WRIST PAIN: ICD-10-CM

## 2020-10-16 DIAGNOSIS — E11.8 TYPE 2 DIABETES MELLITUS WITH COMPLICATION, WITHOUT LONG-TERM CURRENT USE OF INSULIN (HCC): ICD-10-CM

## 2020-10-16 DIAGNOSIS — Z23 NEED FOR VACCINATION: ICD-10-CM

## 2020-10-16 DIAGNOSIS — R07.89 ATYPICAL CHEST PAIN: ICD-10-CM

## 2020-10-16 DIAGNOSIS — K43.9 HERNIA OF ANTERIOR ABDOMINAL WALL: ICD-10-CM

## 2020-10-16 PROCEDURE — 90472 IMMUNIZATION ADMIN EACH ADD: CPT | Performed by: FAMILY MEDICINE

## 2020-10-16 PROCEDURE — 90715 TDAP VACCINE 7 YRS/> IM: CPT | Performed by: FAMILY MEDICINE

## 2020-10-16 PROCEDURE — 90686 IIV4 VACC NO PRSV 0.5 ML IM: CPT | Performed by: FAMILY MEDICINE

## 2020-10-16 PROCEDURE — 73110 X-RAY EXAM OF WRIST: CPT | Mod: TC,FY,LT | Performed by: FAMILY MEDICINE

## 2020-10-16 PROCEDURE — 99214 OFFICE O/P EST MOD 30 MIN: CPT | Mod: 25 | Performed by: FAMILY MEDICINE

## 2020-10-16 PROCEDURE — 90471 IMMUNIZATION ADMIN: CPT | Performed by: FAMILY MEDICINE

## 2020-10-16 ASSESSMENT — FIBROSIS 4 INDEX: FIB4 SCORE: 0.81

## 2020-10-16 NOTE — ASSESSMENT & PLAN NOTE
Worse left wrist pain over last few months after a fall from his jeep   Also with left hand going numb  No loss of strength,   +tinels   Likely arthritis and carpal tunnel  Dec ref to ortho right now but can consider in future if worsening  Xray today  Brace provide  Ice.

## 2020-10-16 NOTE — ASSESSMENT & PLAN NOTE
Since 2014 after laparoscopic cholecystectomy pt has a worsening anterior abd wall hernia now getting painful. Located at the periumbilical trocar insertion point.   No blood in the stool, no vomiting.   Ref to gen surgery done for repair  Continue weight loss.

## 2020-10-16 NOTE — ASSESSMENT & PLAN NOTE
Over the last few months patient has left sided chest pain and pressure with exertion when he walks up stairs at work. Recently pain is less as he has doubled his dose of asa 81 mg to asa 162 mg  He had a heart cath in 2018 with 60% occlusion and is on medical management with atorvastatin 40 mg, isosorbide mononitrate 30 mg, losartan 50 mg  His DM2, HtN, dyslipidemia are all under excellent control.   Ref back to cardiology done to evaluate with exercise stress test or angiogram.
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
## 2020-11-09 DIAGNOSIS — F51.01 PRIMARY INSOMNIA: ICD-10-CM

## 2020-11-11 RX ORDER — TRAZODONE HYDROCHLORIDE 100 MG/1
TABLET ORAL
Qty: 180 TAB | Refills: 1 | Status: SHIPPED | OUTPATIENT
Start: 2020-11-11 | End: 2021-05-04 | Stop reason: SDUPTHER

## 2020-11-18 DIAGNOSIS — E11.8 TYPE 2 DIABETES MELLITUS WITH COMPLICATION, WITHOUT LONG-TERM CURRENT USE OF INSULIN (HCC): ICD-10-CM

## 2020-11-20 RX ORDER — INSULIN GLARGINE 100 [IU]/ML
32 INJECTION, SOLUTION SUBCUTANEOUS EVERY EVENING
Qty: 15 ML | Refills: 1 | Status: SHIPPED | OUTPATIENT
Start: 2020-11-20 | End: 2021-02-25

## 2020-11-20 RX ORDER — EMPAGLIFLOZIN AND METFORMIN HYDROCHLORIDE 12.5; 1 MG/1; MG/1
1 TABLET ORAL 2 TIMES DAILY
Qty: 180 TAB | Refills: 1 | Status: SHIPPED | OUTPATIENT
Start: 2020-11-20 | End: 2021-05-14 | Stop reason: SDUPTHER

## 2020-11-20 RX ORDER — GLIPIZIDE 5 MG/1
5 TABLET ORAL 2 TIMES DAILY
Qty: 180 TAB | Refills: 1 | Status: SHIPPED | OUTPATIENT
Start: 2020-11-20 | End: 2021-05-14 | Stop reason: SDUPTHER

## 2020-11-21 NOTE — TELEPHONE ENCOUNTER
Last seen by PCP 10/16/2020.     Lab Results   Component Value Date/Time    HBA1C 6.1 (H) 10/12/2020 10:48 AM      Lab Results   Component Value Date/Time    MALBCRT see below 10/12/2020 10:48 AM    MICROALBUR <1.2 10/12/2020 10:48 AM      Lab Results   Component Value Date/Time    ALKPHOSPHAT 72 04/30/2020 11:03 AM    ASTSGOT 27 04/30/2020 11:03 AM    ALTSGPT 40 04/30/2020 11:03 AM    TBILIRUBIN 0.5 04/30/2020 11:03 AM        Will send 6 month(s) to the pharmacy.

## 2020-11-29 DIAGNOSIS — I10 ESSENTIAL HYPERTENSION, BENIGN: ICD-10-CM

## 2020-11-29 DIAGNOSIS — E78.5 DYSLIPIDEMIA: ICD-10-CM

## 2020-11-29 DIAGNOSIS — J30.9 ALLERGIC RHINITIS, UNSPECIFIED SEASONALITY, UNSPECIFIED TRIGGER: ICD-10-CM

## 2020-12-02 ENCOUNTER — HOSPITAL ENCOUNTER (OUTPATIENT)
Dept: LAB | Facility: MEDICAL CENTER | Age: 60
End: 2020-12-02
Attending: NURSE PRACTITIONER
Payer: COMMERCIAL

## 2020-12-02 DIAGNOSIS — R52 BODY ACHES: ICD-10-CM

## 2020-12-02 DIAGNOSIS — R50.9 FEVER, UNSPECIFIED FEVER CAUSE: ICD-10-CM

## 2020-12-02 PROCEDURE — U0003 INFECTIOUS AGENT DETECTION BY NUCLEIC ACID (DNA OR RNA); SEVERE ACUTE RESPIRATORY SYNDROME CORONAVIRUS 2 (SARS-COV-2) (CORONAVIRUS DISEASE [COVID-19]), AMPLIFIED PROBE TECHNIQUE, MAKING USE OF HIGH THROUGHPUT TECHNOLOGIES AS DESCRIBED BY CMS-2020-01-R: HCPCS

## 2020-12-02 PROCEDURE — C9803 HOPD COVID-19 SPEC COLLECT: HCPCS

## 2020-12-03 LAB — COVID ORDER STATUS COVID19: NORMAL

## 2020-12-04 LAB
SARS-COV-2 RNA RESP QL NAA+PROBE: DETECTED
SPECIMEN SOURCE: ABNORMAL

## 2020-12-04 RX ORDER — ATORVASTATIN CALCIUM 40 MG/1
TABLET, FILM COATED ORAL
Qty: 90 TAB | Refills: 1 | Status: SHIPPED | OUTPATIENT
Start: 2020-12-04 | End: 2021-06-01 | Stop reason: SDUPTHER

## 2020-12-04 RX ORDER — CETIRIZINE HYDROCHLORIDE 10 MG/1
TABLET, FILM COATED ORAL
Qty: 90 TAB | Refills: 1 | Status: SHIPPED | OUTPATIENT
Start: 2020-12-04 | End: 2021-05-14 | Stop reason: SDUPTHER

## 2020-12-04 RX ORDER — ISOSORBIDE MONONITRATE 30 MG/1
TABLET, EXTENDED RELEASE ORAL
Qty: 90 TAB | Refills: 1 | Status: SHIPPED | OUTPATIENT
Start: 2020-12-04 | End: 2021-06-01 | Stop reason: SDUPTHER

## 2020-12-04 NOTE — TELEPHONE ENCOUNTER
Pt has had OV within the 12 month protocol and lipid panel is current. 6 month supply sent to pharmacy.   Lab Results   Component Value Date/Time    CHOLSTRLTOT 119 04/30/2020 11:03 AM    LDL 47 04/30/2020 11:03 AM    HDL 43 04/30/2020 11:03 AM    TRIGLYCERIDE 143 04/30/2020 11:03 AM       Lab Results   Component Value Date/Time    SODIUM 137 04/30/2020 11:03 AM    POTASSIUM 4.5 04/30/2020 11:03 AM    CHLORIDE 101 04/30/2020 11:03 AM    CO2 22 04/30/2020 11:03 AM    GLUCOSE 147 (H) 04/30/2020 11:03 AM    BUN 19 04/30/2020 11:03 AM    CREATININE 1.12 04/30/2020 11:03 AM     Lab Results   Component Value Date/Time    ALKPHOSPHAT 72 04/30/2020 11:03 AM    ASTSGOT 27 04/30/2020 11:03 AM    ALTSGPT 40 04/30/2020 11:03 AM    TBILIRUBIN 0.5 04/30/2020 11:03 AM

## 2020-12-11 ENCOUNTER — HOSPITAL ENCOUNTER (INPATIENT)
Facility: MEDICAL CENTER | Age: 60
LOS: 2 days | DRG: 177 | End: 2020-12-13
Attending: EMERGENCY MEDICINE | Admitting: HOSPITALIST
Payer: COMMERCIAL

## 2020-12-11 ENCOUNTER — APPOINTMENT (OUTPATIENT)
Dept: RADIOLOGY | Facility: MEDICAL CENTER | Age: 60
DRG: 177 | End: 2020-12-11
Attending: EMERGENCY MEDICINE
Payer: COMMERCIAL

## 2020-12-11 ENCOUNTER — NURSE TRIAGE (OUTPATIENT)
Dept: HEALTH INFORMATION MANAGEMENT | Facility: OTHER | Age: 60
End: 2020-12-11

## 2020-12-11 DIAGNOSIS — R09.02 HYPOXEMIA: ICD-10-CM

## 2020-12-11 DIAGNOSIS — U07.1 COVID-19: ICD-10-CM

## 2020-12-11 PROBLEM — J12.82 PNEUMONIA DUE TO COVID-19 VIRUS: Status: ACTIVE | Noted: 2020-12-11

## 2020-12-11 PROBLEM — J96.01 ACUTE RESPIRATORY FAILURE WITH HYPOXIA (HCC): Status: ACTIVE | Noted: 2020-12-11

## 2020-12-11 LAB
ALBUMIN SERPL BCP-MCNC: 3.6 G/DL (ref 3.2–4.9)
ALBUMIN/GLOB SERPL: 0.9 G/DL
ALP SERPL-CCNC: 146 U/L (ref 30–99)
ALT SERPL-CCNC: 66 U/L (ref 2–50)
ANION GAP SERPL CALC-SCNC: 12 MMOL/L (ref 7–16)
AST SERPL-CCNC: 26 U/L (ref 12–45)
BASOPHILS # BLD AUTO: 0.3 % (ref 0–1.8)
BASOPHILS # BLD: 0.03 K/UL (ref 0–0.12)
BILIRUB SERPL-MCNC: 0.4 MG/DL (ref 0.1–1.5)
BLOOD CULTURE HOLD CXBCH: NORMAL
BUN SERPL-MCNC: 18 MG/DL (ref 8–22)
CALCIUM SERPL-MCNC: 9.3 MG/DL (ref 8.5–10.5)
CHLORIDE SERPL-SCNC: 100 MMOL/L (ref 96–112)
CO2 SERPL-SCNC: 24 MMOL/L (ref 20–33)
CREAT SERPL-MCNC: 0.81 MG/DL (ref 0.5–1.4)
CRP SERPL HS-MCNC: 2.18 MG/DL (ref 0–0.75)
D DIMER PPP IA.FEU-MCNC: 2.1 UG/ML (FEU) (ref 0–0.5)
EKG IMPRESSION: NORMAL
EOSINOPHIL # BLD AUTO: 0.08 K/UL (ref 0–0.51)
EOSINOPHIL NFR BLD: 0.8 % (ref 0–6.9)
ERYTHROCYTE [DISTWIDTH] IN BLOOD BY AUTOMATED COUNT: 45.5 FL (ref 35.9–50)
GLOBULIN SER CALC-MCNC: 3.9 G/DL (ref 1.9–3.5)
GLUCOSE BLD-MCNC: 270 MG/DL (ref 65–99)
GLUCOSE BLD-MCNC: 333 MG/DL (ref 65–99)
GLUCOSE SERPL-MCNC: 232 MG/DL (ref 65–99)
HCT VFR BLD AUTO: 43.8 % (ref 42–52)
HGB BLD-MCNC: 14.6 G/DL (ref 14–18)
IMM GRANULOCYTES # BLD AUTO: 0.18 K/UL (ref 0–0.11)
IMM GRANULOCYTES NFR BLD AUTO: 1.9 % (ref 0–0.9)
LYMPHOCYTES # BLD AUTO: 1.26 K/UL (ref 1–4.8)
LYMPHOCYTES NFR BLD: 13.2 % (ref 22–41)
MCH RBC QN AUTO: 30.1 PG (ref 27–33)
MCHC RBC AUTO-ENTMCNC: 33.3 G/DL (ref 33.7–35.3)
MCV RBC AUTO: 90.3 FL (ref 81.4–97.8)
MONOCYTES # BLD AUTO: 0.91 K/UL (ref 0–0.85)
MONOCYTES NFR BLD AUTO: 9.5 % (ref 0–13.4)
NEUTROPHILS # BLD AUTO: 7.07 K/UL (ref 1.82–7.42)
NEUTROPHILS NFR BLD: 74.3 % (ref 44–72)
NRBC # BLD AUTO: 0 K/UL
NRBC BLD-RTO: 0 /100 WBC
PLATELET # BLD AUTO: 437 K/UL (ref 164–446)
PMV BLD AUTO: 9 FL (ref 9–12.9)
POTASSIUM SERPL-SCNC: 4.5 MMOL/L (ref 3.6–5.5)
PROCALCITONIN SERPL-MCNC: <0.05 NG/ML
PROT SERPL-MCNC: 7.5 G/DL (ref 6–8.2)
RBC # BLD AUTO: 4.85 M/UL (ref 4.7–6.1)
SODIUM SERPL-SCNC: 136 MMOL/L (ref 135–145)
TROPONIN T SERPL-MCNC: 8 NG/L (ref 6–19)
WBC # BLD AUTO: 9.5 K/UL (ref 4.8–10.8)

## 2020-12-11 PROCEDURE — 94760 N-INVAS EAR/PLS OXIMETRY 1: CPT

## 2020-12-11 PROCEDURE — 71045 X-RAY EXAM CHEST 1 VIEW: CPT

## 2020-12-11 PROCEDURE — 93005 ELECTROCARDIOGRAM TRACING: CPT | Performed by: EMERGENCY MEDICINE

## 2020-12-11 PROCEDURE — 700111 HCHG RX REV CODE 636 W/ 250 OVERRIDE (IP): Performed by: HOSPITALIST

## 2020-12-11 PROCEDURE — 96374 THER/PROPH/DIAG INJ IV PUSH: CPT

## 2020-12-11 PROCEDURE — 84484 ASSAY OF TROPONIN QUANT: CPT

## 2020-12-11 PROCEDURE — 80053 COMPREHEN METABOLIC PANEL: CPT

## 2020-12-11 PROCEDURE — 700102 HCHG RX REV CODE 250 W/ 637 OVERRIDE(OP): Performed by: HOSPITALIST

## 2020-12-11 PROCEDURE — 770014 HCHG ROOM/CARE - WARD (150)

## 2020-12-11 PROCEDURE — 85379 FIBRIN DEGRADATION QUANT: CPT

## 2020-12-11 PROCEDURE — 82962 GLUCOSE BLOOD TEST: CPT | Mod: 91

## 2020-12-11 PROCEDURE — 700111 HCHG RX REV CODE 636 W/ 250 OVERRIDE (IP): Performed by: EMERGENCY MEDICINE

## 2020-12-11 PROCEDURE — 700117 HCHG RX CONTRAST REV CODE 255: Performed by: EMERGENCY MEDICINE

## 2020-12-11 PROCEDURE — 85025 COMPLETE CBC W/AUTO DIFF WBC: CPT

## 2020-12-11 PROCEDURE — A9270 NON-COVERED ITEM OR SERVICE: HCPCS | Performed by: HOSPITALIST

## 2020-12-11 PROCEDURE — 99285 EMERGENCY DEPT VISIT HI MDM: CPT

## 2020-12-11 PROCEDURE — 93005 ELECTROCARDIOGRAM TRACING: CPT

## 2020-12-11 PROCEDURE — 71275 CT ANGIOGRAPHY CHEST: CPT

## 2020-12-11 PROCEDURE — 86140 C-REACTIVE PROTEIN: CPT

## 2020-12-11 PROCEDURE — 84145 PROCALCITONIN (PCT): CPT

## 2020-12-11 PROCEDURE — 99223 1ST HOSP IP/OBS HIGH 75: CPT | Performed by: HOSPITALIST

## 2020-12-11 PROCEDURE — 83520 IMMUNOASSAY QUANT NOS NONAB: CPT

## 2020-12-11 RX ORDER — DIPHENHYDRAMINE HCL 25 MG
1 CAPSULE ORAL
Status: ON HOLD | COMMUNITY
End: 2020-12-13

## 2020-12-11 RX ORDER — ESCITALOPRAM OXALATE 10 MG/1
20 TABLET ORAL DAILY
Status: DISCONTINUED | OUTPATIENT
Start: 2020-12-11 | End: 2020-12-13 | Stop reason: HOSPADM

## 2020-12-11 RX ORDER — GUAIFENESIN 600 MG/1
600 TABLET, EXTENDED RELEASE ORAL EVERY 12 HOURS
Status: DISCONTINUED | OUTPATIENT
Start: 2020-12-11 | End: 2020-12-13 | Stop reason: HOSPADM

## 2020-12-11 RX ORDER — TRAZODONE HYDROCHLORIDE 50 MG/1
200 TABLET ORAL
Status: DISCONTINUED | OUTPATIENT
Start: 2020-12-11 | End: 2020-12-11

## 2020-12-11 RX ORDER — ACETAMINOPHEN 500 MG
1000 TABLET ORAL 2 TIMES DAILY PRN
COMMUNITY

## 2020-12-11 RX ORDER — INSULIN GLARGINE 100 [IU]/ML
32 INJECTION, SOLUTION SUBCUTANEOUS EVERY EVENING
Status: DISCONTINUED | OUTPATIENT
Start: 2020-12-11 | End: 2020-12-13 | Stop reason: HOSPADM

## 2020-12-11 RX ORDER — TRAZODONE HYDROCHLORIDE 100 MG/1
100 TABLET ORAL
Status: DISCONTINUED | OUTPATIENT
Start: 2020-12-11 | End: 2020-12-13 | Stop reason: HOSPADM

## 2020-12-11 RX ORDER — ISOSORBIDE MONONITRATE 30 MG/1
30 TABLET, EXTENDED RELEASE ORAL EVERY MORNING
Status: DISCONTINUED | OUTPATIENT
Start: 2020-12-11 | End: 2020-12-13 | Stop reason: HOSPADM

## 2020-12-11 RX ORDER — DEXTROSE MONOHYDRATE 25 G/50ML
50 INJECTION, SOLUTION INTRAVENOUS
Status: DISCONTINUED | OUTPATIENT
Start: 2020-12-11 | End: 2020-12-13 | Stop reason: HOSPADM

## 2020-12-11 RX ORDER — DEXAMETHASONE 4 MG/1
6 TABLET ORAL DAILY
Status: DISCONTINUED | OUTPATIENT
Start: 2020-12-12 | End: 2020-12-13 | Stop reason: HOSPADM

## 2020-12-11 RX ORDER — GUAIFENESIN AND DEXTROMETHORPHAN HYDROBROMIDE 600; 30 MG/1; MG/1
1 TABLET, EXTENDED RELEASE ORAL EVERY 12 HOURS
Status: ON HOLD | COMMUNITY
End: 2020-12-13

## 2020-12-11 RX ORDER — BENZONATATE 100 MG/1
100 CAPSULE ORAL 3 TIMES DAILY
Status: DISCONTINUED | OUTPATIENT
Start: 2020-12-11 | End: 2020-12-13 | Stop reason: HOSPADM

## 2020-12-11 RX ORDER — CETIRIZINE HYDROCHLORIDE 10 MG/1
10 TABLET ORAL DAILY
Status: DISCONTINUED | OUTPATIENT
Start: 2020-12-11 | End: 2020-12-12

## 2020-12-11 RX ORDER — DEXAMETHASONE SODIUM PHOSPHATE 10 MG/ML
10 INJECTION, SOLUTION INTRAMUSCULAR; INTRAVENOUS ONCE
Status: COMPLETED | OUTPATIENT
Start: 2020-12-11 | End: 2020-12-11

## 2020-12-11 RX ORDER — LOSARTAN POTASSIUM 50 MG/1
50 TABLET ORAL DAILY
Status: DISCONTINUED | OUTPATIENT
Start: 2020-12-11 | End: 2020-12-13 | Stop reason: HOSPADM

## 2020-12-11 RX ORDER — ONDANSETRON 2 MG/ML
4 INJECTION INTRAMUSCULAR; INTRAVENOUS EVERY 6 HOURS PRN
Status: DISCONTINUED | OUTPATIENT
Start: 2020-12-11 | End: 2020-12-13 | Stop reason: HOSPADM

## 2020-12-11 RX ORDER — LEVOTHYROXINE SODIUM 0.03 MG/1
25 TABLET ORAL
Status: DISCONTINUED | OUTPATIENT
Start: 2020-12-11 | End: 2020-12-13 | Stop reason: HOSPADM

## 2020-12-11 RX ORDER — ATORVASTATIN CALCIUM 40 MG/1
40 TABLET, FILM COATED ORAL EVERY EVENING
Status: DISCONTINUED | OUTPATIENT
Start: 2020-12-11 | End: 2020-12-12

## 2020-12-11 RX ORDER — ONDANSETRON 4 MG/1
4 TABLET, ORALLY DISINTEGRATING ORAL EVERY 6 HOURS PRN
Status: DISCONTINUED | OUTPATIENT
Start: 2020-12-11 | End: 2020-12-13 | Stop reason: HOSPADM

## 2020-12-11 RX ORDER — ACETAMINOPHEN 325 MG/1
650 TABLET ORAL EVERY 6 HOURS PRN
Status: DISCONTINUED | OUTPATIENT
Start: 2020-12-11 | End: 2020-12-13 | Stop reason: HOSPADM

## 2020-12-11 RX ORDER — UREA 10 %
1 LOTION (ML) TOPICAL
Status: DISCONTINUED | OUTPATIENT
Start: 2020-12-11 | End: 2020-12-13 | Stop reason: HOSPADM

## 2020-12-11 RX ADMIN — ENOXAPARIN SODIUM 40 MG: 40 INJECTION SUBCUTANEOUS at 16:07

## 2020-12-11 RX ADMIN — DEXAMETHASONE SODIUM PHOSPHATE 10 MG: 10 INJECTION, SOLUTION INTRAMUSCULAR; INTRAVENOUS at 11:57

## 2020-12-11 RX ADMIN — IOHEXOL 55 ML: 350 INJECTION, SOLUTION INTRAVENOUS at 13:27

## 2020-12-11 RX ADMIN — INSULIN GLARGINE 32 UNITS: 100 INJECTION, SOLUTION SUBCUTANEOUS at 17:50

## 2020-12-11 RX ADMIN — ISOSORBIDE MONONITRATE 30 MG: 30 TABLET, EXTENDED RELEASE ORAL at 17:46

## 2020-12-11 RX ADMIN — LOSARTAN POTASSIUM 50 MG: 50 TABLET, FILM COATED ORAL at 13:48

## 2020-12-11 RX ADMIN — BENZONATATE 100 MG: 100 CAPSULE ORAL at 17:47

## 2020-12-11 RX ADMIN — CETIRIZINE HYDROCHLORIDE 10 MG: 10 TABLET, FILM COATED ORAL at 17:46

## 2020-12-11 RX ADMIN — BENZONATATE 100 MG: 100 CAPSULE ORAL at 13:48

## 2020-12-11 RX ADMIN — ASPIRIN 81 MG: 81 TABLET, COATED ORAL at 13:48

## 2020-12-11 RX ADMIN — ACETAMINOPHEN 650 MG: 325 TABLET, FILM COATED ORAL at 16:07

## 2020-12-11 RX ADMIN — GUAIFENESIN 600 MG: 600 TABLET, EXTENDED RELEASE ORAL at 17:48

## 2020-12-11 RX ADMIN — ESCITALOPRAM OXALATE 20 MG: 10 TABLET ORAL at 13:48

## 2020-12-11 RX ADMIN — TRAZODONE HYDROCHLORIDE 100 MG: 100 TABLET ORAL at 21:03

## 2020-12-11 RX ADMIN — ATORVASTATIN CALCIUM 40 MG: 40 TABLET, FILM COATED ORAL at 21:02

## 2020-12-11 RX ADMIN — Medication 1 MG: at 21:02

## 2020-12-11 ASSESSMENT — ENCOUNTER SYMPTOMS
BRUISES/BLEEDS EASILY: 0
DEPRESSION: 0
BACK PAIN: 0
VOMITING: 0
PND: 0
COUGH: 1
CHILLS: 1
NAUSEA: 0
FEVER: 1
CLAUDICATION: 0
HEMOPTYSIS: 0
WHEEZING: 0
PALPITATIONS: 0
MYALGIAS: 0
DIZZINESS: 0
DOUBLE VISION: 0
HEADACHES: 0
SHORTNESS OF BREATH: 1
SPUTUM PRODUCTION: 1
BLURRED VISION: 0
HEARTBURN: 0

## 2020-12-11 ASSESSMENT — FIBROSIS 4 INDEX
FIB4 SCORE: 0.43
FIB4 SCORE: 0.82

## 2020-12-11 NOTE — ED PROVIDER NOTES
D/C Planning:    Writer went to meet with patient at bedside, patient using commode and requested privacy. Daughter Itz present to assist with discharge assessment. Per Lian, patient uses public transportation and family assists with rides. Writer encouraged daughter to contact [employer] van services should they require additional transportation services.     Daughter requests shower chair but would rather purchase one in the community than receive bill from [facility].     No needs expressed, CM to follow and remain supportive.     05/16/17 1428   Discharge Assessment   Assessment Type Discharge Planning Assessment   Confirmed/corrected address and phone number on facesheet? Yes   Assessment information obtained from? Patient;Caregiver   Prior to hospitilization cognitive status: Alert/Oriented   Prior to hospitalization functional status: Independent;Assistive Equipment   Current cognitive status: Alert/Oriented   Current Functional Status: Independent;Assistive Equipment   Arrived From admitted as an inpatient   Lives With child(sulema), adult   Able to Return to Prior Arrangements yes   Is patient able to care for self after discharge? Unable to determine at this time (comments)   How many people do you have in your home that can help with your care after discharge? 1   Who are your caregiver(s) and their phone number(s)? Itz Oseguera, daughter, [phone]   Patient currently being followed by outpatient case management? No   Patient currently receives home health services? No   Does the patient currently use HME? Yes   Patient currently receives private duty nursing? N/A   Patient currently receives any other outside agency services? No   Equipment Currently Used at Home walker, rolling   Do you have any problems affording any of your prescribed medications? No   Is the patient taking medications as prescribed? yes   Do you have any financial concerns preventing you from  ED Provider Note    Scribed for Mumtaz Auguste M.D. by Sunni Hernandez. 12/11/2020, 11:02 AM.    Primary care provider: BENJA Curtis  Means of arrival: Walk in  History obtained from: patient   History limited by: none    CHIEF COMPLAINT  Chief Complaint   Patient presents with   • Shortness of Breath     Dx with covid on Dec 2.  Thought he was doing better but now increasing SOB, pain in chest , on and off fevers.  Required O2 in triage at 2L.   • Cough       HPI  Farzad Bryant Jr. is a 60 y.o. male who presents to the Emergency Department for worsening shortness of breath onset this morning. Patient tested positive for COVID on December 2nd after two days of mild symptoms. He was improving until today when he developed associated worsening shortness of breath, chest pain, cough, and fevers. His highest fever was 102 °F. Upon arrival to the ED his O2 saturation was 86% and he was placed on O2. His chest pain was temporarily alleviated with deep breaths however continued to return. Patient adds that his dad passed away from COVID on 12/1/2020 and his mother was hospitalized for COVID as well. He has been taking OTC cold medication.    REVIEW OF SYSTEMS  As above otherwise all other systems are negative    PAST MEDICAL HISTORY   has a past medical history of Arthritis, Breath shortness, Depression, Diabetes (HCC), High cholesterol, Hyperlipidemia, and Hypertension.    SURGICAL HISTORY   has a past surgical history that includes arthroplasty (Left) and cholecystectomy.    SOCIAL HISTORY  Social History     Tobacco Use   • Smoking status: Never Smoker   • Smokeless tobacco: Former User     Types: Chew   Substance Use Topics   • Alcohol use: Yes     Comment: 1 per month    • Drug use: No      Social History     Substance and Sexual Activity   Drug Use No       FAMILY HISTORY  Family History   Problem Relation Age of Onset   • Diabetes Mother    • Stroke Father        CURRENT MEDICATIONS  Home  Medications     Reviewed by Giselle Evans (Pharmacy Tech) on 12/11/20 at 1227  Med List Status: Complete   Medication Last Dose Status   5-Hydroxytryptophan (5-HTP) 100 MG Cap 12/10/2020 Active   acetaminophen (TYLENOL) 500 MG Tab 12/10/2020 Active   aspirin 81 MG tablet >1 week Active   atorvastatin (LIPITOR) 40 MG Tab >1 week Active   Cholecalciferol (VITAMIN D3 PO) >1 week Active   Cinnamon 500 MG Cap >1 week Active   Dextromethorphan-guaiFENesin (MUCINEX DM)  MG TABLET SR 12 HR 12/10/2020 Active   diphenhydrAMINE (BENADRYL ALLERGY) 25 MG capsule 12/10/2020 Active   DM-Doxylamine-Acetaminophen (VICKS NYQUIL COLD & FLU) 15-6. MG Cap 12/10/2020 Active   DM-Phenylephrine-Acetaminophen (VICKS DAYQUIL COLD & FLU) 10-5-325 MG Cap 12/10/2020 Active   Dulaglutide (TRULICITY) 0.75 MG/0.5ML Solution Pen-injector Not Taking Active   Empagliflozin-metFORMIN HCl (SYNJARDY) 12.5-1000 MG Tab >1 week Active   EQ ALLERGY RELIEF, CETIRIZINE, 10 MG Tab >1 week Active   escitalopram (LEXAPRO) 20 MG tablet >1 week Active   EUTHYROX 25 MCG Tab >1 week Active   glipiZIDE (GLUCOTROL) 5 MG Tab >1 week Active   insulin glargine (LANTUS SOLOSTAR) 100 UNIT/ML Solution Pen-injector injection 12/8/2020 Active   isosorbide mononitrate SR (IMDUR) 30 MG TABLET SR 24 HR >1 week Active   loperamide (ANTI-DIARRHEAL) 2 MG Cap 12/10/2020 Active   losartan (COZAAR) 50 MG Tab >1 week Active   Melatonin 1 MG Cap 12/10/2020 Active   Multiple Vitamins-Minerals (ZINC PO) 12/10/2020 Active   Omega-3 Fatty Acids (FISH OIL) 1000 MG Cap capsule >1 week Active   RELION PEN NEEDLE 31G/8MM Not Taking Active   therapeutic multivitamin-minerals (THERAGRAN-M) Tab >1 week Active   traZODone (DESYREL) 100 MG Tab 12/10/2020 Active   TRULICITY 1.5 MG/0.5ML Solution Pen-injector 12/4/2020 Active   TURMERIC CURCUMIN PO >1 week Active                ALLERGIES  No Known Allergies    PHYSICAL EXAM  VITAL SIGNS: /73   Pulse (!) 111   Temp 36.2 °C  receiving the healthcare you need? No   Does the patient have transportation to healthcare appointments? Yes   Transportation Available family or friend will provide   Discharge Plan A Home with family   Patient/Family In Agreement With Plan yes      "(97.1 °F) (Temporal)   Resp 20   Ht 1.88 m (6' 2\")   Wt 109.8 kg (242 lb 1 oz)   SpO2 92% Comment: 88 RA  BMI 31.08 kg/m²     Constitutional: Well developed, Well nourished, No acute distress, Non-toxic appearance.   HENT: Normocephalic, Atraumatic, Bilateral external ears normal, oropharynx moist, No oral exudates, Nose normal.   Eyes:conjunctiva is normal, there are no signs of exudate.   Neck: Supple, no meningeal signs.  Lymphatic: No lymphadenopathy noted.   Cardiovascular: Tachycardic,  rhythm without murmurs gallops or rubs.   Thorax & Lungs: No respiratory distress. Breathing comfortably. Crackles in bilateral bases, there are no wheezes no rales. Chest wall is nontender.  Abdomen: Soft, mild tenderness to epigastric region, nondistended. Bowel sounds are present.   Skin: Warm, Dry, No erythema,   Back: No tenderness, No CVA tenderness.  Musculoskeletal: Good range of motion in all major joints. No tenderness to palpation or major deformities noted. Intact distal pulses, no clubbing, no cyanosis, no asymmetrical edema, negative homans sign  Neurologic: Alert & oriented x 3, Moving all extremities. No gross abnormalities.    Psychiatric: Affect normal, Judgment normal, Mood normal.     LABS  Results for orders placed or performed during the hospital encounter of 12/11/20   CBC with Differential   Result Value Ref Range    WBC 9.5 4.8 - 10.8 K/uL    RBC 4.85 4.70 - 6.10 M/uL    Hemoglobin 14.6 14.0 - 18.0 g/dL    Hematocrit 43.8 42.0 - 52.0 %    MCV 90.3 81.4 - 97.8 fL    MCH 30.1 27.0 - 33.0 pg    MCHC 33.3 (L) 33.7 - 35.3 g/dL    RDW 45.5 35.9 - 50.0 fL    Platelet Count 437 164 - 446 K/uL    MPV 9.0 9.0 - 12.9 fL    Neutrophils-Polys 74.30 (H) 44.00 - 72.00 %    Lymphocytes 13.20 (L) 22.00 - 41.00 %    Monocytes 9.50 0.00 - 13.40 %    Eosinophils 0.80 0.00 - 6.90 %    Basophils 0.30 0.00 - 1.80 %    Immature Granulocytes 1.90 (H) 0.00 - 0.90 %    Nucleated RBC 0.00 /100 WBC    Neutrophils (Absolute) " 7.07 1.82 - 7.42 K/uL    Lymphs (Absolute) 1.26 1.00 - 4.80 K/uL    Monos (Absolute) 0.91 (H) 0.00 - 0.85 K/uL    Eos (Absolute) 0.08 0.00 - 0.51 K/uL    Baso (Absolute) 0.03 0.00 - 0.12 K/uL    Immature Granulocytes (abs) 0.18 (H) 0.00 - 0.11 K/uL    NRBC (Absolute) 0.00 K/uL   Complete Metabolic Panel (CMP)   Result Value Ref Range    Sodium 136 135 - 145 mmol/L    Potassium 4.5 3.6 - 5.5 mmol/L    Chloride 100 96 - 112 mmol/L    Co2 24 20 - 33 mmol/L    Anion Gap 12.0 7.0 - 16.0    Glucose 232 (H) 65 - 99 mg/dL    Bun 18 8 - 22 mg/dL    Creatinine 0.81 0.50 - 1.40 mg/dL    Calcium 9.3 8.5 - 10.5 mg/dL    AST(SGOT) 26 12 - 45 U/L    ALT(SGPT) 66 (H) 2 - 50 U/L    Alkaline Phosphatase 146 (H) 30 - 99 U/L    Total Bilirubin 0.4 0.1 - 1.5 mg/dL    Albumin 3.6 3.2 - 4.9 g/dL    Total Protein 7.5 6.0 - 8.2 g/dL    Globulin 3.9 (H) 1.9 - 3.5 g/dL    A-G Ratio 0.9 g/dL   Troponin STAT   Result Value Ref Range    Troponin T 8 6 - 19 ng/L   D-DIMER   Result Value Ref Range    D-Dimer Screen 2.10 (H) 0.00 - 0.50 ug/mL (FEU)   ESTIMATED GFR   Result Value Ref Range    GFR If African American >60 >60 mL/min/1.73 m 2    GFR If Non African American >60 >60 mL/min/1.73 m 2   Blood Culture,Hold   Result Value Ref Range    Blood Culture Hold Collected    CRP Quantitative (Non-Cardiac)   Result Value Ref Range    Stat C-Reactive Protein 2.18 (H) 0.00 - 0.75 mg/dL   Procalcitonin   Result Value Ref Range    Procalcitonin <0.05 <0.25 ng/mL   EKG (NOW)   Result Value Ref Range    Report       [facility] Emergency Dept.    Test Date:  2020  Pt Name:    LEANDRA DEVINE                Department: ER  MRN:        [id number]                      Room:  Gender:     Male                         Technician: [id number]  :        1960                   Requested By:ER TRIAGE PROTOCOL  Order #:    [id number]                    [place]ero MD: MONICA LANIER MD    Measurements  Intervals                                 Axis  Rate:       85                           P:          85  NJ:         156                          QRS:        38  QRSD:       94                           T:          31  QT:         384  QTc:        457    Interpretive Statements  SINUS RHYTHM  Compared to ECG 04/21/2019 05:59:03  No significant changes  Electronically Signed On 12- 12:35:39 PST by MONICA LANIER MD       All labs reviewed by me.    EKG  Interpreted by me    RADIOLOGY  CT-CTA CHEST PULMONARY ARTERY W/ RECONS   Final Result      1.  No CT evidence for pulmonary emboli.   2.  Bilateral multifocal pneumonia consistent with Covid 19 pneumonia.   3.  Prominent main pulmonary artery suggesting pulmonary hypertension.            DX-CHEST-PORTABLE (1 VIEW)   Final Result      Bilateral patchy airspace opacities are consistent with Covid pneumonia        The radiologist's interpretation of all radiological studies have been reviewed by me.    COURSE & MEDICAL DECISION MAKING  Pertinent Labs & Imaging studies reviewed. (See chart for details)    11:02 AM - Patient seen and examined at bedside. I informed the patient that because of his need for supplemental oxygen he will need to be admitted to the hospital. Recommended increasing the amount of vitamin C, vitamin D, and Zinc in his diet. I informed him that we will obtain labs and imaging to further evaluate for a cause of his symptoms. Patient will be treated with Decadron 10 mg. Ordered chest x-ray, CBC w/ diff, CMP, troponin, d-dimer, ABG, COVID/SARS, and EKG to evaluate his symptoms. The differential diagnoses include but are not limited to: PE, COVID pneumonia    12:37 PM - Reviewed patients labs and chest x-ray. Ordered CTA chest. Further discussed the need for admission with the patient who is agreeable. [place] hospitalist.    1:07 PM - I discussed the patient's case and the above findings with Dr. Meyer (Hospitalist) who agreed to evaluate patient for hospitalization. Patients care  will be transferred at this time.     Decision Making:  Patient does have Covid.  Initial concerns were for Manera embolism secondary to COVID-19.  CT scan was obtained no signs of PE does have continued symptoms/findings of COVID-19.  Laboratory studies are as above.  At this point the patient is on the acquiring further oxygen prior was not on oxygen at this point you for the patient should be admitted the hospital for the treatment care I did start the patient on Decadron.  The patient will be admitted for further treatment and care.    DISPOSITION:  Patient will be hospitalized by Dr. Meyer in fair condition.    FINAL IMPRESSION  1. COVID-19    2. Hypoxemia          Sunni WALTERS (Scribe), am scribing for, and in the presence of, Mumtaz Auguste M.D..    Electronically signed by: Sunni Hernandez (Scribe), 12/11/2020    Mumtaz WALTERS M.D. personally performed the services described in this documentation, as scribed by Sunni Hernandez in my presence, and it is both accurate and complete. C    The note accurately reflects work and decisions made by me.  Mumtaz Auguste M.D.  12/11/2020  3:21 PM

## 2020-12-11 NOTE — PROGRESS NOTES
Pt arrived  from ER  In wheel chair,on arrival ,c/o HA,v/s monitored,pt ,pt c/o mild dizziness,urinal provided,asked him to call,general assessment done,plan of care explained to pt.

## 2020-12-11 NOTE — TELEPHONE ENCOUNTER
"Got off phone and instructed to go directly into [facility] ED. He verbalized agreement and understanding.     Reason for Disposition  • SEVERE or constant chest pain (Exception: mild central chest pain, present only when coughing)     Pt is in [place] and will go [facility] ED.    Additional Information  • Negative: SEVERE difficulty breathing (e.g., struggling for each breath, speaks in single words)  • Negative: Difficult to awaken or acting confused (e.g., disoriented, slurred speech)  • Negative: Bluish (or gray) lips or face now  • Negative: Shock suspected (e.g., cold/pale/clammy skin, too weak to stand, low BP, rapid pulse)  • Negative: Sounds like a life-threatening emergency to the triager    Answer Assessment - Initial Assessment Questions  1. COVID-19 DIAGNOSIS: \"Who made your Coronavirus (COVID-19) diagnosis?\" \"Was it confirmed by a positive lab test?\" If not diagnosed by a HCP, ask \"Are there lots of cases (community spread) where you live?\" (See public health department website, if unsure)    * MAJOR community spread: high number of cases; numbers of cases are increasing; many people hospitalized.    * MINOR community spread: low number of cases; not increasing; few or no people hospitalized      major  2. ONSET: \"When did the COVID-19 symptoms start?\"       *No Answer*  3. WORST SYMPTOM: \"What is your worst symptom?\" (e.g., cough, fever, shortness of breath, muscle aches)      *No Answer*  4. COUGH: \"How bad is the cough?\"        *No Answer*  5. FEVER: \"Do you have a fever?\" If so, ask: \"What is your temperature, how was it measured, and when did it start?\"      *No Answer*  6. RESPIRATORY STATUS: \"Describe your breathing?\" (e.g., shortness of breath, wheezing, unable to speak)       *No Answer*  7. BETTER-SAME-WORSE: \"Are you getting better, staying the same or getting worse compared to yesterday?\"  If getting worse, ask, \"In what way?\"      *No Answer*  8. HIGH RISK DISEASE: \"Do you have " "any chronic medical problems?\" (e.g., asthma, heart or lung disease, weak immune system, etc.)      *No Answer*  9. PREGNANCY: \"Is there any chance you are pregnant?\" \"When was your last menstrual period?\"      *No Answer*  10. OTHER SYMPTOMS: \"Do you have any other symptoms?\"  (e.g., runny nose, headache, sore throat, loss of smell)        *No Answer*    Protocols used: CORONAVIRUS (COVID-19) DIAGNOSED OR [id number]      "

## 2020-12-11 NOTE — ED NOTES
Med rec completed per Pt at bedside.    Pt states that he has not taken most of his medications in over a week due to being sick. Pt states that he has not used his Lantus Solostar daily insulin injection since 3 days ago (12/8/2020).    Allergies reviewed with Pt. No known drug allergies.  No oral antibiotics in last 14 days.    Pt takes ASPIRIN.

## 2020-12-11 NOTE — ED TRIAGE NOTES
Chief Complaint   Patient presents with   • Shortness of Breath     Dx with covid on Dec 2.  Thought he was doing better but now increasing SOB, pain in chest , on and off fevers.  Required O2 in triage at 2L.   • Cough   Was 88% on room air.  Now 92% with O2.

## 2020-12-11 NOTE — H&P
Hospital Medicine History & Physical Note    Date of Service  2020    Primary Care Physician  ARMANDO Curtis.    Consultants  none    Code Status  Full Code    Chief Complaint  Chief Complaint   Patient presents with   • Shortness of Breath     Dx with covid on Dec 2.  Thought he was doing better but now increasing SOB, pain in chest , on and off fevers.  Required O2 in triage at 2L.   • Cough       History of Presenting Illness  60 y.o. male who presented 2020 with pmh of DM, HTN, is coming today c/o increasing sob, fever, chills, malaise, patient's father just  from COVID patient got check on 2020 as outpatient that came back positive, patient gradually has become more sob, cough has increased with sputum production, intermittent diarrhea, no rash, no abdominal pain no vision changes no neck pain, patient in the ER was found to have CXR with b/l infiltrate, o2 was low in the mid 80's, patient's ddimer elevated went to CTA neg for PE, patient will be admitted, started on decadron, sc lovenox, supportive treatment, patient is alert and oriented, follows commands he expressed understanding of his plan of care and agreed with it, all questions answered.     Review of Systems  Review of Systems   Constitutional: Positive for chills, fever and malaise/fatigue.   HENT: Negative for congestion and nosebleeds.    Eyes: Negative for blurred vision and double vision.   Respiratory: Positive for cough, sputum production and shortness of breath. Negative for hemoptysis and wheezing.    Cardiovascular: Negative for chest pain, palpitations, claudication, leg swelling and PND.   Gastrointestinal: Negative for heartburn, nausea and vomiting.   Genitourinary: Negative for hematuria and urgency.   Musculoskeletal: Negative for back pain and myalgias.   Skin: Negative for rash.   Neurological: Negative for dizziness and headaches.   Endo/Heme/Allergies: Does not bruise/bleed easily.    Psychiatric/Behavioral: Negative for depression.       Past Medical History   has a past medical history of Arthritis, Breath shortness, Depression, Diabetes (HCC), High cholesterol, Hyperlipidemia, and Hypertension.    Surgical History   has a past surgical history that includes arthroplasty (Left) and cholecystectomy.     Family History  family history includes Diabetes in his mother; Stroke in his father.     Social History   reports that he has never smoked. He quit smokeless tobacco use about 34 years ago.  His smokeless tobacco use included chew. He reports current alcohol use. He reports that he does not use drugs.    Allergies  No Known Allergies    Medications  Prior to Admission Medications   Prescriptions Last Dose Informant Patient Reported? Taking?   5-Hydroxytryptophan (5-HTP) 100 MG Cap 12/10/2020 at 2300 Patient Yes No   Sig: Take 200 mg by mouth every bedtime. 2 capsules = 200 mg.   Cholecalciferol (VITAMIN D3 PO) >1 week at unknown Patient Yes Yes   Sig: Take 1 Tab by mouth every morning.   Cinnamon 500 MG Cap >1 week at unknown Patient Yes No   Sig: Take 1,000 mg by mouth every morning. 2 capsules = 1000 mg.   DM-Doxylamine-Acetaminophen (VICKS NYQUIL COLD & FLU) 15-6. MG Cap 12/10/2020 at 2100 Patient Yes Yes   Sig: Take 1 Cap by mouth at bedtime as needed (cold & flu symptoms).   DM-Phenylephrine-Acetaminophen (VICKS DAYQUIL COLD & FLU) 10-5-325 MG Cap 12/10/2020 at 1200 Patient Yes Yes   Sig: Take 1 Cap by mouth 1 time a day as needed (cold & flu symptoms).   Dextromethorphan-guaiFENesin (MUCINEX DM)  MG TABLET SR 12 HR 12/10/2020 at 2100 Patient Yes Yes   Sig: Take 1 Tab by mouth every 12 hours.   Dulaglutide (TRULICITY) 0.75 MG/0.5ML Solution Pen-injector Not Taking at Not Taking Patient No No   Sig: Inject 0.75 mg as instructed every 7 days.   Patient not taking: Reported on 12/11/2020   EQ ALLERGY RELIEF, CETIRIZINE, 10 MG Tab >1 week at unknown Patient No No   Sig: Take 1  tablet by mouth once daily   Patient taking differently: Take 10 mg by mouth every day.   EUTHYROX 25 MCG Tab >1 week at unknown Patient No No   Sig: TAKE 1 TABLET BY MOUTH ONCE DAILY IN THE MORNING ON  AN  EMPTY  STOMACH   Patient taking differently: Take 25 mcg by mouth Every morning on an empty stomach.   Empagliflozin-metFORMIN HCl (SYNJARDY) 12.5-1000 MG Tab >1 week at unknown Patient No No   Sig: Take 1 Tab by mouth 2 Times a Day.   Melatonin 1 MG Cap 12/10/2020 at 2300 Patient Yes No   Sig: Take 1 mg by mouth every bedtime.   Multiple Vitamins-Minerals (ZINC PO) 12/10/2020 at 0900 Patient Yes Yes   Sig: Take 1 Tab by mouth every morning.   Omega-3 Fatty Acids (FISH OIL) 1000 MG Cap capsule >1 week at unknown Patient Yes No   Sig: Take 2,000 mg by mouth every morning. 2 capsules = 2000 mg.   RELION PEN NEEDLE 31G/8MM Not Taking at Not Taking Patient No No   Sig: USE WITH LANTUS DAILY.   Patient not taking: Reported on 12/11/2020   TRULICITY 1.5 MG/0.5ML Solution Pen-injector 12/4/2020 at unknown Patient No No   Sig: INJECT 1 SYRINGE SUBCUTANEOUSLY ONCE A WEEK AS DIRECTED   Patient taking differently: Inject 0.5 mL under the skin every 7 days. 0.5 mL = 1.5 mg. Every Friday.   TURMERIC CURCUMIN PO >1 week at unknown Patient Yes No   Sig: Take 3 Tabs by mouth every morning.   acetaminophen (TYLENOL) 500 MG Tab 12/10/2020 at 0900 Patient Yes Yes   Sig: Take 1,000 mg by mouth 2 times a day as needed for Fever.   aspirin 81 MG tablet >1 week at unknown Patient Yes No   Sig: Take 81 mg by mouth every day.   atorvastatin (LIPITOR) 40 MG Tab >1 week at unknown Patient No No   Sig: TAKE 1 TABLET BY MOUTH ONCE DAILY IN THE EVENING   Patient taking differently: Take 40 mg by mouth every day.   diphenhydrAMINE (BENADRYL ALLERGY) 25 MG capsule 12/10/2020 at 2300 Patient Yes No   Sig: Take 50 mg by mouth every bedtime.   escitalopram (LEXAPRO) 20 MG tablet >1 week at unknown Patient No No   Sig: Take 1 tablet by mouth once  daily   Patient taking differently: Take 20 mg by mouth every day.   glipiZIDE (GLUCOTROL) 5 MG Tab >1 week at unknown Patient No No   Sig: Take 1 Tab by mouth 2 times a day.   insulin glargine (LANTUS SOLOSTAR) 100 UNIT/ML Solution Pen-injector injection 12/8/2020 at PM Patient No No   Sig: Inject 32 Units under the skin every evening.   isosorbide mononitrate SR (IMDUR) 30 MG TABLET SR 24 HR >1 week at unknown Patient No No   Sig: TAKE 1 TABLET BY MOUTH ONCE DAILY IN THE MORNING   Patient taking differently: Take 30 mg by mouth every morning.   loperamide (ANTI-DIARRHEAL) 2 MG Cap 12/10/2020 at PM Patient Yes No   Sig: Take 4 mg by mouth 1 time a day as needed for Diarrhea. 2 capsules = 4 mg.   losartan (COZAAR) 50 MG Tab >1 week at unknown Patient No No   Sig: Take 1 tablet by mouth once daily   Patient taking differently: Take 50 mg by mouth every day.   therapeutic multivitamin-minerals (THERAGRAN-M) Tab >1 week at unknown Patient Yes No   Sig: Take 1 Tab by mouth every morning.   traZODone (DESYREL) 100 MG Tab 12/10/2020 at 2300 Patient No No   Sig: TAKE 2 TABLETS BY MOUTH AT BEDTIME   Patient taking differently: Take 200 mg by mouth every bedtime. 2 tablets = 200 mg.      Facility-Administered Medications: None       Physical Exam  Temp:  [36.2 °C (97.1 °F)] 36.2 °C (97.1 °F)  Pulse:  [] 80  Resp:  [20-28] 28  BP: (130-147)/(73-79) 146/73  SpO2:  [86 %-94 %] 94 %    Physical Exam  Vitals signs and nursing note reviewed.   Constitutional:       Appearance: Normal appearance.   HENT:      Head: Normocephalic.      Nose: Nose normal.      Mouth/Throat:      Mouth: Mucous membranes are moist.      Pharynx: Oropharynx is clear.   Eyes:      General:         Right eye: No discharge.         Left eye: No discharge.      Conjunctiva/sclera: Conjunctivae normal.      Pupils: Pupils are equal, round, and reactive to light.   Neck:      Musculoskeletal: Normal range of motion and neck supple. No neck rigidity.    Cardiovascular:      Rate and Rhythm: Normal rate and regular rhythm.      Pulses: Normal pulses.   Pulmonary:      Effort: Pulmonary effort is normal. No respiratory distress.      Breath sounds: Rales present. No wheezing.   Abdominal:      General: Bowel sounds are normal. There is no distension.      Palpations: Abdomen is soft.      Tenderness: There is no abdominal tenderness. There is no guarding.   Musculoskeletal: Normal range of motion.   Skin:     General: Skin is warm and dry.      Capillary Refill: Capillary refill takes less than 2 seconds.      Coloration: Skin is not jaundiced.   Neurological:      General: No focal deficit present.      Mental Status: He is alert and oriented to person, place, and time.      Cranial Nerves: No cranial nerve deficit.   Psychiatric:         Mood and Affect: Mood normal.         Laboratory:  Recent Labs     12/11/20  1115   WBC 9.5   RBC 4.85   HEMOGLOBIN 14.6   HEMATOCRIT 43.8   MCV 90.3   MCH 30.1   MCHC 33.3*   RDW 45.5   PLATELETCT 437   MPV 9.0     Recent Labs     12/11/20  1115   SODIUM 136   POTASSIUM 4.5   CHLORIDE 100   CO2 24   GLUCOSE 232*   BUN 18   CREATININE 0.81   CALCIUM 9.3     Recent Labs     12/11/20  1115   ALTSGPT 66*   ASTSGOT 26   ALKPHOSPHAT 146*   TBILIRUBIN 0.4   GLUCOSE 232*         No results for input(s): NTPROBNP in the last 72 hours.      Recent Labs     12/11/20  1115   TROPONINT 8       Imaging:  DX-CHEST-PORTABLE (1 VIEW)   Final Result      Bilateral patchy airspace opacities are consistent with Covid pneumonia      CT-CTA CHEST PULMONARY ARTERY W/ RECONS    (Results Pending)         Assessment/Plan:  I anticipate this patient will require at least two midnights for appropriate medical management, necessitating inpatient admission.    * Pneumonia due to COVID-19 virus  Assessment & Plan  Started on decadron  o2 per protocol  Supportive treatment  IS  Prone position  CTA neg for PE  On lovenox for dvt prophylaxis  F/u procalcitonin,  esr/crp    Acute respiratory failure with hypoxia (HCC)  Assessment & Plan  Due to covid pneumonia  As above.     Essential hypertension, benign- (present on admission)  Assessment & Plan  On losartan    Obesity (BMI 30-39.9)- (present on admission)  Assessment & Plan  Body mass index is 31.08 kg/m².  Needs to lose weight.     Primary insomnia- (present on admission)  Assessment & Plan  On melatonin and trazodone     Dyslipidemia- (present on admission)  Assessment & Plan  On statin    Type 2 diabetes mellitus with complication, without long-term current use of insulin (HCC)- (present on admission)  Assessment & Plan  Continue on lantus  SSI    Acquired hypothyroidism- (present on admission)  Assessment & Plan  Continue supplement      dvt prophylaxis lovenox.

## 2020-12-11 NOTE — ASSESSMENT & PLAN NOTE
Started on decadron  o2 per protocol  Supportive treatment  IS  Prone position  CTA neg for PE  On lovenox for dvt prophylaxis  procalcitonin neg.

## 2020-12-12 LAB
ALBUMIN SERPL BCP-MCNC: 3.4 G/DL (ref 3.2–4.9)
ALBUMIN/GLOB SERPL: 1 G/DL
ALP SERPL-CCNC: 115 U/L (ref 30–99)
ALT SERPL-CCNC: 49 U/L (ref 2–50)
ANION GAP SERPL CALC-SCNC: 13 MMOL/L (ref 7–16)
AST SERPL-CCNC: 15 U/L (ref 12–45)
BASOPHILS # BLD AUTO: 0.3 % (ref 0–1.8)
BASOPHILS # BLD: 0.02 K/UL (ref 0–0.12)
BILIRUB SERPL-MCNC: 0.4 MG/DL (ref 0.1–1.5)
BUN SERPL-MCNC: 27 MG/DL (ref 8–22)
CALCIUM SERPL-MCNC: 8.9 MG/DL (ref 8.5–10.5)
CHLORIDE SERPL-SCNC: 98 MMOL/L (ref 96–112)
CO2 SERPL-SCNC: 22 MMOL/L (ref 20–33)
CREAT SERPL-MCNC: 0.79 MG/DL (ref 0.5–1.4)
EOSINOPHIL # BLD AUTO: 0.01 K/UL (ref 0–0.51)
EOSINOPHIL NFR BLD: 0.1 % (ref 0–6.9)
ERYTHROCYTE [DISTWIDTH] IN BLOOD BY AUTOMATED COUNT: 45.9 FL (ref 35.9–50)
GLOBULIN SER CALC-MCNC: 3.4 G/DL (ref 1.9–3.5)
GLUCOSE BLD-MCNC: 241 MG/DL (ref 65–99)
GLUCOSE BLD-MCNC: 326 MG/DL (ref 65–99)
GLUCOSE BLD-MCNC: 348 MG/DL (ref 65–99)
GLUCOSE BLD-MCNC: 431 MG/DL (ref 65–99)
GLUCOSE SERPL-MCNC: 396 MG/DL (ref 65–99)
HCT VFR BLD AUTO: 40.5 % (ref 42–52)
HGB BLD-MCNC: 12.9 G/DL (ref 14–18)
IMM GRANULOCYTES # BLD AUTO: 0.15 K/UL (ref 0–0.11)
IMM GRANULOCYTES NFR BLD AUTO: 2 % (ref 0–0.9)
LYMPHOCYTES # BLD AUTO: 0.96 K/UL (ref 1–4.8)
LYMPHOCYTES NFR BLD: 12.7 % (ref 22–41)
MCH RBC QN AUTO: 29.2 PG (ref 27–33)
MCHC RBC AUTO-ENTMCNC: 31.9 G/DL (ref 33.7–35.3)
MCV RBC AUTO: 91.6 FL (ref 81.4–97.8)
MONOCYTES # BLD AUTO: 0.55 K/UL (ref 0–0.85)
MONOCYTES NFR BLD AUTO: 7.3 % (ref 0–13.4)
NEUTROPHILS # BLD AUTO: 5.89 K/UL (ref 1.82–7.42)
NEUTROPHILS NFR BLD: 77.6 % (ref 44–72)
NRBC # BLD AUTO: 0 K/UL
NRBC BLD-RTO: 0 /100 WBC
PLATELET # BLD AUTO: 428 K/UL (ref 164–446)
PMV BLD AUTO: 9.5 FL (ref 9–12.9)
POTASSIUM SERPL-SCNC: 4.4 MMOL/L (ref 3.6–5.5)
PROT SERPL-MCNC: 6.8 G/DL (ref 6–8.2)
RBC # BLD AUTO: 4.42 M/UL (ref 4.7–6.1)
SODIUM SERPL-SCNC: 133 MMOL/L (ref 135–145)
WBC # BLD AUTO: 7.6 K/UL (ref 4.8–10.8)

## 2020-12-12 PROCEDURE — 770014 HCHG ROOM/CARE - WARD (150)

## 2020-12-12 PROCEDURE — 700111 HCHG RX REV CODE 636 W/ 250 OVERRIDE (IP): Performed by: HOSPITALIST

## 2020-12-12 PROCEDURE — 99232 SBSQ HOSP IP/OBS MODERATE 35: CPT | Performed by: HOSPITALIST

## 2020-12-12 PROCEDURE — A9270 NON-COVERED ITEM OR SERVICE: HCPCS | Performed by: HOSPITALIST

## 2020-12-12 PROCEDURE — 85025 COMPLETE CBC W/AUTO DIFF WBC: CPT

## 2020-12-12 PROCEDURE — 36415 COLL VENOUS BLD VENIPUNCTURE: CPT

## 2020-12-12 PROCEDURE — 82962 GLUCOSE BLOOD TEST: CPT

## 2020-12-12 PROCEDURE — 80053 COMPREHEN METABOLIC PANEL: CPT

## 2020-12-12 PROCEDURE — 700102 HCHG RX REV CODE 250 W/ 637 OVERRIDE(OP): Performed by: HOSPITALIST

## 2020-12-12 RX ADMIN — Medication 1 MG: at 20:20

## 2020-12-12 RX ADMIN — ASPIRIN 81 MG: 81 TABLET, COATED ORAL at 04:59

## 2020-12-12 RX ADMIN — BENZONATATE 100 MG: 100 CAPSULE ORAL at 12:22

## 2020-12-12 RX ADMIN — BENZONATATE 100 MG: 100 CAPSULE ORAL at 18:01

## 2020-12-12 RX ADMIN — ENOXAPARIN SODIUM 40 MG: 40 INJECTION SUBCUTANEOUS at 05:01

## 2020-12-12 RX ADMIN — GUAIFENESIN 600 MG: 600 TABLET, EXTENDED RELEASE ORAL at 04:59

## 2020-12-12 RX ADMIN — CETIRIZINE HYDROCHLORIDE 10 MG: 10 TABLET, FILM COATED ORAL at 04:59

## 2020-12-12 RX ADMIN — TRAZODONE HYDROCHLORIDE 100 MG: 100 TABLET ORAL at 20:20

## 2020-12-12 RX ADMIN — DEXAMETHASONE 6 MG: 4 TABLET ORAL at 05:01

## 2020-12-12 RX ADMIN — INSULIN GLARGINE 32 UNITS: 100 INJECTION, SOLUTION SUBCUTANEOUS at 18:02

## 2020-12-12 RX ADMIN — BENZONATATE 100 MG: 100 CAPSULE ORAL at 05:00

## 2020-12-12 RX ADMIN — GUAIFENESIN 600 MG: 600 TABLET, EXTENDED RELEASE ORAL at 18:02

## 2020-12-12 RX ADMIN — ESCITALOPRAM OXALATE 20 MG: 10 TABLET ORAL at 04:59

## 2020-12-12 RX ADMIN — LEVOTHYROXINE SODIUM 25 MCG: 25 TABLET ORAL at 04:59

## 2020-12-12 ASSESSMENT — ENCOUNTER SYMPTOMS
HEMOPTYSIS: 0
BRUISES/BLEEDS EASILY: 0
NAUSEA: 0
DOUBLE VISION: 0
PALPITATIONS: 0
WHEEZING: 0
CHILLS: 0
HEARTBURN: 0
COUGH: 0
DEPRESSION: 0
FEVER: 0
MYALGIAS: 0
BLURRED VISION: 0
BACK PAIN: 0
DIZZINESS: 0
VOMITING: 0
HEADACHES: 0

## 2020-12-12 NOTE — PROGRESS NOTES
[facility] Medicine Daily Progress Note    Date of Service  12/12/2020    Chief Complaint  60 y.o. male admitted 12/11/2020 with covid      Interval Problem Update  Patient is resting in bed, no fever or chills, still on 2L of o2, alert and oriented.  Follows commands  SSI dose increased.     Consultants/Specialty  none    Code Status  Full Code    Disposition  Home when stable .    Review of Systems  Review of Systems   Constitutional: Negative for chills and fever.   HENT: Negative for congestion and nosebleeds.    Eyes: Negative for blurred vision and double vision.   Respiratory: Negative for cough, hemoptysis and wheezing.    Cardiovascular: Negative for chest pain and palpitations.   Gastrointestinal: Negative for heartburn, nausea and vomiting.   Genitourinary: Negative for dysuria, hematuria and urgency.   Musculoskeletal: Negative for back pain and myalgias.   Skin: Negative for rash.   Neurological: Negative for dizziness and headaches.   Endo/Heme/Allergies: Does not bruise/bleed easily.   Psychiatric/Behavioral: Negative for depression.        Physical Exam  Temp:  [36.2 °C (97.1 °F)-36.7 °C (98.1 °F)] 36.7 °C (98.1 °F)  Pulse:  [] 80  Resp:  [18-35] 18  BP: (107-162)/(68-84) 137/72  SpO2:  [93 %-96 %] 94 %    Physical Exam  Vitals signs and nursing note reviewed.   Constitutional:       Appearance: Normal appearance.   HENT:      Head: Normocephalic.      Nose: Nose normal.      Mouth/Throat:      Mouth: Mucous membranes are moist.      Pharynx: Oropharynx is clear.   Eyes:      General:         Right eye: No discharge.         Left eye: No discharge.      Conjunctiva/sclera: Conjunctivae normal.      Pupils: Pupils are equal, round, and reactive to light.   Neck:      Musculoskeletal: Normal range of motion and neck supple. No neck rigidity.   Cardiovascular:      Rate and Rhythm: Normal rate and regular rhythm.      Pulses: Normal pulses.      Heart sounds: Normal heart sounds.   Pulmonary:       Effort: Pulmonary effort is normal. No respiratory distress.      Breath sounds: Normal breath sounds. No wheezing.   Abdominal:      General: Bowel sounds are normal. There is no distension.      Tenderness: There is no abdominal tenderness. There is no guarding.   Musculoskeletal: Normal range of motion.         General: No swelling.   Skin:     General: Skin is warm and dry.      Capillary Refill: Capillary refill takes less than 2 seconds.      Coloration: Skin is not jaundiced.   Neurological:      General: No focal deficit present.      Mental Status: He is alert and oriented to person, place, and time.      Cranial Nerves: No cranial nerve deficit.   Psychiatric:         Mood and Affect: Mood normal.         Fluids  No intake or output data in the 24 hours ending 12/12/20 1137    Laboratory  Recent Labs     12/11/20  1115 12/12/20  0944   WBC 9.5 7.6   RBC 4.85 4.42*   HEMOGLOBIN 14.6 12.9*   HEMATOCRIT 43.8 40.5*   MCV 90.3 91.6   MCH 30.1 29.2   MCHC 33.3* 31.9*   RDW 45.5 45.9   PLATELETCT 437 428   MPV 9.0 9.5     Recent Labs     12/11/20  1115 12/12/20  0944   SODIUM 136 133*   POTASSIUM 4.5 4.4   CHLORIDE 100 98   CO2 24 22   GLUCOSE 232* 396*   BUN 18 27*   CREATININE 0.81 0.79   CALCIUM 9.3 8.9                   Imaging  CT-CTA CHEST PULMONARY ARTERY W/ RECONS   Final Result      1.  No CT evidence for pulmonary emboli.   2.  Bilateral multifocal pneumonia consistent with Covid 19 pneumonia.   3.  Prominent main pulmonary artery suggesting pulmonary hypertension.            DX-CHEST-PORTABLE (1 VIEW)   Final Result      Bilateral patchy airspace opacities are consistent with Covid pneumonia           Assessment/Plan  * Pneumonia due to COVID-19 virus  Assessment & Plan  Started on decadron  o2 per protocol  Supportive treatment  IS  Prone position  CTA neg for PE  On lovenox for dvt prophylaxis  procalcitonin neg.     Acute respiratory failure with hypoxia (HCC)  Assessment & Plan  Due to covid  pneumonia  As above.     Essential hypertension, benign- (present on admission)  Assessment & Plan  On losartan    Obesity (BMI 30-39.9)- (present on admission)  Assessment & Plan  Body mass index is 31.08 kg/m².  Needs to lose weight.     Primary insomnia- (present on admission)  Assessment & Plan  On melatonin and trazodone     Dyslipidemia- (present on admission)  Assessment & Plan  On statin    Type 2 diabetes mellitus with complication, without long-term current use of insulin (HCC)- (present on admission)  Assessment & Plan  Continue on lantus  SSI    Acquired hypothyroidism- (present on admission)  Assessment & Plan  Continue supplement         VTE prophylaxis: lovenox    Case and plan of care discussed with nurse staff and during multidisciplinary rounds.

## 2020-12-12 NOTE — PROGRESS NOTES
Patient in bed awake,a&ox4,no respiratory distress noticed,on oxygen 2l/nc-90%,IS and d/b/c exercise encouraged,pt ambulates to bathroom,care assistance rounding,poc explained to patient.

## 2020-12-13 VITALS
HEIGHT: 74 IN | OXYGEN SATURATION: 90 % | SYSTOLIC BLOOD PRESSURE: 124 MMHG | DIASTOLIC BLOOD PRESSURE: 71 MMHG | BODY MASS INDEX: 30.59 KG/M2 | HEART RATE: 69 BPM | TEMPERATURE: 98 F | RESPIRATION RATE: 18 BRPM | WEIGHT: 238.32 LBS

## 2020-12-13 PROBLEM — J96.01 ACUTE RESPIRATORY FAILURE WITH HYPOXIA (HCC): Status: RESOLVED | Noted: 2020-12-11 | Resolved: 2020-12-13

## 2020-12-13 LAB
ANION GAP SERPL CALC-SCNC: 8 MMOL/L (ref 7–16)
BUN SERPL-MCNC: 30 MG/DL (ref 8–22)
CALCIUM SERPL-MCNC: 8.8 MG/DL (ref 8.5–10.5)
CHLORIDE SERPL-SCNC: 99 MMOL/L (ref 96–112)
CO2 SERPL-SCNC: 27 MMOL/L (ref 20–33)
CREAT SERPL-MCNC: 0.86 MG/DL (ref 0.5–1.4)
EKG IMPRESSION: NORMAL
GLUCOSE BLD-MCNC: 156 MG/DL (ref 65–99)
GLUCOSE BLD-MCNC: 314 MG/DL (ref 65–99)
GLUCOSE SERPL-MCNC: 265 MG/DL (ref 65–99)
POTASSIUM SERPL-SCNC: 4.3 MMOL/L (ref 3.6–5.5)
SODIUM SERPL-SCNC: 134 MMOL/L (ref 135–145)

## 2020-12-13 PROCEDURE — 80048 BASIC METABOLIC PNL TOTAL CA: CPT

## 2020-12-13 PROCEDURE — 36415 COLL VENOUS BLD VENIPUNCTURE: CPT

## 2020-12-13 PROCEDURE — 700102 HCHG RX REV CODE 250 W/ 637 OVERRIDE(OP): Performed by: HOSPITALIST

## 2020-12-13 PROCEDURE — 93010 ELECTROCARDIOGRAM REPORT: CPT | Performed by: INTERNAL MEDICINE

## 2020-12-13 PROCEDURE — 82962 GLUCOSE BLOOD TEST: CPT | Mod: 91

## 2020-12-13 PROCEDURE — A9270 NON-COVERED ITEM OR SERVICE: HCPCS | Performed by: HOSPITALIST

## 2020-12-13 PROCEDURE — 93005 ELECTROCARDIOGRAM TRACING: CPT | Performed by: HOSPITALIST

## 2020-12-13 PROCEDURE — 700111 HCHG RX REV CODE 636 W/ 250 OVERRIDE (IP): Performed by: HOSPITALIST

## 2020-12-13 PROCEDURE — 99239 HOSP IP/OBS DSCHRG MGMT >30: CPT | Performed by: HOSPITALIST

## 2020-12-13 RX ORDER — DEXAMETHASONE 6 MG/1
6 TABLET ORAL DAILY
Qty: 6 TAB | Refills: 0 | Status: SHIPPED | OUTPATIENT
Start: 2020-12-14 | End: 2020-12-20

## 2020-12-13 RX ORDER — GUAIFENESIN 600 MG/1
600 TABLET, EXTENDED RELEASE ORAL EVERY 12 HOURS
Qty: 28 TAB | Refills: 0 | Status: SHIPPED | OUTPATIENT
Start: 2020-12-13 | End: 2021-03-23

## 2020-12-13 RX ORDER — BENZONATATE 100 MG/1
100 CAPSULE ORAL 3 TIMES DAILY
Qty: 60 CAP | Refills: 0 | Status: SHIPPED | OUTPATIENT
Start: 2020-12-13 | End: 2021-03-02

## 2020-12-13 RX ADMIN — LOSARTAN POTASSIUM 50 MG: 50 TABLET, FILM COATED ORAL at 04:57

## 2020-12-13 RX ADMIN — GUAIFENESIN 600 MG: 600 TABLET, EXTENDED RELEASE ORAL at 04:57

## 2020-12-13 RX ADMIN — ISOSORBIDE MONONITRATE 30 MG: 30 TABLET, EXTENDED RELEASE ORAL at 04:57

## 2020-12-13 RX ADMIN — DEXAMETHASONE 6 MG: 4 TABLET ORAL at 04:57

## 2020-12-13 RX ADMIN — ENOXAPARIN SODIUM 40 MG: 40 INJECTION SUBCUTANEOUS at 04:57

## 2020-12-13 RX ADMIN — ASPIRIN 81 MG: 81 TABLET, COATED ORAL at 04:57

## 2020-12-13 RX ADMIN — LEVOTHYROXINE SODIUM 25 MCG: 25 TABLET ORAL at 04:57

## 2020-12-13 RX ADMIN — BENZONATATE 100 MG: 100 CAPSULE ORAL at 11:44

## 2020-12-13 RX ADMIN — ESCITALOPRAM OXALATE 20 MG: 10 TABLET ORAL at 04:57

## 2020-12-13 RX ADMIN — BENZONATATE 100 MG: 100 CAPSULE ORAL at 04:57

## 2020-12-13 ASSESSMENT — PAIN DESCRIPTION - PAIN TYPE: TYPE: ACUTE PAIN

## 2020-12-13 NOTE — RESPIRATORY CARE
REMOTE MONITORING PROGRAM by COPD CLINICAL EDUCATOR  12/13/2020 at 1:22 PM by Candi Jovel RRT     Patient interviewed by COPD Team. Patient agrees to Remote Monitoring program. Device instruction performed with welcome packet, consent signed and placed at bedside chart. Patient instructed on how to use MyChart and Zoom. No questions at this time.

## 2020-12-13 NOTE — PROGRESS NOTES
Report received from ANAI Holt.  Patient sitting up in bed relaxing.  POC gone over with pt and all questions answered.  Patient A & O  X 4.  No apparent signs of distress.  Safety precautions in place.  Patient educated to call for assistance.  Will continue to monitor.

## 2020-12-13 NOTE — DISCHARGE INSTRUCTIONS
Discharge Instructions per Joss Fuller M.D.    Follow up with primary care doctor in 1 week    DIET: healthy diet    ACTIVITY: as tolerated    DIAGNOSIS: covid pneumonia    Return to ER if symptoms return, fever chills, shortness of breath.    Discharge Instructions    Discharged to home by car with relative. Discharged via wheelchair, hospital escort: Yes.  Special equipment needed: Not Applicable    Be sure to schedule a follow-up appointment with your primary care doctor or any specialists as instructed.     Discharge Plan:   Diet Plan: Discussed  Activity Level: Discussed  Confirmed Follow up Appointment: Patient to Call and Schedule Appointment  Confirmed Symptoms Management: Discussed  Medication Reconciliation Updated: Yes  Influenza Vaccine Indication: Patient Refuses    I understand that a diet low in cholesterol, fat, and sodium is recommended for good health. Unless I have been given specific instructions below for another diet, I accept this instruction as my diet prescription.   Other diet: Heart Healthy    Special Instructions: None    · Is patient discharged on Warfarin / Coumadin?   No     Depression / Suicide Risk    As you are discharged from this [facility] facility, it is important to learn how to keep safe from harming yourself.    Recognize the warning signs:  · Abrupt changes in personality, positive or negative- including increase in energy   · Giving away possessions  · Change in eating patterns- significant weight changes-  positive or negative  · Change in sleeping patterns- unable to sleep or sleeping all the time   · Unwillingness or inability to communicate  · Depression  · Unusual sadness, discouragement and loneliness  · Talk of wanting to die  · Neglect of personal appearance   · Rebelliousness- reckless behavior  · Withdrawal from people/activities they love  · Confusion- inability to concentrate     If you or a loved one observes any of these behaviors or has  concerns about self-harm, here's what you can do:  · Talk about it- your feelings and reasons for harming yourself  · Remove any means that you might use to hurt yourself (examples: pills, rope, extension cords, firearm)  · Get professional help from the community (Mental Health, Substance Abuse, psychological counseling)  · Do not be alone:Call your Safe Contact- someone whom you trust who will be there for you.  · Call your local CRISIS HOTLINE 379-6801 or 624-658-3505  · Call your local Children's Mobile Crisis Response Team [place] (848) 171-2585 or www.[facility]  · Call the toll free National Suicide Prevention Hotlines   · National Suicide Prevention Lifeline 980-403-CHBT (1687)  · Car reviews Line Network 800-SUICIDE (655-6634)            COVID-19  COVID-19 is a respiratory infection that is caused by a virus called severe acute respiratory syndrome coronavirus 2 (SARS-CoV-2). The disease is also known as coronavirus disease or novel coronavirus. In some people, the virus may not cause any symptoms. In others, it may cause a serious infection. The infection can get worse quickly and can lead to complications, such as:  · Pneumonia, or infection of the lungs.  · Acute respiratory distress syndrome or ARDS. This is fluid build-up in the lungs.  · Acute respiratory failure. This is a condition in which there is not enough oxygen passing from the lungs to the body.  · Sepsis or septic shock. This is a serious bodily reaction to an infection.  · Blood clotting problems.  · Secondary infections due to bacteria or fungus.  The virus that causes COVID-19 is contagious. This means that it can spread from person to person through droplets from coughs and sneezes (respiratory secretions).  What are the causes?  This illness is caused by a virus. You may catch the virus by:  · Breathing in droplets from an infected person's cough or sneeze.  · Touching something, like a table or a doorknob, that was  exposed to the virus (contaminated) and then touching your mouth, nose, or eyes.  What increases the risk?  Risk for infection  You are more likely to be infected with this virus if you:  · Live in or travel to an area with a COVID-19 outbreak.  · Come in contact with a sick person who recently traveled to an area with a COVID-19 outbreak.  · Provide care for or live with a person who is infected with COVID-19.  Risk for serious illness  You are more likely to become seriously ill from the virus if you:  · Are 65 years of age or older.  · Have a long-term disease that lowers your body's ability to fight infection (immunocompromised).  · Live in a nursing home or long-term care facility.  · Have a long-term (chronic) disease such as:  ? Chronic lung disease, including chronic obstructive pulmonary disease or asthma  ? Heart disease.  ? Diabetes.  ? Chronic kidney disease.  ? Liver disease.  · Are obese.  What are the signs or symptoms?  Symptoms of this condition can range from mild to severe. Symptoms may appear any time from 2 to 14 days after being exposed to the virus. They include:  · A fever.  · A cough.  · Difficulty breathing.  · Chills.  · Muscle pains.  · A sore throat.  · Loss of taste or smell.  Some people may also have stomach problems, such as nausea, vomiting, or diarrhea.  Other people may not have any symptoms of COVID-19.  How is this diagnosed?  This condition may be diagnosed based on:  · Your signs and symptoms, especially if:  ? You live in an area with a COVID-19 outbreak.  ? You recently traveled to or from an area where the virus is common.  ? You provide care for or live with a person who was diagnosed with COVID-19.  · A physical exam.  · Lab tests, which may include:  ? A nasal swab to take a sample of fluid from your nose.  ? A throat swab to take a sample of fluid from your throat.  ? A sample of mucus from your lungs (sputum).  ? Blood tests.  · Imaging tests, which may include,  X-rays, CT scan, or ultrasound.  How is this treated?  At present, there is no medicine to treat COVID-19. Medicines that treat other diseases are being used on a trial basis to see if they are effective against COVID-19.  Your health care provider will talk with you about ways to treat your symptoms. For most people, the infection is mild and can be managed at home with rest, fluids, and over-the-counter medicines.  Treatment for a serious infection usually takes places in a hospital intensive care unit (ICU). It may include one or more of the following treatments. These treatments are given until your symptoms improve.  · Receiving fluids and medicines through an IV.  · Supplemental oxygen. Extra oxygen is given through a tube in the nose, a face mask, or a matos.  · Positioning you to lie on your stomach (prone position). This makes it easier for oxygen to get into the lungs.  · Continuous positive airway pressure (CPAP) or bi-level positive airway pressure (BPAP) machine. This treatment uses mild air pressure to keep the airways open. A tube that is connected to a motor delivers oxygen to the body.  · Ventilator. This treatment moves air into and out of the lungs by using a tube that is placed in your windpipe.  · Tracheostomy. This is a procedure to create a hole in the neck so that a breathing tube can be inserted.  · Extracorporeal membrane oxygenation (ECMO). This procedure gives the lungs a chance to recover by taking over the functions of the heart and lungs. It supplies oxygen to the body and removes carbon dioxide.  Follow these instructions at home:  Lifestyle  · If you are sick, stay home except to get medical care. Your health care provider will tell you how long to stay home. Call your health care provider before you go for medical care.  · Rest at home as told by your health care provider.  · Do not use any products that contain nicotine or tobacco, such as cigarettes, e-cigarettes, and chewing  tobacco. If you need help quitting, ask your health care provider.  · Return to your normal activities as told by your health care provider. Ask your health care provider what activities are safe for you.  General instructions  · Take over-the-counter and prescription medicines only as told by your health care provider.  · Drink enough fluid to keep your urine pale yellow.  · Keep all follow-up visits as told by your health care provider. This is important.  How is this prevented?    There is no vaccine to help prevent COVID-19 infection. However, there are steps you can take to protect yourself and others from this virus.  To protect yourself:   · Do not travel to areas where COVID-19 is a risk. The areas where COVID-19 is reported change often. To identify high-risk areas and travel restrictions, check the CDC travel website: wwwnc.cdc.gov/travel/notices  · If you live in, or must travel to, an area where COVID-19 is a risk, take precautions to avoid infection.  ? Stay away from people who are sick.  ? Wash your hands often with soap and water for 20 seconds. If soap and water are not available, use an alcohol-based hand .  ? Avoid touching your mouth, face, eyes, or nose.  ? Avoid going out in public, follow guidance from your state and local health authorities.  ? If you must go out in public, wear a cloth face covering or face mask.  ? Disinfect objects and surfaces that are frequently touched every day. This may include:  § Counters and tables.  § Doorknobs and light switches.  § Sinks and faucets.  § Electronics, such as phones, remote controls, keyboards, computers, and tablets.  To protect others:  If you have symptoms of COVID-19, take steps to prevent the virus from spreading to others.  · If you think you have a COVID-19 infection, contact your health care provider right away. Tell your health care team that you think you may have a COVID-19 infection.  · Stay home. Leave your house only to seek  medical care. Do not use public transport.  · Do not travel while you are sick.  · Wash your hands often with soap and water for 20 seconds. If soap and water are not available, use alcohol-based hand .  · Stay away from other members of your household. Let healthy household members care for children and pets, if possible. If you have to care for children or pets, wash your hands often and wear a mask. If possible, stay in your own room, separate from others. Use a different bathroom.  · Make sure that all people in your household wash their hands well and often.  · Cough or sneeze into a tissue or your sleeve or elbow. Do not cough or sneeze into your hand or into the air.  · Wear a cloth face covering or face mask.  Where to find more information  · Centers for Disease Control and Prevention: www.cdc.gov/coronavirus/2019-ncov/index.html  · World Health Organization: www.who.int/health-topics/coronavirus  Contact a health care provider if:  · You live in or have traveled to an area where COVID-19 is a risk and you have symptoms of the infection.  · You have had contact with someone who has COVID-19 and you have symptoms of the infection.  Get help right away if:  · You have trouble breathing.  · You have pain or pressure in your chest.  · You have confusion.  · You have bluish lips and fingernails.  · You have difficulty waking from sleep.  · You have symptoms that get worse.  These symptoms may represent a serious problem that is an emergency. Do not wait to see if the symptoms will go away. Get medical help right away. Call your local emergency services (911 in the [place] Do not drive yourself to the hospital. Let the emergency medical personnel know if you think you have COVID-19.  Summary  · COVID-19 is a respiratory infection that is caused by a virus. It is also known as coronavirus disease or novel coronavirus. It can cause serious infections, such as pneumonia, acute respiratory distress syndrome,  acute respiratory failure, or sepsis.  · The virus that causes COVID-19 is contagious. This means that it can spread from person to person through droplets from coughs and sneezes.  · You are more likely to develop a serious illness if you are 65 years of age or older, have a weak immunity, live in a nursing home, or have chronic disease.  · There is no medicine to treat COVID-19. Your health care provider will talk with you about ways to treat your symptoms.  · Take steps to protect yourself and others from infection. Wash your hands often and disinfect objects and surfaces that are frequently touched every day. Stay away from people who are sick and wear a mask if you are sick.  This information is not intended to replace advice given to you by your health care provider. Make sure you discuss any questions you have with your health care provider.  Document Released: 01/23/2020 Document Revised: 05/14/2020 Document Reviewed: 01/23/2020  [employer] Patient Education © 2020 [employer]    COVID-19: How to Protect Yourself and Others  Know how it spreads  · There is currently no vaccine to prevent coronavirus disease 2019 (COVID-19).  · The best way to prevent illness is to avoid being exposed to this virus.  · The virus is thought to spread mainly from person-to-person.  ? Between people who are in close contact with one another (within about 6 feet).  ? Through respiratory droplets produced when an infected person coughs, sneezes or talks.  ? These droplets can land in the mouths or noses of people who are nearby or possibly be inhaled into the lungs.  ? Some recent studies have suggested that COVID-19 may be spread by people who are not showing symptoms.  Everyone should  Clean your hands often  · Wash your hands often with soap and water for at least 20 seconds especially after you have been in a public place, or after blowing your nose, coughing, or sneezing.  · If soap and water are not readily available, use a  hand  that contains at least 60% alcohol. Cover all surfaces of your hands and rub them together until they feel dry.  · Avoid touching your eyes, nose, and mouth with unwashed hands.  Avoid close contact  · Stay home if you are sick.  · Avoid close contact with people who are sick.  · Put distance between yourself and other people.  ? Remember that some people without symptoms may be able to spread virus.  ? This is especially important for people who are at higher risk of getting very sick.www.cdc.gov/coronavirus/2019-ncov/need-extra-precautions/people-at-higher-risk.html  Cover your mouth and nose with a cloth face cover when around others  · You could spread COVID-19 to others even if you do not feel sick.  · Everyone should wear a cloth face cover when they have to go out in public, for example to the grocery store or to  other necessities.  ? Cloth face coverings should not be placed on young children under age 2, anyone who has trouble breathing, or is unconscious, incapacitated or otherwise unable to remove the mask without assistance.  · The cloth face cover is meant to protect other people in case you are infected.  · Do NOT use a facemask meant for a healthcare worker.  · Continue to keep about 6 feet between yourself and others. The cloth face cover is not a substitute for social distancing.  Cover coughs and sneezes  · If you are in a private setting and do not have on your cloth face covering, remember to always cover your mouth and nose with a tissue when you cough or sneeze or use the inside of your elbow.  · Throw used tissues in the trash.  · Immediately wash your hands with soap and water for at least 20 seconds. If soap and water are not readily available, clean your hands with a hand  that contains at least 60% alcohol.  Clean and disinfect  · Clean AND disinfect frequently touched surfaces daily. This includes tables, doorknobs, light switches, countertops, handles,  desks, phones, keyboards, toilets, faucets, and sinks. www.cdc.gov/coronavirus/2019-ncov/prevent-getting-sick/disinfecting-your-home.html  · If surfaces are dirty, clean them: Use detergent or soap and water prior to disinfection.  · Then, use a household disinfectant. You can see a list of EPA-registered household disinfectants here.  cdc.gov/coronavirus  05/05/2020  This information is not intended to replace advice given to you by your health care provider. Make sure you discuss any questions you have with your health care provider.  Document Released: 04/14/2020 Document Revised: 05/13/2020 Document Reviewed: 04/14/2020  [employer] Patient Education © 2020 [employer]      Hypoxemia    Hypoxemia occurs when the blood does not contain enough oxygen. The body cannot work well when it does not have enough oxygen because every part of the body needs oxygen. Oxygen enters the lungs when we breathe in, then it travels to all parts of the body through the blood. Hypoxemia can develop suddenly or slowly.  What are the causes?  Common causes of this condition include:  · Long-term (chronic) lung diseases, such as chronic obstructive pulmonary disease (COPD) or interstitial lung disease.  · Disorders that affect breathing at night, such as sleep apnea.  · Fluid buildup in the lungs (pulmonary edema).  · Lung infection (pneumonia).  · Lung or throat cancer.  · Abnormal blood flow that bypasses the lungs (having a shunt).  · Certain diseases that affect nerves or muscles.  · A collapsed lung (pneumothorax).  · A blood clot in the lungs (pulmonary embolus).  · Certain types of heart disease.  · Slow or shallow breathing (hypoventilation).  · Certain medicines.  · High altitudes.  · Toxic chemicals, smoke, and gases.  What are the signs or symptoms?  In some cases, there may be no symptoms of this condition. If you do have symptoms, they may include:  · Shortness of breath (dyspnea).  · Bluish color of the skin, lips, or nail  beds.  · Breathing that is fast, noisy, or shallow.  · A fast heartbeat.  · Feeling tired or sleepy.  · Feeling confused or worried.  If hypoxemia develops quickly, you will likely have dyspnea. If hypoxemia develops slowly over months or years, you may not notice any symptoms.  How is this diagnosed?  This condition is diagnosed by:  · A physical exam.  · Blood tests.  · A test that measures the percentage of oxygen in your blood (pulse oximetry). This is done with a sensor that is placed on your finger, toe, or earlobe.  How is this treated?  Treatment for this condition depends on the underlying cause of your hypoxemia. You will likely be treated with oxygen therapy to restore your blood oxygen level. Depending on the cause of your hypoxemia, you may need oxygen therapy for a short time (weeks or months), or you may need it for the rest of your life.  Your health care provider may also recommend other therapies to treat the underlying cause of your hypoxemia.  Follow these instructions at home:    · Take over-the-counter and prescription medicines only as told by your health care provider.  · If you are on oxygen therapy, follow oxygen safety precautions as directed by your health care provider. These may include:  ? Always having a backup supply of oxygen.  ? Not allowing anyone to smoke or have a fire around oxygen.  ? Handling oxygen tanks carefully and as instructed.  · Do not use any products that contain nicotine or tobacco, such as cigarettes and e-cigarettes. If you need help quitting, ask your health care provider. Stay away from people who smoke.  · Keep all follow-up visits as told by your health care provider. This is important.  Contact a health care provider if:  · You have any concerns about your oxygen therapy.  · You have trouble breathing, even during or after treatment.  · You become short of breath when you exercise.  · You are tired when you wake up.  · You have a headache when you wake  up.  Get help right away if:  · Your shortness of breath gets worse, especially with normal or minimal activity.  · You have a bluish color of the skin, lips, or nail beds.  · You become confused or you cannot think properly.  · You cough up dark mucus or blood.  · You have chest pain.  · You have a fever.  Summary  · Hypoxemia occurs when the blood does not contain enough oxygen.  · Hypoxemia may or may not cause symptoms. Often, the main symptom is shortness of breath (dyspnea).  · Depending on the cause of your hypoxemia, you may need oxygen therapy for a short time (weeks or months), or you may need it for the rest of your life.  · If you are on oxygen therapy, follow oxygen safety precautions as directed by your health care provider.  This information is not intended to replace advice given to you by your health care provider. Make sure you discuss any questions you have with your health care provider.  Document Released: 07/02/2012 Document Revised: 10/08/2019 Document Reviewed: 11/21/2017  [employer] Patient Education © 2020 [employer]      COVID-19 Frequently Asked Questions  COVID-19 (coronavirus disease) is an infection that is caused by a large family of viruses. Some viruses cause illness in people and others cause illness in animals like camels, cats, and bats. In some cases, the viruses that cause illness in animals can spread to humans.  Where did the coronavirus come from?  In December 2019, [place] told the World Health Organization (WHO) of several cases of lung disease (human respiratory illness). These cases were linked to an open seafood and livestock market in the city of [facility]. The link to the seafood and livestock market suggests that the virus may have spread from animals to humans. However, since that first outbreak in December, the virus has also been shown to spread from person to person.  What is the name of the disease and the virus?  Disease name  Early on, this disease was called novel  coronavirus. This is because scientists determined that the disease was caused by a new (novel) respiratory virus. The World Health Organization (WHO) has now named the disease COVID-19, or coronavirus disease.  Virus name  The virus that causes the disease is called severe acute respiratory syndrome coronavirus 2 (SARS-CoV-2).  More information on disease and virus naming  World Health Organization (WHO): www.who.int/emergencies/diseases/novel-coronavirus-2019/technical-guidance/naming-the-coronavirus-disease-(covid-2019)-and-the-virus-that-causes-it  Who is at risk for complications from coronavirus disease?  Some people may be at higher risk for complications from coronavirus disease. This includes older adults and people who have chronic diseases, such as heart disease, diabetes, and lung disease.  If you are at higher risk for complications, take these extra precautions:  · Avoid close contact with people who are sick or have a fever or cough. Stay at least 3-6 ft (1-2 m) away from them, if possible.  · Wash your hands often with soap and water for at least 20 seconds.  · Avoid touching your face, mouth, nose, or eyes.  · Keep supplies on hand at home, such as food, medicine, and cleaning supplies.  · Stay home as much as possible.  · Avoid social gatherings and travel.  How does coronavirus disease spread?  The virus that causes coronavirus disease spreads easily from person to person (is contagious). There are also cases of community-spread disease. This means the disease has spread to:  · People who have no known contact with other infected people.  · People who have not traveled to areas where there are known cases.  It appears to spread from one person to another through droplets from coughing or sneezing.  Can I get the virus from touching surfaces or objects?  There is still a lot that we do not know about the virus that causes coronavirus disease. Scientists are basing a lot of information on what they  know about similar viruses, such as:  · Viruses cannot generally survive on surfaces for long. They need a human body (host) to survive.  · It is more likely that the virus is spread by close contact with people who are sick (direct contact), such as through:  ? Shaking hands or hugging.  ? Breathing in respiratory droplets that travel through the air. This can happen when an infected person coughs or sneezes on or near other people.  · It is less likely that the virus is spread when a person touches a surface or object that has the virus on it (indirect contact). The virus may be able to enter the body if the person touches a surface or object and then touches his or her face, eyes, nose, or mouth.  Can a person spread the virus without having symptoms of the disease?  It may be possible for the virus to spread before a person has symptoms of the disease, but this is most likely not the main way the virus is spreading. It is more likely for the virus to spread by being in close contact with people who are sick and breathing in the respiratory droplets of a sick person's cough or sneeze.  What are the symptoms of coronavirus disease?  Symptoms vary from person to person and can range from mild to severe. Symptoms may include:  · Fever.  · Cough.  · Tiredness, weakness, or fatigue.  · Fast breathing or feeling short of breath.  These symptoms can appear anywhere from 2 to 14 days after you have been exposed to the virus. If you develop symptoms, call your health care provider. People with severe symptoms may need hospital care.  If I am exposed to the virus, how long does it take before symptoms start?  Symptoms of coronavirus disease may appear anywhere from 2 to 14 days after a person has been exposed to the virus. If you develop symptoms, call your health care provider.  Should I be tested for this virus?  Your health care provider will decide whether to test you based on your symptoms, history of exposure, and  your risk factors.  How does a health care provider test for this virus?  Health care providers will collect samples to send for testing. Samples may include:  · Taking a swab of fluid from the nose.  · Taking fluid from the lungs by having you cough up mucus (sputum) into a sterile cup.  · Taking a blood sample.  · Taking a stool or urine sample.  Is there a treatment or vaccine for this virus?  Currently, there is no vaccine to prevent coronavirus disease. Also, there are no medicines like antibiotics or antivirals to treat the virus. A person who becomes sick is given supportive care, which means rest and fluids. A person may also relieve his or her symptoms by using over-the-counter medicines that treat sneezing, coughing, and runny nose. These are the same medicines that a person takes for the common cold.  If you develop symptoms, call your health care provider. People with severe symptoms may need hospital care.  What can I do to protect myself and my family from this virus?         You can protect yourself and your family by taking the same actions that you would take to prevent the spread of other viruses. Take the following actions:  · Wash your hands often with soap and water for at least 20 seconds. If soap and water are not available, use alcohol-based hand .  · Avoid touching your face, mouth, nose, or eyes.  · Cough or sneeze into a tissue, sleeve, or elbow. Do not cough or sneeze into your hand or the air.  ? If you cough or sneeze into a tissue, throw it away immediately and wash your hands.  · Disinfect objects and surfaces that you frequently touch every day.  · Avoid close contact with people who are sick or have a fever or cough. Stay at least 3-6 ft (1-2 m) away from them, if possible.  · Stay home if you are sick, except to get medical care. Call your health care provider before you get medical care.  · Make sure your vaccines are up to date. Ask your health care provider what  vaccines you need.  What should I do if I need to travel?  Follow travel recommendations from your local health authority, the CDC, and WHO.  Travel information and advice  · Centers for Disease Control and Prevention (CDC): www.cdc.gov/coronavirus/2019-ncov/travelers/index.html  · World Health Organization (WHO): www.who.int/emergencies/diseases/novel-coronavirus-2019/travel-advice  Know the risks and take action to protect your health  · You are at higher risk of getting coronavirus disease if you are traveling to areas with an outbreak or if you are exposed to travelers from areas with an outbreak.  · Wash your hands often and practice good hygiene to lower the risk of catching or spreading the virus.  What should I do if I am sick?  General instructions to stop the spread of infection  · Wash your hands often with soap and water for at least 20 seconds. If soap and water are not available, use alcohol-based hand .  · Cough or sneeze into a tissue, sleeve, or elbow. Do not cough or sneeze into your hand or the air.  · If you cough or sneeze into a tissue, throw it away immediately and wash your hands.  · Stay home unless you must get medical care. Call your health care provider or local health authority before you get medical care.  · Avoid public areas. Do not take public transportation, if possible.  · If you can, wear a mask if you must go out of the house or if you are in close contact with someone who is not sick.  Keep your home clean  · Disinfect objects and surfaces that are frequently touched every day. This may include:  ? Counters and tables.  ? Doorknobs and light switches.  ? Sinks and faucets.  ? Electronics such as phones, remote controls, keyboards, computers, and tablets.  · Wash dishes in hot, soapy water or use a . Air-dry your dishes.  · Wash laundry in hot water.  Prevent infecting other household members  · Let healthy household members care for children and pets, if  possible. If you have to care for children or pets, wash your hands often and wear a mask.  · Sleep in a different bedroom or bed, if possible.  · Do not share personal items, such as razors, toothbrushes, deodorant, hare, brushes, towels, and washcloths.  Where to find more information  Centers for Disease Control and Prevention (CDC)  · Information and news updates: www.cdc.gov/coronavirus/2019-ncov  World Health Organization (WHO)  · Information and news updates: www.who.int/emergencies/diseases/novel-coronavirus-2019  · Coronavirus health topic: www.who.int/health-topics/coronavirus  · Questions and answers on COVID-19: www.who.int/news-room/q-a-detail/t-j-kplirwymzzofa  · Global tracker: who.Indeed  American Academy of Pediatrics (AAP)  · Information for families: www.healthychildren.org/English/health-issues/conditions/chest-lungs/Pages/2019-Novel-Coronavirus.aspx  The coronavirus situation is changing rapidly. Check your local health authority website or the CDC and WHO websites for updates and news.  When should I contact a health care provider?  · Contact your health care provider if you have symptoms of an infection, such as fever or cough, and you:  ? Have been near anyone who is known to have coronavirus disease.  ? Have come into contact with a person who is suspected to have coronavirus disease.  ? Have traveled outside of the country.  When should I get emergency medical care?  · Get help right away by calling your local emergency services (911 in the [place] if you have:  ? Trouble breathing.  ? Pain or pressure in your chest.  ? Confusion.  ? Blue-tinged lips and fingernails.  ? Difficulty waking from sleep.  ? Symptoms that get worse.  Let the emergency medical personnel know if you think you have coronavirus disease.  Summary  · A new respiratory virus is spreading from person to person and causing COVID-19 (coronavirus disease).  · The virus that causes COVID-19 appears to spread easily. It  spreads from one person to another through droplets from coughing or sneezing.  · Older adults and those with chronic diseases are at higher risk of disease. If you are at higher risk for complications, take extra precautions.  · There is currently no vaccine to prevent coronavirus disease. There are no medicines, such as antibiotics or antivirals, to treat the virus.  · You can protect yourself and your family by washing your hands often, avoiding touching your face, and covering your coughs and sneezes.  This information is not intended to replace advice given to you by your health care provider. Make sure you discuss any questions you have with your health care provider.  Document Released: 04/14/2020 Document Revised: 04/14/2020 Document Reviewed: 04/14/2020  Elsevier Patient Education © 2020 [employer]

## 2020-12-13 NOTE — DISCHARGE PLANNING
Anticipated Discharge Disposition: Home with Remote home monitoring    Action: CM obtained verbal consent for remote home monitoring. Pt has [employer] dale downloaded.  RT notified for teaching.     Barriers to Discharge: Medical clearance.     Plan: Discharge home when medically cleared.

## 2020-12-14 ENCOUNTER — TELEMEDICINE (OUTPATIENT)
Dept: URGENT CARE | Facility: CLINIC | Age: 60
End: 2020-12-14
Payer: COMMERCIAL

## 2020-12-14 VITALS — RESPIRATION RATE: 21 BRPM | OXYGEN SATURATION: 93 %

## 2020-12-14 DIAGNOSIS — E11.8 TYPE 2 DIABETES MELLITUS WITH COMPLICATION, WITHOUT LONG-TERM CURRENT USE OF INSULIN (HCC): ICD-10-CM

## 2020-12-14 DIAGNOSIS — H01.004 BLEPHARITIS OF LEFT UPPER EYELID, UNSPECIFIED TYPE: ICD-10-CM

## 2020-12-14 DIAGNOSIS — U07.1 PNEUMONIA DUE TO COVID-19 VIRUS: ICD-10-CM

## 2020-12-14 DIAGNOSIS — J12.82 PNEUMONIA DUE TO COVID-19 VIRUS: ICD-10-CM

## 2020-12-14 PROCEDURE — 99203 OFFICE O/P NEW LOW 30 MIN: CPT | Performed by: EMERGENCY MEDICINE

## 2020-12-14 RX ORDER — ERYTHROMYCIN 5 MG/G
1 OINTMENT OPHTHALMIC 3 TIMES DAILY
Qty: 1 G | Refills: 0 | Status: SHIPPED | OUTPATIENT
Start: 2020-12-14 | End: 2021-03-02

## 2020-12-14 ASSESSMENT — ENCOUNTER SYMPTOMS
DIARRHEA: 0
EYE ITCHING: 1
BLURRED VISION: 0
VOMITING: 0
EYE REDNESS: 1
EYE DISCHARGE: 1
SHORTNESS OF BREATH: 0
COUGH: 1
FEVER: 0
EYE PAIN: 0

## 2020-12-14 NOTE — PROGRESS NOTES
Subjective:      Farzad Bryant Jr. is a 60 y.o. male who presents with Covid-19 Home Monitoring Video Visit    This evaluation was conducted via Zoom using secure and encrypted videoconferencing technology. The patient was in a private location in the [facility].    The patient's identity was confirmed and verbal consent was obtained for this virtual visit.    Home Monitoring Patient Triage  COVID-19 Monitoring Level: Home with basic monitoring       [facility] Flowsheet   1. Record COVID-19 Severity Index (qCSI):  0  2.Confirm appropriate patient and chart with two identifiers: Yes - continue to question #3   3. Days Since Onset of Symptoms: 16  4. Does patient have another adult at home to help take care of you: Yes - continue to question #5  5. Confirm O2 supply is working and there are no cannula problems: N/A  6. Is your breathing today better or worse? Better - continue to question #7  7. Are you able to tolerate fluids? Yes - Continue to question #8  8. Any vomiting or diarrhea in the last 24 hours? No - continue to question #9  9. Are you able to control your fevers? N/A  10. Are you able to control your cough?    11. Current O2 Flow Rate: 0  12. Review ER precautions: present to ED or call 911 if experiencing severe shortness of breath: Yes  13. Confirm next VV: Appointment scheduled  14. Final disposition: Stable at home  15. Time Spent: 20          Pneumonia  He complains of cough. There is no shortness of breath. The problem has been gradually improving. The cough is non-productive. Pertinent negatives include no fever.   Eye Problem   The left eye is affected. This is a new problem. Episode onset: over one week. The problem occurs daily. The problem has been unchanged. There was no injury mechanism. The patient is experiencing no pain. Associated symptoms include an eye discharge, eye redness and itching. Pertinent negatives include no blurred vision, fever or vomiting.        Review of Systems   Constitutional: Negative for fever.   Eyes: Positive for discharge, redness and itching. Negative for blurred vision and pain.   Respiratory: Positive for cough. Negative for shortness of breath.    Gastrointestinal: Negative for diarrhea and vomiting.     Past Medical History:   Diagnosis Date   • Arthritis    • Breath shortness    • Depression     depression   • Diabetes (HCC)     oral medication   • High cholesterol    • Hyperlipidemia    • Hypertension      Past Surgical History:   Procedure Laterality Date   • ARTHROPLASTY Left     hip replacement   • CHOLECYSTECTOMY        Allergy:  Patient has no known allergies.     Current Outpatient Medications:   •  erythromycin, 1 cm, Left Eye, TID  •  benzonatate, 100 mg, Oral, TID, Taking  •  guaiFENesin ER, 600 mg, Oral, Q12HRS, PRN  •  acetaminophen, 1,000 mg, Oral, BID PRN, PRN  •  Multiple Vitamins-Minerals (ZINC PO), 1 Tab, Oral, QAM, Taking  •  Cholecalciferol (VITAMIN D3 PO), 1 Tab, Oral, QAM, Taking  •  Synjardy, 1 Tab, Oral, BID, Taking  •  glipiZIDE, 5 mg, Oral, BID, Taking  •  Lantus SoloStar, 32 Units, Subcutaneous, Q EVENING, Taking  •  traZODone, TAKE 2 TABLETS BY MOUTH AT BEDTIME, Taking  •  Trulicity, INJECT 1 SYRINGE SUBCUTANEOUSLY ONCE A WEEK AS DIRECTED, Taking  •  TURMERIC CURCUMIN PO, 3 Tab, Oral, QAM, Taking  •  aspirin, 81 mg, Oral, DAILY, Taking  •  Cinnamon, 1,000 mg, Oral, QAM, Taking  •  loperamide, 4 mg, Oral, QDAY PRN, PRN  •  5-HTP, 200 mg, Oral, QHS, PRN  •  fish oil, 2,000 mg, Oral, QAM, Taking  •  diphenhydrAMINE, 50 mg, Oral, QHS, PRN  •  Melatonin, 1 mg, Oral, QHS, PRN  •  therapeutic multivitamin-minerals, 1 Tab, Oral, QAM, Taking  •  dexamethasone, 6 mg, Oral, DAILY (Patient not taking: Reported on 12/14/2020), Not Taking  •  EQ Allergy Relief (Cetirizine), Take 1 tablet by mouth once daily  •  isosorbide mononitrate SR, TAKE 1 TABLET BY MOUTH ONCE DAILY IN THE MORNING  •  atorvastatin, TAKE 1 TABLET BY  MOUTH ONCE DAILY IN THE EVENING  •  losartan, Take 1 tablet by mouth once daily  •  escitalopram, Take 1 tablet by mouth once daily  •  Euthyrox, TAKE 1 TABLET BY MOUTH ONCE DAILY IN THE MORNING ON  AN  EMPTY  STOMACH  •  RELION PEN NEEDLE 31G/8MM, USE WITH LANTUS DAILY.   family history includes Diabetes in his mother; Stroke in his father.   Social History     Tobacco Use   • Smoking status: Never Smoker   • Smokeless tobacco: Former User     Types: Chew   Substance Use Topics   • Alcohol use: Yes     Comment: 1 per month    • Drug use: No         Objective:     Resp (!) 21   SpO2 93%      Physical Exam    Constitutional:       General: Awake.      Appearance: Not toxic-appearing.   HENT:      Nose: No signs of injury.      Mouth/Throat: Voice clear.     Lips: Pink.   Eyes:      General: Left upper lid mildly swollen, red, with apparent small scabbed area.      Conjunctiva/sclera: Conjunctivae normal; no visible discharge.   Neck:      Trachea: Phonation normal.   Pulmonary:      Effort: Pulmonary effort is normal.   Skin:     Coloration: Skin is not cyanotic or pale.   Neurological:      Mental Status: Alert and oriented to person, place, and time.   Psychiatric:         Mood and Affect: Mood and affect normal.         Speech: Speech normal.         Behavior: Behavior is cooperative.             Assessment/Plan:        1. Pneumonia due to COVID-19 virus  Continue RPM  Recommended supportive care measures, including rest, increasing oral fluid intake and use of over-the-counter medications for relief of symptoms.    2. Blepharitis of left upper eyelid, unspecified type  - erythromycin 5 MG/GM Ointment; Apply 1 cm to left eye 3 times a day.  Dispense: 1 g; Refill: 0    3. Type 2 diabetes mellitus with complication, without long-term current use of insulin (HCC)  D/C dexamethasone  Monitor FBS

## 2020-12-14 NOTE — PROGRESS NOTES
Pt's discharge paperwork printed and gone over with the patient.  All questions and concerns addressed.  Pt's IV and armband removed.  Pt's gathered all of his belongings, and he was escorted out to his wife by wheelchair.

## 2020-12-14 NOTE — DISCHARGE SUMMARY
Discharge Summary    CHIEF COMPLAINT ON ADMISSION  Chief Complaint   Patient presents with   • Shortness of Breath     Dx with covid on Dec 2.  Thought he was doing better but now increasing SOB, pain in chest , on and off fevers.  Required O2 in triage at 2L.   • Cough       Reason for Admission  Shortness of Breath     Admission Date  12/11/2020    CODE STATUS  Full Code    HPI & HOSPITAL COURSE  Please see original H&P for specific information, patient was admitted due to COVID-19 pneumonia, patient was started on supportive treatment, dexamethasone, oxygen per protocol, prone position, incentive spirometry, patient's D-dimer was elevated he went for CTA chest that came back negative for PE, patient gradually responded to treatment, he has been able to be weaned off oxygen, he is alert oriented follows commands he is not having any fevers, he is eager to go back home, patient is going to be discharged home today he will continue with dexamethasone, incentive spirometry at home, patient expressed understanding of his discharge plan and agree with either question have been answered.    Therefore, he is discharged in good and stable condition to home with close outpatient follow-up.    The patient met 2-midnight criteria for an inpatient stay at the time of discharge.    Discharge Date  12/13/2020    FOLLOW UP ITEMS POST DISCHARGE  Needs to follow-up with primary care physician in 1 to 2 weeks    DISCHARGE DIAGNOSES  Principal Problem:    Pneumonia due to COVID-19 virus POA: Unknown  Active Problems:    Acquired hypothyroidism POA: Yes    Type 2 diabetes mellitus with complication, without long-term current use of insulin (HCC) POA: Yes    Dyslipidemia POA: Yes    Primary insomnia POA: Yes    Obesity (BMI 30-39.9) POA: Yes    Essential hypertension, benign POA: Yes  Resolved Problems:    Acute respiratory failure with hypoxia (HCC) POA: Unknown      FOLLOW UP  Future Appointments   Date Time Provider Department  [place]   12/14/2020 10:15 AM [facility]   12/15/2020 10:15 AM [facility]   12/16/2020 10:15 AM [facility]   12/17/2020 10:15 AM [facility]   12/18/2020 10:15 AM [facility]   12/19/2020 10:15 AM [facility]   12/20/2020 10:15 AM [facility]   12/21/2020 10:15 AM [facility]   12/22/2020 10:15 AM [facility]   12/23/2020 10:15 AM [facility]     No follow-up provider specified.    MEDICATIONS ON DISCHARGE     Medication List      START taking these medications      Instructions   benzonatate 100 MG Caps  Commonly known as: TESSALON   Take 1 Cap by mouth 3 times a day.  Dose: 100 mg     dexamethasone 6 MG Tabs  Start taking on: December 14, 2020  Commonly known as: DECADRON   Take 1 Tab by mouth every day for 6 days.  Dose: 6 mg     guaiFENesin  MG Tb12  Commonly known as: MUCINEX   Take 1 Tab by mouth every 12 hours.  Dose: 600 mg        CHANGE how you take these medications      Instructions   atorvastatin 40 MG Tabs  What changed: See the new instructions.  Commonly known as: LIPITOR   TAKE 1 TABLET BY MOUTH ONCE DAILY IN THE EVENING     EQ Allergy Relief (Cetirizine) 10 MG Tabs  What changed:   · how much to take  · when to take this  Generic drug: cetirizine   Take 1 tablet by mouth once daily     escitalopram 20 MG tablet  What changed: when to take this  Commonly known as: LEXAPRO   Take 1 tablet by mouth once daily     Euthyrox 25 MCG Tabs  What changed: See the new instructions.  Generic drug: levothyroxine   TAKE 1 TABLET BY MOUTH ONCE DAILY IN THE MORNING ON  AN  EMPTY  STOMACH     isosorbide mononitrate SR 30 MG Tb24  What changed:   · how much to take  · when to take this  Commonly known as: IMDUR   TAKE 1 TABLET BY MOUTH ONCE DAILY IN THE MORNING     losartan 50 MG Tabs  What changed: when to take this  Commonly known  as: COZAAR   Take 1 tablet by mouth once daily     traZODone 100 MG Tabs  What changed:   · when to take this  · additional instructions  Commonly known as: DESYREL   TAKE 2 TABLETS BY MOUTH AT BEDTIME     Trulicity 1.5 MG/0.5ML Sopn  What changed:   · See the new instructions.  · Another medication with the same name was removed. Continue taking this medication, and follow the directions you see here.  Generic drug: Dulaglutide   INJECT 1 SYRINGE SUBCUTANEOUSLY ONCE A WEEK AS DIRECTED        CONTINUE taking these medications      Instructions   5- MG Caps   Take 200 mg by mouth every bedtime. 2 capsules = 200 mg.  Dose: 200 mg     acetaminophen 500 MG Tabs  Commonly known as: TYLENOL   Take 1,000 mg by mouth 2 times a day as needed for Fever.  Dose: 1,000 mg     Anti-Diarrheal 2 MG Caps  Generic drug: loperamide   Take 4 mg by mouth 1 time a day as needed for Diarrhea. 2 capsules = 4 mg.  Dose: 4 mg     aspirin 81 MG tablet   Take 81 mg by mouth every day.  Dose: 81 mg     Benadryl Allergy 25 MG capsule  Generic drug: diphenhydrAMINE   Take 50 mg by mouth every bedtime.  Dose: 50 mg     Cinnamon 500 MG Caps   Take 1,000 mg by mouth every morning. 2 capsules = 1000 mg.  Dose: 1,000 mg     fish oil 1000 MG Caps capsule   Take 2,000 mg by mouth every morning. 2 capsules = 2000 mg.  Dose: 2,000 mg     glipiZIDE 5 MG Tabs  Commonly known as: GLUCOTROL   Take 1 Tab by mouth 2 times a day.  Dose: 5 mg     Lantus SoloStar 100 UNIT/ML Sopn injection  Generic drug: insulin glargine   Inject 32 Units under the skin every evening.  Dose: 32 Units     Melatonin 1 MG Caps   Take 1 mg by mouth every bedtime.  Dose: 1 mg     RELION PEN NEEDLE 31G/8MM  Generic drug: Insulin Pen Needle   Doctor's comments: ICD-10:E11.9  USE WITH LANTUS DAILY.     Synjardy 12.5-1000 MG Tabs  Generic drug: Empagliflozin-metFORMIN HCl   Take 1 Tab by mouth 2 Times a Day.  Dose: 1 Tab     therapeutic multivitamin-minerals Tabs   Take 1 Tab by  mouth every morning.  Dose: 1 Tab     TURMERIC CURCUMIN PO   Take 3 Tabs by mouth every morning.  Dose: 3 Tab     VITAMIN D3 PO   Take 1 Tab by mouth every morning.  Dose: 1 Tab     ZINC PO   Take 1 Tab by mouth every morning.  Dose: 1 Tab        STOP taking these medications    Mucinex DM  MG Tb12     Vicks DayQuil Cold & Flu 10-5-325 MG Caps  Generic drug: DM-Phenylephrine-Acetaminophen     Vicks NyQuil Cold & Flu 15-6. MG Caps  Generic drug: DM-Doxylamine-Acetaminophen            Allergies  No Known Allergies    DIET  Orders Placed This Encounter   Procedures   • Diet Order Diet: Cardiac     Standing Status:   Standing     Number of Occurrences:   1     Order Specific Question:   Diet:     Answer:   Cardiac [6]       ACTIVITY  As tolerated.  Weight bearing as tolerated    CONSULTATIONS  None    PROCEDURES  None    LABORATORY  Lab Results   Component Value Date    SODIUM 134 (L) 12/13/2020    POTASSIUM 4.3 12/13/2020    CHLORIDE 99 12/13/2020    CO2 27 12/13/2020    GLUCOSE 265 (H) 12/13/2020    BUN 30 (H) 12/13/2020    CREATININE 0.86 12/13/2020        Lab Results   Component Value Date    WBC 7.6 12/12/2020    HEMOGLOBIN 12.9 (L) 12/12/2020    HEMATOCRIT 40.5 (L) 12/12/2020    PLATELETCT 428 12/12/2020        Total time of the discharge process exceeds 40 minutes.

## 2020-12-15 ENCOUNTER — TELEMEDICINE (OUTPATIENT)
Dept: URGENT CARE | Facility: CLINIC | Age: 60
End: 2020-12-15
Payer: COMMERCIAL

## 2020-12-15 VITALS — OXYGEN SATURATION: 97 % | RESPIRATION RATE: 21 BRPM

## 2020-12-15 DIAGNOSIS — H01.004 BLEPHARITIS OF LEFT UPPER EYELID, UNSPECIFIED TYPE: ICD-10-CM

## 2020-12-15 DIAGNOSIS — U07.1 PNEUMONIA DUE TO COVID-19 VIRUS: Primary | ICD-10-CM

## 2020-12-15 DIAGNOSIS — E11.8 TYPE 2 DIABETES MELLITUS WITH COMPLICATION, WITHOUT LONG-TERM CURRENT USE OF INSULIN (HCC): ICD-10-CM

## 2020-12-15 DIAGNOSIS — J12.82 PNEUMONIA DUE TO COVID-19 VIRUS: Primary | ICD-10-CM

## 2020-12-15 LAB — IL6 SERPL-MCNC: 2.6 PG/ML

## 2020-12-15 PROCEDURE — 99457 RPM TX MGMT 1ST 20 MIN: CPT | Mod: 95 | Performed by: PHYSICIAN ASSISTANT

## 2020-12-15 ASSESSMENT — ENCOUNTER SYMPTOMS
DIARRHEA: 0
COUGH: 1
FEVER: 0
EYE PAIN: 1
VOMITING: 0
SHORTNESS OF BREATH: 0
NAUSEA: 0
EYE REDNESS: 1
CHILLS: 0
EYE DISCHARGE: 1

## 2020-12-15 NOTE — PROGRESS NOTES
Telemedicine Visit:      Subjective:     Farzad Bryant Jr. is a 60 y.o. male presenting for evaluation and management of Covid-19 Home Monitoring     This evaluation was conducted via Zoom using secure and encrypted videoconferencing technology. The patient was in a private location in the [facility].    The patient's identity was confirmed and verbal consent was obtained for this virtual visit.    Home Monitoring Patient Triage  COVID-19 Monitoring Level: Home with basic monitoring       [facility] Flowsheet   1. Record COVID-19 Severity Index (qCSI):  0  2.Confirm appropriate patient and chart with two identifiers: Yes - continue to question #3   3. Days Since Onset of Symptoms: 17  4. Does patient have another adult at home to help take care of you: Yes - continue to question #5  5. Confirm O2 supply is working and there are no cannula problems: N/A  6. Is your breathing today better or worse? Same  7. Are you able to tolerate fluids? Yes - Continue to question #8  8. Any vomiting or diarrhea in the last 24 hours? No - continue to question #9  9. Are you able to control your fevers? N/A  10. Are you able to control your cough? Yes - continue to question #11  11. Current O2 Flow Rate: 0  12. Review ER precautions: present to ED or call 911 if experiencing severe shortness of breath: Yes  13. Confirm next VV: Appointment scheduled  14. Final disposition: Stable at home  15. Time Spent: 15      Review of Systems   Constitutional: Negative for chills and fever.   Eyes: Positive for pain, discharge and redness.   Respiratory: Positive for cough. Negative for shortness of breath.    Cardiovascular: Negative for chest pain.   Gastrointestinal: Negative for diarrhea, nausea and vomiting.       Current medicines (including changes today)  Medications:    • 5-HTP Caps  • acetaminophen Tabs  • aspirin  • atorvastatin Tabs  • benzonatate Caps  • Cinnamon Caps  • dexamethasone Tabs  • diphenhydrAMINE  • EQ  Allergy Relief (Cetirizine) Tabs  • erythromycin Oint  • escitalopram  • Euthyrox Tabs  • fish oil Caps  • glipiZIDE Tabs  • guaiFENesin ER Tb12  • isosorbide mononitrate SR Tb24  • Lantus SoloStar Sopn  • loperamide Caps  • losartan Tabs  • Melatonin Caps  • RELION PEN NEEDLE 31G/8MM Misc  • Synjardy Tabs  • therapeutic multivitamin-minerals Tabs  • traZODone Tabs  • Trulicity Sopn  • TURMERIC CURCUMIN PO  • VITAMIN D3 PO  • ZINC PO    Allergies: Patient has no known allergies.    Problem List: Farzad Bryant Jr. has Acquired hypothyroidism; Type 2 diabetes mellitus with complication, without long-term current use of insulin (HCC); Dyslipidemia; Primary insomnia; Episode of recurrent major depressive disorder (HCC); Chronic shoulder pain; Obesity (BMI 30-39.9); Dry skin; Costochondritis; Left wrist pain; Atypical chest pain; Abnormal nuclear cardiac imaging test; Essential hypertension, benign; Elevated blood pressure reading; Cough; Allergic rhinitis; Recurrent pain of right knee; Tendonitis; Hernia of anterior abdominal wall; and Pneumonia due to COVID-19 virus on their problem list.    Surgical History:  Past Surgical History:   Procedure Laterality Date   • ARTHROPLASTY Left     hip replacement   • CHOLECYSTECTOMY         Past Social Hx: Farzad Bryant Jr.  reports that he has never smoked. He quit smokeless tobacco use about 34 years ago.  His smokeless tobacco use included chew. He reports current alcohol use. He reports that he does not use drugs.     Past Family Hx:  Farzad Bryant Jr. family history includes Diabetes in his mother; Stroke in his father.     Problem list, medications, and allergies reviewed by myself today in Epic.     Objective:   Resp (!) 21   SpO2 97%  (from [employer] home monitor)    Physical Exam:  Constitutional:       General: Awake.      Appearance: Not toxic-appearing.   HENT:      Nose: No signs of injury.      Mouth/Throat: Voice clear.     Lips: Pink.   Eyes:       General: Lids are normal.      Conjunctiva/sclera: Conjunctivae normal.   Neck:      Trachea: Phonation normal.   Pulmonary:      Effort: Pulmonary effort is normal.   Skin:     Coloration: Skin is not cyanotic or pale.   Neurological:      Mental Status: Alert and oriented to person, place, and time.   Psychiatric:         Mood and Affect: Mood and affect normal.         Speech: Speech normal.         Behavior: Behavior is cooperative.       Assessment and Plan:   The following treatment plan was discussed:     1. Pneumonia due to COVID-19 virus    2. Blepharitis of left upper eyelid, unspecified type    3. Type 2 diabetes mellitus with complication, without long-term current use of insulin ([facility])    - steroids d/c yesterday, FSBS 155 today  -doing well w/o oxygen  -warm compresses and erythro ointment for eye  -if continuing to improve/stable, anticipate graduation from [facility] tomorrow.

## 2020-12-16 ENCOUNTER — TELEMEDICINE (OUTPATIENT)
Dept: URGENT CARE | Facility: CLINIC | Age: 60
End: 2020-12-16
Payer: COMMERCIAL

## 2020-12-16 VITALS — RESPIRATION RATE: 22 BRPM | OXYGEN SATURATION: 92 %

## 2020-12-16 DIAGNOSIS — U07.1 PNEUMONIA DUE TO COVID-19 VIRUS: ICD-10-CM

## 2020-12-16 DIAGNOSIS — E11.8 TYPE 2 DIABETES MELLITUS WITH COMPLICATION, WITHOUT LONG-TERM CURRENT USE OF INSULIN (HCC): ICD-10-CM

## 2020-12-16 DIAGNOSIS — J12.82 PNEUMONIA DUE TO COVID-19 VIRUS: ICD-10-CM

## 2020-12-16 PROCEDURE — 99457 RPM TX MGMT 1ST 20 MIN: CPT | Mod: 95 | Performed by: PHYSICIAN ASSISTANT

## 2020-12-16 ASSESSMENT — ENCOUNTER SYMPTOMS
SHORTNESS OF BREATH: 0
HEADACHES: 1
COUGH: 1

## 2020-12-16 NOTE — PROGRESS NOTES
Telemedicine Visit:      Subjective:   Farzad Bryant Jr. is a 60 y.o. male presenting for evaluation and management of Covid-19 Home Monitoring     This evaluation was conducted via Zoom using secure and encrypted videoconferencing technology. The patient was in a private location in the [facility].    The patient's identity was confirmed and verbal consent was obtained for this virtual visit.    Home Monitoring Patient Triage  COVID-19 Monitoring Level: Home with basic monitoring       [facility] Flowsheet   1. Record COVID-19 Severity Index (qCSI):  2  2.Confirm appropriate patient and chart with two identifiers: Yes - continue to question #3   3. Days Since Onset of Symptoms: 18  4. Does patient have another adult at home to help take care of you: Yes - continue to question #5  5. Confirm O2 supply is working and there are no cannula problems: Yes - continue to question #6  6. Is your breathing today better or worse? Same  7. Are you able to tolerate fluids? Yes - Continue to question #8  8. Any vomiting or diarrhea in the last 24 hours? No - continue to question #9  9. Are you able to control your fevers? N/A  10. Are you able to control your cough? Yes - continue to question #11  11. Current O2 Flow Rate: 0  12. Review ER precautions: present to ED or call 911 if experiencing severe shortness of breath: Yes  13. Confirm next VV: Appointment scheduled  14. Final disposition: Stable at home  15. Time Spent: 15      Review of Systems   Respiratory: Positive for cough. Negative for shortness of breath.    Neurological: Positive for headaches.       Current medicines (including changes today)  Medications:    • 5-HTP Caps  • acetaminophen Tabs  • aspirin  • atorvastatin Tabs  • benzonatate Caps  • Cinnamon Caps  • dexamethasone Tabs  • diphenhydrAMINE  • EQ Allergy Relief (Cetirizine) Tabs  • erythromycin Oint  • escitalopram  • Euthyrox Tabs  • fish oil Caps  • glipiZIDE Tabs  • guaiFENesin ER  Tb12  • isosorbide mononitrate SR Tb24  • Lantus SoloStar Sopn  • loperamide Caps  • losartan Tabs  • Melatonin Caps  • RELION PEN NEEDLE 31G/8MM Misc  • Synjardy Tabs  • therapeutic multivitamin-minerals Tabs  • traZODone Tabs  • Trulicity Sopn  • TURMERIC CURCUMIN PO  • VITAMIN D3 PO  • ZINC PO    Allergies: Patient has no known allergies.    Problem List: Farzad Bryant Jr. has Acquired hypothyroidism; Type 2 diabetes mellitus with complication, without long-term current use of insulin (HCC); Dyslipidemia; Primary insomnia; Episode of recurrent major depressive disorder (HCC); Chronic shoulder pain; Obesity (BMI 30-39.9); Dry skin; Costochondritis; Left wrist pain; Atypical chest pain; Abnormal nuclear cardiac imaging test; Essential hypertension, benign; Elevated blood pressure reading; Cough; Allergic rhinitis; Recurrent pain of right knee; Tendonitis; Hernia of anterior abdominal wall; and Pneumonia due to COVID-19 virus on their problem list.    Surgical History:  Past Surgical History:   Procedure Laterality Date   • ARTHROPLASTY Left     hip replacement   • CHOLECYSTECTOMY         Past Social Hx: Farzad Bryant Jr.  reports that he has never smoked. He quit smokeless tobacco use about 34 years ago.  His smokeless tobacco use included chew. He reports current alcohol use. He reports that he does not use drugs.     Past Family Hx:  Farzad Bryant Jr. family history includes Diabetes in his mother; Stroke in his father.     Problem list, medications, and allergies reviewed by myself today in Epic.     Objective:   Resp (!) 22   SpO2 92%  (from [employer] home monitor)    Physical Exam:  Constitutional:       General: Awake.      Appearance: Not toxic-appearing.   HENT:      Nose: No signs of injury.      Mouth/Throat: Voice clear.     Lips: Pink.   Eyes:      General: Lids are normal.      Conjunctiva/sclera: Conjunctivae normal.   Neck:      Trachea: Phonation normal.   Pulmonary:      Effort:  Pulmonary effort is normal.   Skin:     Coloration: Skin is not cyanotic or pale.   Neurological:      Mental Status: Alert and oriented to person, place, and time.   Psychiatric:         Mood and Affect: Mood and affect normal.         Speech: Speech normal.         Behavior: Behavior is cooperative.       Assessment and Plan:   The following treatment plan was discussed:     1. Pneumonia due to COVID-19 virus    2. Type 2 diabetes mellitus with complication, without long-term current use of insulin (HCC)      CSI:2  Day of Symptoms: 18  Decadron: done   Remdesivir: none  Anticoagulation: none  O2 Day: 0L   O2 Night: 0L  Next appointment: 12/17/2020  Time Spent: 15  Additional comments:  We'll keep in program one more day, pt counseled on cough management.  Advised he may continue to have a dry cough after quarantine ends and need to work on hard candies, honey, etc to help once he returns to work.  No concerns with breathing.

## 2020-12-17 ENCOUNTER — TELEMEDICINE (OUTPATIENT)
Dept: URGENT CARE | Facility: CLINIC | Age: 60
End: 2020-12-17
Payer: COMMERCIAL

## 2020-12-17 VITALS — TEMPERATURE: 97 F | OXYGEN SATURATION: 92 % | RESPIRATION RATE: 21 BRPM | HEART RATE: 85 BPM

## 2020-12-17 DIAGNOSIS — J12.82 PNEUMONIA DUE TO COVID-19 VIRUS: ICD-10-CM

## 2020-12-17 DIAGNOSIS — U07.1 PNEUMONIA DUE TO COVID-19 VIRUS: ICD-10-CM

## 2020-12-17 PROCEDURE — 99213 OFFICE O/P EST LOW 20 MIN: CPT | Mod: 95 | Performed by: PHYSICIAN ASSISTANT

## 2020-12-17 ASSESSMENT — ENCOUNTER SYMPTOMS
DIZZINESS: 0
NAUSEA: 0
FEVER: 0
COUGH: 1
VOMITING: 0
CHILLS: 0
SHORTNESS OF BREATH: 0
MUSCULOSKELETAL NEGATIVE: 1
DIARRHEA: 0
ABDOMINAL PAIN: 0

## 2020-12-17 NOTE — PROGRESS NOTES
Subjective:      Farzad Bryant Jr. is a 60 y.o. male who presents with No chief complaint on file.        This evaluation was conducted via Zoom using secure and encrypted videoconferencing technology. The patient was in a private location in the [facility].    The patient's identity was confirmed and verbal consent was obtained for this virtual visit.      HPI     Covid-19 Home Monitoring Program:    Date of Discharge: 12/13/20  Current O2 flow rate: None   Symptom improvement: Yes   On Dexamethasone: No      Home Monitoring Patient Triage  COVID-19 Monitoring Level: Monitoring no longer needed       [facility] Flowsheet   1. Record COVID-19 Severity Index (qCSI):  2  2.Confirm appropriate patient and chart with two identifiers: Yes - continue to question #3   3. Days Since Onset of Symptoms: 19  4. Does patient have another adult at home to help take care of you: Yes - continue to question #5  5. Confirm O2 supply is working and there are no cannula problems: N/A  6. Is your breathing today better or worse? Better - continue to question #7  7. Are you able to tolerate fluids? Yes - Continue to question #8  8. Any vomiting or diarrhea in the last 24 hours? No - continue to question #9  9. Are you able to control your fevers? N/A  10. Are you able to control your cough? No - Review appropriate use of OTC cough suppressants  11. Current O2 Flow Rate: 0  12. Review ER precautions: present to ED or call 911 if experiencing severe shortness of breath: Yes  13. Confirm next VV: Appointment scheduled  14. Final disposition: Stable at home  15. Time Spent: 10        Review of Systems   Constitutional: Negative for chills and fever.   HENT: Negative for congestion.    Respiratory: Positive for cough. Negative for shortness of breath.    Cardiovascular: Negative for chest pain.   Gastrointestinal: Negative for abdominal pain, diarrhea, nausea and vomiting.   Genitourinary: Negative.     Musculoskeletal: Negative.    Skin: Negative for rash.   Neurological: Negative for dizziness.          Objective:     Pulse 85   Temp 36.1 °C (97 °F)   Resp (!) 21   SpO2 92%      Physical Exam  Vitals signs and nursing note reviewed.   Constitutional:       Appearance: Normal appearance. He is well-developed.   HENT:      Head: Normocephalic and atraumatic.      Right Ear: External ear normal.      Left Ear: External ear normal.      Nose: Nose normal.   Eyes:      Conjunctiva/sclera: Conjunctivae normal.   Cardiovascular:      Rate and Rhythm: Normal rate.   Pulmonary:      Effort: Pulmonary effort is normal.   Musculoskeletal: Normal range of motion.   Neurological:      Mental Status: He is alert and oriented to person, place, and time.   Psychiatric:         Behavior: Behavior normal.                 PMH:  has a past medical history of Arthritis, Breath shortness, Depression, Diabetes (HCC), High cholesterol, Hyperlipidemia, and Hypertension.  MEDS:   Current Outpatient Medications:   •  erythromycin 5 MG/GM Ointment, Apply 1 cm to left eye 3 times a day., Disp: 1 g, Rfl: 0  •  benzonatate (TESSALON) 100 MG Cap, Take 1 Cap by mouth 3 times a day., Disp: 60 Cap, Rfl: 0  •  dexamethasone (DECADRON) 6 MG Tab, Take 1 Tab by mouth every day for 6 days. (Patient not taking: Reported on 12/14/2020), Disp: 6 Tab, Rfl: 0  •  guaiFENesin ER (MUCINEX) 600 MG TABLET SR 12 HR, Take 1 Tab by mouth every 12 hours., Disp: 28 Tab, Rfl: 0  •  acetaminophen (TYLENOL) 500 MG Tab, Take 1,000 mg by mouth 2 times a day as needed for Fever., Disp: , Rfl:   •  Multiple Vitamins-Minerals (ZINC PO), Take 1 Tab by mouth every morning., Disp: , Rfl:   •  Cholecalciferol (VITAMIN D3 PO), Take 1 Tab by mouth every morning., Disp: , Rfl:   •  EQ ALLERGY RELIEF, CETIRIZINE, 10 MG Tab, Take 1 tablet by mouth once daily (Patient taking differently: Take 10 mg by mouth every day.), Disp: 90 Tab, Rfl: 1  •  isosorbide mononitrate SR (IMDUR)  30 MG TABLET SR 24 HR, TAKE 1 TABLET BY MOUTH ONCE DAILY IN THE MORNING (Patient taking differently: Take 30 mg by mouth every morning.), Disp: 90 Tab, Rfl: 1  •  atorvastatin (LIPITOR) 40 MG Tab, TAKE 1 TABLET BY MOUTH ONCE DAILY IN THE EVENING (Patient taking differently: Take 40 mg by mouth every day.), Disp: 90 Tab, Rfl: 1  •  Empagliflozin-metFORMIN HCl (SYNJARDY) 12.5-1000 MG Tab, Take 1 Tab by mouth 2 Times a Day., Disp: 180 Tab, Rfl: 1  •  glipiZIDE (GLUCOTROL) 5 MG Tab, Take 1 Tab by mouth 2 times a day., Disp: 180 Tab, Rfl: 1  •  insulin glargine (LANTUS SOLOSTAR) 100 UNIT/ML Solution Pen-injector injection, Inject 32 Units under the skin every evening., Disp: 15 mL, Rfl: 1  •  traZODone (DESYREL) 100 MG Tab, TAKE 2 TABLETS BY MOUTH AT BEDTIME (Patient taking differently: Take 200 mg by mouth every bedtime. 2 tablets = 200 mg.), Disp: 180 Tab, Rfl: 1  •  TRULICITY 1.5 MG/0.5ML Solution Pen-injector, INJECT 1 SYRINGE SUBCUTANEOUSLY ONCE A WEEK AS DIRECTED (Patient taking differently: Inject 0.5 mL under the skin every 7 days. 0.5 mL = 1.5 mg. Every Friday.), Disp: 12 mL, Rfl: 3  •  losartan (COZAAR) 50 MG Tab, Take 1 tablet by mouth once daily (Patient taking differently: Take 50 mg by mouth every day.), Disp: 90 Tab, Rfl: 1  •  escitalopram (LEXAPRO) 20 MG tablet, Take 1 tablet by mouth once daily (Patient taking differently: Take 20 mg by mouth every day.), Disp: 90 Tab, Rfl: 3  •  EUTHYROX 25 MCG Tab, TAKE 1 TABLET BY MOUTH ONCE DAILY IN THE MORNING ON  AN  EMPTY  STOMACH (Patient taking differently: Take 25 mcg by mouth Every morning on an empty stomach.), Disp: 90 Tab, Rfl: 3  •  RELION PEN NEEDLE 31G/8MM, USE WITH LANTUS DAILY. (Patient not taking: Reported on 12/11/2020), Disp: 100 Each, Rfl: 3  •  TURMERIC CURCUMIN PO, Take 3 Tabs by mouth every morning., Disp: , Rfl:   •  aspirin 81 MG tablet, Take 81 mg by mouth every day., Disp: , Rfl:   •  Cinnamon 500 MG Cap, Take 1,000 mg by mouth every  morning. 2 capsules = 1000 mg., Disp: , Rfl:   •  loperamide (ANTI-DIARRHEAL) 2 MG Cap, Take 4 mg by mouth 1 time a day as needed for Diarrhea. 2 capsules = 4 mg., Disp: , Rfl:   •  5-Hydroxytryptophan (5-HTP) 100 MG Cap, Take 200 mg by mouth every bedtime. 2 capsules = 200 mg., Disp: , Rfl:   •  Omega-3 Fatty Acids (FISH OIL) 1000 MG Cap capsule, Take 2,000 mg by mouth every morning. 2 capsules = 2000 mg., Disp: , Rfl:   •  diphenhydrAMINE (BENADRYL ALLERGY) 25 MG capsule, Take 50 mg by mouth every bedtime., Disp: , Rfl:   •  Melatonin 1 MG Cap, Take 1 mg by mouth every bedtime., Disp: , Rfl:   •  therapeutic multivitamin-minerals (THERAGRAN-M) Tab, Take 1 Tab by mouth every morning., Disp: , Rfl:   ALLERGIES: No Known Allergies  SURGHX:   Past Surgical History:   Procedure Laterality Date   • ARTHROPLASTY Left     hip replacement   • CHOLECYSTECTOMY       SOCHX:  reports that he has never smoked. He quit smokeless tobacco use about 34 years ago.  His smokeless tobacco use included chew. He reports current alcohol use. He reports that he does not use drugs.  FH: family history includes Diabetes in his mother; Stroke in his father.    Assessment/Plan:        1. Pneumonia due to COVID-19 virus    - Continue home monitoring  - CSI of 2 at today's visit   - Patient has not required supplemental oxygen since being discharged from the hospital  - Encouraged increased fluids and rest, continue breathing exercises at home  - ED precautions again discussed  - Follow up tomorrow, 12/18/20, expect discharge from [facility] in 1-2 days

## 2020-12-18 ENCOUNTER — TELEMEDICINE (OUTPATIENT)
Dept: URGENT CARE | Facility: CLINIC | Age: 60
End: 2020-12-18
Payer: COMMERCIAL

## 2020-12-18 VITALS — HEART RATE: 96 BPM | RESPIRATION RATE: 21 BRPM | TEMPERATURE: 98 F | OXYGEN SATURATION: 93 %

## 2020-12-18 DIAGNOSIS — U07.1 PNEUMONIA DUE TO COVID-19 VIRUS: ICD-10-CM

## 2020-12-18 DIAGNOSIS — J12.82 PNEUMONIA DUE TO COVID-19 VIRUS: ICD-10-CM

## 2020-12-18 PROCEDURE — 99213 OFFICE O/P EST LOW 20 MIN: CPT | Mod: 95 | Performed by: NURSE PRACTITIONER

## 2020-12-18 ASSESSMENT — ENCOUNTER SYMPTOMS: COUGH: 1

## 2020-12-18 NOTE — PROGRESS NOTES
This evaluation was conducted via Zoom using secure and encrypted videoconferencing technology. The patient was in a private location in the state of [place].    The patient's identity was confirmed and verbal consent was obtained for this virtual visit.    Home Monitoring Patient Triage  COVID-19 Monitoring Level:         [facility] Flowsheet   1. Record COVID-19 Severity Index (qCSI):      2.Confirm appropriate patient and chart with two identifiers:   yes  3. Days Since Onset of Symptoms:  21  4. Does patient have another adult at home to help take care of you:  yes  5. Confirm O2 supply is working and there are no cannula problems:   yes  6. Is your breathing today better or worse?  better  7. Are you able to tolerate fluids?  yes  8. Any vomiting or diarrhea in the last 24 hours?  no  9. Are you able to control your fevers?  N/A  10. Are you able to control your cough?  yes  11. Current O2 Flow Rate:  N/A  12. Review ER precautions: present to ED or call 911 if experiencing severe shortness of breath:  yes  13. Confirm next VV:  yes  14. Final disposition:  stable at home  15. Time Spent:  15    Incentive spirometer: yes   Decadron: finished   Anticoagulants: no     Past Medical History:   Diagnosis Date   • Arthritis    • Breath shortness    • Depression     depression   • Diabetes (HCC)     oral medication   • High cholesterol    • Hyperlipidemia    • Hypertension      Social History     Socioeconomic History   • Marital status:      Spouse name: Not on file   • Number of children: Not on file   • Years of education: Not on file   • Highest education level: Not on file   Occupational History   • Not on file   Social Needs   • Financial resource strain: Not on file   • Food insecurity     Worry: Not on file     Inability: Not on file   • Transportation needs     Medical: Not on file     Non-medical: Not on file   Tobacco Use   • Smoking status: Never Smoker   • Smokeless tobacco: Former User      Types: Chew   Substance and Sexual Activity   • Alcohol use: Yes     Comment: 1 per month    • Drug use: No   • Sexual activity: Not on file   Lifestyle   • Physical activity     Days per week: Not on file     Minutes per session: Not on file   • Stress: Not on file   Relationships   • Social connections     Talks on phone: Not on file     Gets together: Not on file     Attends Synagogue service: Not on file     Active member of club or organization: Not on file     Attends meetings of clubs or organizations: Not on file     Relationship status: Not on file   • Intimate partner violence     Fear of current or ex partner: Not on file     Emotionally abused: Not on file     Physically abused: Not on file     Forced sexual activity: Not on file   Other Topics Concern   • Not on file   Social History Narrative   • Not on file     Family History   Problem Relation Age of Onset   • Diabetes Mother    • Stroke Father        Allergies: Patient has no known allergies.    Patient states he continues to improve today.  Is no longer on oxygen.  Has finished with Decadron.  Still has occasional cough.      .

## 2020-12-19 ENCOUNTER — TELEMEDICINE (OUTPATIENT)
Dept: URGENT CARE | Facility: CLINIC | Age: 60
End: 2020-12-19
Payer: COMMERCIAL

## 2020-12-19 VITALS — RESPIRATION RATE: 19 BRPM | OXYGEN SATURATION: 95 % | HEART RATE: 75 BPM

## 2020-12-19 DIAGNOSIS — J12.82 PNEUMONIA DUE TO COVID-19 VIRUS: ICD-10-CM

## 2020-12-19 DIAGNOSIS — E11.8 TYPE 2 DIABETES MELLITUS WITH COMPLICATION, WITHOUT LONG-TERM CURRENT USE OF INSULIN (HCC): ICD-10-CM

## 2020-12-19 DIAGNOSIS — U07.1 PNEUMONIA DUE TO COVID-19 VIRUS: ICD-10-CM

## 2020-12-19 DIAGNOSIS — U07.1 COVID-19: ICD-10-CM

## 2020-12-19 DIAGNOSIS — Z99.81 OXYGEN DEPENDENT: ICD-10-CM

## 2020-12-19 PROCEDURE — 99213 OFFICE O/P EST LOW 20 MIN: CPT | Mod: 95 | Performed by: PHYSICIAN ASSISTANT

## 2020-12-19 NOTE — PROGRESS NOTES
Virtual Visit: Established Patient   This visit was conducted via Zoom using secure and encrypted videoconferencing technology. The patient was in a private location in the state of [place].    The patient's identity was confirmed and verbal consent was obtained for this virtual visit.    Subjective:   CC:   Chief Complaint   Patient presents with   • Pneumonia     Home O2 monitoring   • Covid-19 Home Monitoring Video Visit       Farzad Bryant Jr. is a 60 y.o. male presenting for evaluation and management of:        Home Monitoring Patient Triage  COVID-19 Monitoring Level:         [facility]   1. Record COVID-19 Severity Index (qCSI):   0  2.Confirm appropriate patient and chart with two identifiers:     3. Days Since Onset of Symptoms:    4. Does patient have another adult at home to help take care of you:  Yes  5. Confirm O2 supply is working and there are no cannula problems:  N/A  6. Is your breathing today better or worse?  Better  7. Are you able to tolerate fluids?  Yes  8. Any vomiting or diarrhea in the last 24 hours?  No  9. Are you able to control your fevers?  N/A  10. Are you able to control your cough?  N/A  11. Current O2 Flow Rate:  0  12. Review ER precautions: present to ED or call 911 if experiencing severe shortness of breath:  Yes  13. Confirm next VV:  Discharged  14. Final disposition:  Discharged  15. Time Spent:  10    ROS   Denies any recent fevers or chills. No nausea or vomiting. No chest pains or shortness of breath.     No Known Allergies    Current medicines (including changes today)  Current Outpatient Medications   Medication Sig Dispense Refill   • erythromycin 5 MG/GM Ointment Apply 1 cm to left eye 3 times a day. 1 g 0   • benzonatate (TESSALON) 100 MG Cap Take 1 Cap by mouth 3 times a day. 60 Cap 0   • dexamethasone (DECADRON) 6 MG Tab Take 1 Tab by mouth every day for 6 days. (Patient not taking: Reported on 12/14/2020) 6 Tab 0   • guaiFENesin ER  (MUCINEX) 600 MG TABLET SR 12 HR Take 1 Tab by mouth every 12 hours. 28 Tab 0   • acetaminophen (TYLENOL) 500 MG Tab Take 1,000 mg by mouth 2 times a day as needed for Fever.     • Multiple Vitamins-Minerals (ZINC PO) Take 1 Tab by mouth every morning.     • Cholecalciferol (VITAMIN D3 PO) Take 1 Tab by mouth every morning.     • EQ ALLERGY RELIEF, CETIRIZINE, 10 MG Tab Take 1 tablet by mouth once daily (Patient taking differently: Take 10 mg by mouth every day.) 90 Tab 1   • isosorbide mononitrate SR (IMDUR) 30 MG TABLET SR 24 HR TAKE 1 TABLET BY MOUTH ONCE DAILY IN THE MORNING (Patient taking differently: Take 30 mg by mouth every morning.) 90 Tab 1   • atorvastatin (LIPITOR) 40 MG Tab TAKE 1 TABLET BY MOUTH ONCE DAILY IN THE EVENING (Patient taking differently: Take 40 mg by mouth every day.) 90 Tab 1   • Empagliflozin-metFORMIN HCl (SYNJARDY) 12.5-1000 MG Tab Take 1 Tab by mouth 2 Times a Day. 180 Tab 1   • glipiZIDE (GLUCOTROL) 5 MG Tab Take 1 Tab by mouth 2 times a day. 180 Tab 1   • insulin glargine (LANTUS SOLOSTAR) 100 UNIT/ML Solution Pen-injector injection Inject 32 Units under the skin every evening. 15 mL 1   • traZODone (DESYREL) 100 MG Tab TAKE 2 TABLETS BY MOUTH AT BEDTIME (Patient taking differently: Take 200 mg by mouth every bedtime. 2 tablets = 200 mg.) 180 Tab 1   • TRULICITY 1.5 MG/0.5ML Solution Pen-injector INJECT 1 SYRINGE SUBCUTANEOUSLY ONCE A WEEK AS DIRECTED (Patient taking differently: Inject 0.5 mL under the skin every 7 days. 0.5 mL = 1.5 mg. Every Friday.) 12 mL 3   • losartan (COZAAR) 50 MG Tab Take 1 tablet by mouth once daily (Patient taking differently: Take 50 mg by mouth every day.) 90 Tab 1   • escitalopram (LEXAPRO) 20 MG tablet Take 1 tablet by mouth once daily (Patient taking differently: Take 20 mg by mouth every day.) 90 Tab 3   • EUTHYROX 25 MCG Tab TAKE 1 TABLET BY MOUTH ONCE DAILY IN THE MORNING ON  AN  EMPTY  STOMACH (Patient taking differently: Take 25 mcg by mouth  Every morning on an empty stomach.) 90 Tab 3   • RELION PEN NEEDLE 31G/8MM USE WITH LANTUS DAILY. (Patient not taking: Reported on 12/11/2020) 100 Each 3   • TURMERIC CURCUMIN PO Take 3 Tabs by mouth every morning.     • aspirin 81 MG tablet Take 81 mg by mouth every day.     • Cinnamon 500 MG Cap Take 1,000 mg by mouth every morning. 2 capsules = 1000 mg.     • loperamide (ANTI-DIARRHEAL) 2 MG Cap Take 4 mg by mouth 1 time a day as needed for Diarrhea. 2 capsules = 4 mg.     • 5-Hydroxytryptophan (5-HTP) 100 MG Cap Take 200 mg by mouth every bedtime. 2 capsules = 200 mg.     • Omega-3 Fatty Acids (FISH OIL) 1000 MG Cap capsule Take 2,000 mg by mouth every morning. 2 capsules = 2000 mg.     • diphenhydrAMINE (BENADRYL ALLERGY) 25 MG capsule Take 50 mg by mouth every bedtime.     • Melatonin 1 MG Cap Take 1 mg by mouth every bedtime.     • therapeutic multivitamin-minerals (THERAGRAN-M) Tab Take 1 Tab by mouth every morning.       No current facility-administered medications for this visit.        Patient Active Problem List    Diagnosis Date Noted   • Pneumonia due to COVID-19 virus 12/11/2020   • Hernia of anterior abdominal wall 10/16/2020   • Tendonitis 03/04/2020   • Recurrent pain of right knee 10/30/2019   • Cough 11/29/2018   • Allergic rhinitis 11/29/2018   • Elevated blood pressure reading 07/14/2018   • Atypical chest pain 07/13/2018   • Abnormal nuclear cardiac imaging test 07/13/2018   • Essential hypertension, benign 07/13/2018   • Left wrist pain 07/12/2018   • Costochondritis 07/11/2018   • Dry skin 03/28/2018   • Acquired hypothyroidism 09/28/2017   • Type 2 diabetes mellitus with complication, without long-term current use of insulin (HCC) 09/28/2017   • Dyslipidemia 09/28/2017   • Primary insomnia 09/28/2017   • Episode of recurrent major depressive disorder (HCC) 09/28/2017   • Chronic shoulder pain 09/28/2017   • Obesity (BMI 30-39.9) 09/28/2017       Family History   Problem Relation Age of  Onset   • Diabetes Mother    • Stroke Father        He  has a past medical history of Arthritis, Breath shortness, Depression, Diabetes (HCC), High cholesterol, Hyperlipidemia, and Hypertension.  He  has a past surgical history that includes arthroplasty (Left) and cholecystectomy.       Objective:   Pulse 75   Resp 19   SpO2 95%     Physical Exam:  Constitutional: Alert, no distress, well-groomed.  Skin: No rashes in visible areas.  Eye: Round. Conjunctiva clear, lids normal. No icterus.   ENMT: Lips pink without lesions, good dentition, moist mucous membranes. Phonation normal.  Neck: No masses, no thyromegaly. Moves freely without pain.  Respiratory: Unlabored respiratory effort, no cough or audible wheeze  Psych: Alert and oriented x3, normal affect and mood.       Assessment and Plan:   The following treatment plan was discussed:     1. Pneumonia due to COVID-19 virus    2. Type 2 diabetes mellitus with complication, without long-term current use of insulin (Former[facility])    3. COVID-19    4. Oxygen dependent    Patient severity score 0.  Not on oxygen.  No further concerns or complaints.  He is graduated from the program.    Follow-up: No follow-ups on file.

## 2020-12-19 NOTE — PROGRESS NOTES
Subjective:      Farzad Bryant Jr. is a 60 y.o. male who presents with No chief complaint on file.    Past Medical History:   Diagnosis Date   • Arthritis    • Breath shortness    • Depression     depression   • Diabetes (HCC)     oral medication   • High cholesterol    • Hyperlipidemia    • Hypertension      Social History     Socioeconomic History   • Marital status:      Spouse name: Not on file   • Number of children: Not on file   • Years of education: Not on file   • Highest education level: Not on file   Occupational History   • Not on file   Social Needs   • Financial resource strain: Not on file   • Food insecurity     Worry: Not on file     Inability: Not on file   • Transportation needs     Medical: Not on file     Non-medical: Not on file   Tobacco Use   • Smoking status: Never Smoker   • Smokeless tobacco: Former User     Types: Chew   Substance and Sexual Activity   • Alcohol use: Yes     Comment: 1 per month    • Drug use: No   • Sexual activity: Not on file   Lifestyle   • Physical activity     Days per week: Not on file     Minutes per session: Not on file   • Stress: Not on file   Relationships   • Social connections     Talks on phone: Not on file     Gets together: Not on file     Attends Religion service: Not on file     Active member of club or organization: Not on file     Attends meetings of clubs or organizations: Not on file     Relationship status: Not on file   • Intimate partner violence     Fear of current or ex partner: Not on file     Emotionally abused: Not on file     Physically abused: Not on file     Forced sexual activity: Not on file   Other Topics Concern   • Not on file   Social History Narrative   • Not on file     Family History   Problem Relation Age of Onset   • Diabetes Mother    • Stroke Father        Allergies: Patient has no known allergies.    his evaluation was conducted via Zoom using secure and encrypted videoconferencing technology. The  patient was in a private location in the [facility].    The patient's identity was confirmed and verbal consent was obtained for this virtual visit.    Home Monitoring Patient Triage  COVID-19 Monitoring Level:         [facility]   1. Record COVID-19 Severity Index (qCSI):      2.Confirm appropriate patient and chart with two identifiers:   yes  3. Days Since Onset of Symptoms:  21  4. Does patient have another adult at home to help take care of you:  yes  5. Confirm O2 supply is working and there are no cannula problems:   yes  6. Is your breathing today better or worse?  better  7. Are you able to tolerate fluids?  yes  8. Any vomiting or diarrhea in the last 24 hours?  no  9. Are you able to control your fevers?  N/A  10. Are you able to control your cough?  yes  11. Current O2 Flow Rate:  N/A  12. Review ER precautions: present to ED or call 911 if experiencing severe shortness of breath:  yes  13. Confirm next VV:  yes  14. Final disposition:  stable at home  15. Time Spent:  15    Incentive spirometer: yes   Decadron: finished   Anticoagulants: no     Past Medical History:   Diagnosis Date   • Arthritis    • Breath shortness    • Depression     depression   • Diabetes (HCC)     oral medication   • High cholesterol    • Hyperlipidemia    • Hypertension      Social History     Socioeconomic History   • Marital status:      Spouse name: Not on file   • Number of children: Not on file   • Years of education: Not on file   • Highest education level: Not on file   Occupational History   • Not on file   Social Needs   • Financial resource strain: Not on file   • Food insecurity     Worry: Not on file     Inability: Not on file   • Transportation needs     Medical: Not on file     Non-medical: Not on file   Tobacco Use   • Smoking status: Never Smoker   • Smokeless tobacco: Former User     Types: Chew   Substance and Sexual Activity   • Alcohol use: Yes     Comment: 1 per month    • Drug  use: No   • Sexual activity: Not on file   Lifestyle   • Physical activity     Days per week: Not on file     Minutes per session: Not on file   • Stress: Not on file   Relationships   • Social connections     Talks on phone: Not on file     Gets together: Not on file     Attends Jain service: Not on file     Active member of club or organization: Not on file     Attends meetings of clubs or organizations: Not on file     Relationship status: Not on file   • Intimate partner violence     Fear of current or ex partner: Not on file     Emotionally abused: Not on file     Physically abused: Not on file     Forced sexual activity: Not on file   Other Topics Concern   • Not on file   Social History Narrative   • Not on file     Family History   Problem Relation Age of Onset   • Diabetes Mother    • Stroke Father        Allergies: Patient has no known allergies.    Patient states he continues to improve today.  Is no longer on oxygen.  Has finished with Decadron.  Still has occasional cough.            Other  This is a new problem. Associated symptoms include coughing. The treatment provided moderate relief.       Review of Systems   Constitutional: Positive for malaise/fatigue.   Respiratory: Positive for cough.    All other systems reviewed and are negative.         Objective:     Pulse 96   Temp 36.7 °C (98 °F)   Resp (!) 21   SpO2 93%      Physical Exam  Vitals signs reviewed.   Constitutional:       Appearance: Normal appearance.   HENT:      Head: Normocephalic.      Nose: Nose normal.   Neck:      Musculoskeletal: Normal range of motion and neck supple.   Pulmonary:      Effort: Pulmonary effort is normal.      Comments: Respirations are even and unlabored.  Patient speaks in full sentences  Neurological:      Mental Status: He is alert and oriented to person, place, and time.   Psychiatric:         Mood and Affect: Mood normal.         Behavior: Behavior normal.         Thought Content: Thought content  normal.         Judgment: Judgment normal.                 Assessment/Plan:        There are no diagnoses linked to this encounter.    Pneumonia due to COVID-19    Continue present medications  Strict ER precautions for severe shortness of breath, chest pain, weakness  Verified follow-up visit for tomorrow

## 2020-12-19 NOTE — PROGRESS NOTES
"Subjective:      Farzad Bryant Jr. is a 60 y.o. male who presents with Pneumonia (Home O2 monitoring)            Pneumonia        ROS       Objective:     Pulse 75   Resp 19   SpO2 95%      Physical Exam            Assessment/Plan:        1. Pneumonia due to COVID-19 virus  ***    2. Type 2 diabetes mellitus with complication, without long-term current use of insulin (HCC)  ***    Virtual Visit: Established Patient   This visit was conducted via Zoom using secure and encrypted videoconferencing technology. The patient was in a private location in the state of {State:[address]::\"[place]\"}.    The patient's identity was confirmed and verbal consent was obtained for this virtual visit.    Subjective:   CC:   Chief Complaint   Patient presents with   • Pneumonia     Home O2 monitoring   • Covid-19 Home Monitoring Video Visit       Farzad Bryant Jr. is a 60 y.o. male presenting for evaluation and management of:    ***    Home Monitoring Patient Triage  COVID-19 Monitoring Level:         [facility] Home Oxygen Flowsheet   1. Record COVID-19 Severity Index (qCSI):     2.Confirm appropriate patient and chart with two identifiers:     3. Days Since Onset of Symptoms:    4. Does patient have another adult at home to help take care of you:    5. Confirm O2 supply is working and there are no cannula problems:    6. Is your breathing today better or worse?    7. Are you able to tolerate fluids?    8. Any vomiting or diarrhea in the last 24 hours?    9. Are you able to control your fevers?    10. Are you able to control your cough?    11. Current O2 Flow Rate:    12. Review ER precautions: present to ED or call 911 if experiencing severe shortness of breath:    13. Confirm next VV:    14. Final disposition:    15. Time Spent:      ROS ***  Denies any recent fevers or chills. No nausea or vomiting. No chest pains or shortness of breath.     No Known Allergies    Current medicines (including changes today)  Current " Outpatient Medications   Medication Sig Dispense Refill   • erythromycin 5 MG/GM Ointment Apply 1 cm to left eye 3 times a day. 1 g 0   • benzonatate (TESSALON) 100 MG Cap Take 1 Cap by mouth 3 times a day. 60 Cap 0   • dexamethasone (DECADRON) 6 MG Tab Take 1 Tab by mouth every day for 6 days. (Patient not taking: Reported on 12/14/2020) 6 Tab 0   • guaiFENesin ER (MUCINEX) 600 MG TABLET SR 12 HR Take 1 Tab by mouth every 12 hours. 28 Tab 0   • acetaminophen (TYLENOL) 500 MG Tab Take 1,000 mg by mouth 2 times a day as needed for Fever.     • Multiple Vitamins-Minerals (ZINC PO) Take 1 Tab by mouth every morning.     • Cholecalciferol (VITAMIN D3 PO) Take 1 Tab by mouth every morning.     • EQ ALLERGY RELIEF, CETIRIZINE, 10 MG Tab Take 1 tablet by mouth once daily (Patient taking differently: Take 10 mg by mouth every day.) 90 Tab 1   • isosorbide mononitrate SR (IMDUR) 30 MG TABLET SR 24 HR TAKE 1 TABLET BY MOUTH ONCE DAILY IN THE MORNING (Patient taking differently: Take 30 mg by mouth every morning.) 90 Tab 1   • atorvastatin (LIPITOR) 40 MG Tab TAKE 1 TABLET BY MOUTH ONCE DAILY IN THE EVENING (Patient taking differently: Take 40 mg by mouth every day.) 90 Tab 1   • Empagliflozin-metFORMIN HCl (SYNJARDY) 12.5-1000 MG Tab Take 1 Tab by mouth 2 Times a Day. 180 Tab 1   • glipiZIDE (GLUCOTROL) 5 MG Tab Take 1 Tab by mouth 2 times a day. 180 Tab 1   • insulin glargine (LANTUS SOLOSTAR) 100 UNIT/ML Solution Pen-injector injection Inject 32 Units under the skin every evening. 15 mL 1   • traZODone (DESYREL) 100 MG Tab TAKE 2 TABLETS BY MOUTH AT BEDTIME (Patient taking differently: Take 200 mg by mouth every bedtime. 2 tablets = 200 mg.) 180 Tab 1   • TRULICITY 1.5 MG/0.5ML Solution Pen-injector INJECT 1 SYRINGE SUBCUTANEOUSLY ONCE A WEEK AS DIRECTED (Patient taking differently: Inject 0.5 mL under the skin every 7 days. 0.5 mL = 1.5 mg. Every Friday.) 12 mL 3   • losartan (COZAAR) 50 MG Tab Take 1 tablet by mouth once  daily (Patient taking differently: Take 50 mg by mouth every day.) 90 Tab 1   • escitalopram (LEXAPRO) 20 MG tablet Take 1 tablet by mouth once daily (Patient taking differently: Take 20 mg by mouth every day.) 90 Tab 3   • EUTHYROX 25 MCG Tab TAKE 1 TABLET BY MOUTH ONCE DAILY IN THE MORNING ON  AN  EMPTY  STOMACH (Patient taking differently: Take 25 mcg by mouth Every morning on an empty stomach.) 90 Tab 3   • RELION PEN NEEDLE 31G/8MM USE WITH LANTUS DAILY. (Patient not taking: Reported on 12/11/2020) 100 Each 3   • TURMERIC CURCUMIN PO Take 3 Tabs by mouth every morning.     • aspirin 81 MG tablet Take 81 mg by mouth every day.     • Cinnamon 500 MG Cap Take 1,000 mg by mouth every morning. 2 capsules = 1000 mg.     • loperamide (ANTI-DIARRHEAL) 2 MG Cap Take 4 mg by mouth 1 time a day as needed for Diarrhea. 2 capsules = 4 mg.     • 5-Hydroxytryptophan (5-HTP) 100 MG Cap Take 200 mg by mouth every bedtime. 2 capsules = 200 mg.     • Omega-3 Fatty Acids (FISH OIL) 1000 MG Cap capsule Take 2,000 mg by mouth every morning. 2 capsules = 2000 mg.     • diphenhydrAMINE (BENADRYL ALLERGY) 25 MG capsule Take 50 mg by mouth every bedtime.     • Melatonin 1 MG Cap Take 1 mg by mouth every bedtime.     • therapeutic multivitamin-minerals (THERAGRAN-M) Tab Take 1 Tab by mouth every morning.       No current facility-administered medications for this visit.        Patient Active Problem List    Diagnosis Date Noted   • Pneumonia due to COVID-19 virus 12/11/2020   • Hernia of anterior abdominal wall 10/16/2020   • Tendonitis 03/04/2020   • Recurrent pain of right knee 10/30/2019   • Cough 11/29/2018   • Allergic rhinitis 11/29/2018   • Elevated blood pressure reading 07/14/2018   • Atypical chest pain 07/13/2018   • Abnormal nuclear cardiac imaging test 07/13/2018   • Essential hypertension, benign 07/13/2018   • Left wrist pain 07/12/2018   • Costochondritis 07/11/2018   • Dry skin 03/28/2018   • Acquired hypothyroidism  09/28/2017   • Type 2 diabetes mellitus with complication, without long-term current use of insulin ([facility]) 09/28/2017   • Dyslipidemia 09/28/2017   • Primary insomnia 09/28/2017   • Episode of recurrent major depressive disorder ([facility]) 09/28/2017   • Chronic shoulder pain 09/28/2017   • Obesity (BMI 30-39.9) 09/28/2017       Family History   Problem Relation Age of Onset   • Diabetes Mother    • Stroke Father        He  has a past medical history of Arthritis, Breath shortness, Depression, Diabetes ([facility]), High cholesterol, Hyperlipidemia, and Hypertension.  He  has a past surgical history that includes arthroplasty (Left) and cholecystectomy.       Objective:   Pulse 75   Resp 19   SpO2 95%     Physical Exam:***  Constitutional: Alert, no distress, well-groomed.  Skin: No rashes in visible areas.  Eye: Round. Conjunctiva clear, lids normal. No icterus.   ENMT: Lips pink without lesions, good dentition, moist mucous membranes. Phonation normal.  Neck: No masses, no thyromegaly. Moves freely without pain.  Respiratory: Unlabored respiratory effort, no cough or audible wheeze  Psych: Alert and oriented x3, normal affect and mood.       Assessment and Plan:   The following treatment plan was discussed:     1. Pneumonia due to COVID-19 virus    2. Type 2 diabetes mellitus with complication, without long-term current use of insulin ([facility])    3. COVID-19    4. Oxygen dependent        Follow-up: No follow-ups on file.

## 2021-02-24 DIAGNOSIS — E11.8 TYPE 2 DIABETES MELLITUS WITH COMPLICATION, WITHOUT LONG-TERM CURRENT USE OF INSULIN (HCC): ICD-10-CM

## 2021-02-25 RX ORDER — INSULIN GLARGINE 100 [IU]/ML
INJECTION, SOLUTION SUBCUTANEOUS
Qty: 15 ML | Refills: 1 | Status: SHIPPED | OUTPATIENT
Start: 2021-02-25 | End: 2021-05-31 | Stop reason: SDUPTHER

## 2021-02-25 NOTE — TELEPHONE ENCOUNTER
Patient has recently been seen by PCP within the last 6 months per protocol (10/20). Will refill meds for 6 months.  Lab Results   Component Value Date/Time    HBA1C 6.1 (H) 10/12/2020 10:48 AM      Lab Results   Component Value Date/Time    MALBCRT see below 10/12/2020 10:48 AM    MICROALBUR <1.2 10/12/2020 10:48 AM      Lab Results   Component Value Date/Time    ALKPHOSPHAT 115 (H) 12/12/2020 09:44 AM    ASTSGOT 15 12/12/2020 09:44 AM    ALTSGPT 49 12/12/2020 09:44 AM    TBILIRUBIN 0.4 12/12/2020 09:44 AM

## 2021-03-02 ENCOUNTER — APPOINTMENT (OUTPATIENT)
Dept: RADIOLOGY | Facility: MEDICAL CENTER | Age: 61
End: 2021-03-02
Attending: EMERGENCY MEDICINE
Payer: COMMERCIAL

## 2021-03-02 ENCOUNTER — OFFICE VISIT (OUTPATIENT)
Dept: URGENT CARE | Facility: PHYSICIAN GROUP | Age: 61
End: 2021-03-02
Payer: COMMERCIAL

## 2021-03-02 ENCOUNTER — HOSPITAL ENCOUNTER (OUTPATIENT)
Facility: MEDICAL CENTER | Age: 61
End: 2021-03-05
Attending: EMERGENCY MEDICINE | Admitting: STUDENT IN AN ORGANIZED HEALTH CARE EDUCATION/TRAINING PROGRAM
Payer: COMMERCIAL

## 2021-03-02 ENCOUNTER — PATIENT MESSAGE (OUTPATIENT)
Dept: MEDICAL GROUP | Facility: PHYSICIAN GROUP | Age: 61
End: 2021-03-02

## 2021-03-02 VITALS
DIASTOLIC BLOOD PRESSURE: 80 MMHG | SYSTOLIC BLOOD PRESSURE: 128 MMHG | TEMPERATURE: 97.8 F | RESPIRATION RATE: 16 BRPM | HEART RATE: 110 BPM | HEIGHT: 74 IN | OXYGEN SATURATION: 98 % | BODY MASS INDEX: 30.42 KG/M2 | WEIGHT: 237 LBS

## 2021-03-02 DIAGNOSIS — R07.9 CHEST PAIN, UNSPECIFIED TYPE: ICD-10-CM

## 2021-03-02 DIAGNOSIS — R06.02 SHORTNESS OF BREATH: ICD-10-CM

## 2021-03-02 DIAGNOSIS — Z79.4 TYPE 2 DIABETES MELLITUS WITHOUT COMPLICATION, WITH LONG-TERM CURRENT USE OF INSULIN (HCC): ICD-10-CM

## 2021-03-02 DIAGNOSIS — R07.89 OTHER CHEST PAIN: ICD-10-CM

## 2021-03-02 DIAGNOSIS — Z86.16 PERSONAL HISTORY OF COVID-19: ICD-10-CM

## 2021-03-02 DIAGNOSIS — R06.02 SOB (SHORTNESS OF BREATH): ICD-10-CM

## 2021-03-02 DIAGNOSIS — E11.9 TYPE 2 DIABETES MELLITUS WITHOUT COMPLICATION, WITH LONG-TERM CURRENT USE OF INSULIN (HCC): ICD-10-CM

## 2021-03-02 DIAGNOSIS — E78.5 HYPERLIPIDEMIA, UNSPECIFIED HYPERLIPIDEMIA TYPE: ICD-10-CM

## 2021-03-02 DIAGNOSIS — I10 HYPERTENSION, UNSPECIFIED TYPE: ICD-10-CM

## 2021-03-02 LAB
ALBUMIN SERPL BCP-MCNC: 3.9 G/DL (ref 3.2–4.9)
ALBUMIN/GLOB SERPL: 1.5 G/DL
ALP SERPL-CCNC: 61 U/L (ref 30–99)
ALT SERPL-CCNC: 22 U/L (ref 2–50)
ANION GAP SERPL CALC-SCNC: 10 MMOL/L (ref 7–16)
AST SERPL-CCNC: 15 U/L (ref 12–45)
BASOPHILS # BLD AUTO: 0.4 % (ref 0–1.8)
BASOPHILS # BLD: 0.03 K/UL (ref 0–0.12)
BILIRUB SERPL-MCNC: 0.5 MG/DL (ref 0.1–1.5)
BUN SERPL-MCNC: 30 MG/DL (ref 8–22)
CALCIUM SERPL-MCNC: 9.3 MG/DL (ref 8.5–10.5)
CHLORIDE SERPL-SCNC: 104 MMOL/L (ref 96–112)
CO2 SERPL-SCNC: 23 MMOL/L (ref 20–33)
CREAT SERPL-MCNC: 0.94 MG/DL (ref 0.5–1.4)
EKG IMPRESSION: NORMAL
EOSINOPHIL # BLD AUTO: 0.12 K/UL (ref 0–0.51)
EOSINOPHIL NFR BLD: 1.5 % (ref 0–6.9)
ERYTHROCYTE [DISTWIDTH] IN BLOOD BY AUTOMATED COUNT: 47.8 FL (ref 35.9–50)
FLUAV RNA SPEC QL NAA+PROBE: NEGATIVE
FLUBV RNA SPEC QL NAA+PROBE: NEGATIVE
GLOBULIN SER CALC-MCNC: 2.6 G/DL (ref 1.9–3.5)
GLUCOSE SERPL-MCNC: 150 MG/DL (ref 65–99)
HCT VFR BLD AUTO: 42.1 % (ref 42–52)
HGB BLD-MCNC: 13.7 G/DL (ref 14–18)
IMM GRANULOCYTES # BLD AUTO: 0.14 K/UL (ref 0–0.11)
IMM GRANULOCYTES NFR BLD AUTO: 1.7 % (ref 0–0.9)
LYMPHOCYTES # BLD AUTO: 1.51 K/UL (ref 1–4.8)
LYMPHOCYTES NFR BLD: 18.8 % (ref 22–41)
MCH RBC QN AUTO: 30.3 PG (ref 27–33)
MCHC RBC AUTO-ENTMCNC: 32.5 G/DL (ref 33.7–35.3)
MCV RBC AUTO: 93.1 FL (ref 81.4–97.8)
MONOCYTES # BLD AUTO: 0.58 K/UL (ref 0–0.85)
MONOCYTES NFR BLD AUTO: 7.2 % (ref 0–13.4)
NEUTROPHILS # BLD AUTO: 5.66 K/UL (ref 1.82–7.42)
NEUTROPHILS NFR BLD: 70.4 % (ref 44–72)
NRBC # BLD AUTO: 0 K/UL
NRBC BLD-RTO: 0 /100 WBC
NT-PROBNP SERPL IA-MCNC: 65 PG/ML (ref 0–125)
PLATELET # BLD AUTO: 236 K/UL (ref 164–446)
PMV BLD AUTO: 10.5 FL (ref 9–12.9)
POTASSIUM SERPL-SCNC: 4 MMOL/L (ref 3.6–5.5)
PROT SERPL-MCNC: 6.5 G/DL (ref 6–8.2)
RBC # BLD AUTO: 4.52 M/UL (ref 4.7–6.1)
RSV RNA SPEC QL NAA+PROBE: NEGATIVE
SARS-COV-2 RNA RESP QL NAA+PROBE: NOTDETECTED
SODIUM SERPL-SCNC: 137 MMOL/L (ref 135–145)
SPECIMEN SOURCE: NORMAL
TROPONIN T SERPL-MCNC: 10 NG/L (ref 6–19)
TROPONIN T SERPL-MCNC: 10 NG/L (ref 6–19)
WBC # BLD AUTO: 8 K/UL (ref 4.8–10.8)

## 2021-03-02 PROCEDURE — G0378 HOSPITAL OBSERVATION PER HR: HCPCS

## 2021-03-02 PROCEDURE — 99220 PR INITIAL OBSERVATION CARE,LEVL III: CPT | Performed by: STUDENT IN AN ORGANIZED HEALTH CARE EDUCATION/TRAINING PROGRAM

## 2021-03-02 PROCEDURE — 99214 OFFICE O/P EST MOD 30 MIN: CPT | Performed by: PHYSICIAN ASSISTANT

## 2021-03-02 PROCEDURE — 80053 COMPREHEN METABOLIC PANEL: CPT

## 2021-03-02 PROCEDURE — C9803 HOPD COVID-19 SPEC COLLECT: HCPCS | Performed by: EMERGENCY MEDICINE

## 2021-03-02 PROCEDURE — 83880 ASSAY OF NATRIURETIC PEPTIDE: CPT

## 2021-03-02 PROCEDURE — 71045 X-RAY EXAM CHEST 1 VIEW: CPT

## 2021-03-02 PROCEDURE — A9270 NON-COVERED ITEM OR SERVICE: HCPCS | Performed by: EMERGENCY MEDICINE

## 2021-03-02 PROCEDURE — 700102 HCHG RX REV CODE 250 W/ 637 OVERRIDE(OP): Performed by: EMERGENCY MEDICINE

## 2021-03-02 PROCEDURE — 93005 ELECTROCARDIOGRAM TRACING: CPT | Performed by: STUDENT IN AN ORGANIZED HEALTH CARE EDUCATION/TRAINING PROGRAM

## 2021-03-02 PROCEDURE — 71275 CT ANGIOGRAPHY CHEST: CPT

## 2021-03-02 PROCEDURE — 84484 ASSAY OF TROPONIN QUANT: CPT

## 2021-03-02 PROCEDURE — 700117 HCHG RX CONTRAST REV CODE 255: Performed by: EMERGENCY MEDICINE

## 2021-03-02 PROCEDURE — 85025 COMPLETE CBC W/AUTO DIFF WBC: CPT

## 2021-03-02 PROCEDURE — 93005 ELECTROCARDIOGRAM TRACING: CPT | Performed by: EMERGENCY MEDICINE

## 2021-03-02 PROCEDURE — 99285 EMERGENCY DEPT VISIT HI MDM: CPT

## 2021-03-02 PROCEDURE — 0241U HCHG SARS-COV-2 COVID-19 NFCT DS RESP RNA 4 TRGT MIC: CPT

## 2021-03-02 PROCEDURE — 93005 ELECTROCARDIOGRAM TRACING: CPT

## 2021-03-02 RX ORDER — VIT C/B6/B5/MAGNESIUM/HERB 173 50-5-6-5MG
1500 CAPSULE ORAL DAILY
Status: SHIPPED | COMMUNITY
End: 2022-08-04

## 2021-03-02 RX ORDER — ASPIRIN 325 MG
325 TABLET ORAL DAILY
Status: DISCONTINUED | OUTPATIENT
Start: 2021-03-03 | End: 2021-03-04

## 2021-03-02 RX ORDER — AMOXICILLIN 250 MG
2 CAPSULE ORAL 2 TIMES DAILY
Status: DISCONTINUED | OUTPATIENT
Start: 2021-03-02 | End: 2021-03-05 | Stop reason: HOSPADM

## 2021-03-02 RX ORDER — POLYETHYLENE GLYCOL 3350 17 G/17G
1 POWDER, FOR SOLUTION ORAL
Status: DISCONTINUED | OUTPATIENT
Start: 2021-03-02 | End: 2021-03-05 | Stop reason: HOSPADM

## 2021-03-02 RX ORDER — ATORVASTATIN CALCIUM 40 MG/1
40 TABLET, FILM COATED ORAL
Status: DISCONTINUED | OUTPATIENT
Start: 2021-03-02 | End: 2021-03-05 | Stop reason: HOSPADM

## 2021-03-02 RX ORDER — DEXTROSE MONOHYDRATE 25 G/50ML
50 INJECTION, SOLUTION INTRAVENOUS
Status: DISCONTINUED | OUTPATIENT
Start: 2021-03-02 | End: 2021-03-05 | Stop reason: HOSPADM

## 2021-03-02 RX ORDER — LEVOTHYROXINE SODIUM 0.03 MG/1
25 TABLET ORAL
Status: DISCONTINUED | OUTPATIENT
Start: 2021-03-03 | End: 2021-03-05 | Stop reason: HOSPADM

## 2021-03-02 RX ORDER — ESCITALOPRAM OXALATE 10 MG/1
20 TABLET ORAL DAILY
Status: DISCONTINUED | OUTPATIENT
Start: 2021-03-03 | End: 2021-03-05 | Stop reason: HOSPADM

## 2021-03-02 RX ORDER — NITROGLYCERIN 0.4 MG/1
0.4 TABLET SUBLINGUAL ONCE
Status: COMPLETED | OUTPATIENT
Start: 2021-03-02 | End: 2021-03-02

## 2021-03-02 RX ORDER — VITAMIN B COMPLEX
1000 TABLET ORAL DAILY
COMMUNITY

## 2021-03-02 RX ORDER — ASPIRIN 81 MG/1
324 TABLET, CHEWABLE ORAL ONCE
Status: DISCONTINUED | OUTPATIENT
Start: 2021-03-02 | End: 2021-03-02

## 2021-03-02 RX ORDER — LOSARTAN POTASSIUM 50 MG/1
50 TABLET ORAL DAILY
Status: DISCONTINUED | OUTPATIENT
Start: 2021-03-03 | End: 2021-03-05 | Stop reason: HOSPADM

## 2021-03-02 RX ORDER — ASPIRIN 300 MG/1
300 SUPPOSITORY RECTAL DAILY
Status: DISCONTINUED | OUTPATIENT
Start: 2021-03-03 | End: 2021-03-04

## 2021-03-02 RX ORDER — BISACODYL 10 MG
10 SUPPOSITORY, RECTAL RECTAL
Status: DISCONTINUED | OUTPATIENT
Start: 2021-03-02 | End: 2021-03-05 | Stop reason: HOSPADM

## 2021-03-02 RX ORDER — ASPIRIN 81 MG/1
324 TABLET, CHEWABLE ORAL DAILY
Status: DISCONTINUED | OUTPATIENT
Start: 2021-03-03 | End: 2021-03-04

## 2021-03-02 RX ADMIN — NITROGLYCERIN 0.4 MG: 0.4 TABLET, ORALLY DISINTEGRATING SUBLINGUAL at 20:49

## 2021-03-02 RX ADMIN — ASPIRIN 324 MG: 81 TABLET, CHEWABLE ORAL at 19:06

## 2021-03-02 RX ADMIN — IOHEXOL 100 ML: 350 INJECTION, SOLUTION INTRAVENOUS at 21:23

## 2021-03-02 ASSESSMENT — FIBROSIS 4 INDEX
FIB4 SCORE: 0.3
FIB4 SCORE: 0.3
FIB4 SCORE: 0.8

## 2021-03-02 ASSESSMENT — ENCOUNTER SYMPTOMS: SHORTNESS OF BREATH: 1

## 2021-03-02 ASSESSMENT — LIFESTYLE VARIABLES: DO YOU DRINK ALCOHOL: NO

## 2021-03-03 ENCOUNTER — APPOINTMENT (OUTPATIENT)
Dept: RADIOLOGY | Facility: MEDICAL CENTER | Age: 61
End: 2021-03-03
Attending: STUDENT IN AN ORGANIZED HEALTH CARE EDUCATION/TRAINING PROGRAM
Payer: COMMERCIAL

## 2021-03-03 ENCOUNTER — APPOINTMENT (OUTPATIENT)
Dept: RADIOLOGY | Facility: MEDICAL CENTER | Age: 61
End: 2021-03-03
Attending: INTERNAL MEDICINE
Payer: COMMERCIAL

## 2021-03-03 PROBLEM — R06.02 SOB (SHORTNESS OF BREATH): Status: ACTIVE | Noted: 2021-03-03

## 2021-03-03 LAB
EKG IMPRESSION: NORMAL
GLUCOSE BLD-MCNC: 102 MG/DL (ref 65–99)
GLUCOSE BLD-MCNC: 105 MG/DL (ref 65–99)
GLUCOSE BLD-MCNC: 127 MG/DL (ref 65–99)
GLUCOSE BLD-MCNC: 74 MG/DL (ref 65–99)
GLUCOSE BLD-MCNC: 98 MG/DL (ref 65–99)
TROPONIN T SERPL-MCNC: 9 NG/L (ref 6–19)

## 2021-03-03 PROCEDURE — 700111 HCHG RX REV CODE 636 W/ 250 OVERRIDE (IP): Performed by: STUDENT IN AN ORGANIZED HEALTH CARE EDUCATION/TRAINING PROGRAM

## 2021-03-03 PROCEDURE — 700102 HCHG RX REV CODE 250 W/ 637 OVERRIDE(OP): Performed by: STUDENT IN AN ORGANIZED HEALTH CARE EDUCATION/TRAINING PROGRAM

## 2021-03-03 PROCEDURE — 84484 ASSAY OF TROPONIN QUANT: CPT

## 2021-03-03 PROCEDURE — A9270 NON-COVERED ITEM OR SERVICE: HCPCS

## 2021-03-03 PROCEDURE — 93010 ELECTROCARDIOGRAM REPORT: CPT | Mod: 59 | Performed by: INTERNAL MEDICINE

## 2021-03-03 PROCEDURE — 700102 HCHG RX REV CODE 250 W/ 637 OVERRIDE(OP): Performed by: NURSE PRACTITIONER

## 2021-03-03 PROCEDURE — 93017 CV STRESS TEST TRACING ONLY: CPT | Performed by: STUDENT IN AN ORGANIZED HEALTH CARE EDUCATION/TRAINING PROGRAM

## 2021-03-03 PROCEDURE — A9270 NON-COVERED ITEM OR SERVICE: HCPCS | Performed by: NURSE PRACTITIONER

## 2021-03-03 PROCEDURE — 75574 CT ANGIO HRT W/3D IMAGE: CPT

## 2021-03-03 PROCEDURE — 700102 HCHG RX REV CODE 250 W/ 637 OVERRIDE(OP)

## 2021-03-03 PROCEDURE — A9270 NON-COVERED ITEM OR SERVICE: HCPCS | Performed by: INTERNAL MEDICINE

## 2021-03-03 PROCEDURE — 93018 CV STRESS TEST I&R ONLY: CPT | Performed by: INTERNAL MEDICINE

## 2021-03-03 PROCEDURE — 700102 HCHG RX REV CODE 250 W/ 637 OVERRIDE(OP): Performed by: INTERNAL MEDICINE

## 2021-03-03 PROCEDURE — G0378 HOSPITAL OBSERVATION PER HR: HCPCS

## 2021-03-03 PROCEDURE — 700117 HCHG RX CONTRAST REV CODE 255: Performed by: INTERNAL MEDICINE

## 2021-03-03 PROCEDURE — A9270 NON-COVERED ITEM OR SERVICE: HCPCS | Performed by: STUDENT IN AN ORGANIZED HEALTH CARE EDUCATION/TRAINING PROGRAM

## 2021-03-03 PROCEDURE — 82962 GLUCOSE BLOOD TEST: CPT | Mod: 91

## 2021-03-03 PROCEDURE — 96372 THER/PROPH/DIAG INJ SC/IM: CPT

## 2021-03-03 PROCEDURE — 99215 OFFICE O/P EST HI 40 MIN: CPT | Mod: 25 | Performed by: INTERNAL MEDICINE

## 2021-03-03 RX ORDER — ACETAMINOPHEN 325 MG/1
TABLET ORAL
Status: COMPLETED
Start: 2021-03-03 | End: 2021-03-03

## 2021-03-03 RX ORDER — CHOLECALCIFEROL (VITAMIN D3) 125 MCG
5 CAPSULE ORAL NIGHTLY PRN
Status: DISCONTINUED | OUTPATIENT
Start: 2021-03-03 | End: 2021-03-05 | Stop reason: HOSPADM

## 2021-03-03 RX ORDER — TRAZODONE HYDROCHLORIDE 50 MG/1
100 TABLET ORAL
Status: DISCONTINUED | OUTPATIENT
Start: 2021-03-03 | End: 2021-03-05 | Stop reason: HOSPADM

## 2021-03-03 RX ORDER — NITROGLYCERIN 0.4 MG/1
0.4 TABLET SUBLINGUAL PRN
Status: DISCONTINUED | OUTPATIENT
Start: 2021-03-03 | End: 2021-03-05 | Stop reason: HOSPADM

## 2021-03-03 RX ORDER — CHOLECALCIFEROL (VITAMIN D3) 125 MCG
CAPSULE ORAL
Status: COMPLETED
Start: 2021-03-03 | End: 2021-03-03

## 2021-03-03 RX ORDER — ACETAMINOPHEN 325 MG/1
650 TABLET ORAL EVERY 6 HOURS PRN
Status: DISCONTINUED | OUTPATIENT
Start: 2021-03-03 | End: 2021-03-05 | Stop reason: HOSPADM

## 2021-03-03 RX ADMIN — TRAZODONE HYDROCHLORIDE 100 MG: 50 TABLET ORAL at 22:33

## 2021-03-03 RX ADMIN — ATORVASTATIN CALCIUM 40 MG: 40 TABLET, FILM COATED ORAL at 22:33

## 2021-03-03 RX ADMIN — ACETAMINOPHEN: 325 TABLET, FILM COATED ORAL at 02:45

## 2021-03-03 RX ADMIN — DOCUSATE SODIUM 50 MG AND SENNOSIDES 8.6 MG 2 TABLET: 8.6; 5 TABLET, FILM COATED ORAL at 05:25

## 2021-03-03 RX ADMIN — ESCITALOPRAM OXALATE 20 MG: 10 TABLET ORAL at 05:25

## 2021-03-03 RX ADMIN — NITROGLYCERIN 0.4 MG: 0.4 TABLET, ORALLY DISINTEGRATING SUBLINGUAL at 13:47

## 2021-03-03 RX ADMIN — Medication 5 MG: at 22:34

## 2021-03-03 RX ADMIN — ASPIRIN 325 MG ORAL TABLET 325 MG: 325 PILL ORAL at 18:39

## 2021-03-03 RX ADMIN — LEVOTHYROXINE SODIUM 25 MCG: 25 TABLET ORAL at 05:25

## 2021-03-03 RX ADMIN — Medication: at 02:45

## 2021-03-03 RX ADMIN — ENOXAPARIN SODIUM 40 MG: 40 INJECTION SUBCUTANEOUS at 05:25

## 2021-03-03 RX ADMIN — METOPROLOL TARTRATE 50 MG: 25 TABLET, FILM COATED ORAL at 14:31

## 2021-03-03 RX ADMIN — METOPROLOL TARTRATE 50 MG: 25 TABLET, FILM COATED ORAL at 13:47

## 2021-03-03 RX ADMIN — LOSARTAN POTASSIUM 50 MG: 50 TABLET, FILM COATED ORAL at 05:25

## 2021-03-03 RX ADMIN — IOHEXOL 100 ML: 350 INJECTION, SOLUTION INTRAVENOUS at 15:38

## 2021-03-03 RX ADMIN — ACETAMINOPHEN 650 MG: 325 TABLET, FILM COATED ORAL at 16:24

## 2021-03-03 RX ADMIN — NITROGLYCERIN 0.4 MG: 0.4 TABLET, ORALLY DISINTEGRATING SUBLINGUAL at 15:24

## 2021-03-03 ASSESSMENT — ENCOUNTER SYMPTOMS
ABDOMINAL PAIN: 0
NAUSEA: 0
FEVER: 0
DIZZINESS: 0
SPUTUM PRODUCTION: 0
WEAKNESS: 0
ORTHOPNEA: 1
NECK PAIN: 0
DIARRHEA: 0
PALPITATIONS: 0
CLAUDICATION: 0
HEARTBURN: 1
SHORTNESS OF BREATH: 1
CHILLS: 0
HEADACHES: 0
COUGH: 0
BACK PAIN: 0
VOMITING: 0
WHEEZING: 0
HEMOPTYSIS: 0

## 2021-03-03 ASSESSMENT — PAIN DESCRIPTION - PAIN TYPE
TYPE: ACUTE PAIN
TYPE: ACUTE PAIN

## 2021-03-03 ASSESSMENT — LIFESTYLE VARIABLES
TOTAL SCORE: 0
ON A TYPICAL DAY WHEN YOU DRINK ALCOHOL HOW MANY DRINKS DO YOU HAVE: 0
TOTAL SCORE: 0
HOW MANY TIMES IN THE PAST YEAR HAVE YOU HAD 5 OR MORE DRINKS IN A DAY: 0
AVERAGE NUMBER OF DAYS PER WEEK YOU HAVE A DRINK CONTAINING ALCOHOL: 0
CONSUMPTION TOTAL: NEGATIVE
ALCOHOL_USE: NO
TOTAL SCORE: 0
HAVE PEOPLE ANNOYED YOU BY CRITICIZING YOUR DRINKING: NO
EVER FELT BAD OR GUILTY ABOUT YOUR DRINKING: NO
EVER HAD A DRINK FIRST THING IN THE MORNING TO STEADY YOUR NERVES TO GET RID OF A HANGOVER: NO
HAVE YOU EVER FELT YOU SHOULD CUT DOWN ON YOUR DRINKING: NO

## 2021-03-03 ASSESSMENT — PATIENT HEALTH QUESTIONNAIRE - PHQ9
1. LITTLE INTEREST OR PLEASURE IN DOING THINGS: NOT AT ALL
2. FEELING DOWN, DEPRESSED, IRRITABLE, OR HOPELESS: NOT AT ALL
SUM OF ALL RESPONSES TO PHQ9 QUESTIONS 1 AND 2: 0

## 2021-03-03 NOTE — DISCHARGE PLANNING
Care Transition Team Assessment    Spoke with patient at bedside and verified all information. Lives with Spouse and adult son in single story house in [facility]. Uses no DME. PCP Cleo MARES. Has [employer] insurance. Uses [employer] in [place]. Spouse will be ride @ D/C. Anticipate no needs @ present time.    Information Source  Orientation : Oriented x 4  Information Given By: Patient    Readmission Evaluation  Is this a readmission?: No    Interdisciplinary Discharge Planning  Primary Care Physician: Cleo MARES  Lives with - Patient's Self Care Capacity: Spouse, Adult Children  Patient or legal guardian wants to designate a caregiver: No  Support Systems: Children, Spouse / Significant Other  Housing / Facility: 1 [address] [place]  Do You Take your Prescribed Medications Regularly: Yes  Able to Return to Previous ADL's: Yes  Mobility Issues: No  Prior Services: Home-Independent  Patient Prefers to be Discharged to:: Home  Assistance Needed: No  Durable Medical Equipment: Not Applicable    Discharge Preparedness  What are your discharge supports?: Child, Spouse  Prior Functional Level: Ambulatory    Functional Assesment  Prior Functional Level: Ambulatory    Finances  Prescription Coverage: Yes    Anticipated Discharge Information  Discharge Address: [address] [place] [address]  Discharge Contact Phone Number: [phone]

## 2021-03-03 NOTE — PROGRESS NOTES
Report received from ANAI Inman.  Patient resting in bed watching TV.  POC gone over with patient and all questions answered.  Patient A & O  X 4.  No apparent signs of distress.  Safety precautions in place.  Patient educated to call for assistance.  Will continue to monitor.

## 2021-03-03 NOTE — CARE PLAN
Problem: Communication  Goal: The ability to communicate needs accurately and effectively will improve  Outcome: PROGRESSING AS EXPECTED     Problem: Safety  Goal: Will remain free from falls  Outcome: PROGRESSING AS EXPECTED     Problem: Pain Management  Goal: Pain level will decrease to patient's comfort goal  Outcome: PROGRESSING AS EXPECTED     Problem: Knowledge Deficit  Goal: Knowledge of disease process/condition, treatment plan, diagnostic tests, and medications will improve  Outcome: PROGRESSING AS EXPECTED

## 2021-03-03 NOTE — ASSESSMENT & PLAN NOTE
Stress test done this admission was negative for reversible ischemia or infarction, but did have worsening of chest pain.   Cardiology was consulted   CTA chest showed LAD with greater than 50% stenosis  Planning for cardiac cath tomorrow. NPO at midnight   Echo normal   Telemetry monitoring    mg daily ,Atorvastatin 40 mg hs , Losartan 50 mg daily   Nitroglycerin 0.4 mg hs PRN for chest pain

## 2021-03-03 NOTE — ED PROVIDER NOTES
ED Provider Note    Chief Complaint:   Chest pain    HPI:  Farzad Bryant Jr. is a very pleasant 60-year-old gentleman who presents for evaluation of substernal chest pain.  He has a past medical history significant for insulin-dependent diabetes, hyperlipidemia, as well as hypertension, all well controlled on medication.  For the past 3 to 4 days, he reports substernal chest pressure, but does seem to be worse with exertion.  Pressure radiates to the left arm.  Additionally he has had some mild headaches, body aches, and leg pain.  He also reports associated shortness of breath that is worse with exertion as well.  He has not had any neurologic symptoms, no unilateral upper or lower extremity weakness, no facial droop, reports no slurred speech.  He does report a cardiac history significant for 40% coronary artery occlusion on angiography done about 2 years ago, no stents were placed at that time.  The procedure was done here at [facility].  He has not had any recent fevers, denies any unilateral lower extremity edema, no history of DVT or pulmonary embolism.  Past medical history is also significant for COVID-19 diagnosis as well as recovery in December 2020, about 2 to 3 months ago.  He is not able to identify any alleviating factors excepting rest, symptoms do seem to be worsened by exertion as noted above.    Review of Systems:  See HPI for pertinent positives and negatives. All other systems negative.    Past Medical History:   has a past medical history of Arthritis, Breath shortness, Depression, Diabetes (HCC), High cholesterol, Hyperlipidemia, and Hypertension.    Social History:  Social History     Tobacco Use   • Smoking status: Never Smoker   • Smokeless tobacco: Former User     Types: Chew   Substance and Sexual Activity   • Alcohol use: Yes     Comment: 1 per month    • Drug use: No   • Sexual activity: Not on file       Surgical History:   has a past surgical history that includes arthroplasty  "(Left) and cholecystectomy.    Current Medications:  Home Medications     Reviewed by Giselle Schulz (Pharmacy Tech) on 03/02/21 at 2156  Med List Status: Complete   Medication Last Dose Status   5-Hydroxytryptophan (5-HTP) 100 MG Cap 3/1/2021 Active   acetaminophen (TYLENOL) 500 MG Tab 3/1/2021 Active   aspirin EC (ECOTRIN) 81 MG Tablet Delayed Response 3/2/2021 Active   atorvastatin (LIPITOR) 40 MG Tab 3/1/2021 Active   Cinnamon 500 MG Cap 3/2/2021 Active   Empagliflozin-metFORMIN HCl (SYNJARDY) 12.5-1000 MG Tab 3/2/2021 Active   EQ ALLERGY RELIEF, CETIRIZINE, 10 MG Tab Not Taking Active   escitalopram (LEXAPRO) 20 MG tablet 3/2/2021 Active   EUTHYROX 25 MCG Tab 3/2/2021 Active   glipiZIDE (GLUCOTROL) 5 MG Tab 3/2/2021 Active   guaiFENesin ER (MUCINEX) 600 MG TABLET SR 12 HR Not Taking Active   isosorbide mononitrate SR (IMDUR) 30 MG TABLET SR 24 HR 3/2/2021 Active   LANTUS SOLOSTAR 100 UNIT/ML Solution Pen-injector injection 3/1/2021 Active   losartan (COZAAR) 50 MG Tab 3/2/2021 Active   Melatonin 1 MG Cap 3/1/2021 Active   Omega-3 Fatty Acids (FISH OIL) 1000 MG Cap capsule 3/2/2021 Active   RELION PEN NEEDLE 31G/8MM Not Taking Active   therapeutic multivitamin-minerals (THERAGRAN-M) Tab 3/2/2021 Active   traZODone (DESYREL) 100 MG Tab 3/1/2021 Active   TRULICITY 1.5 MG/0.5ML Solution Pen-injector 2/28/2021 Active   Turmeric 500 MG Cap 3/2/2021 Active   vitamin D (CHOLECALCIFEROL) 1000 Unit (25 mcg) Tab 3/2/2021 Active                Allergies:  No Known Allergies    Physical Exam:  Vital Signs: /74   Pulse 94   Temp 36.2 °C (97.2 °F) (Temporal)   Resp 16   Ht 1.88 m (6' 2\")   Wt 110 kg (242 lb)   SpO2 96%   BMI 31.07 kg/m²   Constitutional: Alert, no acute distress  HENT: Normocephalic, mask in place  Eyes: Pupils equal and reactive, normal conjunctiva  Neck: Supple, normal range of motion, no stridor  Cardiovascular: Extremities are warm and well perfused, no murmur appreciated, normal cardiac " auscultation  Pulmonary: No respiratory distress, normal work of breathing, no accessory muscule usage, breath sounds clear and equal bilaterally, no wheezing, no coarse breath sounds  Abdomen: Soft, non-distended, non-tender to palpation, no peritoneal signs  Skin: Warm, dry, no rashes or lesions  Musculoskeletal: Normal range of motion in all extremities, no swelling or deformity noted, no unilateral edema  Neurologic: Alert, oriented, normal speech, normal motor function  Psychiatric: Normal and appropriate mood and affect    Medical records reviewed for continuity of care.  PA note from [place] urgent care reviewed from earlier today.  Patient presented for evaluation of chest pain that began 2/27/2021.  Noted that he was diagnosed with Covid in December 2020.  He also reports a history of MI as well as coronary artery disease and prior cardiac stents.  Due to high cardiac risk, and inability to adequately evaluate care setting, he was sent to the emergency department for a higher level of care.  Additionally, prior medical records note that the patient was admitted for COVID-19 pneumonia, discharged 12/13/2020.  He did have a negative CTA of the chest performed at that time.  I did review his discharge summary from admission 4/2019.  Patient noted to have a nuclear cardiac stress test in July 2018 which showed prior infarct with minimal lili-infarct ischemia.  Also noted that he had a cardiac catheterization in July 2018 which showed nonobstructive coronary artery disease, cardiology recommended medical therapy.    EKG: Rate 95, normal sinus rhythm, no ST elevation or depression, no T wave inversions, no significant change from prior.    Labs:  Labs Reviewed   CBC WITH DIFFERENTIAL - Abnormal; Notable for the following components:       Result Value    RBC 4.52 (*)     Hemoglobin 13.7 (*)     MCHC 32.5 (*)     Lymphocytes 18.80 (*)     Immature Granulocytes 1.70 (*)     Immature Granulocytes (abs) 0.14 (*)      All other components within normal limits   COMP METABOLIC PANEL - Abnormal; Notable for the following components:    Glucose 150 (*)     Bun 30 (*)     All other components within normal limits   TROPONIN   TROPONIN   PROBRAIN NATRIURETIC PEPTIDE, NT   ESTIMATED GFR   COV-2, FLU A/B, AND RSV BY PCR    Narrative:     Have you been in close contact with a person who is suspected  or known to be positive for COVID-19 within the last 30 days  (e.g. last seen that person < 30 days ago)->No       Radiology:  CT-CTA CHEST PULMONARY ARTERY W/ RECONS   Final Result         1.  No pulmonary embolus appreciated.   2.  Atherosclerosis and atherosclerotic coronary artery disease      DX-CHEST-PORTABLE (1 VIEW)   Final Result      1.  There is no acute cardiopulmonary process.           ED Medications Administered:  Medications   nitroglycerin (NITROSTAT) tablet 0.4 mg (0.4 mg Sublingual Given 3/2/21 2049)   iohexol (OMNIPAQUE) 350 mg/mL (100 mL Intravenous Given 3/2/21 2123)       Differential diagnosis:  Pulmonary embolism, acute coronary syndrome, pneumonia, pleural effusion, myocarditis, fluid overload or heart failure    MDM:  Patient presents for evaluation of 3 to 4 days of substernal chest pain, intermittent, exertional, though not entirely resolving with rest.  In the emergency department he is still having chest discomfort, that is described as mild.  Due to history of COVID-19, he is at increased risk for pulmonary embolism.  His EKG is reassuring without acute ischemic changes.    On laboratory evaluation he has a normal white blood count, this is less concerning for infectious etiology such as pneumonia.  Hemoglobin is 13.7, this is consistent with his baseline values.  High-sensitivity troponin is negative, less concerning for acute myocardial injury.  proBNP is within normal limits, less concerning for fluid overload or new onset heart failure.  2-hour troponin is pending, COVID-19 swab sent, that result is  pending at this time.    Chest x-ray is negative for acute process.  CTA demonstrates no evidence of pulmonary embolism.  Atherosclerosis and atherosclerotic coronary artery disease noted on CT.    At this time, EKG, lab work, and imaging are reassuring, however given his \ cardiac history as well as risk factors, plan at this time is for admission to hospitalist service for observation, and further diagnostics if indicated.  He has not had any new or worsening symptoms throughout his period of observation in the emergency department.  Heart score is 5 indicating moderate risk of major adverse cardiac event.    Personal protective equipment including N95 surgical respirator, goggles, and gloves were used during this encounter.       Disposition:  Admit to hospitalist service in guarded condition    Final Impression:  1. Chest pain, unspecified type    2. Shortness of breath    3. Hypertension, unspecified type    4. Type 2 diabetes mellitus without complication, with long-term current use of insulin (HCC)    5. Hyperlipidemia, unspecified hyperlipidemia type    6. Personal history of covid-19        Electronically signed by: Magdalena Qureshi MD, 3/2/2021 11:04 PM

## 2021-03-03 NOTE — INFECTION CONTROL
This patient is considered COVID RECOVERED.    Patient initially tested positive for COVID on 12/2/2020.  Patient is greater than or equal to 21 days from symptom onset and/or positive test, with symptom improvement.  Per the CDC guidance, this patient no longer requires transmission based precautions.  Patient may be placed on any unit per the bed assignment policy (except CNU), including placement in a semi-private room with a roommate.

## 2021-03-03 NOTE — PROGRESS NOTES
Chief Complaint   Patient presents with   • Chest Pain     x 3 days    • Shortness of Breath       HISTORY OF PRESENT ILLNESS: Patient is a 60 y.o. male who presents today for the following:    Patient comes in for evaluation of chest pain that started 2/27/2021.  The pain is worse with exertion reports associated shortness of breath and neck pain.  He denies jaw pain in any worsening arm pain.  He has chronic left shoulder and elbow pain.  He denies a recent illness and injury.  He was diagnosed with Covid December 2020.  He denies a history of MI as well as stents but states he does have 40% blockage in one of his arteries.  Cardiology told him he was not ready to be stented at that time.  He has not seen cardiology in a long time.  He took 1 baby aspirin this morning.  Today he started feeling lightheaded and dizzy with exertion which brought him into the urgent care today.    Patient Active Problem List    Diagnosis Date Noted   • Pneumonia due to COVID-19 virus 12/11/2020   • Hernia of anterior abdominal wall 10/16/2020   • Tendonitis 03/04/2020   • Recurrent pain of right knee 10/30/2019   • Cough 11/29/2018   • Allergic rhinitis 11/29/2018   • Elevated blood pressure reading 07/14/2018   • Atypical chest pain 07/13/2018   • Abnormal nuclear cardiac imaging test 07/13/2018   • Essential hypertension, benign 07/13/2018   • Left wrist pain 07/12/2018   • Costochondritis 07/11/2018   • Dry skin 03/28/2018   • Acquired hypothyroidism 09/28/2017   • Type 2 diabetes mellitus with complication, without long-term current use of insulin ([facility]) 09/28/2017   • Dyslipidemia 09/28/2017   • Primary insomnia 09/28/2017   • Episode of recurrent major depressive disorder (HCC) 09/28/2017   • Chronic shoulder pain 09/28/2017   • Obesity (BMI 30-39.9) 09/28/2017       Allergies:Patient has no known allergies.    Current Outpatient Medications Ordered in Epic   Medication Sig Dispense Refill   • LANTUS SOLOSTAR 100 UNIT/ML  Solution Pen-injector injection INJECT 32 UNITS SUBCUTANEOUSLY IN THE EVENING 15 mL 1   • erythromycin 5 MG/GM Ointment Apply 1 cm to left eye 3 times a day. 1 g 0   • benzonatate (TESSALON) 100 MG Cap Take 1 Cap by mouth 3 times a day. 60 Cap 0   • guaiFENesin ER (MUCINEX) 600 MG TABLET SR 12 HR Take 1 Tab by mouth every 12 hours. 28 Tab 0   • acetaminophen (TYLENOL) 500 MG Tab Take 1,000 mg by mouth 2 times a day as needed for Fever.     • Multiple Vitamins-Minerals (ZINC PO) Take 1 Tab by mouth every morning.     • Cholecalciferol (VITAMIN D3 PO) Take 1 Tab by mouth every morning.     • isosorbide mononitrate SR (IMDUR) 30 MG TABLET SR 24 HR TAKE 1 TABLET BY MOUTH ONCE DAILY IN THE MORNING (Patient taking differently: Take 30 mg by mouth every morning.) 90 Tab 1   • atorvastatin (LIPITOR) 40 MG Tab TAKE 1 TABLET BY MOUTH ONCE DAILY IN THE EVENING (Patient taking differently: Take 40 mg by mouth every day.) 90 Tab 1   • Empagliflozin-metFORMIN HCl (SYNJARDY) 12.5-1000 MG Tab Take 1 Tab by mouth 2 Times a Day. 180 Tab 1   • glipiZIDE (GLUCOTROL) 5 MG Tab Take 1 Tab by mouth 2 times a day. 180 Tab 1   • traZODone (DESYREL) 100 MG Tab TAKE 2 TABLETS BY MOUTH AT BEDTIME (Patient taking differently: Take 200 mg by mouth every bedtime. 2 tablets = 200 mg.) 180 Tab 1   • TRULICITY 1.5 MG/0.5ML Solution Pen-injector INJECT 1 SYRINGE SUBCUTANEOUSLY ONCE A WEEK AS DIRECTED (Patient taking differently: Inject 0.5 mL under the skin every 7 days. 0.5 mL = 1.5 mg. Every Friday.) 12 mL 3   • losartan (COZAAR) 50 MG Tab Take 1 tablet by mouth once daily (Patient taking differently: Take 50 mg by mouth every day.) 90 Tab 1   • escitalopram (LEXAPRO) 20 MG tablet Take 1 tablet by mouth once daily (Patient taking differently: Take 20 mg by mouth every day.) 90 Tab 3   • EUTHYROX 25 MCG Tab TAKE 1 TABLET BY MOUTH ONCE DAILY IN THE MORNING ON  AN  EMPTY  STOMACH (Patient taking differently: Take 25 mcg by mouth Every morning on an  empty stomach.) 90 Tab 3   • TURMERIC CURCUMIN PO Take 3 Tabs by mouth every morning.     • aspirin 81 MG tablet Take 81 mg by mouth every day.     • loperamide (ANTI-DIARRHEAL) 2 MG Cap Take 4 mg by mouth 1 time a day as needed for Diarrhea. 2 capsules = 4 mg.     • 5-Hydroxytryptophan (5-HTP) 100 MG Cap Take 200 mg by mouth every bedtime. 2 capsules = 200 mg.     • Omega-3 Fatty Acids (FISH OIL) 1000 MG Cap capsule Take 2,000 mg by mouth every morning. 2 capsules = 2000 mg.     • diphenhydrAMINE (BENADRYL ALLERGY) 25 MG capsule Take 50 mg by mouth every bedtime.     • Melatonin 1 MG Cap Take 1 mg by mouth every bedtime.     • therapeutic multivitamin-minerals (THERAGRAN-M) Tab Take 1 Tab by mouth every morning.     • EQ ALLERGY RELIEF, CETIRIZINE, 10 MG Tab Take 1 tablet by mouth once daily (Patient taking differently: Take 10 mg by mouth every day.) 90 Tab 1   • RELION PEN NEEDLE 31G/8MM USE WITH LANTUS DAILY. (Patient not taking: Reported on 2020) 100 Each 3   • Cinnamon 500 MG Cap Take 1,000 mg by mouth every morning. 2 capsules = 1000 mg.       Current Facility-Administered Medications Ordered in Epic   Medication Dose Route Frequency Provider Last Rate Last Admin   • aspirin (ASA) chewable tab 324 mg  324 mg Oral Once VALE PattersonA.ANA LILIA.           Past Medical History:   Diagnosis Date   • Arthritis    • Breath shortness    • Depression     depression   • Diabetes (HCC)     oral medication   • High cholesterol    • Hyperlipidemia    • Hypertension        Social History     Tobacco Use   • Smoking status: Never Smoker   • Smokeless tobacco: Former User     Types: Chew   Substance Use Topics   • Alcohol use: Yes     Comment: 1 per month    • Drug use: No       Family Status   Relation Name Status   • Mo  Alive   • Fa  Alive   • Bro       Family History   Problem Relation Age of Onset   • Diabetes Mother    • Stroke Father        Review of Systems:   Constitutional ROS: No unexpected change  "in weight, No weakness, No fatigue  Eye ROS: No recent significant change in vision, No eye pain, redness, discharge  Ear ROS: No drainage, No tinnitus or vertigo, No recent change in hearing  Mouth/Throat ROS: No teeth or gum problems, No bleeding gums, No tongue complaints  Neck ROS: No swollen glands, No significant pain in neck  Pulmonary ROS: +SOB  Cardiovascular ROS: No diaphoresis, No edema, No palpitations  Musculoskeletal/Extremities ROS: No peripheral edema, No pain, redness or swelling on the joints  Hematologic/Lymphatic ROS: No chills, No night sweats, No weight loss  Skin/Integumentary ROS: No edema, No evidence of rash, No itching    Exam:  /80   Pulse (!) 110   Temp 36.6 °C (97.8 °F) (Temporal)   Resp 16   Ht 1.88 m (6' 2\")   Wt 108 kg (237 lb)   SpO2 98%   General: Well developed, well nourished. No distress.    Eye: PERRL/EOMI; conjunctivae clear, lids normal.  ENMT: Lips without lesions, MMM. Oropharynx is clear. Bilateral TMs are within normal limits.  Pulmonary: Unlabored respiratory effort. Lungs clear to auscultation, no wheezes, no rhonchi.    Cardiovascular: Regular rate and rhythm without murmur.   Neurologic: Grossly nonfocal. No facial asymmetry noted.  Lymph: No cervical lymphadenopathy noted.  Skin: Warm, dry, good turgor. No rashes in visible areas.   Psych: Normal mood. Alert and oriented to person, place and time.    EKG, per my interpretation: Normal sinus rhythm.  Normal axis.  No ST elevation/depression.  No significant changes when compared EKG from 12/13/2020.    Assessment/Plan:  Unable to adequately evaluate chest pain from this location.  Patient politely declines an ambulance due to cost but will have his wife drive him to the emergency department.  324 mg of chewable aspirin given prior to discharge.  1. Chest pain, unspecified type  aspirin (ASA) chewable tab 324 mg    EKG   2. SOB (shortness of breath)         "

## 2021-03-03 NOTE — ED TRIAGE NOTES
Left sided chest pain that started Saturday.  Worse with exertion and work.  Pain currently 3/10.  Shortness of breath, lightheaded when walking.  Diagnosed with 40% blockage 2 years ago.

## 2021-03-03 NOTE — CARE PLAN
Problem: Communication  Goal: The ability to communicate needs accurately and effectively will improve  Outcome: PROGRESSING AS EXPECTED     Problem: Safety  Goal: Will remain free from injury  Outcome: PROGRESSING AS EXPECTED  Goal: Will remain free from falls  Outcome: PROGRESSING AS EXPECTED  Note: Pt educated on safety precautions, utilization of the call light, and bed alarm.  Pt verbalized understanding.      Problem: Pain Management  Goal: Pain level will decrease to patient's comfort goal  Outcome: PROGRESSING AS EXPECTED     Problem: Infection  Goal: Will remain free from infection  Outcome: PROGRESSING AS EXPECTED  Note: Implement standard precautions and perform handwashing before and after patient contact. RN will follow protocols and necessary steps to minimize the spread of infection.  RN educated pt and any visitors on proper hand hygiene.      Problem: Psychosocial Needs:  Goal: Level of anxiety will decrease  Outcome: PROGRESSING AS EXPECTED     Problem: Knowledge Deficit  Goal: Knowledge of disease process/condition, treatment plan, diagnostic tests, and medications will improve  Outcome: PROGRESSING AS EXPECTED  Goal: Knowledge of the prescribed therapeutic regimen will improve  Outcome: PROGRESSING AS EXPECTED

## 2021-03-03 NOTE — ASSESSMENT & PLAN NOTE
At rest and worsens with ambulation , associated with Chest pain  Echo ordered   CTA negative for PE   He is doing well on room air

## 2021-03-03 NOTE — PROGRESS NOTES
[facility] Medicine Daily Progress Note    Date of Service  3/3/2021    Chief Complaint  60 y.o. male admitted 3/2/2021 with chest pain    Hospital Course  60 y.o. male with past medical history of diabetes mellitus, hypertension, hyperlipidemia, obesity, COVID-19 and December 2020 who presented 3/2/2021 with chest pain for the last 4 days which progressively worsened causing him to come to the ER. He was admitted for ACS rule out.    Interval Problem Update  Patient continues to c/o crushing left-sided chest pain, LEMONS and shortness of breath. VSS on room air. Stress test negative for ischemia but did have some ST depression so cardiology was consulted. CTA ordered and pending    Consultants/Specialty  Cardiology     Code Status  Full Code    Disposition  Continue as observation pending further work-up     Review of Systems  Review of Systems   Constitutional: Positive for malaise/fatigue. Negative for chills and fever.   Respiratory: Positive for shortness of breath. Negative for cough, hemoptysis, sputum production and wheezing.    Cardiovascular: Positive for chest pain and orthopnea. Negative for palpitations, claudication and leg swelling.   Gastrointestinal: Positive for heartburn. Negative for abdominal pain, diarrhea, nausea and vomiting.   Musculoskeletal: Positive for joint pain. Negative for back pain and neck pain.   Neurological: Negative for dizziness, weakness and headaches.        Physical Exam  Temp:  [36.1 °C (97 °F)-36.7 °C (98.1 °F)] 36.3 °C (97.3 °F)  Pulse:  [76-96] 88  Resp:  [16-20] 16  BP: (134-152)/(72-78) 136/78  SpO2:  [92 %-98 %] 92 %    Physical Exam  Vitals and nursing note reviewed.   Constitutional:       General: He is not in acute distress.     Appearance: He is not toxic-appearing.   HENT:      Head: Normocephalic and atraumatic.      Mouth/Throat:      Mouth: Mucous membranes are moist.   Eyes:      General: No scleral icterus.     Pupils: Pupils are equal, round, and reactive to  light.   Cardiovascular:      Rate and Rhythm: Normal rate and regular rhythm.      Pulses: Normal pulses.      Heart sounds: No friction rub.   Pulmonary:      Effort: Pulmonary effort is normal. No respiratory distress.      Breath sounds: Normal breath sounds. No wheezing or rales.   Abdominal:      General: There is no distension.      Palpations: Abdomen is soft.      Tenderness: There is no abdominal tenderness. There is no guarding.   Musculoskeletal:      Cervical back: Normal range of motion and neck supple.      Right lower leg: No edema.      Left lower leg: No edema.   Skin:     General: Skin is dry.      Coloration: Skin is not pale.   Neurological:      General: No focal deficit present.      Mental Status: He is alert and oriented to person, place, and time.   Psychiatric:         Mood and Affect: Mood is anxious.         Speech: Speech normal.         Behavior: Behavior is cooperative.         Fluids    Intake/Output Summary (Last 24 hours) at 3/3/2021 1500  Last data filed at 3/3/2021 0930  Gross per 24 hour   Intake 240 ml   Output --   Net 240 ml       Laboratory  Recent Labs     03/02/21 2000   WBC 8.0   RBC 4.52*   HEMOGLOBIN 13.7*   HEMATOCRIT 42.1   MCV 93.1   MCH 30.3   MCHC 32.5*   RDW 47.8   PLATELETCT 236   MPV 10.5     Recent Labs     03/02/21 2000   SODIUM 137   POTASSIUM 4.0   CHLORIDE 104   CO2 23   GLUCOSE 150*   BUN 30*   CREATININE 0.94   CALCIUM 9.3                   Imaging  Cardiac Stress Test Treadmill without Images   Final Result      CT-CTA CHEST PULMONARY ARTERY W/ RECONS   Final Result         1.  No pulmonary embolus appreciated.   2.  Atherosclerosis and atherosclerotic coronary artery disease      DX-CHEST-PORTABLE (1 VIEW)   Final Result      1.  There is no acute cardiopulmonary process.      CT-CTA HEART W/3D IMAGE    (Results Pending)   EC-ECHOCARDIOGRAM COMPLETE W/O CONT    (Results Pending)        Assessment/Plan  * Chest pain  Assessment & Plan  Stress test done  this admission was negative for reversible ischemia or infarction, but did have ST depression and worsening of chest pain.   Cardiology was consulted and recommending CTA chest for further evaluation   Echo ordered and pending   Telemetry monitoring    mg daily ,Atorvastatin 40 mg hs , Losartan 50 mg daily   Nitroglycerin 0.4 mg hs PRN for chest pain      SOB (shortness of breath)  Assessment & Plan  At rest and worsens with ambulation , associated with Chest pain  Echo ordered   CTA negative for PE   He is doing well on room air     Dyslipidemia- (present on admission)  Assessment & Plan  C/w atorvastatin 40 mg daily     Type 2 diabetes mellitus with complication, without long-term current use of insulin (HCC)- (present on admission)  Assessment & Plan  custodial and 2 gram sodium diet   ISS      Acquired hypothyroidism- (present on admission)  Assessment & Plan  Continue with euthyrox 25 mcg daily        VTE prophylaxis: Lovenox

## 2021-03-03 NOTE — CONSULTS
Cardiology Consult Note:    Henry Dwyer M.D.  Date & Time note created:    3/3/2021   12:15 PM     Referring MD:  Dr. Brunner    Patient ID:   Name:             Farzad Bryant   YOB: 1960  Age:                 60 y.o.  male   MRN:               [id number]                                                             Reason for Consult:      Chest pain    History of Present Illness:    60-year-old male with a past medical history of type 2 diabetes.  He was in his normal state of health when for the last 3 days he developed chest pain which was crescendo decrescendo but never absent.  It feels like a pressure in his left chest reaching maximal intensity of 5 out of 10 with no radiation and no associated shortness of breath.  He came into the ER where he was ruled out for acute MI.  He was sent for an ECG which was negative for STEMI.  He was sent for exercise treadmill test which she had chest pain throughout but had no ischemic ST segment changes.    Review of Systems:      Constitutional: Denies fevers, Denies weight changes  Eyes: Denies changes in vision, no eye pain  Ears/Nose/Throat/Mouth: Denies nasal congestion or sore throat   Cardiovascular: + chest pain, no palpitations   Respiratory: no shortness of breath , Denies cough  Gastrointestinal/Hepatic: Denies abdominal pain, nausea, vomiting, diarrhea, constipation or GI bleeding   Genitourinary: Denies dysuria or frequency  Musculoskeletal/Rheum: Denies  joint pain and swelling, no edema  Skin: Denies rash  Neurological: Denies headache, confusion, memory loss or focal weakness/parasthesias  Psychiatric: denies mood disorder   Endocrine: Courtney thyroid problems  Heme/Oncology/Lymph Nodes: Denies enlarged lymph nodes, denies brusing or known bleeding disorder  All other systems were reviewed and are negative (AMA/CMS criteria)                Past Medical History:   Past Medical History:   Diagnosis Date   • Arthritis    • Breath  shortness    • Depression     depression   • Diabetes (HCC)     oral medication   • High cholesterol    • Hyperlipidemia    • Hypertension      Active Hospital Problems    Diagnosis    • Chest pain [R07.9]      Priority: High   • Acquired hypothyroidism [E03.9]    • Type 2 diabetes mellitus with complication, without long-term current use of insulin (HCC) [E11.8]    • Dyslipidemia [E78.5]        Past Surgical History:  Past Surgical History:   Procedure Laterality Date   • ARTHROPLASTY Left     hip replacement   • CHOLECYSTECTOMY         Hospital Medications:  Current Facility-Administered Medications   Medication Dose   • melatonin tablet 5 mg  5 mg   • acetaminophen (Tylenol) tablet 650 mg  650 mg   • insulin regular (HumuLIN R,NovoLIN R) injection  3-15 Units   • atorvastatin (LIPITOR) tablet 40 mg  40 mg   • escitalopram (Lexapro) tablet 20 mg  20 mg   • levothyroxine (SYNTHROID) tablet 25 mcg  25 mcg   • losartan (COZAAR) tablet 50 mg  50 mg   • senna-docusate (PERICOLACE or SENOKOT S) 8.6-50 MG per tablet 2 tablet  2 tablet    And   • polyethylene glycol/lytes (MIRALAX) PACKET 1 Packet  1 Packet    And   • magnesium hydroxide (MILK OF MAGNESIA) suspension 30 mL  30 mL    And   • bisacodyl (DULCOLAX) suppository 10 mg  10 mg   • aspirin (ASA) tablet 325 mg  325 mg    Or   • aspirin (ASA) chewable tab 324 mg  324 mg    Or   • aspirin (ASA) suppository 300 mg  300 mg   • enoxaparin (LOVENOX) inj 40 mg  40 mg   • glucose 4 g chewable tablet 16 g  16 g    And   • dextrose 50% (D50W) injection 50 mL  50 mL         Current Outpatient Medications:  Facility-Administered Medications Prior to Admission   Medication Dose Route Frequency Provider Last Rate Last Admin   • [COMPLETED] aspirin (ASA) chewable tab 324 mg  324 mg Oral Once Tejal Faust P.A.-C.   324 mg at 03/02/21 1906     Medications Prior to Admission   Medication Sig Dispense Refill Last Dose   • aspirin EC (ECOTRIN) 81 MG Tablet Delayed Response Take  81 mg by mouth every day.   3/2/2021 at 1045   • vitamin D (CHOLECALCIFEROL) 1000 Unit (25 mcg) Tab Take 1,000 Units by mouth every day.   3/2/2021 at 1045   • Turmeric 500 MG Cap Take 1,500 mg by mouth every day.   3/2/2021 at 1045   • LANTUS SOLOSTAR 100 UNIT/ML Solution Pen-injector injection INJECT 32 UNITS SUBCUTANEOUSLY IN THE EVENING 15 mL 1 3/1/2021 at 2200   • guaiFENesin ER (MUCINEX) 600 MG TABLET SR 12 HR Take 1 Tab by mouth every 12 hours. (Patient not taking: Reported on 3/2/2021) 28 Tab 0 Not Taking at Unknown time   • acetaminophen (TYLENOL) 500 MG Tab Take 1,000 mg by mouth 2 times a day as needed for Fever.   3/1/2021 at 1045   • EQ ALLERGY RELIEF, CETIRIZINE, 10 MG Tab Take 1 tablet by mouth once daily (Patient not taking: No sig reported) 90 Tab 1 Not Taking at Unknown time   • isosorbide mononitrate SR (IMDUR) 30 MG TABLET SR 24 HR TAKE 1 TABLET BY MOUTH ONCE DAILY IN THE MORNING (Patient taking differently: Take 30 mg by mouth every morning.) 90 Tab 1 3/2/2021 at 1045   • atorvastatin (LIPITOR) 40 MG Tab TAKE 1 TABLET BY MOUTH ONCE DAILY IN THE EVENING (Patient taking differently: Take 40 mg by mouth every bedtime.) 90 Tab 1 3/1/2021 at 2200   • Empagliflozin-metFORMIN HCl (SYNJARDY) 12.5-1000 MG Tab Take 1 Tab by mouth 2 Times a Day. 180 Tab 1 3/2/2021 at 1045   • glipiZIDE (GLUCOTROL) 5 MG Tab Take 1 Tab by mouth 2 times a day. 180 Tab 1 3/2/2021 at 1045   • traZODone (DESYREL) 100 MG Tab TAKE 2 TABLETS BY MOUTH AT BEDTIME (Patient taking differently: Take 200 mg by mouth every bedtime.) 180 Tab 1 3/1/2021 at 2200   • TRULICITY 1.5 MG/0.5ML Solution Pen-injector INJECT 1 SYRINGE SUBCUTANEOUSLY ONCE A WEEK AS DIRECTED (Patient taking differently: Inject 0.5 mL under the skin every Sunday.) 12 mL 3 2/28/2021 at 1100   • losartan (COZAAR) 50 MG Tab Take 1 tablet by mouth once daily (Patient taking differently: Take 50 mg by mouth every day.) 90 Tab 1 3/2/2021 at 1045   • escitalopram (LEXAPRO) 20  MG tablet Take 1 tablet by mouth once daily (Patient taking differently: Take 20 mg by mouth every day.) 90 Tab 3 3/2/2021 at 1045   • EUTHYROX 25 MCG Tab TAKE 1 TABLET BY MOUTH ONCE DAILY IN THE MORNING ON  AN  EMPTY  STOMACH (Patient taking differently: Take 25 mcg by mouth Every morning on an empty stomach.) 90 Tab 3 3/2/2021 at 0900   • RELION PEN NEEDLE 31G/8MM USE WITH LANTUS DAILY. (Patient not taking: Reported on 12/11/2020) 100 Each 3 Not Taking at Unknown time   • Cinnamon 500 MG Cap Take 1,000 mg by mouth every morning. \   3/2/2021 at 1045   • 5-Hydroxytryptophan (5-HTP) 100 MG Cap Take 200 mg by mouth every bedtime.   3/1/2021 at 2200   • Omega-3 Fatty Acids (FISH OIL) 1000 MG Cap capsule Take 2,000 mg by mouth every morning.   3/2/2021 at 1045   • Melatonin 1 MG Cap Take 1 mg by mouth every bedtime.   3/1/2021 at 2200   • therapeutic multivitamin-minerals (THERAGRAN-M) Tab Take 1 Tab by mouth every morning.   3/2/2021 at 1045       Medication Allergy:  No Known Allergies    Family History:  Family History   Problem Relation Age of Onset   • Diabetes Mother    • Stroke Father        Social History:  Social History     Socioeconomic History   • Marital status:      Spouse name: Not on file   • Number of children: Not on file   • Years of education: Not on file   • Highest education level: Not on file   Occupational History   • Not on file   Tobacco Use   • Smoking status: Never Smoker   • Smokeless tobacco: Former User     Types: Chew   Substance and Sexual Activity   • Alcohol use: Yes     Comment: 1 per month    • Drug use: No   • Sexual activity: Not on file   Other Topics Concern   • Not on file   Social History Narrative   • Not on file     Social Determinants of Health     Financial Resource Strain:    • Difficulty of Paying Living Expenses:    Food Insecurity:    • Worried About Running Out of Food in the Last Year:    • Ran Out of Food in the Last Year:    Transportation Needs:    • Lack  "of Transportation (Medical):    • Lack of Transportation (Non-Medical):    Physical Activity:    • Days of Exercise per Week:    • Minutes of Exercise per Session:    Stress:    • Feeling of Stress :    Social Connections:    • Frequency of Communication with Friends and Family:    • Frequency of Social Gatherings with Friends and Family:    • Attends Voodoo Services:    • Active Member of Clubs or Organizations:    • Attends Club or Organization Meetings:    • Marital Status:    Intimate Partner Violence:    • Fear of Current or Ex-Partner:    • Emotionally Abused:    • Physically Abused:    • Sexually Abused:          Physical Exam:  Vitals/ General Appearance:   Weight/BMI: Body mass index is 31.9 kg/m².  /78   Pulse 78   Temp 36.3 °C (97.3 °F) (Temporal)   Resp 16   Ht 1.88 m (6' 2\")   Wt 113 kg (248 lb 7.3 oz)   SpO2 92%   Vitals:    03/02/21 2358 03/03/21 0446 03/03/21 0843 03/03/21 1209   BP: 134/72 134/78 136/77 136/78   Pulse: 83 80 76 78   Resp: 18 17 20 16   Temp: 36.1 °C (97 °F) 36.6 °C (97.9 °F) 36.7 °C (98.1 °F) 36.3 °C (97.3 °F)   TempSrc: Temporal Temporal Temporal Temporal   SpO2: 95% 95% 92% 92%   Weight: 113 kg (248 lb 7.3 oz)      Height: 1.88 m (6' 2\")        Oxygen Therapy:  Pulse Oximetry: 92 %, O2 (LPM): 0, O2 Delivery Device: None - Room Air    Constitutional:   Well developed, Well nourished, No acute distress  HENMT:  Normocephalic, Atraumatic, Oropharynx moist mucous membranes, No oral exudates, Nose normal.  No thyromegaly.  Eyes:  EOMI, Conjunctiva normal, No discharge.  Neck:  Normal range of motion, No cervical tenderness,  no JVD.  Cardiovascular:  Normal heart rate, Normal rhythm, No murmurs, No rubs, No gallops.   Extremitites with intact distal pulses, no cyanosis, or edema.  Lungs:  Normal breath sounds, breath sounds clear to auscultation bilaterally,  no crackles, no wheezing.   Abdomen: Bowel sounds normal, Soft, No tenderness, No guarding, No rebound, No " masses, No hepatosplenomegaly.  Skin: Warm, Dry, No erythema, No rash, no induration.  Neurologic: Alert & oriented x 3, No focal deficits noted, cranial nerves II through X are grossly intact.  Psychiatric: Affect normal, Judgment normal, Mood normal.      MDM (Data Review):     Records reviewed and summarized in current documentation    Lab Data Review:  Recent Results (from the past 24 hour(s))   EKG (NOW)    Collection Time: 21  7:09 PM   Result Value Ref Range    Report       [facility] Emergency Dept.    Test Date:  2021  Pt Name:    LEANDRA DEVINE                Department: ER  MRN:        [id number]                      Room:  Gender:     Male                         Technician: [id number]  :        1960                   Requested By:ER TRIAGE PROTOCOL  Order #:    [id number]                    Reading MD: AJIT JORDAN MD    Measurements  Intervals                                Axis  Rate:       95                           P:          80  NM:         168                          QRS:        56  QRSD:       106                          T:          17  QT:         352  QTc:        443    Interpretive Statements  SINUS RHYTHM  PROBABLE LEFT ATRIAL ABNORMALITY  BORDERLINE INTRAVENTRICULAR CONDUCTION DELAY  Compared to ECG 2020 08:01:47  No significant changes  Electronically Signed On 3-2-2021 20:02:11 PST by AJIT JORDAN MD     CBC with Differential    Collection Time: 21  8:00 PM   Result Value Ref Range    WBC 8.0 4.8 - 10.8 K/uL    RBC 4.52 (L) 4.70 - 6.10 M/uL    Hemoglobin 13.7 (L) 14.0 - 18.0 g/dL    Hematocrit 42.1 42.0 - 52.0 %    MCV 93.1 81.4 - 97.8 fL    MCH 30.3 27.0 - 33.0 pg    MCHC 32.5 (L) 33.7 - 35.3 g/dL    RDW 47.8 35.9 - 50.0 fL    Platelet Count 236 164 - 446 K/uL    MPV 10.5 9.0 - 12.9 fL    Neutrophils-Polys 70.40 44.00 - 72.00 %    Lymphocytes 18.80 (L) 22.00 - 41.00 %    Monocytes 7.20 0.00 - 13.40 %    Eosinophils 1.50 0.00 - 6.90 %     Basophils 0.40 0.00 - 1.80 %    Immature Granulocytes 1.70 (H) 0.00 - 0.90 %    Nucleated RBC 0.00 /100 WBC    Neutrophils (Absolute) 5.66 1.82 - 7.42 K/uL    Lymphs (Absolute) 1.51 1.00 - 4.80 K/uL    Monos (Absolute) 0.58 0.00 - 0.85 K/uL    Eos (Absolute) 0.12 0.00 - 0.51 K/uL    Baso (Absolute) 0.03 0.00 - 0.12 K/uL    Immature Granulocytes (abs) 0.14 (H) 0.00 - 0.11 K/uL    NRBC (Absolute) 0.00 K/uL   Complete Metabolic Panel (CMP)    Collection Time: 03/02/21  8:00 PM   Result Value Ref Range    Sodium 137 135 - 145 mmol/L    Potassium 4.0 3.6 - 5.5 mmol/L    Chloride 104 96 - 112 mmol/L    Co2 23 20 - 33 mmol/L    Anion Gap 10.0 7.0 - 16.0    Glucose 150 (H) 65 - 99 mg/dL    Bun 30 (H) 8 - 22 mg/dL    Creatinine 0.94 0.50 - 1.40 mg/dL    Calcium 9.3 8.5 - 10.5 mg/dL    AST(SGOT) 15 12 - 45 U/L    ALT(SGPT) 22 2 - 50 U/L    Alkaline Phosphatase 61 30 - 99 U/L    Total Bilirubin 0.5 0.1 - 1.5 mg/dL    Albumin 3.9 3.2 - 4.9 g/dL    Total Protein 6.5 6.0 - 8.2 g/dL    Globulin 2.6 1.9 - 3.5 g/dL    A-G Ratio 1.5 g/dL   Troponin    Collection Time: 03/02/21  8:00 PM   Result Value Ref Range    Troponin T 10 6 - 19 ng/L   proBrain Natriuretic Peptide, NT    Collection Time: 03/02/21  8:00 PM   Result Value Ref Range    NT-proBNP 65 0 - 125 pg/mL   ESTIMATED GFR    Collection Time: 03/02/21  8:00 PM   Result Value Ref Range    GFR If African American >60 >60 mL/min/1.73 m 2    GFR If Non African American >60 >60 mL/min/1.73 m 2   Troponin in two (2) hours    Collection Time: 03/02/21 10:12 PM   Result Value Ref Range    Troponin T 10 6 - 19 ng/L   COV-2, FLU A/B, AND RSV BY PCR (2-4 HOURS CEPHEID): Collect NP swab in VTM    Collection Time: 03/02/21 10:12 PM    Specimen: Respirate   Result Value Ref Range    Influenza virus A RNA Negative Negative    Influenza virus B, PCR Negative Negative    RSV, PCR Negative Negative    SARS-CoV-2 by PCR NotDetected     SARS-CoV-2 Source NP Swab    EKG - STAT Once     Collection Time: 21 11:59 PM   Result Value Ref Range    Report       [facility]    Test Date:  2021  Pt Name:    LEANDRA DEVINE                Department: ER  MRN:        [id number]                      Room:       T212  Gender:     Male                         Technician: JOSE  :        1960                   Requested By:ADDY BAKER  Order #:    [id number]                    Reading MD: Andrea Carias MD    Measurements  Intervals                                Axis  Rate:       82                           P:          69  DC:         164                          QRS:        50  QRSD:       109                          T:          34  QT:         364  QTc:        425    Interpretive Statements  SINUS RHYTHM  BASELINE WANDER IN LEAD(S) V1,V5  Compared to ECG 2021 19:09:03  No significant changes  Electronically Signed On 3-3-2021 0:04:57 PST by Andrea Carias MD     Troponin - STAT Once    Collection Time: 21  2:15 AM   Result Value Ref Range    Troponin T 9 6 - 19 ng/L   ACCU-CHEK GLUCOSE    Collection Time: 21  2:17 AM   Result Value Ref Range    Glucose - Accu-Ck 74 65 - 99 mg/dL   ACCU-CHEK GLUCOSE    Collection Time: 21  8:49 AM   Result Value Ref Range    Glucose - Accu-Ck 105 (H) 65 - 99 mg/dL       Imaging/Procedures Review:    Chest Xray:  Reviewed    EKG:   Dated 3/2/2021 personally reviewed interpreted myself showing normal sinus rhythm with no ST segment changes concerning for ischemia.    ECHO:  NA    Nuclear Stress Test  Dated 2018 personally viewed interpreted myself showing an EF of 49% with apical thinning    Cardiac Cath:  Dated 2018 personally viewed interpreted myself showing mild disease in the LAD with some RCA disease but no obstructive coronary disease.    MDM (Assessment and Plan):     Active Hospital Problems    Diagnosis    • Chest pain [R07.9]      Priority: High   • Acquired hypothyroidism [E03.9]    • Type 2 diabetes mellitus  with complication, without long-term current use of insulin (HCC) [E11.8]    • Dyslipidemia [E78.5]      60-year-old male with atypical chest pain.  He does have risk factors of progression of the underlying coronary disease noted to his cardiac catheterization in 2018.  I discussed with the patient.  I do not think that we have enough evidence at this point to pursue a cardiac catheterization.  I will send him for coronary CTA to further assess the disease.

## 2021-03-03 NOTE — ED NOTES
Med rec updated  And complete. Allergies reviewed. Met with pt at bedside and dicussed current medications and last doses taken. Pt denies antibiotic use in last 14 days.      Home pharmacy [facility]

## 2021-03-03 NOTE — H&P
Hospital Medicine History & Physical Note    Date of Service  3/2/2021    Primary Care Physician  BENJA Curtis    Consultants      Code Status  Full Code    Chief Complaint  Chief Complaint   Patient presents with   • Chest Pain       History of Presenting Illness  60 y.o. male with past medical history of diabetes mellitus, hypertension, hyperlipidemia, obesity, COVID-19 and December 2020 who presented 3/2/2021 with chest pain for the last 4 days which progressively worsened causing him to come to the ER today.  He describes the pain as pressure-like, 7 out of 10 intensity, relieved with rest worse with exertion, radiating to left arm.  Patient reportedly had a angiography done 2 years ago which had a 40% coronary artery occlusion no stents placed.  He will be observed on telemetry floor to rule out ACS.    Review of Systems  Review of Systems   Respiratory: Positive for shortness of breath.    Cardiovascular: Positive for chest pain.   All other systems reviewed and are negative.      Past Medical History   has a past medical history of Arthritis, Breath shortness, Depression, Diabetes (HCC), High cholesterol, Hyperlipidemia, and Hypertension.    Surgical History   has a past surgical history that includes arthroplasty (Left) and cholecystectomy.     Family History  family history includes Diabetes in his mother; Stroke in his father.     Social History   reports that he has never smoked. He quit smokeless tobacco use about 35 years ago.  His smokeless tobacco use included chew. He reports current alcohol use. He reports that he does not use drugs.    Allergies  No Known Allergies    Medications  Prior to Admission Medications   Prescriptions Last Dose Informant Patient Reported? Taking?   5-Hydroxytryptophan (5-HTP) 100 MG Cap 3/1/2021 at 2200 Patient Yes No   Sig: Take 200 mg by mouth every bedtime.   Cinnamon 500 MG Cap 3/2/2021 at 1045 Patient Yes No   Sig: Take 1,000 mg by mouth every morning. \    EQ ALLERGY RELIEF, CETIRIZINE, 10 MG Tab Not Taking at Unknown time Patient No No   Sig: Take 1 tablet by mouth once daily   Patient not taking: No sig reported   EUTHYROX 25 MCG Tab 3/2/2021 at 0900 Patient No No   Sig: TAKE 1 TABLET BY MOUTH ONCE DAILY IN THE MORNING ON  AN  EMPTY  STOMACH   Patient taking differently: Take 25 mcg by mouth Every morning on an empty stomach.   Empagliflozin-metFORMIN HCl (SYNJARDY) 12.5-1000 MG Tab 3/2/2021 at 1045 Patient No No   Sig: Take 1 Tab by mouth 2 Times a Day.   LANTUS SOLOSTAR 100 UNIT/ML Solution Pen-injector injection 3/1/2021 at 2200 Patient No No   Sig: INJECT 32 UNITS SUBCUTANEOUSLY IN THE EVENING   Melatonin 1 MG Cap 3/1/2021 at 2200 Patient Yes No   Sig: Take 1 mg by mouth every bedtime.   Omega-3 Fatty Acids (FISH OIL) 1000 MG Cap capsule 3/2/2021 at 1045 Patient Yes No   Sig: Take 2,000 mg by mouth every morning.   RELION PEN NEEDLE 31G/8MM Not Taking at Unknown time Patient No No   Sig: USE WITH LANTUS DAILY.   Patient not taking: Reported on 12/11/2020   TRULICITY 1.5 MG/0.5ML Solution Pen-injector 2/28/2021 at 1100 Patient No No   Sig: INJECT 1 SYRINGE SUBCUTANEOUSLY ONCE A WEEK AS DIRECTED   Patient taking differently: Inject 0.5 mL under the skin every Sunday.   Turmeric 500 MG Cap 3/2/2021 at 1045 Patient Yes Yes   Sig: Take 1,500 mg by mouth every day.   acetaminophen (TYLENOL) 500 MG Tab 3/1/2021 at 1045 Patient Yes No   Sig: Take 1,000 mg by mouth 2 times a day as needed for Fever.   aspirin EC (ECOTRIN) 81 MG Tablet Delayed Response 3/2/2021 at 1045 Patient Yes Yes   Sig: Take 81 mg by mouth every day.   atorvastatin (LIPITOR) 40 MG Tab 3/1/2021 at 2200 Patient No No   Sig: TAKE 1 TABLET BY MOUTH ONCE DAILY IN THE EVENING   Patient taking differently: Take 40 mg by mouth every bedtime.   escitalopram (LEXAPRO) 20 MG tablet 3/2/2021 at 1045 Patient No No   Sig: Take 1 tablet by mouth once daily   Patient taking differently: Take 20 mg by mouth every  day.   glipiZIDE (GLUCOTROL) 5 MG Tab 3/2/2021 at 1045 Patient No No   Sig: Take 1 Tab by mouth 2 times a day.   guaiFENesin ER (MUCINEX) 600 MG TABLET SR 12 HR Not Taking at Unknown time Patient No No   Sig: Take 1 Tab by mouth every 12 hours.   Patient not taking: Reported on 3/2/2021   isosorbide mononitrate SR (IMDUR) 30 MG TABLET SR 24 HR 3/2/2021 at 1045 Patient No No   Sig: TAKE 1 TABLET BY MOUTH ONCE DAILY IN THE MORNING   Patient taking differently: Take 30 mg by mouth every morning.   losartan (COZAAR) 50 MG Tab 3/2/2021 at 1045 Patient No No   Sig: Take 1 tablet by mouth once daily   Patient taking differently: Take 50 mg by mouth every day.   therapeutic multivitamin-minerals (THERAGRAN-M) Tab 3/2/2021 at 1045 Patient Yes No   Sig: Take 1 Tab by mouth every morning.   traZODone (DESYREL) 100 MG Tab 3/1/2021 at 2200 Patient No No   Sig: TAKE 2 TABLETS BY MOUTH AT BEDTIME   Patient taking differently: Take 200 mg by mouth every bedtime.   vitamin D (CHOLECALCIFEROL) 1000 Unit (25 mcg) Tab 3/2/2021 at 1045 Patient Yes Yes   Sig: Take 1,000 Units by mouth every day.      Facility-Administered Medications Last Administration Doses Remaining   aspirin (ASA) chewable tab 324 mg 3/2/2021  7:06 PM 0          Physical Exam  Temp:  [36.2 °C (97.2 °F)-36.3 °C (97.4 °F)] 36.3 °C (97.4 °F)  Pulse:  [88-94] 88  Resp:  [16] 16  BP: (142-152)/(74) 142/74  SpO2:  [96 %-98 %] 98 %    Physical Exam  Vitals and nursing note reviewed.   Constitutional:       Appearance: He is obese.   HENT:      Head: Normocephalic and atraumatic.      Mouth/Throat:      Pharynx: Oropharynx is clear.   Eyes:      Extraocular Movements: Extraocular movements intact.      Pupils: Pupils are equal, round, and reactive to light.   Cardiovascular:      Rate and Rhythm: Normal rate and regular rhythm.      Pulses: Normal pulses.      Heart sounds: Normal heart sounds.   Pulmonary:      Effort: Pulmonary effort is normal. No respiratory distress.       Breath sounds: Normal breath sounds. No stridor.   Abdominal:      General: Abdomen is flat. Bowel sounds are normal.      Palpations: Abdomen is soft.   Musculoskeletal:         General: No swelling or tenderness. Normal range of motion.      Cervical back: Neck supple.   Skin:     General: Skin is warm and dry.      Capillary Refill: Capillary refill takes less than 2 seconds.   Neurological:      General: No focal deficit present.      Mental Status: He is alert and oriented to person, place, and time.   Psychiatric:         Mood and Affect: Mood normal.         Behavior: Behavior normal.         Laboratory:  Recent Labs     03/02/21 2000   WBC 8.0   RBC 4.52*   HEMOGLOBIN 13.7*   HEMATOCRIT 42.1   MCV 93.1   MCH 30.3   MCHC 32.5*   RDW 47.8   PLATELETCT 236   MPV 10.5     Recent Labs     03/02/21 2000   SODIUM 137   POTASSIUM 4.0   CHLORIDE 104   CO2 23   GLUCOSE 150*   BUN 30*   CREATININE 0.94   CALCIUM 9.3     Recent Labs     03/02/21 2000   ALTSGPT 22   ASTSGOT 15   ALKPHOSPHAT 61   TBILIRUBIN 0.5   GLUCOSE 150*         Recent Labs     03/02/21 2000   NTPROBNP 65         Recent Labs     03/02/21 2000 03/02/21  2212   TROPONINT 10 10       Imaging:  CT-CTA CHEST PULMONARY ARTERY W/ RECONS   Final Result         1.  No pulmonary embolus appreciated.   2.  Atherosclerosis and atherosclerotic coronary artery disease      DX-CHEST-PORTABLE (1 VIEW)   Final Result      1.  There is no acute cardiopulmonary process.      Cardiac Stress Test Treadmill without Images    (Results Pending)         Assessment/Plan:  I anticipate this patient is appropriate for observation status at this time.    * Chest pain  Assessment & Plan  Telemetry monitoring    mg daily   Atorvastatin 40 mg hs   Losartan 50 mg daily   Serial troponin/EKG  HEART SCORE 5 - heath protocol treadmill stress   Nitroglycerin 0.4 mg hs PRN for chest pain      Dyslipidemia- (present on admission)  Assessment & Plan  C/w atorvastatin 40 mg  daily     Type 2 diabetes mellitus with complication, without long-term current use of insulin (HCC)- (present on admission)  Assessment & Plan  prison and 2 gram sodium diet   ISS      Acquired hypothyroidism- (present on admission)  Assessment & Plan  Continue with euthyrox 25 mcg daily     VTE: lovenox

## 2021-03-03 NOTE — PATIENT COMMUNICATION
I have called and spoke with patient who stated he has been having chest pain for 2 days. Patient declined to go to urgent care initially. I have informed patient the importance and he needed to proceed to UC. Patient is currently at work and will go to [facility] care now

## 2021-03-03 NOTE — TELEPHONE ENCOUNTER
From: Farzad Bryant Jr.  To: Nurse Practicioner Cleo Valdez  Sent: 3/2/2021 4:05 PM PST  Subject: Non-Urgent Medical Question    Any way I can get in and see you tomorrow as I have been having chest pains for 2 days non stop

## 2021-03-04 ENCOUNTER — APPOINTMENT (OUTPATIENT)
Dept: CARDIOLOGY | Facility: MEDICAL CENTER | Age: 61
End: 2021-03-04
Attending: NURSE PRACTITIONER
Payer: COMMERCIAL

## 2021-03-04 PROBLEM — R07.9 CHEST PAIN: Status: RESOLVED | Noted: 2021-03-02 | Resolved: 2021-03-04

## 2021-03-04 PROBLEM — R06.02 SOB (SHORTNESS OF BREATH): Status: RESOLVED | Noted: 2021-03-03 | Resolved: 2021-03-04

## 2021-03-04 LAB
GLUCOSE BLD-MCNC: 118 MG/DL (ref 65–99)
GLUCOSE BLD-MCNC: 120 MG/DL (ref 65–99)
GLUCOSE BLD-MCNC: 151 MG/DL (ref 65–99)
GLUCOSE BLD-MCNC: 90 MG/DL (ref 65–99)
LV EJECT FRACT  99904: 55
LV EJECT FRACT MOD 2C 99903: 58.88
LV EJECT FRACT MOD 4C 99902: 56.92
LV EJECT FRACT MOD BP 99901: 58.24

## 2021-03-04 PROCEDURE — 99226 PR SUBSEQUENT OBSERVATION CARE,LEVEL III: CPT | Performed by: STUDENT IN AN ORGANIZED HEALTH CARE EDUCATION/TRAINING PROGRAM

## 2021-03-04 PROCEDURE — 700102 HCHG RX REV CODE 250 W/ 637 OVERRIDE(OP): Performed by: STUDENT IN AN ORGANIZED HEALTH CARE EDUCATION/TRAINING PROGRAM

## 2021-03-04 PROCEDURE — A9270 NON-COVERED ITEM OR SERVICE: HCPCS | Performed by: STUDENT IN AN ORGANIZED HEALTH CARE EDUCATION/TRAINING PROGRAM

## 2021-03-04 PROCEDURE — 96372 THER/PROPH/DIAG INJ SC/IM: CPT

## 2021-03-04 PROCEDURE — A9270 NON-COVERED ITEM OR SERVICE: HCPCS | Performed by: NURSE PRACTITIONER

## 2021-03-04 PROCEDURE — 99214 OFFICE O/P EST MOD 30 MIN: CPT | Performed by: INTERNAL MEDICINE

## 2021-03-04 PROCEDURE — 700111 HCHG RX REV CODE 636 W/ 250 OVERRIDE (IP): Performed by: STUDENT IN AN ORGANIZED HEALTH CARE EDUCATION/TRAINING PROGRAM

## 2021-03-04 PROCEDURE — G0378 HOSPITAL OBSERVATION PER HR: HCPCS

## 2021-03-04 PROCEDURE — 700102 HCHG RX REV CODE 250 W/ 637 OVERRIDE(OP): Performed by: NURSE PRACTITIONER

## 2021-03-04 PROCEDURE — 93306 TTE W/DOPPLER COMPLETE: CPT | Mod: 26 | Performed by: INTERNAL MEDICINE

## 2021-03-04 PROCEDURE — 82962 GLUCOSE BLOOD TEST: CPT | Mod: 91

## 2021-03-04 PROCEDURE — 93306 TTE W/DOPPLER COMPLETE: CPT

## 2021-03-04 RX ORDER — ASPIRIN 81 MG/1
81 TABLET, CHEWABLE ORAL DAILY
Status: DISCONTINUED | OUTPATIENT
Start: 2021-03-05 | End: 2021-03-05 | Stop reason: HOSPADM

## 2021-03-04 RX ADMIN — LEVOTHYROXINE SODIUM 25 MCG: 25 TABLET ORAL at 05:32

## 2021-03-04 RX ADMIN — Medication 5 MG: at 22:57

## 2021-03-04 RX ADMIN — LOSARTAN POTASSIUM 50 MG: 50 TABLET, FILM COATED ORAL at 05:32

## 2021-03-04 RX ADMIN — ATORVASTATIN CALCIUM 40 MG: 40 TABLET, FILM COATED ORAL at 22:57

## 2021-03-04 RX ADMIN — DOCUSATE SODIUM 50 MG AND SENNOSIDES 8.6 MG 2 TABLET: 8.6; 5 TABLET, FILM COATED ORAL at 05:32

## 2021-03-04 RX ADMIN — ENOXAPARIN SODIUM 40 MG: 40 INJECTION SUBCUTANEOUS at 05:31

## 2021-03-04 RX ADMIN — ESCITALOPRAM OXALATE 20 MG: 10 TABLET ORAL at 05:32

## 2021-03-04 RX ADMIN — TRAZODONE HYDROCHLORIDE 100 MG: 50 TABLET ORAL at 22:56

## 2021-03-04 ASSESSMENT — ENCOUNTER SYMPTOMS
HEMOPTYSIS: 0
NAUSEA: 0
STRIDOR: 0
COUGH: 0
FEVER: 0
CHEST TIGHTNESS: 0
ABDOMINAL PAIN: 0
ORTHOPNEA: 1
CHILLS: 0
NECK PAIN: 0
DIARRHEA: 0
HEARTBURN: 1
APNEA: 0
WHEEZING: 0
BACK PAIN: 0
DIZZINESS: 0
CLAUDICATION: 0
SPUTUM PRODUCTION: 0
PALPITATIONS: 0
VOMITING: 0
CHOKING: 0
WEAKNESS: 0
HEADACHES: 0
SHORTNESS OF BREATH: 0

## 2021-03-04 ASSESSMENT — PAIN DESCRIPTION - PAIN TYPE: TYPE: ACUTE PAIN

## 2021-03-04 NOTE — PROGRESS NOTES
Cardiology Follow Up Progress Note    Date of Service  3/4/2021    Attending Physician  REX Barron*    PMH: Type 2 diabetes, hypertension, hyperlipidemia, obesity, COVID-19 December 2020    Presented with chest pain x4 days      Interim Events    No overnight cardiac events  Monitored telemetry: Sinus rhythm  Chest discomfort this morning 4 out of 10, sharp pain, very small area on the left side not radiating, associated with deep breathing, somewhat reproducible  /86  CMP unremarkable      Review of Systems  Review of Systems   Respiratory: Negative for apnea, cough, choking, chest tightness, shortness of breath, wheezing and stridor.    Cardiovascular: Positive for chest pain. Negative for leg swelling.       Vital signs in last 24 hours  Temp:  [35.9 °C (96.7 °F)-36.7 °C (98 °F)] 36.6 °C (97.9 °F)  Pulse:  [68-96] 75  Resp:  [16-18] 18  BP: (121-137)/(67-86) 134/86  SpO2:  [92 %-93 %] 92 %    Physical Exam  Physical Exam  Cardiovascular:      Rate and Rhythm: Normal rate and regular rhythm.   Pulmonary:      Effort: Pulmonary effort is normal.   Skin:     General: Skin is warm.   Neurological:      General: No focal deficit present.      Mental Status: He is alert.   Psychiatric:         Mood and Affect: Mood normal.         Lab Review  Lab Results   Component Value Date/Time    WBC 8.0 03/02/2021 08:00 PM    RBC 4.52 (L) 03/02/2021 08:00 PM    HEMOGLOBIN 13.7 (L) 03/02/2021 08:00 PM    HEMATOCRIT 42.1 03/02/2021 08:00 PM    MCV 93.1 03/02/2021 08:00 PM    MCH 30.3 03/02/2021 08:00 PM    MCHC 32.5 (L) 03/02/2021 08:00 PM    MPV 10.5 03/02/2021 08:00 PM      Lab Results   Component Value Date/Time    SODIUM 137 03/02/2021 08:00 PM    POTASSIUM 4.0 03/02/2021 08:00 PM    CHLORIDE 104 03/02/2021 08:00 PM    CO2 23 03/02/2021 08:00 PM    GLUCOSE 150 (H) 03/02/2021 08:00 PM    BUN 30 (H) 03/02/2021 08:00 PM    CREATININE 0.94 03/02/2021 08:00 PM      Lab Results   Component Value Date/Time     ASTSGOT 15 03/02/2021 08:00 PM    ALTSGPT 22 03/02/2021 08:00 PM     Lab Results   Component Value Date/Time    CHOLSTRLTOT 119 04/30/2020 11:03 AM    LDL 47 04/30/2020 11:03 AM    HDL 43 04/30/2020 11:03 AM    TRIGLYCERIDE 143 04/30/2020 11:03 AM    TROPONINT 9 03/03/2021 02:15 AM       Recent Labs     03/02/21 2000   NTPROBNP 65       Cardiac Imaging and Procedures Review  EKG: Negative for ischemia    Echocardiogram:  3/4/21  The left ventricle was normal in size and thickness. Normal left   ventricular systolic function. Normal regional wall motion. Left   ventricular ejection fraction is visually estimated to be 55%. Normal   diastolic function.          Cardiac Catheterization:    7/25/2018  Nonobstructive CAD  40% LAD, 40% RCA, patent left circumflex    Imaging  Chest X-Ray: No acute cardiopulmonary disease        Assessment/Plan    Atypical chest pain  - coronary CTA, result pending  -Troponin flat, negative  -Continue with aspirin 81, atorvastatin 40      Hypertension  -Continue with losartan    Hyperlipidemia  -Atorvastatin    Type 2 diabetes  -A1c 6.1    Follow-up on coronary CTA      Thank you for allowing me to participate in the care of this patient.    Please contact me with any questions.    WILLIAN Kumrai.   Cardiologist, [facility]  [phone]

## 2021-03-04 NOTE — DISCHARGE SUMMARY
Discharge Summary    CHIEF COMPLAINT ON ADMISSION  Chief Complaint   Patient presents with   • Chest Pain       Reason for Admission  Chest Pain     Admission Date  3/2/2021    CODE STATUS  Full Code    HPI & HOSPITAL COURSE  This is a 60 y.o. male here with a PMHx significant for type 2 diabetes, hypertension, hyperlipidemia, obesity, and COVID-19 in December 2020 who presented for c/o chest pain. He was admitted for ACS work-up. Echo was normal , trops were not elevated, CTA chest negative for PE. Stress test was negative for ischemia or infarction, however patient did have chest pain during test so cardiology was consulted. CTA heart ordered per cardiology and revealed >50% stenosis of the LAD, but otherwise unremarkable. He was cleared by cardiology for discharge with outpatient follow-up. At time of discharge, he was hemodynamically stable on room air . He reported intermittent chest pain with exertion (3-4 out of 10 in intensity), but said it was improved since admission.     Therefore, he is discharged in fair and stable condition to home with close outpatient follow-up.    The patient recovered much more quickly than anticipated on admission.    Discharge Date  3/4/2021    FOLLOW UP ITEMS POST DISCHARGE  Cardiology     DISCHARGE DIAGNOSES  Principal Problem (Resolved):    Chest pain POA: Unknown  Active Problems:    Acquired hypothyroidism POA: Yes    Type 2 diabetes mellitus with complication, without long-term current use of insulin (HCC) POA: Yes    Dyslipidemia POA: Yes  Resolved Problems:    SOB (shortness of breath) POA: Unknown      FOLLOW UP  Future Appointments   Date Time Provider Department Center   3/13/2021 10:00 AM [facility] None     Henry Dwyer M.D.  [address]  [address]  [facility] [address]  [phone]            MEDICATIONS ON DISCHARGE     Medication List      CHANGE how you take these medications      Instructions   atorvastatin 40 MG Tabs  What changed:   · how much  to take  · when to take this  Commonly known as: LIPITOR   TAKE 1 TABLET BY MOUTH ONCE DAILY IN THE EVENING     escitalopram 20 MG tablet  What changed: when to take this  Commonly known as: LEXAPRO   Take 1 tablet by mouth once daily     Euthyrox 25 MCG Tabs  What changed: how much to take  Generic drug: levothyroxine   TAKE 1 TABLET BY MOUTH ONCE DAILY IN THE MORNING ON  AN  EMPTY  STOMACH     isosorbide mononitrate SR 30 MG Tb24  What changed:   · how much to take  · when to take this  Commonly known as: IMDUR   TAKE 1 TABLET BY MOUTH ONCE DAILY IN THE MORNING     losartan 50 MG Tabs  What changed: when to take this  Commonly known as: COZAAR   Take 1 tablet by mouth once daily     traZODone 100 MG Tabs  What changed: when to take this  Commonly known as: DESYREL   TAKE 2 TABLETS BY MOUTH AT BEDTIME     Trulicity 1.5 MG/0.5ML Sopn  What changed: See the new instructions.  Generic drug: Dulaglutide   INJECT 1 SYRINGE SUBCUTANEOUSLY ONCE A WEEK AS DIRECTED        CONTINUE taking these medications      Instructions   5- MG Caps   Take 200 mg by mouth every bedtime.  Dose: 200 mg     acetaminophen 500 MG Tabs  Commonly known as: TYLENOL   Take 1,000 mg by mouth 2 times a day as needed for Fever.  Dose: 1,000 mg     aspirin EC 81 MG Tbec  Commonly known as: ECOTRIN   Take 81 mg by mouth every day.  Dose: 81 mg     Cinnamon 500 MG Caps   Take 1,000 mg by mouth every morning. \  Dose: 1,000 mg     EQ Allergy Relief (Cetirizine) 10 MG Tabs  Generic drug: cetirizine   Take 1 tablet by mouth once daily     fish oil 1000 MG Caps capsule   Take 2,000 mg by mouth every morning.  Dose: 2,000 mg     glipiZIDE 5 MG Tabs  Commonly known as: GLUCOTROL   Take 1 Tab by mouth 2 times a day.  Dose: 5 mg     guaiFENesin  MG Tb12  Commonly known as: MUCINEX   Take 1 Tab by mouth every 12 hours.  Dose: 600 mg     Lantus SoloStar 100 UNIT/ML Sopn injection  Generic drug: insulin glargine   INJECT 32 UNITS SUBCUTANEOUSLY IN  THE EVENING     Melatonin 1 MG Caps   Take 1 mg by mouth every bedtime.  Dose: 1 mg     RELION PEN NEEDLE 31G/8MM  Generic drug: Insulin Pen Needle   Doctor's comments: ICD-10:E11.9  USE WITH LANTUS DAILY.     Synjardy 12.5-1000 MG Tabs  Generic drug: Empagliflozin-metFORMIN HCl   Take 1 Tab by mouth 2 Times a Day.  Dose: 1 tablet     therapeutic multivitamin-minerals Tabs   Take 1 Tab by mouth every morning.  Dose: 1 tablet     Turmeric 500 MG Caps   Take 1,500 mg by mouth every day.  Dose: 1,500 mg     vitamin D 1000 Unit (25 mcg) Tabs  Commonly known as: cholecalciferol   Take 1,000 Units by mouth every day.  Dose: 1,000 Units            Allergies  No Known Allergies    DIET  Orders Placed This Encounter   Procedures   • Diet Order Diet: Cardiac; Second Modifier: (optional): Consistent CHO (Diabetic); Miscellaneous modifications: (optional): No Decaf, No Caffeine(for test)     Standing Status:   Standing     Number of Occurrences:   1     Order Specific Question:   Diet:     Answer:   Cardiac [6]     Order Specific Question:   Second Modifier: (optional)     Answer:   Consistent CHO (Diabetic) [4]     Order Specific Question:   Miscellaneous modifications: (optional)     Answer:   No Decaf, No Caffeine(for test) [11]       ACTIVITY  As tolerated.  Weight bearing as tolerated    CONSULTATIONS  Cardiology, Dr. Dwyer     PROCEDURES  N/A    LABORATORY  Lab Results   Component Value Date    SODIUM 137 03/02/2021    POTASSIUM 4.0 03/02/2021    CHLORIDE 104 03/02/2021    CO2 23 03/02/2021    GLUCOSE 150 (H) 03/02/2021    BUN 30 (H) 03/02/2021    CREATININE 0.94 03/02/2021        Lab Results   Component Value Date    WBC 8.0 03/02/2021    HEMOGLOBIN 13.7 (L) 03/02/2021    HEMATOCRIT 42.1 03/02/2021    PLATELETCT 236 03/02/2021        Total time of the discharge process exceeds 35 minutes.

## 2021-03-04 NOTE — PROGRESS NOTES
Pt states less chest discomfort through night. Occasionally discomfort would temporarily increase to a 4/10 but would not last long. Pt states he was able to sleep a bit. Pt reminded to call for increase in chest discomfort.

## 2021-03-04 NOTE — CARE PLAN
Problem: Pain Management  Goal: Pain level will decrease to patient's comfort goal  Outcome: PROGRESSING SLOWER THAN EXPECTED     Problem: Communication  Goal: The ability to communicate needs accurately and effectively will improve  Outcome: MET     Problem: Safety  Goal: Will remain free from injury  Outcome: MET

## 2021-03-05 ENCOUNTER — APPOINTMENT (OUTPATIENT)
Dept: CARDIOLOGY | Facility: MEDICAL CENTER | Age: 61
End: 2021-03-05
Attending: NURSE PRACTITIONER
Payer: COMMERCIAL

## 2021-03-05 VITALS
DIASTOLIC BLOOD PRESSURE: 69 MMHG | HEART RATE: 83 BPM | HEIGHT: 74 IN | SYSTOLIC BLOOD PRESSURE: 128 MMHG | BODY MASS INDEX: 31.89 KG/M2 | TEMPERATURE: 97.5 F | OXYGEN SATURATION: 94 % | RESPIRATION RATE: 22 BRPM | WEIGHT: 248.46 LBS

## 2021-03-05 LAB
ACT BLD: 329 SEC (ref 74–137)
ANION GAP SERPL CALC-SCNC: 7 MMOL/L (ref 7–16)
BUN SERPL-MCNC: 22 MG/DL (ref 8–22)
CALCIUM SERPL-MCNC: 9.4 MG/DL (ref 8.5–10.5)
CHLORIDE SERPL-SCNC: 102 MMOL/L (ref 96–112)
CO2 SERPL-SCNC: 23 MMOL/L (ref 20–33)
CREAT SERPL-MCNC: 0.88 MG/DL (ref 0.5–1.4)
ERYTHROCYTE [DISTWIDTH] IN BLOOD BY AUTOMATED COUNT: 48.3 FL (ref 35.9–50)
GLUCOSE BLD-MCNC: 132 MG/DL (ref 65–99)
GLUCOSE BLD-MCNC: 141 MG/DL (ref 65–99)
GLUCOSE BLD-MCNC: 141 MG/DL (ref 65–99)
GLUCOSE SERPL-MCNC: 149 MG/DL (ref 65–99)
HCT VFR BLD AUTO: 48.9 % (ref 42–52)
HGB BLD-MCNC: 15.8 G/DL (ref 14–18)
MCH RBC QN AUTO: 30.4 PG (ref 27–33)
MCHC RBC AUTO-ENTMCNC: 32.3 G/DL (ref 33.7–35.3)
MCV RBC AUTO: 94 FL (ref 81.4–97.8)
PLATELET # BLD AUTO: 239 K/UL (ref 164–446)
PMV BLD AUTO: 10.3 FL (ref 9–12.9)
POTASSIUM SERPL-SCNC: 4.3 MMOL/L (ref 3.6–5.5)
RBC # BLD AUTO: 5.2 M/UL (ref 4.7–6.1)
SODIUM SERPL-SCNC: 132 MMOL/L (ref 135–145)
WBC # BLD AUTO: 7.8 K/UL (ref 4.8–10.8)

## 2021-03-05 PROCEDURE — 99153 MOD SED SAME PHYS/QHP EA: CPT

## 2021-03-05 PROCEDURE — 700101 HCHG RX REV CODE 250

## 2021-03-05 PROCEDURE — 99152 MOD SED SAME PHYS/QHP 5/>YRS: CPT | Performed by: INTERNAL MEDICINE

## 2021-03-05 PROCEDURE — 93458 L HRT ARTERY/VENTRICLE ANGIO: CPT | Mod: 26 | Performed by: INTERNAL MEDICINE

## 2021-03-05 PROCEDURE — A9270 NON-COVERED ITEM OR SERVICE: HCPCS | Performed by: STUDENT IN AN ORGANIZED HEALTH CARE EDUCATION/TRAINING PROGRAM

## 2021-03-05 PROCEDURE — 700105 HCHG RX REV CODE 258: Performed by: INTERNAL MEDICINE

## 2021-03-05 PROCEDURE — 85027 COMPLETE CBC AUTOMATED: CPT

## 2021-03-05 PROCEDURE — 700102 HCHG RX REV CODE 250 W/ 637 OVERRIDE(OP): Performed by: STUDENT IN AN ORGANIZED HEALTH CARE EDUCATION/TRAINING PROGRAM

## 2021-03-05 PROCEDURE — 99214 OFFICE O/P EST MOD 30 MIN: CPT | Mod: 25 | Performed by: INTERNAL MEDICINE

## 2021-03-05 PROCEDURE — 99217 PR OBSERVATION CARE DISCHARGE: CPT | Performed by: STUDENT IN AN ORGANIZED HEALTH CARE EDUCATION/TRAINING PROGRAM

## 2021-03-05 PROCEDURE — A9270 NON-COVERED ITEM OR SERVICE: HCPCS | Performed by: NURSE PRACTITIONER

## 2021-03-05 PROCEDURE — 700102 HCHG RX REV CODE 250 W/ 637 OVERRIDE(OP): Performed by: NURSE PRACTITIONER

## 2021-03-05 PROCEDURE — 700111 HCHG RX REV CODE 636 W/ 250 OVERRIDE (IP)

## 2021-03-05 PROCEDURE — 82962 GLUCOSE BLOOD TEST: CPT | Mod: 91

## 2021-03-05 PROCEDURE — G0378 HOSPITAL OBSERVATION PER HR: HCPCS

## 2021-03-05 PROCEDURE — 80048 BASIC METABOLIC PNL TOTAL CA: CPT

## 2021-03-05 PROCEDURE — 85347 COAGULATION TIME ACTIVATED: CPT

## 2021-03-05 PROCEDURE — 700117 HCHG RX CONTRAST REV CODE 255: Performed by: INTERNAL MEDICINE

## 2021-03-05 RX ORDER — MIDAZOLAM HYDROCHLORIDE 1 MG/ML
INJECTION INTRAMUSCULAR; INTRAVENOUS
Status: COMPLETED
Start: 2021-03-05 | End: 2021-03-05

## 2021-03-05 RX ORDER — SODIUM CHLORIDE 9 MG/ML
INJECTION, SOLUTION INTRAVENOUS CONTINUOUS
Status: CANCELLED | OUTPATIENT
Start: 2021-03-05

## 2021-03-05 RX ORDER — HEPARIN SODIUM 200 [USP'U]/100ML
INJECTION, SOLUTION INTRAVENOUS
Status: COMPLETED
Start: 2021-03-05 | End: 2021-03-05

## 2021-03-05 RX ORDER — HEPARIN SODIUM 1000 [USP'U]/ML
INJECTION, SOLUTION INTRAVENOUS; SUBCUTANEOUS
Status: COMPLETED
Start: 2021-03-05 | End: 2021-03-05

## 2021-03-05 RX ORDER — SODIUM CHLORIDE 9 MG/ML
INJECTION, SOLUTION INTRAVENOUS CONTINUOUS
Status: DISCONTINUED | OUTPATIENT
Start: 2021-03-05 | End: 2021-03-05 | Stop reason: HOSPADM

## 2021-03-05 RX ORDER — LIDOCAINE HYDROCHLORIDE 20 MG/ML
INJECTION, SOLUTION INFILTRATION; PERINEURAL
Status: COMPLETED
Start: 2021-03-05 | End: 2021-03-05

## 2021-03-05 RX ORDER — VERAPAMIL HYDROCHLORIDE 2.5 MG/ML
INJECTION, SOLUTION INTRAVENOUS
Status: COMPLETED
Start: 2021-03-05 | End: 2021-03-05

## 2021-03-05 RX ADMIN — FENTANYL CITRATE 75 MCG: 50 INJECTION, SOLUTION INTRAMUSCULAR; INTRAVENOUS at 15:41

## 2021-03-05 RX ADMIN — VERAPAMIL HYDROCHLORIDE 2.5 MG: 2.5 INJECTION, SOLUTION INTRAVENOUS at 15:28

## 2021-03-05 RX ADMIN — NITROGLYCERIN 10 ML: 20 INJECTION INTRAVENOUS at 15:28

## 2021-03-05 RX ADMIN — LIDOCAINE HYDROCHLORIDE: 20 INJECTION, SOLUTION INFILTRATION; PERINEURAL at 15:27

## 2021-03-05 RX ADMIN — DOCUSATE SODIUM 50 MG AND SENNOSIDES 8.6 MG 2 TABLET: 8.6; 5 TABLET, FILM COATED ORAL at 05:56

## 2021-03-05 RX ADMIN — LOSARTAN POTASSIUM 50 MG: 50 TABLET, FILM COATED ORAL at 05:56

## 2021-03-05 RX ADMIN — MIDAZOLAM HYDROCHLORIDE 1.5 MG: 1 INJECTION, SOLUTION INTRAMUSCULAR; INTRAVENOUS at 15:41

## 2021-03-05 RX ADMIN — IOHEXOL 37 ML: 350 INJECTION, SOLUTION INTRAVENOUS at 15:41

## 2021-03-05 RX ADMIN — LEVOTHYROXINE SODIUM 25 MCG: 25 TABLET ORAL at 05:56

## 2021-03-05 RX ADMIN — HEPARIN SODIUM 2000 UNITS: 200 INJECTION, SOLUTION INTRAVENOUS at 15:29

## 2021-03-05 RX ADMIN — HEPARIN SODIUM: 1000 INJECTION, SOLUTION INTRAVENOUS; SUBCUTANEOUS at 15:44

## 2021-03-05 RX ADMIN — ASPIRIN 81 MG: 81 TABLET, CHEWABLE ORAL at 05:56

## 2021-03-05 RX ADMIN — HEPARIN SODIUM: 1000 INJECTION, SOLUTION INTRAVENOUS; SUBCUTANEOUS at 15:14

## 2021-03-05 RX ADMIN — SODIUM CHLORIDE: 9 INJECTION, SOLUTION INTRAVENOUS at 16:09

## 2021-03-05 RX ADMIN — ESCITALOPRAM OXALATE 20 MG: 10 TABLET ORAL at 05:56

## 2021-03-05 ASSESSMENT — ENCOUNTER SYMPTOMS
CHEST TIGHTNESS: 0
EYE REDNESS: 0
ADENOPATHY: 0
NUMBNESS: 0
ABDOMINAL DISTENTION: 0
MYALGIAS: 0
CHOKING: 0
POLYPHAGIA: 0
DECREASED CONCENTRATION: 0
EYE PAIN: 0
COUGH: 0
COLOR CHANGE: 0
CONSTIPATION: 0
NERVOUS/ANXIOUS: 0
CHILLS: 0
POLYDIPSIA: 0
DIAPHORESIS: 0
APPETITE CHANGE: 0
HALLUCINATIONS: 0
DIARRHEA: 0
WEAKNESS: 0
STRIDOR: 0
AGITATION: 0
PALPITATIONS: 0
ABDOMINAL PAIN: 0
BACK PAIN: 0
EYE DISCHARGE: 0
EYE ITCHING: 0
BRUISES/BLEEDS EASILY: 0
SEIZURES: 0
JOINT SWELLING: 0
CONFUSION: 0
LIGHT-HEADEDNESS: 0
FLANK PAIN: 0
TREMORS: 0
NAUSEA: 0
HEADACHES: 0
SPEECH DIFFICULTY: 0
ACTIVITY CHANGE: 0

## 2021-03-05 NOTE — PROGRESS NOTES
Cardiology Follow Up Progress Note    Date of Service  3/5/2021    Attending Physician  Deborah Mcdonald A.P.R.*    Chief Complaint   Chest pain    HPI  Farzad Bryant Jr. is a 60 y.o. male admitted 3/2/2021 with chest pain    Interim Events  CT coronary yesterday showed possible progression to LAD disease  No chest pain overnight  Patient otherwise stable  Patient n.p.o. for procedure today.    Review of Systems  Review of Systems   Constitutional: Negative for activity change, appetite change, chills and diaphoresis.   Eyes: Negative for pain, discharge, redness and itching.   Respiratory: Negative for cough, choking, chest tightness and stridor.    Cardiovascular: Negative for palpitations.   Gastrointestinal: Negative for abdominal distention, abdominal pain, constipation, diarrhea and nausea.   Endocrine: Negative for cold intolerance, heat intolerance, polydipsia and polyphagia.   Genitourinary: Negative for difficulty urinating, dysuria, enuresis, flank pain, frequency, genital sores and hematuria.   Musculoskeletal: Negative for back pain, gait problem, joint swelling and myalgias.   Skin: Negative for color change, pallor and rash.   Neurological: Negative for tremors, seizures, syncope, speech difficulty, weakness, light-headedness, numbness and headaches.   Hematological: Negative for adenopathy. Does not bruise/bleed easily.   Psychiatric/Behavioral: Negative for agitation, behavioral problems, confusion, decreased concentration and hallucinations. The patient is not nervous/anxious.        Vital signs in last 24 hours  Temp:  [35.8 °C (96.5 °F)-37 °C (98.6 °F)] 37 °C (98.6 °F)  Pulse:  [73-89] 76  Resp:  [16-18] 16  BP: (123-145)/(71-80) 127/72  SpO2:  [90 %-97 %] 91 %    Physical Exam  Physical Exam  Vitals and nursing note reviewed.   Constitutional:       General: He is not in acute distress.     Appearance: Normal appearance. He is normal weight. He is not ill-appearing, toxic-appearing or  diaphoretic.   HENT:      Head: Normocephalic and atraumatic.      Right Ear: Ear canal and external ear normal.      Left Ear: Ear canal and external ear normal.      Nose: Nose normal. No congestion or rhinorrhea.      Mouth/Throat:      Mouth: Mucous membranes are moist.      Pharynx: Oropharynx is clear. No oropharyngeal exudate or posterior oropharyngeal erythema.   Eyes:      General: No scleral icterus.        Right eye: No discharge.         Left eye: No discharge.      Extraocular Movements: Extraocular movements intact.      Conjunctiva/sclera: Conjunctivae normal.      Pupils: Pupils are equal, round, and reactive to light.   Neck:      Vascular: No carotid bruit.   Cardiovascular:      Rate and Rhythm: Normal rate and regular rhythm.      Pulses: Normal pulses.      Heart sounds: Normal heart sounds. No murmur. No gallop.    Pulmonary:      Effort: Pulmonary effort is normal. No respiratory distress.      Breath sounds: Normal breath sounds. No stridor. No wheezing, rhonchi or rales.   Abdominal:      General: Abdomen is flat. Bowel sounds are normal. There is no distension.      Palpations: Abdomen is soft. There is no mass.      Tenderness: There is no abdominal tenderness. There is no guarding or rebound.      Hernia: No hernia is present.   Musculoskeletal:         General: No swelling or tenderness. Normal range of motion.      Cervical back: Normal range of motion and neck supple. No rigidity. No muscular tenderness.      Right lower leg: No edema.      Left lower leg: No edema.   Lymphadenopathy:      Cervical: No cervical adenopathy.   Skin:     General: Skin is warm and dry.      Capillary Refill: Capillary refill takes 2 to 3 seconds.      Coloration: Skin is not jaundiced or pale.      Findings: No bruising or lesion.   Neurological:      General: No focal deficit present.      Mental Status: He is alert and oriented to person, place, and time. Mental status is at baseline.      Cranial  Nerves: No cranial nerve deficit.      Motor: No weakness.   Psychiatric:         Mood and Affect: Mood normal.         Behavior: Behavior normal.         Thought Content: Thought content normal.         Judgment: Judgment normal.         Lab Review  Lab Results   Component Value Date/Time    WBC 7.8 03/05/2021 08:01 AM    RBC 5.20 03/05/2021 08:01 AM    HEMOGLOBIN 15.8 03/05/2021 08:01 AM    HEMATOCRIT 48.9 03/05/2021 08:01 AM    MCV 94.0 03/05/2021 08:01 AM    MCH 30.4 03/05/2021 08:01 AM    MCHC 32.3 (L) 03/05/2021 08:01 AM    MPV 10.3 03/05/2021 08:01 AM      Lab Results   Component Value Date/Time    SODIUM 132 (L) 03/05/2021 08:01 AM    POTASSIUM 4.3 03/05/2021 08:01 AM    CHLORIDE 102 03/05/2021 08:01 AM    CO2 23 03/05/2021 08:01 AM    GLUCOSE 149 (H) 03/05/2021 08:01 AM    BUN 22 03/05/2021 08:01 AM    CREATININE 0.88 03/05/2021 08:01 AM      Lab Results   Component Value Date/Time    ASTSGOT 15 03/02/2021 08:00 PM    ALTSGPT 22 03/02/2021 08:00 PM     Lab Results   Component Value Date/Time    CHOLSTRLTOT 119 04/30/2020 11:03 AM    LDL 47 04/30/2020 11:03 AM    HDL 43 04/30/2020 11:03 AM    TRIGLYCERIDE 143 04/30/2020 11:03 AM    TROPONINT 9 03/03/2021 02:15 AM       Recent Labs     03/02/21 2000   NTPROBNP 65       Cardiac Imaging and Procedures Review  Dated 3/2/2021 personally reviewed interpreted myself showing normal sinus rhythm with no ST segment changes concerning for ischemia.     ECHO:  NA     Nuclear Stress Test  Dated 7/11/2018 personally viewed interpreted myself showing an EF of 49% with apical thinning     Cardiac Cath:  Dated 7/25/2018 personally viewed interpreted myself showing mild disease in the LAD with some RCA disease but no obstructive coronary disease.    Coronary CTA: Dated 3/3/2021 personally viewed interpreted myself showing:  IMPRESSION:     Three-vessel coronary artery disease with short segment obstructive mixed plaque in the proximal LAD with greater than 50%  stenosis.    Assessment/Plan  No new Assessment & Plan notes have been filed under this hospital service since the last note was generated.  Service: Cardiology  60-year-old male with three-vessel coronary disease noted on a CT scan with greater than 50% stenosis of the LAD.  I explained him the risk benefits alternatives of proceeding to cardiac catheterization.  He agrees to proceed.  Will await the results of his cardiac catheterization.  Otherwise no changes to his medications today.    Thank you for allowing me to participate in the care of this patient.  I will continue to follow this patient    Please contact me with any questions.    Henry Dwyer M.D.   Cardiologist, [facility]  [phone]

## 2021-03-05 NOTE — CARE PLAN
Problem: Safety  Goal: Will remain free from falls  Outcome: PROGRESSING AS EXPECTED  Note: Pt educated on safety precautions, utilization of the call light, and bed alarm.  Pt verbalized understanding.      Problem: Pain Management  Goal: Pain level will decrease to patient's comfort goal  Outcome: PROGRESSING AS EXPECTED     Problem: Infection  Goal: Will remain free from infection  Outcome: PROGRESSING AS EXPECTED  Note: Implement standard precautions and perform handwashing before and after patient contact. RN will follow protocols and necessary steps to minimize the spread of infection.  RN educated pt and any visitors on proper hand hygiene.      Problem: Knowledge Deficit  Goal: Knowledge of disease process/condition, treatment plan, diagnostic tests, and medications will improve  Outcome: PROGRESSING AS EXPECTED  Goal: Knowledge of the prescribed therapeutic regimen will improve  Outcome: PROGRESSING AS EXPECTED

## 2021-03-05 NOTE — PROGRESS NOTES
Hospital Medicine Daily Progress Note    Date of Service  3/4/2021    Chief Complaint  60 y.o. male admitted 3/2/2021 with chest pain    Hospital Course  60 y.o. male with past medical history of diabetes mellitus, hypertension, hyperlipidemia, obesity, COVID-19 and December 2020 who presented 3/2/2021 with chest pain for the last 4 days which progressively worsened causing him to come to the ER. He was admitted for ACS rule out.    Interval Problem Update  3/3: Patient continues to c/o crushing left-sided chest pain, LEMONS and shortness of breath. VSS on room air. Stress test negative for ischemia but did have some ST depression so cardiology was consulted. CTA ordered and pending  3/4: Patient reports 4/10 chest pain. CT heart showed LAD with greater than 50% stenosis. Planning for cardiac cath tomorrow      Consultants/Specialty  Cardiology , Dr. Dwyer     Code Status  Full Code    Disposition  Continue here for cath tomorrow     Review of Systems  Review of Systems   Constitutional: Positive for malaise/fatigue. Negative for chills and fever.   Respiratory: Negative for cough, hemoptysis, sputum production, shortness of breath and wheezing.    Cardiovascular: Positive for chest pain and orthopnea. Negative for palpitations, claudication and leg swelling.   Gastrointestinal: Positive for heartburn. Negative for abdominal pain, diarrhea, nausea and vomiting.   Musculoskeletal: Negative for back pain, joint pain and neck pain.   Neurological: Negative for dizziness, weakness and headaches.        Physical Exam  Temp:  [35.9 °C (96.7 °F)-36.7 °C (98 °F)] 36.5 °C (97.7 °F)  Pulse:  [68-89] 89  Resp:  [16-18] 18  BP: (121-145)/(67-86) 145/78  SpO2:  [92 %-94 %] 94 %    Physical Exam  Vitals and nursing note reviewed.   Constitutional:       General: He is not in acute distress.     Appearance: He is not toxic-appearing.   HENT:      Head: Normocephalic and atraumatic.      Mouth/Throat:      Mouth: Mucous membranes are  moist.   Eyes:      General: No scleral icterus.     Pupils: Pupils are equal, round, and reactive to light.   Cardiovascular:      Rate and Rhythm: Normal rate and regular rhythm.      Pulses: Normal pulses.      Heart sounds: No friction rub.   Pulmonary:      Effort: Pulmonary effort is normal. No respiratory distress.      Breath sounds: Normal breath sounds. No wheezing or rales.   Abdominal:      General: There is no distension.      Palpations: Abdomen is soft.      Tenderness: There is no abdominal tenderness. There is no guarding.   Musculoskeletal:      Cervical back: Normal range of motion and neck supple.      Right lower leg: No edema.      Left lower leg: No edema.   Skin:     General: Skin is dry.      Coloration: Skin is not pale.   Neurological:      General: No focal deficit present.      Mental Status: He is alert and oriented to person, place, and time.   Psychiatric:         Mood and Affect: Mood is anxious.         Speech: Speech normal.         Behavior: Behavior is cooperative.         Fluids  No intake or output data in the 24 hours ending 03/04/21 1636    Laboratory  Recent Labs     03/02/21 2000   WBC 8.0   RBC 4.52*   HEMOGLOBIN 13.7*   HEMATOCRIT 42.1   MCV 93.1   MCH 30.3   MCHC 32.5*   RDW 47.8   PLATELETCT 236   MPV 10.5     Recent Labs     03/02/21 2000   SODIUM 137   POTASSIUM 4.0   CHLORIDE 104   CO2 23   GLUCOSE 150*   BUN 30*   CREATININE 0.94   CALCIUM 9.3                   Imaging  EC-ECHOCARDIOGRAM COMPLETE W/O CONT   Final Result      CT-CTA HEART W/3D IMAGE   Final Result      Three-vessel coronary artery disease with short segment obstructive mixed plaque in the proximal LAD with greater than 50% stenosis.            Calcium score is above the 75th percentile for the patient's age and sex.      Cardiac Stress Test Treadmill without Images   Final Result      CT-CTA CHEST PULMONARY ARTERY W/ RECONS   Final Result         1.  No pulmonary embolus appreciated.   2.   Atherosclerosis and atherosclerotic coronary artery disease      DX-CHEST-PORTABLE (1 VIEW)   Final Result      1.  There is no acute cardiopulmonary process.           Assessment/Plan  * Chest pain  Assessment & Plan  Stress test done this admission was negative for reversible ischemia or infarction, but did have worsening of chest pain.   Cardiology was consulted   CTA chest showed LAD with greater than 50% stenosis  Planning for cardiac cath tomorrow. NPO at midnight   Echo normal   Telemetry monitoring    mg daily ,Atorvastatin 40 mg hs , Losartan 50 mg daily   Nitroglycerin 0.4 mg hs PRN for chest pain      Dyslipidemia- (present on admission)  Assessment & Plan  C/w atorvastatin 40 mg daily     Type 2 diabetes mellitus with complication, without long-term current use of insulin (HCC)- (present on admission)  Assessment & Plan  snf and 2 gram sodium diet   ISS      Acquired hypothyroidism- (present on admission)  Assessment & Plan  Continue with euthyrox 25 mcg daily        VTE prophylaxis: SCDs only. Cardiac cath tomorrow

## 2021-03-05 NOTE — PROGRESS NOTES
Monitor summary per monitor tech report:    Sinus Rhythm rate 80-63  Bundle Branch Block    .16/.08/.43

## 2021-03-05 NOTE — PROGRESS NOTES
Pt reports intermittent CP which is unchanged from previous reports. Pt awaiting cardiac cath in the am. Pt currently resting with no complaints at this time.

## 2021-03-05 NOTE — PROGRESS NOTES
Report received from ANAI Travis.  Patient sitting up in bed watching TV.  POC gone over with pt and all questions answered.  Patient A & O  X 4.  No apparent signs of distress.  Safety precautions in place.  Patient educated to call for assistance.  Will continue to monitor.

## 2021-03-05 NOTE — PROCEDURES
"CARDIAC CATHETERIZATION REPORT    Referring Provider: Henry Dwyer M.D.    PROCEDURE PHYSICIAN: Etienne Giron MD, [facility], [facility]  ASSISTANT: None    IMPRESSIONS:  1.  Noncoronary chest pain  2.  Nonobstructive three-vessel coronary artery disease  3.  Normal LVEDP    Recommendations:  Further recommendations per the rounding team    Pre-procedure diagnosis: Unstable angina  Post-procedure diagnosis: Noncoronary chest pain    Procedure performed  Selective coronary angiography  Left heart catheterization    Conscious sedation was supervised by myself and administered by trained personnel using fentanyl and versed between 1517 and 1541. The patient tolerated sedation without complication.     Procedure Description  1. Access: 5/6 Swazi right radial artery Micropuncture technique was utilized following local anesthesia with lidocaine.  A radial cocktail containing verapamil, heparin and saline was administered in the radial artery sheath    2. Diagnostic description: The catheter was passed to the central circulation with the aide of J tipped 0.35\" wire. A 5F TIG 4.0 and 6F JL3.5 were used to inject the coronary circulation enter the left ventricle during invasive hemodynamic monitoring.     3. Closing: At completion of the procedure the relevant equipment was removed from the body and hemostasis achieved by Radial band    Findings   Hemodynamics:   Aorta: 120/75 mmHg  LV: 120/11 mmHg    Coronary Anatomy   Left Main: Short and normal   LAD: Ostial 20% stenosis.  Mid vessel with 30%, calcific stenosis   LCx: Co-dominant, Minimal luminal irregularities   RCA: Co-dominant, Proximal 50% stenosis     Technical Factors  1. Complications: None  2. Estimated Blood Loss: <50 cc  3. Specimens: None  4. Contrast Volume: 37 ml  5. Medications: Radial cocktail (Verapamil 2.5 mg, Nitroglycerin 100 mcg) Heparin 09846 units  6. Radiation (Air Kerma): 245 mGy    "

## 2021-03-06 NOTE — PROGRESS NOTES
3ml of air removed from hemoband. No signs of bleeding. Pulses are palpable above and below band. Will continue to monitor

## 2021-03-06 NOTE — PROGRESS NOTES
Patient taken downstairs to ride. No signs of bleeding noted to right radial site. Bandage in place. Site care instructions given to patient. Patient has no complaints at this time

## 2021-03-06 NOTE — PROGRESS NOTES
3ML of air removed from hemoband. No signs of bleeding at this time. Pulses palpable above and below band. All air is out of band at this time. Will continue to monitor for 15-20 minutes and then place dressing.

## 2021-03-06 NOTE — PROGRESS NOTES
Patient back to room from cath lab. Vital signs stable. Patient asking about food and discharge. Will get food ordered and will ask provider about discharge.

## 2021-03-06 NOTE — PROGRESS NOTES
Site check completed. 3 ml of air removed from hemoband. No bleeding noted. Pulses palpable above and below band. Will continue to monitor.

## 2021-03-06 NOTE — PROGRESS NOTES
Site check completed. 3ml of air removed from hemoband. No signs of bleeding. Pulses palpable below and above band. Will continue to monitor

## 2021-03-06 NOTE — DISCHARGE INSTRUCTIONS
Radial Site Care    This sheet gives you information about how to care for yourself after your procedure. Your health care provider may also give you more specific instructions. If you have problems or questions, contact your health care provider.  What can I expect after the procedure?  After the procedure, it is common to have:  · Bruising and tenderness at the catheter insertion area.  Follow these instructions at home:  Medicines  · Take over-the-counter and prescription medicines only as told by your health care provider.  Insertion site care  · Follow instructions from your health care provider about how to take care of your insertion site. Make sure you:  ? Wash your hands with soap and water before you change your bandage (dressing). If soap and water are not available, use hand .  ? Change your dressing as told by your health care provider.  ? Leave stitches (sutures), skin glue, or adhesive strips in place. These skin closures may need to stay in place for 2 weeks or longer. If adhesive strip edges start to loosen and curl up, you may trim the loose edges. Do not remove adhesive strips completely unless your health care provider tells you to do that.  · Check your insertion site every day for signs of infection. Check for:  ? Redness, swelling, or pain.  ? Fluid or blood.  ? Pus or a bad smell.  ? Warmth.  · Do not take baths, swim, or use a hot tub until your health care provider approves.  · You may shower 24-48 hours after the procedure, or as directed by your health care provider.  ? Remove the dressing and gently wash the site with plain soap and water.  ? Pat the area dry with a clean towel.  ? Do not rub the site. That could cause bleeding.  · Do not apply powder or lotion to the site.  Activity    · For 24 hours after the procedure, or as directed by your health care provider:  ? Do not flex or bend the affected arm.  ? Do not push or pull heavy objects with the affected arm.  ? Do not  drive yourself home from the hospital or clinic. You may drive 24 hours after the procedure unless your health care provider tells you not to.  ? Do not operate machinery or power tools.  · Do not lift anything that is heavier than 10 lb (4.5 kg), or the limit that you are told, until your health care provider says that it is safe.  · Ask your health care provider when it is okay to:  ? Return to work or school.  ? Resume usual physical activities or sports.  ? Resume sexual activity.  General instructions  · If the catheter site starts to bleed, raise your arm and put firm pressure on the site. If the bleeding does not stop, get help right away. This is a medical emergency.  · If you went home on the same day as your procedure, a responsible adult should be with you for the first 24 hours after you arrive home.  · Keep all follow-up visits as told by your health care provider. This is important.  Contact a health care provider if:  · You have a fever.  · You have redness, swelling, or yellow drainage around your insertion site.  Get help right away if:  · You have unusual pain at the radial site.  · The catheter insertion area swells very fast.  · The insertion area is bleeding, and the bleeding does not stop when you hold steady pressure on the area.  · Your arm or hand becomes pale, cool, tingly, or numb.  These symptoms may represent a serious problem that is an emergency. Do not wait to see if the symptoms will go away. Get medical help right away. Call your local emergency services (911 in the [place] Do not drive yourself to the hospital.  Summary  · After the procedure, it is common to have bruising and tenderness at the site.  · Follow instructions from your health care provider about how to take care of your radial site wound. Check the wound every day for signs of infection.  · Do not lift anything that is heavier than 10 lb (4.5 kg), or the limit that you are told, until your health care provider says  that it is safe.  This information is not intended to replace advice given to you by your health care provider. Make sure you discuss any questions you have with your health care provider.  Document Released: 01/20/2012 Document Revised: 01/23/2019 Document Reviewed: 01/23/2019  [employer] Patient Education © 2020 [employer]  Discharge Instructions    Discharged to home by car with relative. Discharged via wheelchair, hospital escort: Yes.  Special equipment needed: Not Applicable    Be sure to schedule a follow-up appointment with your primary care doctor or any specialists as instructed.     Discharge Plan:   Diet Plan: Discussed  Activity Level: Discussed  Confirmed Follow up Appointment: Patient to Call and Schedule Appointment  Confirmed Symptoms Management: Discussed  Medication Reconciliation Updated: Yes  Influenza Vaccine Indication: Not indicated: Previously immunized this influenza season and > 8 years of age    I understand that a diet low in cholesterol, fat, and sodium is recommended for good health. Unless I have been given specific instructions below for another diet, I accept this instruction as my diet prescription.   Other diet: Diabetic Diet    Special Instructions: None    · Is patient discharged on Warfarin / Coumadin?   No     Depression / Suicide Risk    As you are discharged from this [facility] facility, it is important to learn how to keep safe from harming yourself.    Recognize the warning signs:  · Abrupt changes in personality, positive or negative- including increase in energy   · Giving away possessions  · Change in eating patterns- significant weight changes-  positive or negative  · Change in sleeping patterns- unable to sleep or sleeping all the time   · Unwillingness or inability to communicate  · Depression  · Unusual sadness, discouragement and loneliness  · Talk of wanting to die  · Neglect of personal appearance   · Rebelliousness- reckless behavior  · Withdrawal from  people/activities they love  · Confusion- inability to concentrate     If you or a loved one observes any of these behaviors or has concerns about self-harm, here's what you can do:  · Talk about it- your feelings and reasons for harming yourself  · Remove any means that you might use to hurt yourself (examples: pills, rope, extension cords, firearm)  · Get professional help from the community (Mental Health, Substance Abuse, psychological counseling)  · Do not be alone:Call your Safe Contact- someone whom you trust who will be there for you.  · Call your local CRISIS HOTLINE 039-4474 or 482-648-2314  · Call your local Children's Mobile Crisis Response Team [place] (677) 551-4956 or www.[employer]  · Call the toll free National Suicide Prevention Hotlines   · National Suicide Prevention Lifeline 494-477-EGFO (0971)  · National Hope Line Network 800-SUICIDE (984-8910)        Discharge instructions:    DIET: Resume home diet previous to admission    ACTIVITY: Resume previous level of activities.    DIAGNOSIS: Chest wall pain. No evidence of myocardial ischemia or infarction.     Return to ER if fever greater than 38 degree Celsius or 100.4 degree Fahrenheit, worsening pain, chest pain at rest or associated with exertion, worsening shortness of breath, bloody coughs, bloody bowel movement, severe unrelenting abdominal pain, focal weakness.    ARMANDO Barron.

## 2021-03-06 NOTE — DISCHARGE SUMMARY
Discharge Summary    CHIEF COMPLAINT ON ADMISSION  Chief Complaint   Patient presents with   • Chest Pain       Reason for Admission  Chest Pain     Admission Date  3/2/2021    CODE STATUS  Full Code    HPI & HOSPITAL COURSE  This is a 60 y.o. male here with known medical history consisting of diabetes, hypertension, hyperlipidemia, obesity and recent COVID-19 infection in December 2020.  Patient presented to the emergency room with chest pain for approximately 4 days progressively worsening.  Patient stated left-sided chest pain is sometimes worse with deep breath.  Patient was admitted to CDU for continued ACS work-up.  She was placed on telemetry monitoring with no acute cardiac events noted.  Troponins remain negative.  CTA of the chest showed atherosclerosis and atherosclerotic coronary artery disease, therefore CT of the heart with completed which revealed three-vessel coronary artery disease with short segment obstructive mixed plaque in the proximal LAD with greater than 50% stenosis.  Cardiology was consulted and decided to take patient for left-sided heart cath.  Echocardiogram was completed which showed an EF of 55%.  Heart catheterization showed nonobstructive three-vessel coronary artery disease and patient was cleared for discharge by cardiology.  Chest pain is likely noncoronary and musculoskeletal as patient says it does increase with deep inspiration.  Patient will follow up with PCP upon discharge.  Patient's vital signs are stable and patient is requesting discharge at this time.    Therefore, he is discharged in good and stable condition to home with close outpatient follow-up.        Discharge Date  3/5/21    FOLLOW UP ITEMS POST DISCHARGE  Follow-up with PCP    DISCHARGE DIAGNOSES  Principal Problem:    Chest pain POA: Unknown  Active Problems:    Acquired hypothyroidism POA: Yes    Type 2 diabetes mellitus with complication, without long-term current use of insulin (HCC) POA: Yes     Dyslipidemia POA: Yes  Resolved Problems:    SOB (shortness of breath) POA: Unknown      FOLLOW UP  Future Appointments   Date Time Provider Department Center   3/13/2021 10:00 AM [facility] None     Henry Dwyer M.D.  [address]   [address]  [facility] [address]  [phone]            MEDICATIONS ON DISCHARGE     Medication List      CHANGE how you take these medications      Instructions   atorvastatin 40 MG Tabs  What changed:   · how much to take  · when to take this  Commonly known as: LIPITOR   TAKE 1 TABLET BY MOUTH ONCE DAILY IN THE EVENING     escitalopram 20 MG tablet  What changed: when to take this  Commonly known as: LEXAPRO   Take 1 tablet by mouth once daily     Euthyrox 25 MCG Tabs  What changed: how much to take  Generic drug: levothyroxine   TAKE 1 TABLET BY MOUTH ONCE DAILY IN THE MORNING ON  AN  EMPTY  STOMACH     isosorbide mononitrate SR 30 MG Tb24  What changed:   · how much to take  · when to take this  Commonly known as: IMDUR   TAKE 1 TABLET BY MOUTH ONCE DAILY IN THE MORNING     losartan 50 MG Tabs  What changed: when to take this  Commonly known as: COZAAR   Take 1 tablet by mouth once daily     traZODone 100 MG Tabs  What changed: when to take this  Commonly known as: DESYREL   TAKE 2 TABLETS BY MOUTH AT BEDTIME     Trulicity 1.5 MG/0.5ML Sopn  What changed: See the new instructions.  Generic drug: Dulaglutide   INJECT 1 SYRINGE SUBCUTANEOUSLY ONCE A WEEK AS DIRECTED        CONTINUE taking these medications      Instructions   5- MG Caps   Take 200 mg by mouth every bedtime.  Dose: 200 mg     acetaminophen 500 MG Tabs  Commonly known as: TYLENOL   Take 1,000 mg by mouth 2 times a day as needed for Fever.  Dose: 1,000 mg     aspirin EC 81 MG Tbec  Commonly known as: ECOTRIN   Take 81 mg by mouth every day.  Dose: 81 mg     Cinnamon 500 MG Caps   Take 1,000 mg by mouth every morning. \  Dose: 1,000 mg     EQ Allergy Relief (Cetirizine) 10 MG Tabs  Generic drug:  cetirizine   Take 1 tablet by mouth once daily     fish oil 1000 MG Caps capsule   Take 2,000 mg by mouth every morning.  Dose: 2,000 mg     glipiZIDE 5 MG Tabs  Commonly known as: GLUCOTROL   Take 1 Tab by mouth 2 times a day.  Dose: 5 mg     guaiFENesin  MG Tb12  Commonly known as: MUCINEX   Take 1 Tab by mouth every 12 hours.  Dose: 600 mg     Lantus SoloStar 100 UNIT/ML Sopn injection  Generic drug: insulin glargine   INJECT 32 UNITS SUBCUTANEOUSLY IN THE EVENING     Melatonin 1 MG Caps   Take 1 mg by mouth every bedtime.  Dose: 1 mg     RELION PEN NEEDLE 31G/8MM  Generic drug: Insulin Pen Needle   Doctor's comments: ICD-10:E11.9  USE WITH LANTUS DAILY.     Synjardy 12.5-1000 MG Tabs  Generic drug: Empagliflozin-metFORMIN HCl   Take 1 Tab by mouth 2 Times a Day.  Dose: 1 tablet     therapeutic multivitamin-minerals Tabs   Take 1 Tab by mouth every morning.  Dose: 1 tablet     Turmeric 500 MG Caps   Take 1,500 mg by mouth every day.  Dose: 1,500 mg     vitamin D 1000 Unit (25 mcg) Tabs  Commonly known as: cholecalciferol   Take 1,000 Units by mouth every day.  Dose: 1,000 Units            Allergies  No Known Allergies    DIET  Orders Placed This Encounter   Procedures   • Diet Order Diet: Cardiac     Standing Status:   Standing     Number of Occurrences:   1     Order Specific Question:   Diet:     Answer:   Cardiac [6]       ACTIVITY  As tolerated.  Weight bearing as tolerated    CONSULTATIONS  Cardiology     PROCEDURES  Left heart catheterization    LABORATORY  Lab Results   Component Value Date    SODIUM 132 (L) 03/05/2021    POTASSIUM 4.3 03/05/2021    CHLORIDE 102 03/05/2021    CO2 23 03/05/2021    GLUCOSE 149 (H) 03/05/2021    BUN 22 03/05/2021    CREATININE 0.88 03/05/2021        Lab Results   Component Value Date    WBC 7.8 03/05/2021    HEMOGLOBIN 15.8 03/05/2021    HEMATOCRIT 48.9 03/05/2021    PLATELETCT 239 03/05/2021        Total time of the discharge process exceeds 35 minutes.

## 2021-03-06 NOTE — PROGRESS NOTES
Hemoband removed. NO bleeding or hematoma present. VS stable. Bandage place. PIV  removed. Discharge instructions given to patient and wife; verbalized understanding. Will check radial site before discharge.

## 2021-03-10 RX ORDER — LOSARTAN POTASSIUM 50 MG/1
50 TABLET ORAL DAILY
Qty: 90 TABLET | Refills: 1 | Status: SHIPPED | OUTPATIENT
Start: 2021-03-10 | End: 2021-09-05 | Stop reason: SDUPTHER

## 2021-03-10 NOTE — TELEPHONE ENCOUNTER
Last seen by PCP 10/16/2020.     Lab Results   Component Value Date/Time    SODIUM 132 (L) 03/05/2021 08:01 AM    POTASSIUM 4.3 03/05/2021 08:01 AM    CHLORIDE 102 03/05/2021 08:01 AM    CO2 23 03/05/2021 08:01 AM    GLUCOSE 149 (H) 03/05/2021 08:01 AM    BUN 22 03/05/2021 08:01 AM    CREATININE 0.88 03/05/2021 08:01 AM

## 2021-03-13 ENCOUNTER — HOSPITAL ENCOUNTER (OUTPATIENT)
Dept: LAB | Facility: MEDICAL CENTER | Age: 61
End: 2021-03-13
Attending: NURSE PRACTITIONER
Payer: COMMERCIAL

## 2021-03-13 DIAGNOSIS — E11.8 TYPE 2 DIABETES MELLITUS WITH COMPLICATION, WITHOUT LONG-TERM CURRENT USE OF INSULIN (HCC): ICD-10-CM

## 2021-03-13 LAB
EST. AVERAGE GLUCOSE BLD GHB EST-MCNC: 148 MG/DL
HBA1C MFR BLD: 6.8 % (ref 4–5.6)

## 2021-03-13 PROCEDURE — 36415 COLL VENOUS BLD VENIPUNCTURE: CPT

## 2021-03-13 PROCEDURE — 83036 HEMOGLOBIN GLYCOSYLATED A1C: CPT

## 2021-03-23 ENCOUNTER — OFFICE VISIT (OUTPATIENT)
Dept: MEDICAL GROUP | Facility: PHYSICIAN GROUP | Age: 61
End: 2021-03-23
Payer: COMMERCIAL

## 2021-03-23 VITALS
OXYGEN SATURATION: 95 % | DIASTOLIC BLOOD PRESSURE: 58 MMHG | HEART RATE: 94 BPM | WEIGHT: 250 LBS | RESPIRATION RATE: 16 BRPM | HEIGHT: 74 IN | SYSTOLIC BLOOD PRESSURE: 120 MMHG | BODY MASS INDEX: 32.08 KG/M2 | TEMPERATURE: 97.3 F

## 2021-03-23 DIAGNOSIS — E11.8 TYPE 2 DIABETES MELLITUS WITH COMPLICATION, WITHOUT LONG-TERM CURRENT USE OF INSULIN (HCC): ICD-10-CM

## 2021-03-23 DIAGNOSIS — H93.11 NEW ONSET TINNITUS OF RIGHT EAR: ICD-10-CM

## 2021-03-23 PROCEDURE — 99214 OFFICE O/P EST MOD 30 MIN: CPT | Performed by: FAMILY MEDICINE

## 2021-03-23 RX ORDER — CIPROFLOXACIN AND DEXAMETHASONE 3; 1 MG/ML; MG/ML
4 SUSPENSION/ DROPS AURICULAR (OTIC) 2 TIMES DAILY
Qty: 7.5 ML | Refills: 0 | Status: SHIPPED | OUTPATIENT
Start: 2021-03-23 | End: 2021-06-02

## 2021-03-23 ASSESSMENT — FIBROSIS 4 INDEX: FIB4 SCORE: 0.79

## 2021-03-23 NOTE — ASSESSMENT & PLAN NOTE
Since he got covid pneumonia he has noted high pitched ringing in his right ear.   He has no drainage from the ear, no fevers/chills/cough/shortness of breath or loss of hearing, he has no vertigo.   Exam is normal.   rx for ear drops antibiotic +steroid provided.

## 2021-03-23 NOTE — ASSESSMENT & PLAN NOTE
Follow up A1c order provided. He is on trulicity, lantus, synjardy, atorvastatin, losartan, aspirin  A1c:   Lab Results   Component Value Date/Time    HBA1C 6.8 (H) 03/13/2021 0959    AVGLUC 148 03/13/2021 0959     Lipids:   Lab Results   Component Value Date/Time    CHOLSTRLTOT 119 04/30/2020 11:03 AM    TRIGLYCERIDE 143 04/30/2020 11:03 AM    HDL 43 04/30/2020 11:03 AM    LDL 47 04/30/2020 11:03 AM   ]  BMP:   Lab Results   Component Value Date/Time    SODIUM 132 (L) 03/05/2021 0801    POTASSIUM 4.3 03/05/2021 0801    CHLORIDE 102 03/05/2021 0801    CO2 23 03/05/2021 0801    GLUCOSE 149 (H) 03/05/2021 0801    BUN 22 03/05/2021 0801    CREATININE 0.88 03/05/2021 0801    CALCIUM 9.4 03/05/2021 0801    ANION 7.0 03/05/2021 0801     GFR:   Lab Results   Component Value Date/Time    IFAFRICA >60 03/05/2021 0801    IFNOTAFR >60 03/05/2021 0801

## 2021-03-23 NOTE — PROGRESS NOTES
Subjective:   Farzad Bryant Jr. is a 60 y.o. male here today for evaluation and management of:     New onset tinnitus of right ear  Since he got covid pneumonia he has noted high pitched ringing in his right ear.   He has no drainage from the ear, no fevers/chills/cough/shortness of breath or loss of hearing, he has no vertigo.   Exam is normal.   rx for ear drops antibiotic +steroid provided.     Type 2 diabetes mellitus with complication, without long-term current use of insulin ([facility])  Follow up A1c order provided. He is on trulicity, lantus, synjardy, atorvastatin, losartan, aspirin  A1c:   Lab Results   Component Value Date/Time    HBA1C 6.8 (H) 03/13/2021 0959    AVGLUC 148 03/13/2021 0959     Lipids:   Lab Results   Component Value Date/Time    CHOLSTRLTOT 119 04/30/2020 11:03 AM    TRIGLYCERIDE 143 04/30/2020 11:03 AM    HDL 43 04/30/2020 11:03 AM    LDL 47 04/30/2020 11:03 AM   ]  BMP:   Lab Results   Component Value Date/Time    SODIUM 132 (L) 03/05/2021 0801    POTASSIUM 4.3 03/05/2021 0801    CHLORIDE 102 03/05/2021 0801    CO2 23 03/05/2021 0801    GLUCOSE 149 (H) 03/05/2021 0801    BUN 22 03/05/2021 0801    CREATININE 0.88 03/05/2021 0801    CALCIUM 9.4 03/05/2021 0801    ANION 7.0 03/05/2021 0801     GFR:   Lab Results   Component Value Date/Time    IF[place] >60 03/05/2021 0801    IFNOTAFR >60 03/05/2021 0801                Current medicines (including changes today)  Current Outpatient Medications   Medication Sig Dispense Refill   • Multiple Vitamins-Minerals (ZINC PO) Take  by mouth.     • ciprofloxacin/dexamethasone (CIPRODEX) 0.3-0.1 % Suspension Administer 4 Drops into affected ear(s) 2 times a day. 7.5 mL 0   • losartan (COZAAR) 50 MG Tab Take 1 tablet by mouth every day. 90 tablet 1   • aspirin EC (ECOTRIN) 81 MG Tablet Delayed Response Take 81 mg by mouth every day.     • vitamin D (CHOLECALCIFEROL) 1000 Unit (25 mcg) Tab Take 1,000 Units by mouth every day.     • Turmeric 500 MG Cap  Take 1,500 mg by mouth every day.     • LANTUS SOLOSTAR 100 UNIT/ML Solution Pen-injector injection INJECT 32 UNITS SUBCUTANEOUSLY IN THE EVENING 15 mL 1   • acetaminophen (TYLENOL) 500 MG Tab Take 1,000 mg by mouth 2 times a day as needed for Fever.     • EQ ALLERGY RELIEF, CETIRIZINE, 10 MG Tab Take 1 tablet by mouth once daily 90 Tab 1   • isosorbide mononitrate SR (IMDUR) 30 MG TABLET SR 24 HR TAKE 1 TABLET BY MOUTH ONCE DAILY IN THE MORNING (Patient taking differently: Take 30 mg by mouth every morning.) 90 Tab 1   • atorvastatin (LIPITOR) 40 MG Tab TAKE 1 TABLET BY MOUTH ONCE DAILY IN THE EVENING (Patient taking differently: Take 40 mg by mouth every bedtime.) 90 Tab 1   • Empagliflozin-metFORMIN HCl (SYNJARDY) 12.5-1000 MG Tab Take 1 Tab by mouth 2 Times a Day. 180 Tab 1   • glipiZIDE (GLUCOTROL) 5 MG Tab Take 1 Tab by mouth 2 times a day. 180 Tab 1   • traZODone (DESYREL) 100 MG Tab TAKE 2 TABLETS BY MOUTH AT BEDTIME (Patient taking differently: Take 200 mg by mouth every bedtime.) 180 Tab 1   • TRULICITY 1.5 MG/0.5ML Solution Pen-injector INJECT 1 SYRINGE SUBCUTANEOUSLY ONCE A WEEK AS DIRECTED (Patient taking differently: Inject 0.5 mL under the skin every Sunday.) 12 mL 3   • escitalopram (LEXAPRO) 20 MG tablet Take 1 tablet by mouth once daily (Patient taking differently: Take 20 mg by mouth every day.) 90 Tab 3   • EUTHYROX 25 MCG Tab TAKE 1 TABLET BY MOUTH ONCE DAILY IN THE MORNING ON  AN  EMPTY  STOMACH (Patient taking differently: Take 25 mcg by mouth Every morning on an empty stomach.) 90 Tab 3   • RELION PEN NEEDLE 31G/8MM USE WITH LANTUS DAILY. 100 Each 3   • Cinnamon 500 MG Cap Take 1,000 mg by mouth every morning. \     • 5-Hydroxytryptophan (5-HTP) 100 MG Cap Take 200 mg by mouth every bedtime.     • Omega-3 Fatty Acids (FISH OIL) 1000 MG Cap capsule Take 2,000 mg by mouth every morning.     • Melatonin 1 MG Cap Take 1 mg by mouth every bedtime.     • therapeutic multivitamin-minerals  "(THERAGRAN-M) Tab Take 1 Tab by mouth every morning.       No current facility-administered medications for this visit.     He  has a past medical history of Arthritis, Breath shortness, Depression, Diabetes (HCC), High cholesterol, Hyperlipidemia, and Hypertension.    ROS  No chest pain, no shortness of breath, no abdominal pain       Objective:     /58   Pulse 94   Temp 36.3 °C (97.3 °F) (Temporal)   Resp 16   Ht 1.88 m (6' 2\")   Wt 113 kg (250 lb)   SpO2 95%  Body mass index is 32.1 kg/m².   Physical Exam:  Constitutional: Alert, no distress.  Skin: Warm, dry, good turgor, no rashes in visible areas.  Eye: Equal, round and reactive, conjunctiva clear, lids normal.  ENMT: Lips without lesions, good dentition, oropharynx clear.  Neck: Trachea midline, no masses, no thyromegaly. No cervical or supraclavicular lymphadenopathy  Respiratory: Unlabored respiratory effort, lungs clear to auscultation, no wheezes, no ronchi.  Cardiovascular: Normal S1, S2, no murmur, no edema.  Abdomen: Soft, non-tender, no masses, no hepatosplenomegaly.  Psych: Alert and oriented x3, normal affect and mood.        Assessment and Plan:   The following treatment plan was discussed    1. Type 2 diabetes mellitus with complication, without long-term current use of insulin (HCC)  Stable.   - HEMOGLOBIN A1C; Future    2. New onset tinnitus of right ear  If worsening will let me know and I can refer to ent      Followup: Return in about 6 months (around 9/23/2021) for DM, tinnitis.         "

## 2021-04-13 RX ORDER — PEN NEEDLE, DIABETIC 29 G X1/2"
NEEDLE, DISPOSABLE MISCELLANEOUS
Qty: 100 EACH | Refills: 3 | Status: SHIPPED | OUTPATIENT
Start: 2021-04-13 | End: 2022-05-21 | Stop reason: SDUPTHER

## 2021-06-24 DIAGNOSIS — H93.13 TINNITUS OF BOTH EARS: ICD-10-CM

## 2021-06-24 DIAGNOSIS — H91.93 BILATERAL HEARING LOSS, UNSPECIFIED HEARING LOSS TYPE: ICD-10-CM

## 2021-07-03 ENCOUNTER — APPOINTMENT (OUTPATIENT)
Dept: URGENT CARE | Facility: PHYSICIAN GROUP | Age: 61
End: 2021-07-03
Payer: COMMERCIAL

## 2021-07-03 ENCOUNTER — APPOINTMENT (OUTPATIENT)
Dept: RADIOLOGY | Facility: IMAGING CENTER | Age: 61
End: 2021-07-03
Attending: CHIROPRACTOR
Payer: COMMERCIAL

## 2021-07-03 DIAGNOSIS — M54.2 NECK PAIN: ICD-10-CM

## 2021-07-03 PROCEDURE — 72040 X-RAY EXAM NECK SPINE 2-3 VW: CPT | Mod: TC,FY | Performed by: PHYSICIAN ASSISTANT

## 2021-08-11 DIAGNOSIS — H93.13 TINNITUS OF BOTH EARS: ICD-10-CM

## 2021-08-11 DIAGNOSIS — H91.93 BILATERAL HEARING LOSS, UNSPECIFIED HEARING LOSS TYPE: ICD-10-CM

## 2021-08-29 ENCOUNTER — HOSPITAL ENCOUNTER (OUTPATIENT)
Dept: RADIOLOGY | Facility: MEDICAL CENTER | Age: 61
End: 2021-08-29
Attending: CHIROPRACTOR
Payer: COMMERCIAL

## 2021-08-29 DIAGNOSIS — M54.2 CERVICALGIA: ICD-10-CM

## 2021-08-29 PROCEDURE — 72141 MRI NECK SPINE W/O DYE: CPT

## 2021-09-05 DIAGNOSIS — I10 ESSENTIAL HYPERTENSION, BENIGN: ICD-10-CM

## 2021-09-05 DIAGNOSIS — E78.5 DYSLIPIDEMIA: ICD-10-CM

## 2021-09-08 RX ORDER — LOSARTAN POTASSIUM 50 MG/1
50 TABLET ORAL DAILY
Qty: 90 TABLET | Refills: 1 | Status: SHIPPED | OUTPATIENT
Start: 2021-09-08 | End: 2021-12-05 | Stop reason: SDUPTHER

## 2021-09-08 RX ORDER — ISOSORBIDE MONONITRATE 30 MG/1
30 TABLET, EXTENDED RELEASE ORAL EVERY MORNING
Qty: 90 TABLET | Refills: 1 | Status: SHIPPED | OUTPATIENT
Start: 2021-09-08 | End: 2021-12-05 | Stop reason: SDUPTHER

## 2021-09-08 RX ORDER — ATORVASTATIN CALCIUM 40 MG/1
40 TABLET, FILM COATED ORAL EVERY EVENING
Qty: 90 TABLET | Refills: 1 | Status: SHIPPED | OUTPATIENT
Start: 2021-09-08 | End: 2021-12-05 | Stop reason: SDUPTHER

## 2021-09-08 NOTE — TELEPHONE ENCOUNTER
Received request via: Patient    Was the patient seen in the last year in this department? Yes, last OV 3/23/21    Does the patient have an active prescription (recently filled or refills available) for medication(s) requested? No    Requested Prescriptions     Pending Prescriptions Disp Refills    losartan (COZAAR) 50 MG Tab 90 Tablet 1     Sig: Take 1 Tablet by mouth every day.    isosorbide mononitrate SR (IMDUR) 30 MG TABLET SR 24 HR 90 Tablet 1     Sig: Take 1 Tablet by mouth every morning.    atorvastatin (LIPITOR) 40 MG Tab 90 Tablet 1     Sig: Take 1 Tablet by mouth every evening.

## 2021-09-15 ENCOUNTER — HOSPITAL ENCOUNTER (OUTPATIENT)
Dept: LAB | Facility: MEDICAL CENTER | Age: 61
End: 2021-09-15
Attending: FAMILY MEDICINE
Payer: COMMERCIAL

## 2021-09-15 DIAGNOSIS — E11.8 TYPE 2 DIABETES MELLITUS WITH COMPLICATION, WITHOUT LONG-TERM CURRENT USE OF INSULIN (HCC): ICD-10-CM

## 2021-09-15 LAB
EST. AVERAGE GLUCOSE BLD GHB EST-MCNC: 169 MG/DL
HBA1C MFR BLD: 7.5 % (ref 4–5.6)

## 2021-09-15 PROCEDURE — 83036 HEMOGLOBIN GLYCOSYLATED A1C: CPT

## 2021-09-15 PROCEDURE — 36415 COLL VENOUS BLD VENIPUNCTURE: CPT

## 2021-09-29 ENCOUNTER — OFFICE VISIT (OUTPATIENT)
Dept: MEDICAL GROUP | Facility: PHYSICIAN GROUP | Age: 61
End: 2021-09-29
Payer: COMMERCIAL

## 2021-09-29 VITALS
SYSTOLIC BLOOD PRESSURE: 112 MMHG | WEIGHT: 264 LBS | HEIGHT: 74 IN | BODY MASS INDEX: 33.88 KG/M2 | DIASTOLIC BLOOD PRESSURE: 70 MMHG | HEART RATE: 94 BPM | TEMPERATURE: 97.5 F

## 2021-09-29 DIAGNOSIS — Z12.11 SCREENING FOR COLORECTAL CANCER: ICD-10-CM

## 2021-09-29 DIAGNOSIS — E11.8 TYPE 2 DIABETES MELLITUS WITH COMPLICATION, WITHOUT LONG-TERM CURRENT USE OF INSULIN (HCC): ICD-10-CM

## 2021-09-29 DIAGNOSIS — Z12.12 SCREENING FOR COLORECTAL CANCER: ICD-10-CM

## 2021-09-29 DIAGNOSIS — Z23 NEED FOR VACCINATION: ICD-10-CM

## 2021-09-29 PROCEDURE — 99214 OFFICE O/P EST MOD 30 MIN: CPT | Mod: 25 | Performed by: NURSE PRACTITIONER

## 2021-09-29 PROCEDURE — 90471 IMMUNIZATION ADMIN: CPT | Performed by: NURSE PRACTITIONER

## 2021-09-29 PROCEDURE — 90686 IIV4 VACC NO PRSV 0.5 ML IM: CPT | Performed by: NURSE PRACTITIONER

## 2021-09-29 ASSESSMENT — PATIENT HEALTH QUESTIONNAIRE - PHQ9
2. FEELING DOWN, DEPRESSED, IRRITABLE, OR HOPELESS: SEVERAL DAYS
8. MOVING OR SPEAKING SO SLOWLY THAT OTHER PEOPLE COULD HAVE NOTICED. OR THE OPPOSITE, BEING SO FIGETY OR RESTLESS THAT YOU HAVE BEEN MOVING AROUND A LOT MORE THAN USUAL: NOT AT ALL
9. THOUGHTS THAT YOU WOULD BE BETTER OFF DEAD, OR OF HURTING YOURSELF: NOT AT ALL
SUM OF ALL RESPONSES TO PHQ9 QUESTIONS 1 AND 2: 2
5. POOR APPETITE OR OVEREATING: NOT AT ALL
5. POOR APPETITE OR OVEREATING: 0 - NOT AT ALL
4. FEELING TIRED OR HAVING LITTLE ENERGY: MORE THAN HALF THE DAYS
SUM OF ALL RESPONSES TO PHQ QUESTIONS 1-9: 7
3. TROUBLE FALLING OR STAYING ASLEEP OR SLEEPING TOO MUCH: MORE THAN HALF THE DAYS
CLINICAL INTERPRETATION OF PHQ2 SCORE: 2
SUM OF ALL RESPONSES TO PHQ QUESTIONS 1-9: 7
1. LITTLE INTEREST OR PLEASURE IN DOING THINGS: SEVERAL DAYS
6. FEELING BAD ABOUT YOURSELF - OR THAT YOU ARE A FAILURE OR HAVE LET YOURSELF OR YOUR FAMILY DOWN: SEVERAL DAYS
7. TROUBLE CONCENTRATING ON THINGS, SUCH AS READING THE NEWSPAPER OR WATCHING TELEVISION: NOT AT ALL

## 2021-09-29 ASSESSMENT — FIBROSIS 4 INDEX: FIB4 SCORE: 0.79

## 2021-09-29 NOTE — ASSESSMENT & PLAN NOTE
Is a chronic condition, suboptimally controlled on current medications  A1c is up to 7.5%, previously 6.7%  He does admit to very poor eating as of late and has already gotten back on track with his regular eating habits in the setting of type 2 diabetes  Currently on Lantus insulin 32 units daily, Synjardy 12.5-1000 mg twice daily and Trulicity once a week  He is appropriately on ARB as well as statin  Due for retinal scan a monofilament exam today, these were completed  He is to follow-up in 3 to 4 months with labs done before visit

## 2021-09-29 NOTE — PROGRESS NOTES
Chief Complaint   Patient presents with   • Follow-Up       HISTORY OF PRESENT ILLNESS: Patient is a 60 y.o. male established patient who presents today to follow up, review labs, flu shot    Type 2 diabetes mellitus with complication, without long-term current use of insulin ([facility])  Is a chronic condition, suboptimally controlled on current medications  A1c is up to 7.5%, previously 6.7%  He does admit to very poor eating as of late and has already gotten back on track with his regular eating habits in the setting of type 2 diabetes  Currently on Lantus insulin 32 units daily, Synjardy 12.5-1000 mg twice daily and Trulicity once a week  He is appropriately on ARB as well as statin  Due for retinal scan a monofilament exam today, these were completed  He is to follow-up in 3 to 4 months with labs done before visit      Patient Active Problem List    Diagnosis Date Noted   • New onset tinnitus of right ear 03/23/2021   • Chest pain 03/02/2021   • Pneumonia due to COVID-19 virus 12/11/2020   • Hernia of anterior abdominal wall 10/16/2020   • Tendonitis 03/04/2020   • Recurrent pain of right knee 10/30/2019   • Cough 11/29/2018   • Allergic rhinitis 11/29/2018   • Elevated blood pressure reading 07/14/2018   • Atypical chest pain 07/13/2018   • Abnormal nuclear cardiac imaging test 07/13/2018   • Essential hypertension, benign 07/13/2018   • Left wrist pain 07/12/2018   • Costochondritis 07/11/2018   • Dry skin 03/28/2018   • Acquired hypothyroidism 09/28/2017   • Type 2 diabetes mellitus with complication, without long-term current use of insulin ([facility]) 09/28/2017   • Dyslipidemia 09/28/2017   • Primary insomnia 09/28/2017   • Episode of recurrent major depressive disorder (HCC) 09/28/2017   • Chronic shoulder pain 09/28/2017   • Obesity (BMI 30-39.9) 09/28/2017       Allergies:Patient has no known allergies.    Current Outpatient Medications   Medication Sig Dispense Refill   • losartan (COZAAR) 50 MG Tab Take 1 Tablet  by mouth every day. 90 Tablet 1   • isosorbide mononitrate SR (IMDUR) 30 MG TABLET SR 24 HR Take 1 Tablet by mouth every morning. 90 Tablet 1   • atorvastatin (LIPITOR) 40 MG Tab Take 1 Tablet by mouth every evening. 90 Tablet 1   • escitalopram (LEXAPRO) 20 MG tablet Take 1 tablet by mouth every day. 90 tablet 0   • insulin glargine (LANTUS SOLOSTAR) 100 UNIT/ML Solution Pen-injector injection Inject 32 Units under the skin every evening. 30 mL 3   • Empagliflozin-metFORMIN HCl (SYNJARDY) 12.5-1000 MG Tab Take 1 tablet by mouth 2 times a day. 180 tablet 1   • glipiZIDE (GLUCOTROL) 5 MG Tab Take 1 tablet by mouth 2 times a day. 180 tablet 1   • cetirizine (EQ ALLERGY RELIEF, CETIRIZINE,) 10 MG Tab Take 1 tablet by mouth every day. 90 tablet 3   • levothyroxine (EUTHYROX) 25 MCG Tab Take 1 tablet by mouth every morning on an empty stomach. 90 tablet 1   • traZODone (DESYREL) 100 MG Tab Take 2 Tablets by mouth at bedtime as needed for Sleep. 180 tablet 1   • RELION PEN NEEDLE 31G/8MM USE WITH LANTUS DAILY 100 Each 3   • Multiple Vitamins-Minerals (ZINC PO) Take  by mouth.     • aspirin EC (ECOTRIN) 81 MG Tablet Delayed Response Take 81 mg by mouth every day.     • vitamin D (CHOLECALCIFEROL) 1000 Unit (25 mcg) Tab Take 1,000 Units by mouth every day.     • Turmeric 500 MG Cap Take 1,500 mg by mouth every day.     • acetaminophen (TYLENOL) 500 MG Tab Take 1,000 mg by mouth 2 times a day as needed for Fever.     • TRULICITY 1.5 MG/0.5ML Solution Pen-injector INJECT 1 SYRINGE SUBCUTANEOUSLY ONCE A WEEK AS DIRECTED (Patient taking differently: Inject 0.5 mL under the skin every Sunday.) 12 mL 3   • Cinnamon 500 MG Cap Take 1,000 mg by mouth every morning. \     • 5-Hydroxytryptophan (5-HTP) 100 MG Cap Take 200 mg by mouth every bedtime.     • Omega-3 Fatty Acids (FISH OIL) 1000 MG Cap capsule Take 2,000 mg by mouth every morning.     • Melatonin 1 MG Cap Take 1 mg by mouth every bedtime.     • therapeutic  "multivitamin-minerals (THERAGRAN-M) Tab Take 1 Tab by mouth every morning.       No current facility-administered medications for this visit.       Social History     Tobacco Use   • Smoking status: Never Smoker   • Smokeless tobacco: Former User     Types: Chew   Vaping Use   • Vaping Use: Never used   Substance Use Topics   • Alcohol use: Yes     Comment: 1 per month    • Drug use: No       Family Status   Relation Name Status   • Mo  Alive   • Fa  Alive   • Bro       Family History   Problem Relation Age of Onset   • Diabetes Mother    • Stroke Father        Review of Systems:   Constitutional: Negative for fever, chills, weight loss and malaise/fatigue.   Respiratory: Negative for cough, sputum production, shortness of breath and wheezing.    Cardiovascular: Negative for chest pain, palpitations, orthopnea and leg swelling.   Gastrointestinal: Negative for heartburn, nausea, vomiting and abdominal pain.   Genitourinary/Renal: Negative for dysuria, urgency and frequency.   Musculoskeletal: Negative for myalgias, back pain and joint pain.   Skin: Negative for rash and itching.   Neurological: Negative for dizziness, tingling, tremors, sensory change, focal weakness and headaches.   Endo/Heme/Allergies: Does not bruise/bleed easily.       Exam:  /70   Pulse 94   Temp 36.4 °C (97.5 °F)   Ht 1.88 m (6' 2\")   Wt 120 kg (264 lb)   General:  Well nourished, well developed male in NAD  Skin: warm, dry, intact, no evidence of rash or concerning lesions  Head: is grossly normal.  HEENT: eyes clear, conjunctiva normal, PERRLA  Pulmonary: Clear to ausculation. Normal effort. No rales, ronchi, or wheezing.  Cardiovascular: Regular rate and rhythm without murmur. Carotid and radial pulses are intact and equal bilaterally.  Abdomen: soft, non-tender, positive bowel sounds  Musculoskeletal: no clubbing, cyanosis, or edema.  Psych/mental: no depression, anxiety, hallucinations  Neuro: alert, intact, CN 2-12 " grossly intact  Monofilament testing with a 10 gram force: sensation intact: intact bilaterally  Visual Inspection: Feet without maceration, ulcers, fissures.  Pedal pulses: intact bilaterally        Medical decision-making and discussion:  Assessment/Plan:  Farzad was seen today for follow-up.    Diagnoses and all orders for this visit:    Type 2 diabetes mellitus with complication, without long-term current use of insulin (HCC)  -     POCT Retinal Eye Exam  -     Diabetic Monofilament Lower Extremity Exam  -     Comp Metabolic Panel; Future  -     Hemoglobin A1c; Future  -     Lipid Profile; Future  -     Microalbumin Creat Ratio Urine - Lab Collect; Future    Need for vaccination  -     INFLUENZA VACCINE QUAD INJ (PF)    Screening for colorectal cancer  -     COLOGUARD (FIT DNA)             Return in about 3 months (around 12/29/2021) for Discuss Labs.        Please note that this dictation was created using voice recognition software. I have made every reasonable attempt to correct obvious errors, but I expect that there are errors of grammar and possibly content that I did not discover before finalizing the note.

## 2021-09-29 NOTE — PATIENT INSTRUCTIONS
Get back on track with healthy eating    Call for refills as needed    Follow up with Dr. UNGER in 3 months with labs

## 2021-09-30 ENCOUNTER — APPOINTMENT (OUTPATIENT)
Dept: URGENT CARE | Facility: PHYSICIAN GROUP | Age: 61
End: 2021-09-30
Payer: COMMERCIAL

## 2021-09-30 ENCOUNTER — APPOINTMENT (OUTPATIENT)
Dept: RADIOLOGY | Facility: IMAGING CENTER | Age: 61
End: 2021-09-30
Attending: CHIROPRACTOR
Payer: COMMERCIAL

## 2021-09-30 ENCOUNTER — APPOINTMENT (OUTPATIENT)
Dept: MEDICAL GROUP | Facility: PHYSICIAN GROUP | Age: 61
End: 2021-09-30
Payer: COMMERCIAL

## 2021-09-30 DIAGNOSIS — M54.2 CERVICALGIA: ICD-10-CM

## 2021-09-30 PROCEDURE — 72050 X-RAY EXAM NECK SPINE 4/5VWS: CPT | Mod: TC,FY | Performed by: FAMILY MEDICINE

## 2021-10-10 DIAGNOSIS — F33.9 EPISODE OF RECURRENT MAJOR DEPRESSIVE DISORDER, UNSPECIFIED DEPRESSION EPISODE SEVERITY (HCC): ICD-10-CM

## 2021-10-11 ENCOUNTER — HOSPITAL ENCOUNTER (OUTPATIENT)
Dept: RADIOLOGY | Facility: MEDICAL CENTER | Age: 61
End: 2021-10-11
Attending: PHYSICIAN ASSISTANT
Payer: COMMERCIAL

## 2021-10-11 DIAGNOSIS — H90.3 ASYMMETRIC SNHL (SENSORINEURAL HEARING LOSS): ICD-10-CM

## 2021-10-11 PROCEDURE — 70553 MRI BRAIN STEM W/O & W/DYE: CPT

## 2021-10-11 PROCEDURE — A9576 INJ PROHANCE MULTIPACK: HCPCS | Performed by: PHYSICIAN ASSISTANT

## 2021-10-11 PROCEDURE — 700117 HCHG RX CONTRAST REV CODE 255: Performed by: PHYSICIAN ASSISTANT

## 2021-10-11 RX ADMIN — GADOTERIDOL 20 ML: 279.3 INJECTION, SOLUTION INTRAVENOUS at 08:31

## 2021-10-12 NOTE — TELEPHONE ENCOUNTER
Received request via: Patient    Was the patient seen in the last year in this department? Yes    Does the patient have an active prescription (recently filled or refills available) for medication(s) requested? No    Requested Prescriptions     Pending Prescriptions Disp Refills    escitalopram (LEXAPRO) 20 MG tablet 90 Tablet 3     Sig: Take 1 Tablet by mouth every day.

## 2021-10-14 RX ORDER — ESCITALOPRAM OXALATE 20 MG/1
20 TABLET ORAL DAILY
Qty: 90 TABLET | Refills: 3 | Status: SHIPPED | OUTPATIENT
Start: 2021-10-14 | End: 2022-09-25 | Stop reason: SDUPTHER

## 2021-10-24 DIAGNOSIS — E03.9 ACQUIRED HYPOTHYROIDISM: ICD-10-CM

## 2021-10-24 DIAGNOSIS — F51.01 PRIMARY INSOMNIA: ICD-10-CM

## 2021-10-24 DIAGNOSIS — E11.8 TYPE 2 DIABETES MELLITUS WITH COMPLICATION, WITHOUT LONG-TERM CURRENT USE OF INSULIN (HCC): ICD-10-CM

## 2021-10-26 RX ORDER — TRAZODONE HYDROCHLORIDE 100 MG/1
200 TABLET ORAL
Qty: 180 TABLET | Refills: 1 | Status: SHIPPED | OUTPATIENT
Start: 2021-10-26 | End: 2022-04-24 | Stop reason: SDUPTHER

## 2021-10-26 RX ORDER — LEVOTHYROXINE SODIUM 0.03 MG/1
25 TABLET ORAL
Qty: 90 TABLET | Refills: 1 | Status: SHIPPED | OUTPATIENT
Start: 2021-10-26 | End: 2022-04-26 | Stop reason: SDUPTHER

## 2021-10-26 RX ORDER — DULAGLUTIDE 1.5 MG/.5ML
0.5 INJECTION, SOLUTION SUBCUTANEOUS
Qty: 12 ML | Refills: 3 | Status: SHIPPED | OUTPATIENT
Start: 2021-10-26 | End: 2022-01-21

## 2021-12-05 DIAGNOSIS — E78.5 DYSLIPIDEMIA: ICD-10-CM

## 2021-12-05 DIAGNOSIS — I10 ESSENTIAL HYPERTENSION, BENIGN: ICD-10-CM

## 2021-12-05 DIAGNOSIS — E11.8 TYPE 2 DIABETES MELLITUS WITH COMPLICATION, WITHOUT LONG-TERM CURRENT USE OF INSULIN (HCC): ICD-10-CM

## 2021-12-07 RX ORDER — ISOSORBIDE MONONITRATE 30 MG/1
30 TABLET, EXTENDED RELEASE ORAL EVERY MORNING
Qty: 90 TABLET | Refills: 1 | Status: SHIPPED | OUTPATIENT
Start: 2021-12-07 | End: 2022-08-21 | Stop reason: SDUPTHER

## 2021-12-07 RX ORDER — EMPAGLIFLOZIN AND METFORMIN HYDROCHLORIDE 12.5; 1 MG/1; MG/1
1 TABLET ORAL 2 TIMES DAILY
Qty: 180 TABLET | Refills: 1 | Status: SHIPPED | OUTPATIENT
Start: 2021-12-07 | End: 2022-05-30 | Stop reason: SDUPTHER

## 2021-12-07 RX ORDER — ATORVASTATIN CALCIUM 40 MG/1
40 TABLET, FILM COATED ORAL EVERY EVENING
Qty: 90 TABLET | Refills: 1 | Status: SHIPPED | OUTPATIENT
Start: 2021-12-07 | End: 2022-08-21 | Stop reason: SDUPTHER

## 2021-12-07 RX ORDER — GLIPIZIDE 5 MG/1
5 TABLET ORAL 2 TIMES DAILY
Qty: 180 TABLET | Refills: 1 | Status: SHIPPED | OUTPATIENT
Start: 2021-12-07 | End: 2022-05-30 | Stop reason: SDUPTHER

## 2021-12-07 RX ORDER — LOSARTAN POTASSIUM 50 MG/1
50 TABLET ORAL DAILY
Qty: 90 TABLET | Refills: 1 | Status: SHIPPED | OUTPATIENT
Start: 2021-12-07 | End: 2022-07-17 | Stop reason: SDUPTHER

## 2021-12-07 NOTE — TELEPHONE ENCOUNTER
Received request via: Patient    Was the patient seen in the last year in this department? Yes    Does the patient have an active prescription (recently filled or refills available) for medication(s) requested? No     Last office visit: 09/29/2021

## 2021-12-30 ENCOUNTER — OFFICE VISIT (OUTPATIENT)
Dept: MEDICAL GROUP | Facility: PHYSICIAN GROUP | Age: 61
End: 2021-12-30
Payer: COMMERCIAL

## 2021-12-30 VITALS
RESPIRATION RATE: 20 BRPM | SYSTOLIC BLOOD PRESSURE: 124 MMHG | DIASTOLIC BLOOD PRESSURE: 82 MMHG | BODY MASS INDEX: 37.37 KG/M2 | OXYGEN SATURATION: 96 % | HEART RATE: 98 BPM | HEIGHT: 73 IN | TEMPERATURE: 97 F | WEIGHT: 282 LBS

## 2021-12-30 DIAGNOSIS — D33.3 VESTIBULAR SCHWANNOMA (HCC): ICD-10-CM

## 2021-12-30 DIAGNOSIS — M25.512 LEFT SHOULDER PAIN, UNSPECIFIED CHRONICITY: ICD-10-CM

## 2021-12-30 PROCEDURE — 99214 OFFICE O/P EST MOD 30 MIN: CPT | Performed by: NURSE PRACTITIONER

## 2021-12-30 RX ORDER — TIZANIDINE 4 MG/1
4 TABLET ORAL 3 TIMES DAILY
Qty: 60 TABLET | Refills: 0 | Status: SHIPPED | OUTPATIENT
Start: 2021-12-30 | End: 2022-10-04

## 2021-12-30 ASSESSMENT — PAIN SCALES - GENERAL: PAINLEVEL: 3=SLIGHT PAIN

## 2021-12-30 ASSESSMENT — FIBROSIS 4 INDEX: FIB4 SCORE: 0.8

## 2021-12-30 NOTE — PROGRESS NOTES
"Chief Complaint   Patient presents with   • Referral Needed     Nuro    • Other     left shoulder pain        HISTORY OF PRESENT ILLNESS: Patient is a 61 y.o. male established patient who presents today to request referral and discuss shoulder pain, left side    1. Vestibular schwannoma (HCC)  Referral to Neurology   2. Left shoulder pain, unspecified chronicity  Referral to Sports Medicine     Patient previously referred to ENT for persistent tinnitus and hearing loss, ENT ordered MRI which found a vestibular schwannoma and was recommended that he be referred to neurology urgently however his her insurance requires that referral, from primary care thus he presents today  Continues to have hearing loss in the right ear as well as persistent tinnitus  Urgent referral placed    L shoulder has been hurting for past several months, not related to any particular injury that he is aware of  Has limited ROM especially with abduction and adductions, pain with lifting anything over 5 pounds  Has been taking ibuprofen with minimal relief    ROS: per HPI    Exam:  /82   Pulse 98   Temp 36.1 °C (97 °F) (Temporal)   Resp 20   Ht 1.854 m (6' 1\")   Wt (!) 128 kg (282 lb)   SpO2 96%   General:  Well nourished, well developed male in NAD  Skin: warm, dry, intact, no evidence of rash or concerning lesions  Head: is grossly normal.  HEENT: eyes clear, conjunctiva normal, PERRLA  Pulmonary: Clear to ausculation. Normal effort. No rales, ronchi, or wheezing.  Cardiovascular: Regular rate and rhythm without murmur. Carotid and radial pulses are intact and equal bilaterally.  Abdomen: soft, non-tender, positive bowel sounds  Musculoskeletal: TTP to AC joint, supraspinatus muscle, limited range of motion with abduction and abduction as well as internal and external rotation  Psych/mental: no depression, anxiety, hallucinations  Neuro: alert, intact, CN 2-12 grossly intact      Medical decision-making and " discussion:        Assessment/Plan:      1. Vestibular schwannoma (HCC)  Urgent referral placed to neurology as recommended by ENT for further evaluation and treatment of vestibular schwannoma  - Referral to Neurology    2. Left shoulder pain, unspecified chronicity  Discussed importance of supportive care including gentle range of motion exercises and stretches, heat or ice, over-the-counter analgesics, will trial muscle relaxer as Rx'd below, warned not to drive while taking due to sedating effects, will refer to sports medicine for further evaluation and treatment options  - Referral to Sports Medicine  -Tizanidine 4 mg 1 pill 3 times a day as needed, #60         Return for As needed.        Please note that this dictation was created using voice recognition software. I have made every reasonable attempt to correct obvious errors, but I expect that there are errors of grammar and possibly content that I did not discover before finalizing the note.

## 2022-01-20 ENCOUNTER — OFFICE VISIT (OUTPATIENT)
Dept: SPORTS MEDICINE | Facility: CLINIC | Age: 62
End: 2022-01-20
Payer: COMMERCIAL

## 2022-01-20 VITALS
TEMPERATURE: 98.3 F | RESPIRATION RATE: 16 BRPM | DIASTOLIC BLOOD PRESSURE: 80 MMHG | HEIGHT: 73 IN | OXYGEN SATURATION: 94 % | WEIGHT: 282 LBS | SYSTOLIC BLOOD PRESSURE: 120 MMHG | HEART RATE: 100 BPM | BODY MASS INDEX: 37.37 KG/M2

## 2022-01-20 DIAGNOSIS — M25.612 DECREASED RANGE OF MOTION OF SHOULDER, LEFT: ICD-10-CM

## 2022-01-20 DIAGNOSIS — M25.512 ACUTE PAIN OF LEFT SHOULDER: ICD-10-CM

## 2022-01-20 PROCEDURE — 99213 OFFICE O/P EST LOW 20 MIN: CPT | Performed by: FAMILY MEDICINE

## 2022-01-20 SDOH — ECONOMIC STABILITY: FOOD INSECURITY: WITHIN THE PAST 12 MONTHS, THE FOOD YOU BOUGHT JUST DIDN'T LAST AND YOU DIDN'T HAVE MONEY TO GET MORE.: NEVER TRUE

## 2022-01-20 SDOH — ECONOMIC STABILITY: TRANSPORTATION INSECURITY
IN THE PAST 12 MONTHS, HAS LACK OF TRANSPORTATION KEPT YOU FROM MEETINGS, WORK, OR FROM GETTING THINGS NEEDED FOR DAILY LIVING?: NO

## 2022-01-20 SDOH — ECONOMIC STABILITY: TRANSPORTATION INSECURITY
IN THE PAST 12 MONTHS, HAS THE LACK OF TRANSPORTATION KEPT YOU FROM MEDICAL APPOINTMENTS OR FROM GETTING MEDICATIONS?: NO

## 2022-01-20 SDOH — ECONOMIC STABILITY: HOUSING INSECURITY
IN THE LAST 12 MONTHS, WAS THERE A TIME WHEN YOU DID NOT HAVE A STEADY PLACE TO SLEEP OR SLEPT IN A SHELTER (INCLUDING NOW)?: NO

## 2022-01-20 SDOH — HEALTH STABILITY: PHYSICAL HEALTH: ON AVERAGE, HOW MANY MINUTES DO YOU ENGAGE IN EXERCISE AT THIS LEVEL?: 0 MIN

## 2022-01-20 SDOH — ECONOMIC STABILITY: INCOME INSECURITY: IN THE LAST 12 MONTHS, WAS THERE A TIME WHEN YOU WERE NOT ABLE TO PAY THE MORTGAGE OR RENT ON TIME?: NO

## 2022-01-20 SDOH — HEALTH STABILITY: PHYSICAL HEALTH: ON AVERAGE, HOW MANY DAYS PER WEEK DO YOU ENGAGE IN MODERATE TO STRENUOUS EXERCISE (LIKE A BRISK WALK)?: 0 DAYS

## 2022-01-20 SDOH — ECONOMIC STABILITY: INCOME INSECURITY: HOW HARD IS IT FOR YOU TO PAY FOR THE VERY BASICS LIKE FOOD, HOUSING, MEDICAL CARE, AND HEATING?: NOT HARD AT ALL

## 2022-01-20 SDOH — HEALTH STABILITY: MENTAL HEALTH
STRESS IS WHEN SOMEONE FEELS TENSE, NERVOUS, ANXIOUS, OR CAN'T SLEEP AT NIGHT BECAUSE THEIR MIND IS TROUBLED. HOW STRESSED ARE YOU?: TO SOME EXTENT

## 2022-01-20 SDOH — ECONOMIC STABILITY: HOUSING INSECURITY: IN THE LAST 12 MONTHS, HOW MANY PLACES HAVE YOU LIVED?: 1

## 2022-01-20 SDOH — ECONOMIC STABILITY: FOOD INSECURITY: WITHIN THE PAST 12 MONTHS, YOU WORRIED THAT YOUR FOOD WOULD RUN OUT BEFORE YOU GOT MONEY TO BUY MORE.: NEVER TRUE

## 2022-01-20 SDOH — ECONOMIC STABILITY: TRANSPORTATION INSECURITY
IN THE PAST 12 MONTHS, HAS LACK OF RELIABLE TRANSPORTATION KEPT YOU FROM MEDICAL APPOINTMENTS, MEETINGS, WORK OR FROM GETTING THINGS NEEDED FOR DAILY LIVING?: NO

## 2022-01-20 ASSESSMENT — SOCIAL DETERMINANTS OF HEALTH (SDOH)
HOW MANY DRINKS CONTAINING ALCOHOL DO YOU HAVE ON A TYPICAL DAY WHEN YOU ARE DRINKING: 1 OR 2
WITHIN THE PAST 12 MONTHS, YOU WORRIED THAT YOUR FOOD WOULD RUN OUT BEFORE YOU GOT THE MONEY TO BUY MORE: NEVER TRUE
DO YOU BELONG TO ANY CLUBS OR ORGANIZATIONS SUCH AS CHURCH GROUPS UNIONS, FRATERNAL OR ATHLETIC GROUPS, OR SCHOOL GROUPS?: NO
HOW OFTEN DO YOU HAVE A DRINK CONTAINING ALCOHOL: MONTHLY OR LESS
HOW OFTEN DO YOU GET TOGETHER WITH FRIENDS OR RELATIVES?: ONCE A WEEK
HOW OFTEN DO YOU HAVE A DRINK CONTAINING ALCOHOL: MONTHLY OR LESS
WITHIN THE PAST 12 MONTHS, YOU WORRIED THAT YOUR FOOD WOULD RUN OUT BEFORE YOU GOT THE MONEY TO BUY MORE: NEVER TRUE
DO YOU BELONG TO ANY CLUBS OR ORGANIZATIONS SUCH AS CHURCH GROUPS UNIONS, FRATERNAL OR ATHLETIC GROUPS, OR SCHOOL GROUPS?: NO
HOW OFTEN DO YOU ATTEND CHURCH OR RELIGIOUS SERVICES?: NEVER
HOW OFTEN DO YOU ATTEND CHURCH OR RELIGIOUS SERVICES?: NEVER
HOW MANY DRINKS CONTAINING ALCOHOL DO YOU HAVE ON A TYPICAL DAY WHEN YOU ARE DRINKING: 1 OR 2
HOW HARD IS IT FOR YOU TO PAY FOR THE VERY BASICS LIKE FOOD, HOUSING, MEDICAL CARE, AND HEATING?: NOT HARD AT ALL
IN A TYPICAL WEEK, HOW MANY TIMES DO YOU TALK ON THE PHONE WITH FAMILY, FRIENDS, OR NEIGHBORS?: TWICE A WEEK
DO YOU BELONG TO ANY CLUBS OR ORGANIZATIONS SUCH AS CHURCH GROUPS UNIONS, FRATERNAL OR ATHLETIC GROUPS, OR SCHOOL GROUPS?: NO
HOW HARD IS IT FOR YOU TO PAY FOR THE VERY BASICS LIKE FOOD, HOUSING, MEDICAL CARE, AND HEATING?: NOT HARD AT ALL
HOW OFTEN DO YOU HAVE SIX OR MORE DRINKS ON ONE OCCASION: NEVER
HOW OFTEN DO YOU ATTEND CHURCH OR RELIGIOUS SERVICES?: NEVER
HOW OFTEN DO YOU ATTEND CHURCH OR RELIGIOUS SERVICES?: NEVER
HOW OFTEN DO YOU ATTENT MEETINGS OF THE CLUB OR ORGANIZATION YOU BELONG TO?: PATIENT DECLINED
HOW OFTEN DO YOU GET TOGETHER WITH FRIENDS OR RELATIVES?: ONCE A WEEK
HOW OFTEN DO YOU ATTENT MEETINGS OF THE CLUB OR ORGANIZATION YOU BELONG TO?: PATIENT DECLINED
DO YOU BELONG TO ANY CLUBS OR ORGANIZATIONS SUCH AS CHURCH GROUPS UNIONS, FRATERNAL OR ATHLETIC GROUPS, OR SCHOOL GROUPS?: NO
IN A TYPICAL WEEK, HOW MANY TIMES DO YOU TALK ON THE PHONE WITH FAMILY, FRIENDS, OR NEIGHBORS?: TWICE A WEEK
HOW OFTEN DO YOU GET TOGETHER WITH FRIENDS OR RELATIVES?: ONCE A WEEK
IN A TYPICAL WEEK, HOW MANY TIMES DO YOU TALK ON THE PHONE WITH FAMILY, FRIENDS, OR NEIGHBORS?: TWICE A WEEK
HOW OFTEN DO YOU GET TOGETHER WITH FRIENDS OR RELATIVES?: ONCE A WEEK
HOW OFTEN DO YOU HAVE SIX OR MORE DRINKS ON ONE OCCASION: NEVER
IN A TYPICAL WEEK, HOW MANY TIMES DO YOU TALK ON THE PHONE WITH FAMILY, FRIENDS, OR NEIGHBORS?: TWICE A WEEK

## 2022-01-20 ASSESSMENT — LIFESTYLE VARIABLES
HOW MANY STANDARD DRINKS CONTAINING ALCOHOL DO YOU HAVE ON A TYPICAL DAY: 1 OR 2
HOW OFTEN DO YOU HAVE SIX OR MORE DRINKS ON ONE OCCASION: NEVER
HOW OFTEN DO YOU HAVE A DRINK CONTAINING ALCOHOL: MONTHLY OR LESS
HOW OFTEN DO YOU HAVE A DRINK CONTAINING ALCOHOL: MONTHLY OR LESS
HOW MANY STANDARD DRINKS CONTAINING ALCOHOL DO YOU HAVE ON A TYPICAL DAY: 1 OR 2
HOW OFTEN DO YOU HAVE SIX OR MORE DRINKS ON ONE OCCASION: NEVER

## 2022-01-20 ASSESSMENT — FIBROSIS 4 INDEX: FIB4 SCORE: 0.8

## 2022-01-20 NOTE — LETTER
January 20, 2022         Patient: Farzad Bryant Jr.   YOB: 1960   Date of Visit: 1/20/2022            To Whom it May Concern:    Farzad Bryant was seen in my clinic on 1/20/2022. He may return to work but recommend he avoid lifting more that 10 lbs and avoid overhead activity.    If you have any questions or concerns, please don't hesitate to call.        Sincerely,           Arsh Sharp M.D.  Electronically Signed

## 2022-01-20 NOTE — PROGRESS NOTES
"CHIEF COMPLAINT:  Farzad Byrant Jr. male presenting at the request of BENJA Curtis  for evaluation of Shoulder pain.     Farzad Bryant Jr. is complaining of LEFT shoulder pain  present for 2 months (roughly November 2021)  Insidious onset without injury  Getting worse  Pain is at the deltoid region and superior  Quality is sharp  Pain is Non-radiating  Aggravated by movement and overhead activity  Improved with  rest   previous shoulder injury while playing rugby and self care in the late 80's. 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 No pertinent past medical history.    PMH:  has a past medical history of Arthritis, Breath shortness, Depression, Diabetes (HCC), High cholesterol, Hyperlipidemia, and Hypertension.  MEDS:   Current Outpatient Medications:   •  Multiple Vitamins-Minerals (ZINC PO), Take 1 Each by mouth every day., Disp: , Rfl:   •  tizanidine (ZANAFLEX) 4 MG Tab, Take 1 Tablet by mouth 3 times a day. (Patient taking differently: Take 4 mg by mouth as needed.), Disp: 60 Tablet, Rfl: 0  •  Empagliflozin-metFORMIN HCl (SYNJARDY) 12.5-1000 MG Tab, Take 1 Tablet by mouth 2 times a day., Disp: 180 Tablet, Rfl: 1  •  glipiZIDE (GLUCOTROL) 5 MG Tab, Take 1 Tablet by mouth 2 times a day., Disp: 180 Tablet, Rfl: 1  •  losartan (COZAAR) 50 MG Tab, Take 1 Tablet by mouth every day., Disp: 90 Tablet, Rfl: 1  •  isosorbide mononitrate SR (IMDUR) 30 MG TABLET SR 24 HR, Take 1 " Tablet by mouth every morning., Disp: 90 Tablet, Rfl: 1  •  atorvastatin (LIPITOR) 40 MG Tab, Take 1 Tablet by mouth every evening., Disp: 90 Tablet, Rfl: 1  •  Dulaglutide (TRULICITY) 1.5 MG/0.5ML Solution Pen-injector, Inject 0.5 mL under the skin every 7 days. INJECT 1 SYRINGE SUBCUTANEOUSLY ONCE A WEEK AS DIRECTED, Disp: 12 mL, Rfl: 3  •  levothyroxine (EUTHYROX) 25 MCG Tab, Take 1 Tablet by mouth every morning on an empty stomach., Disp: 90 Tablet, Rfl: 1  •  traZODone (DESYREL) 100 MG Tab, Take 2 Tablets by mouth at bedtime as needed for Sleep., Disp: 180 Tablet, Rfl: 1  •  escitalopram (LEXAPRO) 20 MG tablet, Take 1 Tablet by mouth every day., Disp: 90 Tablet, Rfl: 3  •  insulin glargine (LANTUS SOLOSTAR) 100 UNIT/ML Solution Pen-injector injection, Inject 32 Units under the skin every evening., Disp: 30 mL, Rfl: 3  •  cetirizine (EQ ALLERGY RELIEF, CETIRIZINE,) 10 MG Tab, Take 1 tablet by mouth every day., Disp: 90 tablet, Rfl: 3  •  RELION PEN NEEDLE 31G/8MM, USE WITH LANTUS DAILY, Disp: 100 Each, Rfl: 3  •  Multiple Vitamins-Minerals (ZINC PO), Take  by mouth., Disp: , Rfl:   •  aspirin EC (ECOTRIN) 81 MG Tablet Delayed Response, Take 81 mg by mouth every day., Disp: , Rfl:   •  vitamin D (CHOLECALCIFEROL) 1000 Unit (25 mcg) Tab, Take 1,000 Units by mouth every day., Disp: , Rfl:   •  Turmeric 500 MG Cap, Take 1,500 mg by mouth every day., Disp: , Rfl:   •  acetaminophen (TYLENOL) 500 MG Tab, Take 1,000 mg by mouth 2 times a day as needed for Fever., Disp: , Rfl:   •  Cinnamon 500 MG Cap, Take 1,000 mg by mouth every morning. \, Disp: , Rfl:   •  5-Hydroxytryptophan (5-HTP) 100 MG Cap, Take 200 mg by mouth every bedtime., Disp: , Rfl:   •  Omega-3 Fatty Acids (FISH OIL) 1000 MG Cap capsule, Take 2,000 mg by mouth every morning., Disp: , Rfl:   •  Melatonin 1 MG Cap, Take 1 mg by mouth every bedtime., Disp: , Rfl:   •  therapeutic multivitamin-minerals (THERAGRAN-M) Tab, Take 1 Tab by mouth every morning.,  "Disp: , Rfl:   ALLERGIES: No Known Allergies  SURGHX:   Past Surgical History:   Procedure Laterality Date   • ARTHROPLASTY Left     hip replacement   • CHOLECYSTECTOMY       SOCHX:  reports that he has never smoked. He quit smokeless tobacco use about 36 years ago.  His smokeless tobacco use included chew. He reports current alcohol use. He reports that he does not use drugs.  FH: Family history was reviewed, no pertinent findings to report     PHYSICAL EXAM:  /80 (BP Location: Right arm, Patient Position: Sitting, BP Cuff Size: Adult)   Pulse 100   Temp 36.8 °C (98.3 °F) (Temporal)   Resp 16   Ht 1.854 m (6' 1\")   Wt (!) 128 kg (282 lb)   SpO2 94%   BMI 37.21 kg/m²      well-developed, well-nourished in no apparent distress, alert and oriented x 3.  Gait: normal    Cervical spine:  Range of motion Slightly limited with Extension and Slightly limited with Lateral rotation  Spurling's testing is POSITIVE with pain radiating into the shoulder and lili-scapular region on the LEFT  Cervical spine tenderness POSITIVE at the level of C5, NEGATIVE    Strength testing:     Deltoid, bilateral 5/5  Bicep, bilateral 5/5  Tricep, bilateral 5/5  Wrist Extension, bilateral 5/5  Wrist Flexion, bilateral 5/5  Finger Abduction, bilateral 5/5    Sensation:  He does have some increased sensation along the LEFT arm compared to the right without specific dermatomal pattern  Otherwise INTACT Bilaterally        Reflexes:   Biceps: R 2+/L 2+  Triceps: R 2+/L  2+  Brachial radialis R 2+/L  2+  Min's testing is NEGATIVE  The arms are otherwise neurovascularly intact     Shoulder Exam:    RIGHT Shoulder:  No visible swelling   Range of motion INTACT  Tenderness: Non-tender  Empty Can Testing 5/5  Internal Rotation 5/5  External Rotation 5/5  Lift Off Testing 5/5  Impingement testing Coley  NEGATIVE  Neer's testing NEGATIVE  Apprehension testing NEGATIVE  Relocation testing NEGATIVE    LEFT Shoulder:  No visible swelling  "   Range of motion DIMINISHED with pain  Tenderness: Non-tender  Empty Can Testing 5/5 with pain  Internal Rotation 5/5  External Rotation 5/5 with pain  Lift Off Testing unable to perform with 5/5 on belly press test  Impingement testing Coley  POSITIVE on the LEFT  Neer's testing POSITIVE on the LEFT  Apprehension testing POSITIVE    Additional Findings: Flexed Posture    1. Acute pain of left shoulder  Referral to Physical Therapy    DX-SHOULDER 2+ LEFT   2. Decreased range of motion of shoulder, left  Referral to Physical Therapy    DX-SHOULDER 2+ LEFT     present for 2 months (roughly November 2021)  Insidious onset without injury  Getting worse    Physical therapy referral ([facility])   X-ray order to be obtained at [facility] site since x-rays done at TODAY's visit (January 20, 2022)    Shoulder HEP provided    Return in about 6 weeks (around 3/3/2022).  After has been doing approximately 1 month of formal therapy      Shoulder x-ray imaging is NOT been performed, but was ordered to be done at outpatient site      7/3/2021 9:42 AM     HISTORY/REASON FOR EXAM:  Atraumatic Pain; Atraumatic neck pain.  Neck pain     TECHNIQUE/EXAM DESCRIPTION AND NUMBER OF VIEWS:  Cervical spine series, 3 views.     COMPARISON:  None.     FINDINGS:     Alignment: Loss of the normal cervical lordosis. There is mild retrolisthesis of C5 on C6 and C6 on C7.  Vertebral body height: There is osteophytic spurring from the endplates of the mid and lower cervical spine. There is moderate disc space narrowing at C5-6. There is mild multilevel facet arthropathy.    Prevertebral soft tissues: Unremarkable.  Soft tissues: Unremarkable.        1.  Multilevel degenerative disc disease is most pronounced at C5-6.     2.  Mild multilevel facet arthropathy.     3.  Degenerative retrolisthesis at C5-6 and C6-7    Signed by Sofiya Diaz M.D. on 7/3/2021 10:15 AM  Narrative & Impression                Exam Ended: 07/03/21  9:50 AM Last  Resulted: 07/03/21 10:13 AM           Interpreted in the office today with the patient    Thank you BENJA Curtis for allowing me to participate in care for your patient.      ADDENDUM:  January 24, 2022    Degenerative changes of the AC joint, otherwise relatively normal x-ray of the LEFT shoulder          1/21/2022 10:10 AM     HISTORY/REASON FOR EXAM:  Atraumatic Pain/Swelling/Deformity        TECHNIQUE/EXAM DESCRIPTION AND NUMBER OF VIEWS:  3 views of the LEFT shoulder.     COMPARISON: None     FINDINGS:  There is no evidence of fracture or dislocation. Glenohumeral and acromioclavicular articulation is maintained. There are acromioclavicular degenerative changes with spurring noted. Visualized left lung field is clear.        IMPRESSION:     1.  No evidence of acute fracture or dislocation.  2.  Acromioclavicular degenerative changes.                Exam Ended: 01/21/22 10:13 AM Last Resulted: 01/21/22 10:16 AM

## 2022-01-20 NOTE — Clinical Note
Samy Gallo,  Thank you for referring Farzad to our sports medicine clinic.  It sounds like he has some degenerative changes likely from his younger days of rugby.  We provided him with some exercises, plan on doing some x-rays and will have him do some physical therapy to see how he responds.  Hope you are well!  L

## 2022-01-21 ENCOUNTER — APPOINTMENT (OUTPATIENT)
Dept: URGENT CARE | Facility: PHYSICIAN GROUP | Age: 62
End: 2022-01-21
Payer: COMMERCIAL

## 2022-01-21 ENCOUNTER — OFFICE VISIT (OUTPATIENT)
Dept: MEDICAL GROUP | Facility: PHYSICIAN GROUP | Age: 62
End: 2022-01-21
Payer: COMMERCIAL

## 2022-01-21 ENCOUNTER — APPOINTMENT (OUTPATIENT)
Dept: RADIOLOGY | Facility: IMAGING CENTER | Age: 62
End: 2022-01-21
Attending: FAMILY MEDICINE
Payer: COMMERCIAL

## 2022-01-21 VITALS
SYSTOLIC BLOOD PRESSURE: 122 MMHG | HEIGHT: 73 IN | TEMPERATURE: 97.1 F | HEART RATE: 95 BPM | DIASTOLIC BLOOD PRESSURE: 84 MMHG | BODY MASS INDEX: 36.98 KG/M2 | OXYGEN SATURATION: 95 % | WEIGHT: 279 LBS | RESPIRATION RATE: 16 BRPM

## 2022-01-21 DIAGNOSIS — Z11.59 ENCOUNTER FOR HEPATITIS C SCREENING TEST FOR LOW RISK PATIENT: ICD-10-CM

## 2022-01-21 DIAGNOSIS — E66.9 OBESITY (BMI 30-39.9): ICD-10-CM

## 2022-01-21 DIAGNOSIS — D33.3 VESTIBULAR SCHWANNOMA (HCC): ICD-10-CM

## 2022-01-21 DIAGNOSIS — M25.512 ACUTE PAIN OF LEFT SHOULDER: ICD-10-CM

## 2022-01-21 DIAGNOSIS — R07.89 ATYPICAL CHEST PAIN: ICD-10-CM

## 2022-01-21 DIAGNOSIS — E11.8 TYPE 2 DIABETES MELLITUS WITH COMPLICATION, WITHOUT LONG-TERM CURRENT USE OF INSULIN (HCC): ICD-10-CM

## 2022-01-21 DIAGNOSIS — M25.612 DECREASED RANGE OF MOTION OF SHOULDER, LEFT: ICD-10-CM

## 2022-01-21 DIAGNOSIS — Z23 NEED FOR VACCINATION: ICD-10-CM

## 2022-01-21 PROCEDURE — 73030 X-RAY EXAM OF SHOULDER: CPT | Mod: TC,FY,LT | Performed by: FAMILY MEDICINE

## 2022-01-21 PROCEDURE — 99214 OFFICE O/P EST MOD 30 MIN: CPT | Performed by: FAMILY MEDICINE

## 2022-01-21 ASSESSMENT — FIBROSIS 4 INDEX: FIB4 SCORE: 0.8

## 2022-01-21 ASSESSMENT — PATIENT HEALTH QUESTIONNAIRE - PHQ9: CLINICAL INTERPRETATION OF PHQ2 SCORE: 0

## 2022-01-21 NOTE — ASSESSMENT & PLAN NOTE
Heart cath showed 40% stenosis,   CT showed coronary atherosclerosis  He is on asa, statin,   DM2 moderate control  Working on weight loss  HTN controlled  He does not smoke  Has mskeletal chest pain with walking up stairs at work.

## 2022-01-21 NOTE — PROGRESS NOTES
Subjective:   Farzad Bryant Jr. is a 61 y.o. male here today for evaluation and management of:     Type 2 diabetes mellitus with complication, without long-term current use of insulin ([facility])  A1c:   Lab Results   Component Value Date/Time    HBA1C 7.5 (H) 09/15/2021 1101    AVGLUC 169 09/15/2021 1101     Lipids:   Lab Results   Component Value Date/Time    CHOLSTRLTOT 119 04/30/2020 11:03 AM    TRIGLYCERIDE 143 04/30/2020 11:03 AM    HDL 43 04/30/2020 11:03 AM    LDL 47 04/30/2020 11:03 AM   ]  BMP:   Lab Results   Component Value Date/Time    SODIUM 132 (L) 03/05/2021 0801    POTASSIUM 4.3 03/05/2021 0801    CHLORIDE 102 03/05/2021 0801    CO2 23 03/05/2021 0801    GLUCOSE 149 (H) 03/05/2021 0801    BUN 22 03/05/2021 0801    CREATININE 0.88 03/05/2021 0801    CALCIUM 9.4 03/05/2021 0801    ANION 7.0 03/05/2021 0801     GFR:   Lab Results   Component Value Date/Time    IFAFR[place] >60 03/05/2021 0801    IFNOTAFR >60 03/05/2021 0801     Last eye exam: he has annual retinal screenings done  Foot exam: he has no ulcers/numbness or tingling of feet  Medications: synjardy, ozempic, glipizide (no hypoglycemia), lantus 32 units,   Asa, atorvastatin        Atypical chest pain  Heart cath showed 40% stenosis,   CT showed coronary atherosclerosis  He is on asa, statin,   DM2 moderate control  Working on weight loss  HTN controlled  He does not smoke  Has mskeletal chest pain with walking up stairs at work.     Vestibular schwannoma (HCC)  Had ENT evaluation [facility] due to tinnitus and loss of hearing in right ear, MRI shows a vestibular schwannoma, he has appt with neuro surgery to discuss treatment and with neurology.          Current medicines (including changes today)  Current Outpatient Medications   Medication Sig Dispense Refill   • Zoster Vac Recomb Adjuvanted (SHINGRIX) 50 MCG/0.5ML Recon Susp Inject 0.5 mL into the shoulder, thigh, or buttocks one time for 1 dose. 0.5 mL 0   • Semaglutide, 1 MG/DOSE, 4 MG/3ML  Solution Pen-injector Inject 1 mg under the skin every 7 days. 9 mL 3   • Multiple Vitamins-Minerals (ZINC PO) Take 1 Each by mouth every day.     • tizanidine (ZANAFLEX) 4 MG Tab Take 1 Tablet by mouth 3 times a day. (Patient taking differently: Take 4 mg by mouth as needed.) 60 Tablet 0   • Empagliflozin-metFORMIN HCl (SYNJARDY) 12.5-1000 MG Tab Take 1 Tablet by mouth 2 times a day. 180 Tablet 1   • glipiZIDE (GLUCOTROL) 5 MG Tab Take 1 Tablet by mouth 2 times a day. 180 Tablet 1   • losartan (COZAAR) 50 MG Tab Take 1 Tablet by mouth every day. 90 Tablet 1   • isosorbide mononitrate SR (IMDUR) 30 MG TABLET SR 24 HR Take 1 Tablet by mouth every morning. 90 Tablet 1   • atorvastatin (LIPITOR) 40 MG Tab Take 1 Tablet by mouth every evening. 90 Tablet 1   • levothyroxine (EUTHYROX) 25 MCG Tab Take 1 Tablet by mouth every morning on an empty stomach. 90 Tablet 1   • traZODone (DESYREL) 100 MG Tab Take 2 Tablets by mouth at bedtime as needed for Sleep. 180 Tablet 1   • escitalopram (LEXAPRO) 20 MG tablet Take 1 Tablet by mouth every day. 90 Tablet 3   • insulin glargine (LANTUS SOLOSTAR) 100 UNIT/ML Solution Pen-injector injection Inject 32 Units under the skin every evening. 30 mL 3   • cetirizine (EQ ALLERGY RELIEF, CETIRIZINE,) 10 MG Tab Take 1 tablet by mouth every day. 90 tablet 3   • RELION PEN NEEDLE 31G/8MM USE WITH LANTUS DAILY 100 Each 3   • Multiple Vitamins-Minerals (ZINC PO) Take  by mouth.     • aspirin EC (ECOTRIN) 81 MG Tablet Delayed Response Take 81 mg by mouth every day.     • vitamin D (CHOLECALCIFEROL) 1000 Unit (25 mcg) Tab Take 1,000 Units by mouth every day.     • Turmeric 500 MG Cap Take 1,500 mg by mouth every day.     • acetaminophen (TYLENOL) 500 MG Tab Take 1,000 mg by mouth 2 times a day as needed for Fever.     • Cinnamon 500 MG Cap Take 1,000 mg by mouth every morning. \     • 5-Hydroxytryptophan (5-HTP) 100 MG Cap Take 200 mg by mouth every bedtime.     • Omega-3 Fatty Acids (FISH OIL)  "1000 MG Cap capsule Take 2,000 mg by mouth every morning.     • Melatonin 1 MG Cap Take 1 mg by mouth every bedtime.     • therapeutic multivitamin-minerals (THERAGRAN-M) Tab Take 1 Tab by mouth every morning.       No current facility-administered medications for this visit.     He  has a past medical history of Arthritis, Breath shortness, Depression, Diabetes (HCC), High cholesterol, Hyperlipidemia, and Hypertension.    ROS  No chest pain, no shortness of breath, no abdominal pain       Objective:     /84   Pulse 95   Temp 36.2 °C (97.1 °F) (Temporal)   Resp 16   Ht 1.854 m (6' 1\")   Wt (!) 127 kg (279 lb)   SpO2 95%  Body mass index is 36.81 kg/m².   Physical Exam:  Constitutional: Alert, no distress, well-groomed.  Skin: No rashes in visible areas.  Eye: Round. Conjunctiva clear, lids normal. No icterus.   ENMT: Lips pink without lesions, good dentition, moist mucous membranes. Phonation normal.  Neck: No masses, no thyromegaly. Moves freely without pain.  Respiratory: Unlabored respiratory effort, no cough or audible wheeze  Psych: Alert and oriented x3, normal affect and mood.      Assessment and Plan:   The following treatment plan was discussed    1. Need for vaccination    - Zoster Vac Recomb Adjuvanted (SHINGRIX) 50 MCG/0.5ML Recon Susp; Inject 0.5 mL into the shoulder, thigh, or buttocks one time for 1 dose.  Dispense: 0.5 mL; Refill: 0    2. Type 2 diabetes mellitus with complication, without long-term current use of insulin (HCC)  - Comp Metabolic Panel; Future  - HEMOGLOBIN A1C; Future  - MICROALBUMIN CREAT RATIO URINE; Future  - Lipid Profile; Future    3. Atypical chest pain  - CBC WITH DIFFERENTIAL; Future    4. Vestibular schwannoma (HCC)  - CBC WITH DIFFERENTIAL; Future      Followup: Return in about 3 months (around 4/21/2022) for DM2, schwannoma, HTN.         "

## 2022-01-21 NOTE — ASSESSMENT & PLAN NOTE
Had ENT evaluation [facility] due to tinnitus and loss of hearing in right ear, MRI shows a vestibular schwannoma, he has appt with neuro surgery to discuss treatment and with neurology.

## 2022-01-21 NOTE — ASSESSMENT & PLAN NOTE
A1c:   Lab Results   Component Value Date/Time    HBA1C 7.5 (H) 09/15/2021 1101    AVGLUC 169 09/15/2021 1101     Lipids:   Lab Results   Component Value Date/Time    CHOLSTRLTOT 119 04/30/2020 11:03 AM    TRIGLYCERIDE 143 04/30/2020 11:03 AM    HDL 43 04/30/2020 11:03 AM    LDL 47 04/30/2020 11:03 AM   ]  BMP:   Lab Results   Component Value Date/Time    SODIUM 132 (L) 03/05/2021 0801    POTASSIUM 4.3 03/05/2021 0801    CHLORIDE 102 03/05/2021 0801    CO2 23 03/05/2021 0801    GLUCOSE 149 (H) 03/05/2021 0801    BUN 22 03/05/2021 0801    CREATININE 0.88 03/05/2021 0801    CALCIUM 9.4 03/05/2021 0801    ANION 7.0 03/05/2021 0801     GFR:   Lab Results   Component Value Date/Time    IFAFR[place] >60 03/05/2021 0801    IFNOTAFR >60 03/05/2021 0801     Last eye exam: he has annual retinal screenings done  Foot exam: he has no ulcers/numbness or tingling of feet  Medications: synjardy, ozempic, glipizide (no hypoglycemia), lantus 32 units,   Asa, atorvastatin

## 2022-02-07 ENCOUNTER — HOSPITAL ENCOUNTER (OUTPATIENT)
Dept: RADIATION ONCOLOGY | Facility: MEDICAL CENTER | Age: 62
End: 2022-02-28
Attending: RADIOLOGY
Payer: COMMERCIAL

## 2022-02-07 VITALS
TEMPERATURE: 98 F | WEIGHT: 277.78 LBS | HEART RATE: 99 BPM | DIASTOLIC BLOOD PRESSURE: 78 MMHG | BODY MASS INDEX: 36.65 KG/M2 | OXYGEN SATURATION: 96 % | SYSTOLIC BLOOD PRESSURE: 142 MMHG

## 2022-02-07 DIAGNOSIS — D33.3 VESTIBULAR SCHWANNOMA (HCC): ICD-10-CM

## 2022-02-07 PROCEDURE — 99214 OFFICE O/P EST MOD 30 MIN: CPT | Performed by: RADIOLOGY

## 2022-02-07 PROCEDURE — 99205 OFFICE O/P NEW HI 60 MIN: CPT | Performed by: RADIOLOGY

## 2022-02-07 RX ORDER — EMPAGLIFLOZIN AND METFORMIN HYDROCHLORIDE 12.5; 1 MG/1; MG/1
TABLET ORAL
COMMUNITY
End: 2022-03-14

## 2022-02-07 ASSESSMENT — FIBROSIS 4 INDEX: FIB4 SCORE: 0.8

## 2022-02-07 ASSESSMENT — PAIN SCALES - GENERAL: PAINLEVEL: 4=SLIGHT-MODERATE PAIN

## 2022-02-07 NOTE — NON-PROVIDER
Patient was seen today in clinic with Dr. Brady for Consult.  Vitals signs and weight were obtained and pain assessment was completed.  Allergies and medications were reviewed with the patient.  Toxicities of treatment assessed.     Vitals/Pain:  Vitals:    02/07/22 1344   BP: 142/78   Pulse: 99   Temp: 36.7 °C (98 °F)   SpO2: 96%   Weight: (!) 126 kg (277 lb 12.5 oz)   Pain Score: 4=Slight-Moderate Pain        Allergies:   Patient has no known allergies.    Current Medications:  Current Outpatient Medications   Medication Sig Dispense Refill   • Semaglutide, 1 MG/DOSE, 4 MG/3ML Solution Pen-injector Inject 1 mg under the skin every 7 days. 9 mL 3   • Multiple Vitamins-Minerals (ZINC PO) Take 1 Each by mouth every day.     • glipiZIDE (GLUCOTROL) 5 MG Tab Take 1 Tablet by mouth 2 times a day. 180 Tablet 1   • losartan (COZAAR) 50 MG Tab Take 1 Tablet by mouth every day. 90 Tablet 1   • isosorbide mononitrate SR (IMDUR) 30 MG TABLET SR 24 HR Take 1 Tablet by mouth every morning. 90 Tablet 1   • atorvastatin (LIPITOR) 40 MG Tab Take 1 Tablet by mouth every evening. 90 Tablet 1   • levothyroxine (EUTHYROX) 25 MCG Tab Take 1 Tablet by mouth every morning on an empty stomach. 90 Tablet 1   • traZODone (DESYREL) 100 MG Tab Take 2 Tablets by mouth at bedtime as needed for Sleep. 180 Tablet 1   • escitalopram (LEXAPRO) 20 MG tablet Take 1 Tablet by mouth every day. 90 Tablet 3   • insulin glargine (LANTUS SOLOSTAR) 100 UNIT/ML Solution Pen-injector injection Inject 32 Units under the skin every evening. 30 mL 3   • cetirizine (EQ ALLERGY RELIEF, CETIRIZINE,) 10 MG Tab Take 1 tablet by mouth every day. 90 tablet 3   • RELION PEN NEEDLE 31G/8MM USE WITH LANTUS DAILY 100 Each 3   • Multiple Vitamins-Minerals (ZINC PO) Take  by mouth.     • aspirin EC (ECOTRIN) 81 MG Tablet Delayed Response Take 81 mg by mouth every day.     • vitamin D (CHOLECALCIFEROL) 1000 Unit (25 mcg) Tab Take 1,000 Units by mouth every day.     • Turmeric  500 MG Cap Take 1,500 mg by mouth every day.     • acetaminophen (TYLENOL) 500 MG Tab Take 1,000 mg by mouth 2 times a day as needed for Fever.     • Cinnamon 500 MG Cap Take 1,000 mg by mouth every morning. \     • Omega-3 Fatty Acids (FISH OIL) 1000 MG Cap capsule Take 2,000 mg by mouth every morning.     • Melatonin 1 MG Cap Take 1 mg by mouth every bedtime.     • therapeutic multivitamin-minerals (THERAGRAN-M) Tab Take 1 Tab by mouth every morning.     • Empagliflozin-metFORMIN HCl (SYNJARDY) 12.5-1000 MG Tab      • tizanidine (ZANAFLEX) 4 MG Tab Take 1 Tablet by mouth 3 times a day. (Patient taking differently: Take 4 mg by mouth as needed.) 60 Tablet 0   • Empagliflozin-metFORMIN HCl (SYNJARDY) 12.5-1000 MG Tab Take 1 Tablet by mouth 2 times a day. (Patient not taking: Reported on 2/7/2022) 180 Tablet 1   • 5-Hydroxytryptophan (5-HTP) 100 MG Cap Take 200 mg by mouth every bedtime.       No current facility-administered medications for this encounter.         PCP:  Rohan Culp, Med Ass't

## 2022-02-07 NOTE — CONSULTS
RADIATION ONCOLOGY CONSULT    DATE OF SERVICE: 2/7/2022    IDENTIFICATION: A 61 y.o. male with minimal serviceable hearing on right with right sided   Visit Diagnoses     ICD-10-CM   1. Vestibular schwannoma (HCC)  D33.3       He is here at the kind request of Dr. Jasso    HISTORY OF PRESENT ILLNESS:   Patient is a pleasant 61-year-old male who presents with ringing in his ears and occasional headaches which have been ongoing since December 2020.  Patient was referred by his primary care doctor to an audiologist who did an audiogram he states severe hearing loss in the right and mild hearing loss on the left.  Patient underwent MRI brain October 11, 2021 which showed a 14 x 7 x 8 mm right internal auditory canal mass consistent with vestibular schwannoma.  Patient denies any facial weakness or numbness.  He cannot use a cell phone in either ear and speakerphone to hear.    PAST MEDICAL HISTORY:  Past Medical History:   Diagnosis Date   • Arthritis    • Benign neoplasm of cranial nerves (HCC) 10/11/2021   • Breath shortness    • Depression     depression   • Diabetes (HCC)     oral medication   • High cholesterol    • Hyperlipidemia    • Hypertension        PAST SURGICAL HISTORY:  Past Surgical History:   Procedure Laterality Date   • ARTHROPLASTY Left     hip replacement   • CHOLECYSTECTOMY         CURRENT MEDICATIONS:  Current Outpatient Medications   Medication Sig Dispense Refill   • Semaglutide, 1 MG/DOSE, 4 MG/3ML Solution Pen-injector Inject 1 mg under the skin every 7 days. 9 mL 3   • Multiple Vitamins-Minerals (ZINC PO) Take 1 Each by mouth every day.     • glipiZIDE (GLUCOTROL) 5 MG Tab Take 1 Tablet by mouth 2 times a day. 180 Tablet 1   • losartan (COZAAR) 50 MG Tab Take 1 Tablet by mouth every day. 90 Tablet 1   • isosorbide mononitrate SR (IMDUR) 30 MG TABLET SR 24 HR Take 1 Tablet by mouth every morning. 90 Tablet 1   • atorvastatin (LIPITOR) 40 MG Tab Take 1 Tablet by mouth every evening. 90 Tablet 1    • levothyroxine (EUTHYROX) 25 MCG Tab Take 1 Tablet by mouth every morning on an empty stomach. 90 Tablet 1   • traZODone (DESYREL) 100 MG Tab Take 2 Tablets by mouth at bedtime as needed for Sleep. 180 Tablet 1   • escitalopram (LEXAPRO) 20 MG tablet Take 1 Tablet by mouth every day. 90 Tablet 3   • insulin glargine (LANTUS SOLOSTAR) 100 UNIT/ML Solution Pen-injector injection Inject 32 Units under the skin every evening. 30 mL 3   • cetirizine (EQ ALLERGY RELIEF, CETIRIZINE,) 10 MG Tab Take 1 tablet by mouth every day. 90 tablet 3   • RELION PEN NEEDLE 31G/8MM USE WITH LANTUS DAILY 100 Each 3   • Multiple Vitamins-Minerals (ZINC PO) Take  by mouth.     • aspirin EC (ECOTRIN) 81 MG Tablet Delayed Response Take 81 mg by mouth every day.     • vitamin D (CHOLECALCIFEROL) 1000 Unit (25 mcg) Tab Take 1,000 Units by mouth every day.     • Turmeric 500 MG Cap Take 1,500 mg by mouth every day.     • acetaminophen (TYLENOL) 500 MG Tab Take 1,000 mg by mouth 2 times a day as needed for Fever.     • Cinnamon 500 MG Cap Take 1,000 mg by mouth every morning. \     • Omega-3 Fatty Acids (FISH OIL) 1000 MG Cap capsule Take 2,000 mg by mouth every morning.     • Melatonin 1 MG Cap Take 1 mg by mouth every bedtime.     • therapeutic multivitamin-minerals (THERAGRAN-M) Tab Take 1 Tab by mouth every morning.     • Empagliflozin-metFORMIN HCl (SYNJARDY) 12.5-1000 MG Tab      • tizanidine (ZANAFLEX) 4 MG Tab Take 1 Tablet by mouth 3 times a day. (Patient taking differently: Take 4 mg by mouth as needed.) 60 Tablet 0   • Empagliflozin-metFORMIN HCl (SYNJARDY) 12.5-1000 MG Tab Take 1 Tablet by mouth 2 times a day. (Patient not taking: Reported on 2/7/2022) 180 Tablet 1   • 5-Hydroxytryptophan (5-HTP) 100 MG Cap Take 200 mg by mouth every bedtime.       No current facility-administered medications for this encounter.       ALLERGIES:    Patient has no known allergies.    FAMILY HISTORY:    family history includes Diabetes in his  mother; Stroke in his father.    SOCIAL HISTORY:     reports that he has never smoked. He quit smokeless tobacco use about 36 years ago.  His smokeless tobacco use included chew. He reports current alcohol use. He reports that he does not use drugs.    REVIEW OF SYSTEMS:  A review of systems for today's date of service was reviewed and uploaded into the electronic medical record.      PHYSICAL EXAM:    ECOG PERFORMANCE STATUS:  ECOG Performance Review 2/7/2022   ECOG Performance Status Fully active, able to carry on all pre-disease performance without restriction   Some recent data might be hidden     No flowsheet data found.  /78   Pulse 99   Temp 36.7 °C (98 °F)   Wt (!) 126 kg (277 lb 12.5 oz)   SpO2 96%   BMI 36.65 kg/m²   Physical Exam  Vitals reviewed.   HENT:      Head: Normocephalic.      Nose: Nose normal.      Mouth/Throat:      Mouth: Mucous membranes are moist.   Eyes:      Pupils: Pupils are equal, round, and reactive to light.   Cardiovascular:      Rate and Rhythm: Normal rate.   Pulmonary:      Effort: Pulmonary effort is normal.   Abdominal:      General: Abdomen is flat.   Musculoskeletal:         General: Normal range of motion.      Cervical back: Normal range of motion.   Skin:     General: Skin is warm.   Neurological:      General: No focal deficit present.      Mental Status: He is alert.      Cranial Nerves: Cranial nerve deficit present.      Comments: Decreased hearing R>L   Psychiatric:         Mood and Affect: Mood normal.          LABORATORY DATA:   Lab Results   Component Value Date/Time    WBC 7.8 03/05/2021 0801    WBC 8.0 03/02/2021 2000    WBC 7.6 12/12/2020 0944    HEMOGLOBIN 15.8 03/05/2021 0801    HEMOGLOBIN 13.7 (L) 03/02/2021 2000    HEMOGLOBIN 12.9 (L) 12/12/2020 0944    HEMATOCRIT 48.9 03/05/2021 0801    HEMATOCRIT 42.1 03/02/2021 2000    HEMATOCRIT 40.5 (L) 12/12/2020 0944    MCV 94.0 03/05/2021 0801    MCV 93.1 03/02/2021 2000    MCV 91.6 12/12/2020 0944     PLATELETCT 239 03/05/2021 0801    PLATELETCT 236 03/02/2021 2000    PLATELETCT 428 12/12/2020 0944    NEUTS 5.66 03/02/2021 2000    NEUTS 5.89 12/12/2020 0944    NEUTS 7.07 12/11/2020 1115      Lab Results   Component Value Date/Time    SODIUM 132 (L) 03/05/2021 0801    SODIUM 137 03/02/2021 2000    SODIUM 134 (L) 12/13/2020 0852    POTASSIUM 4.3 03/05/2021 0801    POTASSIUM 4.0 03/02/2021 2000    POTASSIUM 4.3 12/13/2020 0852    BUN 22 03/05/2021 0801    BUN 30 (H) 03/02/2021 2000    BUN 30 (H) 12/13/2020 0852    CREATININE 0.88 03/05/2021 0801    CREATININE 0.94 03/02/2021 2000    CREATININE 0.86 12/13/2020 0852    CALCIUM 9.4 03/05/2021 0801    CALCIUM 9.3 03/02/2021 2000    CALCIUM 8.8 12/13/2020 0852    ALBUMIN 3.9 03/02/2021 2000    ALBUMIN 3.4 12/12/2020 0944    ALBUMIN 3.6 12/11/2020 1115    ASTSGOT 15 03/02/2021 2000    ASTSGOT 15 12/12/2020 0944    ASTSGOT 26 12/11/2020 1115    ALKPHOSPHAT 61 03/02/2021 2000    ALKPHOSPHAT 115 (H) 12/12/2020 0944    ALKPHOSPHAT 146 (H) 12/11/2020 1115    IFNOTAFR >60 03/05/2021 0801    IFNOTAFR >60 03/02/2021 2000    IFNOTAFR >60 12/13/2020 0852       RADIOLOGY DATA:  DX-SHOULDER 2+ LEFT    Result Date: 1/21/2022 1/21/2022 10:10 AM HISTORY/REASON FOR EXAM:  Atraumatic Pain/Swelling/Deformity TECHNIQUE/EXAM DESCRIPTION AND NUMBER OF VIEWS:  3 views of the LEFT shoulder. COMPARISON: None FINDINGS: There is no evidence of fracture or dislocation. Glenohumeral and acromioclavicular articulation is maintained. There are acromioclavicular degenerative changes with spurring noted. Visualized left lung field is clear.     1.  No evidence of acute fracture or dislocation. 2.  Acromioclavicular degenerative changes.       IMPRESSION:    61 y.o. male with minimal serviceable hearing on right with right sided   Visit Diagnoses     ICD-10-CM   1. Vestibular schwannoma (HCC)  D33.3       RECOMMENDATIONS:   I discussed the role of radiosurgery for the treatment of vestibular schwannoma.   I explained that the [facility] series showed a 70% plus hearing preservation rate using a dose of 18 Gy in 3 fractions.  We discussed risks and benefits and patient is amenable to treatment.  I explained very rarely I do see acute headaches, nausea, worsening tinnitus or facial numbness or weakness. Patient is seeking surgical opinion at [facility] and I have recommended Travon Osman. Locally I have offered a referral to Dr. Castano but he wants to see the [facility] neurosurgery group first. I have ordered a repeat MRI brain in 1 month and will follow up with him then.    Thank you for the opportunity to participate in his care.  If any questions or comments, please do not hesitate in calling.    Orders Placed This Encounter   • MR-BRAIN-WITH & W/O   • REFERRAL TO ONCOLOGY NURSE NAVIGATOR   • Referral to Neurosurgery   • Empagliflozin-metFORMIN HCl (SYNJARDY) 12.5-1000 MG Tab

## 2022-02-22 ENCOUNTER — APPOINTMENT (OUTPATIENT)
Dept: RADIOLOGY | Facility: IMAGING CENTER | Age: 62
End: 2022-02-22
Attending: CHIROPRACTOR
Payer: COMMERCIAL

## 2022-02-22 ENCOUNTER — APPOINTMENT (OUTPATIENT)
Dept: URGENT CARE | Facility: PHYSICIAN GROUP | Age: 62
End: 2022-02-22
Payer: COMMERCIAL

## 2022-02-22 DIAGNOSIS — J30.9 ALLERGIC RHINITIS, UNSPECIFIED SEASONALITY, UNSPECIFIED TRIGGER: ICD-10-CM

## 2022-02-22 DIAGNOSIS — M54.50 LOW BACK PAIN, UNSPECIFIED BACK PAIN LATERALITY, UNSPECIFIED CHRONICITY, UNSPECIFIED WHETHER SCIATICA PRESENT: ICD-10-CM

## 2022-02-22 PROCEDURE — 72170 X-RAY EXAM OF PELVIS: CPT | Mod: TC,FY | Performed by: RADIOLOGY

## 2022-02-22 PROCEDURE — 72100 X-RAY EXAM L-S SPINE 2/3 VWS: CPT | Mod: TC,FY | Performed by: RADIOLOGY

## 2022-02-22 RX ORDER — CETIRIZINE HYDROCHLORIDE 10 MG/1
10 TABLET ORAL DAILY
Qty: 90 TABLET | Refills: 3 | Status: SHIPPED | OUTPATIENT
Start: 2022-02-22 | End: 2023-02-17 | Stop reason: SDUPTHER

## 2022-02-22 NOTE — TELEPHONE ENCOUNTER
Received request via: Pharmacy    Was the patient seen in the last year in this department? Yes   LOV 01/21/2022    Does the patient have an active prescription (recently filled or refills available) for medication(s) requested? No

## 2022-02-23 ENCOUNTER — PATIENT OUTREACH (OUTPATIENT)
Dept: OTHER | Facility: MEDICAL CENTER | Age: 62
End: 2022-02-23
Payer: COMMERCIAL

## 2022-02-23 NOTE — PROGRESS NOTES
"Cancer nurse navigation referral received for benign brain patient.  Call placed to patient who reports has video conference \"meet and greet\" with [facility] on 29th.  Pt understands has follow up MRI in March as well.  Pt denies problems getting to treatment if in area.  Pt states does not really want to drive for treatment.  Reviewed Dr Brady's note who had discussed could refer to Dr Castano locally.  Pt reports he does feel local treatment would be better if it is something they are able to do.  Will update Dr Brady.  Pt denies other questions or needs at this time.  "

## 2022-02-24 ENCOUNTER — PATIENT OUTREACH (OUTPATIENT)
Dept: OTHER | Facility: MEDICAL CENTER | Age: 62
End: 2022-02-24
Payer: COMMERCIAL

## 2022-02-24 NOTE — PROGRESS NOTES
Spoke with Dr Brady regarding patient hoping to be able to do any treatment needed locally.  He will see patient with Dr Castano in neuro multidisciplinary clinic on March 14th, review MRI that is scheduled for 11th and discuss plan of care.  Call placed to patient with update.  He reported would need to take another day off of work, but understands and prefers morning appointment so hopefully he will only need to take half a day off.  If needed treatment can be done with Dr Castano, he stated he would then not need to do video conference with [facility] on March 29th.  Updated radiation oncology and they will schedule patient for morning 9:15 time, March 14th.

## 2022-03-11 ENCOUNTER — APPOINTMENT (OUTPATIENT)
Dept: NEUROLOGY | Facility: MEDICAL CENTER | Age: 62
End: 2022-03-11
Attending: PSYCHIATRY & NEUROLOGY
Payer: COMMERCIAL

## 2022-03-11 ENCOUNTER — APPOINTMENT (OUTPATIENT)
Dept: RADIOLOGY | Facility: MEDICAL CENTER | Age: 62
End: 2022-03-11
Attending: RADIOLOGY
Payer: COMMERCIAL

## 2022-03-11 DIAGNOSIS — D33.3 VESTIBULAR SCHWANNOMA (HCC): ICD-10-CM

## 2022-03-11 PROCEDURE — 700117 HCHG RX CONTRAST REV CODE 255: Performed by: RADIOLOGY

## 2022-03-11 PROCEDURE — A9576 INJ PROHANCE MULTIPACK: HCPCS | Performed by: RADIOLOGY

## 2022-03-11 PROCEDURE — 70553 MRI BRAIN STEM W/O & W/DYE: CPT

## 2022-03-11 RX ADMIN — GADOTERIDOL 20 ML: 279.3 INJECTION, SOLUTION INTRAVENOUS at 11:17

## 2022-03-11 NOTE — PROGRESS NOTES
No chief complaint on file.      History of present illness:  Farzad Bryant Jr. 61 y.o. male with vestibular schwannoma diagnosed by MRI brain for tinnitus and hearing loss.     He has seen Dr. Brady for radiation therapy and is scheduled to see [facility] neurosurgery.     Past medical history:   Past Medical History:   Diagnosis Date   • Arthritis    • Benign neoplasm of cranial nerves (HCC) 10/11/2021   • Breath shortness    • Depression     depression   • Diabetes (HCC)     oral medication   • High cholesterol    • Hyperlipidemia    • Hypertension        Past surgical history:   Past Surgical History:   Procedure Laterality Date   • ARTHROPLASTY Left     hip replacement   • CHOLECYSTECTOMY         Family history:   Family History   Problem Relation Age of Onset   • Diabetes Mother    • Stroke Father        Social history:   Social History     Socioeconomic History   • Marital status:      Spouse name: Not on file   • Number of children: Not on file   • Years of education: Not on file   • Highest education level: Some college, no degree   Occupational History   • Not on file   Tobacco Use   • Smoking status: Never Smoker   • Smokeless tobacco: Former User     Types: Chew   Vaping Use   • Vaping Use: Never used   Substance and Sexual Activity   • Alcohol use: Yes     Comment: 1 per month    • Drug use: No   • Sexual activity: Not on file   Other Topics Concern   • Not on file   Social History Narrative   • Not on file     Social Determinants of Health     Financial Resource Strain: Low Risk    • Difficulty of Paying Living Expenses: Not hard at all   Food Insecurity: No Food Insecurity   • Worried About Running Out of Food in the Last Year: Never true   • Ran Out of Food in the Last Year: Never true   Transportation Needs: No Transportation Needs   • Lack of Transportation (Medical): No   • Lack of Transportation (Non-Medical): No   Physical Activity: Inactive   • Days of Exercise per Week: 0 days   •  Minutes of Exercise per Session: 0 min   Stress: Stress Concern Present   • Feeling of Stress : To some extent   Social Connections: Moderately Isolated   • Frequency of Communication with Friends and Family: Twice a week   • Frequency of Social Gatherings with Friends and Family: Once a week   • Attends [employer] Services: Never   • Active Member of Clubs or Organizations: No   • Attends Club or Organization Meetings: Patient refused   • Marital Status:    Intimate Partner Violence: Not on file   Housing Stability: Low Risk    • Unable to Pay for Housing in the Last Year: No   • Number of Places Lived in the Last Year: 1   • Unstable Housing in the Last Year: No       Current medications:   Current Outpatient Medications   Medication   • cetirizine (EQ ALLERGY RELIEF, CETIRIZINE,) 10 MG Tab   • Empagliflozin-metFORMIN HCl (SYNJARDY) 12.5-1000 MG Tab   • Semaglutide, 1 MG/DOSE, 4 MG/3ML Solution Pen-injector   • Multiple Vitamins-Minerals (ZINC PO)   • tizanidine (ZANAFLEX) 4 MG Tab   • Empagliflozin-metFORMIN HCl (SYNJARDY) 12.5-1000 MG Tab   • glipiZIDE (GLUCOTROL) 5 MG Tab   • losartan (COZAAR) 50 MG Tab   • isosorbide mononitrate SR (IMDUR) 30 MG TABLET SR 24 HR   • atorvastatin (LIPITOR) 40 MG Tab   • levothyroxine (EUTHYROX) 25 MCG Tab   • traZODone (DESYREL) 100 MG Tab   • escitalopram (LEXAPRO) 20 MG tablet   • insulin glargine (LANTUS SOLOSTAR) 100 UNIT/ML Solution Pen-injector injection   • RELION PEN NEEDLE 31G/8MM   • Multiple Vitamins-Minerals (ZINC PO)   • aspirin EC (ECOTRIN) 81 MG Tablet Delayed Response   • vitamin D (CHOLECALCIFEROL) 1000 Unit (25 mcg) Tab   • Turmeric 500 MG Cap   • acetaminophen (TYLENOL) 500 MG Tab   • Cinnamon 500 MG Cap   • 5-Hydroxytryptophan (5-HTP) 100 MG Cap   • Omega-3 Fatty Acids (FISH OIL) 1000 MG Cap capsule   • Melatonin 1 MG Cap   • therapeutic multivitamin-minerals (THERAGRAN-M) Tab     No current facility-administered medications for this visit.        Medication Allergy:  No Known Allergies    Review of systems:   ROS     Physical examination:   There were no vitals filed for this visit.  Neurological Exam    Labs:  I reviewed the following labs personally:  ***    Imaging:   10/11/21 MRI BRAIN IAC'S W/ WO  IMPRESSION:     1. 14 x 7 x 8 mm right internal auditory canal mass consistent with a vestibular schwannoma.     2. Minimal chronic microvascular ischemic type changes.    ASSESSMENT AND PLAN:  Problem List Items Addressed This Visit    None         There are no diagnoses linked to this encounter.    FOLLOW-UP:   No follow-ups on file.    Total time spent for the day for this patient unrelated to procedure time is: *** minutes. I spent *** minutes in face to face time and I spent *** minutes pre-charting and *** minutes in post-visit documentation.      CAYETANO ManuelO.  [facility]

## 2022-03-14 ENCOUNTER — HOSPITAL ENCOUNTER (OUTPATIENT)
Dept: RADIATION ONCOLOGY | Facility: MEDICAL CENTER | Age: 62
End: 2022-03-31
Attending: RADIOLOGY
Payer: COMMERCIAL

## 2022-03-14 VITALS
HEART RATE: 103 BPM | WEIGHT: 281.53 LBS | OXYGEN SATURATION: 93 % | BODY MASS INDEX: 37.14 KG/M2 | DIASTOLIC BLOOD PRESSURE: 68 MMHG | SYSTOLIC BLOOD PRESSURE: 129 MMHG | TEMPERATURE: 96.7 F

## 2022-03-14 DIAGNOSIS — D33.3 ACOUSTIC NEUROMA (HCC): ICD-10-CM

## 2022-03-14 PROCEDURE — 99212 OFFICE O/P EST SF 10 MIN: CPT | Performed by: RADIOLOGY

## 2022-03-14 PROCEDURE — 99213 OFFICE O/P EST LOW 20 MIN: CPT | Performed by: RADIOLOGY

## 2022-03-14 ASSESSMENT — FIBROSIS 4 INDEX: FIB4 SCORE: 0.8

## 2022-03-14 ASSESSMENT — PAIN SCALES - GENERAL: PAINLEVEL: 3=SLIGHT PAIN

## 2022-03-14 NOTE — PROGRESS NOTES
RADIATION ONCOLOGY FOLLOW-UP    DATE OF SERVICE: 3/14/2022    IDENTIFICATION:   A 61 y.o. male with     Visit Diagnoses       ICD-10-CM   1. Vestibular schwannoma (HCC)  D33.3       HISTORY OF PRESENT ILLNESS:   Patient is a pleasant 61-year-old male who presents with ringing in his ears and occasional headaches which have been ongoing since December 2020.  Patient was referred by his primary care doctor to an audiologist who did an audiogram he states severe hearing loss in the right and mild hearing loss on the left.  Patient underwent MRI brain October 11, 2021 which showed a 14 x 7 x 8 mm right internal auditory canal mass consistent with vestibular schwannoma.  Patient denies any facial weakness or numbness.  He cannot use a cell phone in either ear and speakerphone to hear.    Interval History:  Patient having worsening headaches had repeat MRI brain which shows stable vestibular schwannoma.  Patient was referred to [place] but did not want to go in person and there is a televisit video visit.  Patient has stable decreased hearing in right ear.    PROBLEM LIST:  Patient Active Problem List   Diagnosis   • Acquired hypothyroidism   • Type 2 diabetes mellitus with complication, without long-term current use of insulin (HCC)   • Dyslipidemia   • Primary insomnia   • Episode of recurrent major depressive disorder (HCC)   • Chronic shoulder pain   • Obesity (BMI 30-39.9)   • Dry skin   • Costochondritis   • Left wrist pain   • Atypical chest pain   • Abnormal nuclear cardiac imaging test   • Essential hypertension, benign   • Elevated blood pressure reading   • Cough   • Allergic rhinitis   • Recurrent pain of right knee   • Tendonitis   • Hernia of anterior abdominal wall   • Pneumonia due to COVID-19 virus   • Chest pain   • New onset tinnitus of right ear   • Vestibular schwannoma (HCC)   • Benign neoplasm of cranial nerve (HCC)       CURRENT MEDICATIONS:  Current Outpatient Medications   Medication Sig  Dispense Refill   • cetirizine (EQ ALLERGY RELIEF, CETIRIZINE,) 10 MG Tab Take 1 Tablet by mouth every day. 90 Tablet 3   • Semaglutide, 1 MG/DOSE, 4 MG/3ML Solution Pen-injector Inject 1 mg under the skin every 7 days. 9 mL 3   • Multiple Vitamins-Minerals (ZINC PO) Take 1 Each by mouth every day.     • Empagliflozin-metFORMIN HCl (SYNJARDY) 12.5-1000 MG Tab Take 1 Tablet by mouth 2 times a day. 180 Tablet 1   • glipiZIDE (GLUCOTROL) 5 MG Tab Take 1 Tablet by mouth 2 times a day. 180 Tablet 1   • losartan (COZAAR) 50 MG Tab Take 1 Tablet by mouth every day. 90 Tablet 1   • isosorbide mononitrate SR (IMDUR) 30 MG TABLET SR 24 HR Take 1 Tablet by mouth every morning. 90 Tablet 1   • atorvastatin (LIPITOR) 40 MG Tab Take 1 Tablet by mouth every evening. 90 Tablet 1   • levothyroxine (EUTHYROX) 25 MCG Tab Take 1 Tablet by mouth every morning on an empty stomach. 90 Tablet 1   • traZODone (DESYREL) 100 MG Tab Take 2 Tablets by mouth at bedtime as needed for Sleep. 180 Tablet 1   • escitalopram (LEXAPRO) 20 MG tablet Take 1 Tablet by mouth every day. 90 Tablet 3   • insulin glargine (LANTUS SOLOSTAR) 100 UNIT/ML Solution Pen-injector injection Inject 32 Units under the skin every evening. 30 mL 3   • RELION PEN NEEDLE 31G/8MM USE WITH LANTUS DAILY 100 Each 3   • Multiple Vitamins-Minerals (ZINC PO) Take  by mouth.     • aspirin EC (ECOTRIN) 81 MG Tablet Delayed Response Take 81 mg by mouth every day.     • vitamin D (CHOLECALCIFEROL) 1000 Unit (25 mcg) Tab Take 1,000 Units by mouth every day.     • Turmeric 500 MG Cap Take 1,500 mg by mouth every day.     • acetaminophen (TYLENOL) 500 MG Tab Take 1,000 mg by mouth 2 times a day as needed for Fever.     • Cinnamon 500 MG Cap Take 1,000 mg by mouth every morning. \     • 5-Hydroxytryptophan (5-HTP) 100 MG Cap Take 200 mg by mouth every bedtime.     • Omega-3 Fatty Acids (FISH OIL) 1000 MG Cap capsule Take 2,000 mg by mouth every morning.     • Melatonin 1 MG Cap Take 1 mg  by mouth every bedtime.     • therapeutic multivitamin-minerals (THERAGRAN-M) Tab Take 1 Tab by mouth every morning.     • Empagliflozin-metFORMIN HCl (SYNJARDY) 12.5-1000 MG Tab      • tizanidine (ZANAFLEX) 4 MG Tab Take 1 Tablet by mouth 3 times a day. (Patient taking differently: Take 4 mg by mouth as needed.) 60 Tablet 0     No current facility-administered medications for this encounter.       ALLERGIES:  Patient has no known allergies.    REVIEW OF SYSTEMS:  A review of systems for today's date of service was reviewed and uploaded into the electronic medical record.    PHYSICAL EXAM:  PERFORMANCE STATUS:  ECOG Performance Review 2/7/2022   ECOG Performance Status Fully active, able to carry on all pre-disease performance without restriction   Some recent data might be hidden     No flowsheet data found.  /68   Pulse (!) 103   Temp 35.9 °C (96.7 °F)   Wt (!) 128 kg (281 lb 8.4 oz)   SpO2 93%   BMI 37.14 kg/m²   Physical Exam  Vitals reviewed.   HENT:      Head: Normocephalic.      Mouth/Throat:      Mouth: Mucous membranes are moist.   Eyes:      Pupils: Pupils are equal, round, and reactive to light.   Cardiovascular:      Rate and Rhythm: Normal rate.   Pulmonary:      Effort: Pulmonary effort is normal.   Abdominal:      General: Abdomen is flat.   Musculoskeletal:         General: Normal range of motion.   Neurological:      General: No focal deficit present.      Mental Status: He is alert.   Psychiatric:         Mood and Affect: Mood normal.         LABORATORY DATA:   Lab Results   Component Value Date/Time    WBC 7.8 03/05/2021 0801    WBC 8.0 03/02/2021 2000    WBC 7.6 12/12/2020 0944    HEMOGLOBIN 15.8 03/05/2021 0801    HEMOGLOBIN 13.7 (L) 03/02/2021 2000    HEMOGLOBIN 12.9 (L) 12/12/2020 0944    HEMATOCRIT 48.9 03/05/2021 0801    HEMATOCRIT 42.1 03/02/2021 2000    HEMATOCRIT 40.5 (L) 12/12/2020 0944    MCV 94.0 03/05/2021 0801    MCV 93.1 03/02/2021 2000    MCV 91.6 12/12/2020 0944     PLATELETCT 239 03/05/2021 0801    PLATELETCT 236 03/02/2021 2000    PLATELETCT 428 12/12/2020 0944    NEUTS 5.66 03/02/2021 2000    NEUTS 5.89 12/12/2020 0944    NEUTS 7.07 12/11/2020 1115      Lab Results   Component Value Date/Time    SODIUM 132 (L) 03/05/2021 0801    SODIUM 137 03/02/2021 2000    SODIUM 134 (L) 12/13/2020 0852    POTASSIUM 4.3 03/05/2021 0801    POTASSIUM 4.0 03/02/2021 2000    POTASSIUM 4.3 12/13/2020 0852    BUN 22 03/05/2021 0801    BUN 30 (H) 03/02/2021 2000    BUN 30 (H) 12/13/2020 0852    CREATININE 0.88 03/05/2021 0801    CREATININE 0.94 03/02/2021 2000    CREATININE 0.86 12/13/2020 0852    CALCIUM 9.4 03/05/2021 0801    CALCIUM 9.3 03/02/2021 2000    CALCIUM 8.8 12/13/2020 0852    ALBUMIN 3.9 03/02/2021 2000    ALBUMIN 3.4 12/12/2020 0944    ALBUMIN 3.6 12/11/2020 1115    ASTSGOT 15 03/02/2021 2000    ASTSGOT 15 12/12/2020 0944    ASTSGOT 26 12/11/2020 1115    ALKPHOSPHAT 61 03/02/2021 2000    ALKPHOSPHAT 115 (H) 12/12/2020 0944    ALKPHOSPHAT 146 (H) 12/11/2020 1115    IFNOTAFR >60 03/05/2021 0801    IFNOTAFR >60 03/02/2021 2000    IFNOTAFR >60 12/13/2020 0852       RADIOLOGY DATA:  DX-LUMBAR SPINE-2 OR 3 VIEWS    Result Date: 2/22/2022  HISTORY/REASON FOR EXAM:  Atraumatic low back pain TECHNIQUE/ EXAM DESCRIPTION AND NUMBER OF VIEWS:  2  views of the lumbar spine, 2/22/2022 10:50 AM. COMPARISON: None. FINDINGS: There is disc space narrowing at L2-3, L4-5 and L5-S1. There are left lateral bridging osteophytes at L2-3. There is facet joint arthropathy at L5-S1. No fracture identified. No malalignment identified.     Degenerative change as described above. INTERPRETING LOCATION:  [address] [place] [address], [place], [id number]    DX-PELVIS-1 OR 2 VIEWS    Result Date: 2/22/2022 2/22/2022 10:50 AM HISTORY/REASON FOR EXAM: . Pelvic pain. History of left hip replacement TECHNIQUE/EXAM DESCRIPTION AND NUMBER OF VIEWS:  1 view(s) of the pelvis. COMPARISON:  None. FINDINGS: Exam is limited by patient  body habitus. Patient is status post left total hip replacement. No evidence of fracture or dislocation. The inferior aspect of the femoral stem is not fully imaged. There is right hip joint space narrowing.     Status post left hip total replacement without evidence of fracture or dislocation. Right hip joint space narrowing.    MR-BRAIN-WITH & W/O    Result Date: 3/11/2022  3/11/2022 10:15 AM HISTORY/REASON FOR EXAM: Neoplasm, cerebellopontine angle (CPA) or brainstem; vestibular schwannoma; Metastasis - [facility] Protocol Complete TECHNIQUE/EXAM DESCRIPTION: T1 sagittal, T2 axial, flair coronal, T1 coronal, and diffusion-weighted axial images were obtained of the brain pre-contrast followed by T1 coronal and axial images post intravenous administration of 20 mL ProHance. COMPARISON: 10/11/2021 FINDINGS: Again noted is a 7 x 15 x 6 millimeter right IAC lesion. The component of the lesion of the porus acusticus measures approximately 10 mm x 7 x 9 mm in 3 dimensions. The overall appearance of this lesion is stable from the previous study. Lesion extends laterally to the fundus of the IAC with no definite CSF cap identified at the lateral edge of the lesion. There may be minimal intralabyrinthine extension into the cochlear modiolus. Again noted is a small cerebellar developmental venous anomaly on the left side. The remainder of the brain does not demonstrate any abnormal intracranial enhancement. There are no extra axial collections. Visualized intracranial arterial flow voids are within normal limits. Bone marrow signal in the calvarium is within normal limits. Included portions of the paranasal sinuses are within normal limits. Included portions of the mastoid air cells are within normal limits. Included portions of the orbits are within normal limits     Stable appearance of right IAC schwannoma. Nonspecific T2 hyperintensities are noted in the periventricular and deep white matter, most likely related to chronic  microvascular ischemia.      IMPRESSION:    A 61 y.o. with vestibular schwannoma    RECOMMENDATIONS:   Patient was discussed in multidisciplinary clinic with Dr. Castano.  We discussed both radiosurgery and surgical options.  Patient prefers radiosurgery and will proceed with mapping in the near future and plan for the Guttenberg approach of 18 Gy in 3 treatments.  Risk and benefits discussed and patient amenable treatment.  We did explain the hearing preservation rate around 70% but given his minimal hearing in that right ear did recommend audiology and hearing aids.     Thank you for the opportunity to participate in his care.  If any questions or comments, please do not hesitate in calling.

## 2022-03-14 NOTE — NON-PROVIDER
Patient was seen today in clinic with Dr. Brady for Follow Up.  Vitals signs and weight were obtained and pain assessment was completed.  Allergies and medications were reviewed with the patient.  Toxicities of treatment assessed.     Vitals/Pain:  Vitals:    03/14/22 0915   BP: 129/68   Pulse: (!) 103   Temp: 35.9 °C (96.7 °F)   SpO2: 93%   Weight: (!) 128 kg (281 lb 8.4 oz)   Pain Score: 3=Slight Pain        Allergies:   Patient has no known allergies.    Current Medications:  Current Outpatient Medications   Medication Sig Dispense Refill   • cetirizine (EQ ALLERGY RELIEF, CETIRIZINE,) 10 MG Tab Take 1 Tablet by mouth every day. 90 Tablet 3   • Semaglutide, 1 MG/DOSE, 4 MG/3ML Solution Pen-injector Inject 1 mg under the skin every 7 days. 9 mL 3   • Multiple Vitamins-Minerals (ZINC PO) Take 1 Each by mouth every day.     • Empagliflozin-metFORMIN HCl (SYNJARDY) 12.5-1000 MG Tab Take 1 Tablet by mouth 2 times a day. 180 Tablet 1   • glipiZIDE (GLUCOTROL) 5 MG Tab Take 1 Tablet by mouth 2 times a day. 180 Tablet 1   • losartan (COZAAR) 50 MG Tab Take 1 Tablet by mouth every day. 90 Tablet 1   • isosorbide mononitrate SR (IMDUR) 30 MG TABLET SR 24 HR Take 1 Tablet by mouth every morning. 90 Tablet 1   • atorvastatin (LIPITOR) 40 MG Tab Take 1 Tablet by mouth every evening. 90 Tablet 1   • levothyroxine (EUTHYROX) 25 MCG Tab Take 1 Tablet by mouth every morning on an empty stomach. 90 Tablet 1   • traZODone (DESYREL) 100 MG Tab Take 2 Tablets by mouth at bedtime as needed for Sleep. 180 Tablet 1   • escitalopram (LEXAPRO) 20 MG tablet Take 1 Tablet by mouth every day. 90 Tablet 3   • insulin glargine (LANTUS SOLOSTAR) 100 UNIT/ML Solution Pen-injector injection Inject 32 Units under the skin every evening. 30 mL 3   • RELION PEN NEEDLE 31G/8MM USE WITH LANTUS DAILY 100 Each 3   • Multiple Vitamins-Minerals (ZINC PO) Take  by mouth.     • aspirin EC (ECOTRIN) 81 MG Tablet Delayed Response Take 81 mg by mouth every  day.     • vitamin D (CHOLECALCIFEROL) 1000 Unit (25 mcg) Tab Take 1,000 Units by mouth every day.     • Turmeric 500 MG Cap Take 1,500 mg by mouth every day.     • acetaminophen (TYLENOL) 500 MG Tab Take 1,000 mg by mouth 2 times a day as needed for Fever.     • Cinnamon 500 MG Cap Take 1,000 mg by mouth every morning. \     • 5-Hydroxytryptophan (5-HTP) 100 MG Cap Take 200 mg by mouth every bedtime.     • Omega-3 Fatty Acids (FISH OIL) 1000 MG Cap capsule Take 2,000 mg by mouth every morning.     • Melatonin 1 MG Cap Take 1 mg by mouth every bedtime.     • therapeutic multivitamin-minerals (THERAGRAN-M) Tab Take 1 Tab by mouth every morning.     • Empagliflozin-metFORMIN HCl (SYNJARDY) 12.5-1000 MG Tab      • tizanidine (ZANAFLEX) 4 MG Tab Take 1 Tablet by mouth 3 times a day. (Patient taking differently: Take 4 mg by mouth as needed.) 60 Tablet 0     No current facility-administered medications for this encounter.         PCP:  Rohan Culp, Med Ass't

## 2022-03-14 NOTE — CT SIMULATION
PATIENT NAME Farzad Bryant .   PRIMARY PHYSICIAN Yandy Madrigal [id number]   REFERRING PHYSICIAN Masha Hdez P.* 1960     Acoustic neuroma       Treatment Planning CT Simulation      Order Questions     Question Answer Comment    Is this for a new course of treatment? Yes     Is this an Addendum? No     Implanted Device/Pacemaker No     Simulation Status Initial     Treatment Site Brain     Laterality Axial     Treatment Technique SRS     Other Technique(s) SRT 3 fx    Treatment Pattern/Frequency Every Other Day acoustic neuroma, plz call to schedule mapping    Concurrent Chemotherapy No     CT Technique 3D     Slice Thickness 1mm     Scan Extent Brain     Treatment Device(s) SRS Mask     Patient Attire Gown     Patient Position Supine     Patient Orientation Head First     Arm Position Down     Chin Position Neutral     Treatment Machine TB1 - STx     Treatment Image Guidance CBCT     Frequency (CBCT) Daily     Image Guidance Match Bone     Treatment Planning Image Fusion CT/MR     Special Physics Consult Stereotactic      High Dose     Other Orders Special Tx Procedure Weekly Physics Checl

## 2022-03-14 NOTE — PROGRESS NOTES
This 61-year-old gentleman who presents to multidisciplinary clinic.    Brief HPI 61-year-old gentleman who presented after direct referral to radiation oncology after being seen by Dr. Jasso as a referral from [place] [facility] nose [facility] throat specialist.  Dr. Jasso had referred the patient to [place] for consultation.  The patient did not wish to go over the hill he is a very small mostly canalicular acoustic that shows some moderate degree of growth from December of last year to March of this year it is effacing the fundus the patient has 30% serviceable hearing at this time and was seen for dual consultation between surgery and radiation oncology today.    Total review of systems is present is positive for hearing loss in the right ear he has no significant vertigo or facial palsies he has no other symptoms.    Past medical history noncontributory  Past surgical history noncontributory  Medications noncontributory.    Physical examination he is alert oriented x3 able answer question appropriate no signs dysarthria facies 30% hearing right ear full hearing in the left ear no signs of nystagmus visual obscuration or other cranial nerve dysfunctions.    Imaging the patient has a MRI of the brain from October 2021 and a MRI of the brain thin slice from March 2022 demonstrating complete effacement of the can of the IAC with starting to demonstrate some degree of growth out of the IAC but no brainstem impaction and a significant amount of subarachnoid space between the brainstem and the tumor.    Assessment and plan  We had extensive consultation with the patient we have toldhim that at this time given that there is signs of growth that we would move forward with fractionated radiation would be our biggest recommendation the patient at this time is demonstrating changes in the tumor he does have some degree of serviceable hearing but is very minimal and he may benefit from hearing aids at this time we have told him that there  is approximately 70% preservation of residual hearing with fractionated radiation with nonfractionated radiation and with surgical resection there is a much lower chance of hearing preservation particular using a middle fossa approach which is quite complicated.  It would be our recommendation to move forward with fractionated radiation into attempted preserve with minimal hearing he has left as well as to freeze the lesion so that he can continue to function it is very low risk and if the tumor did grow through radiation at that point he could either be resected through middle fossa translabyrinthine or retrosigmoid approach even potentially petrosal approach however at this time the tumor is very small we not see any indication to move forward with surgical intervention.    He would like to move forward with fractionated radiation at that is the next appropriate time.    Total of 45 minutes was spent reviewing the patient's chart discussing care with the patient and

## 2022-03-15 DIAGNOSIS — H91.90 HEARING LOSS, UNSPECIFIED HEARING LOSS TYPE, UNSPECIFIED LATERALITY: ICD-10-CM

## 2022-03-17 ENCOUNTER — PATIENT OUTREACH (OUTPATIENT)
Dept: OTHER | Facility: MEDICAL CENTER | Age: 62
End: 2022-03-17
Payer: COMMERCIAL

## 2022-04-21 ENCOUNTER — APPOINTMENT (OUTPATIENT)
Dept: NEUROLOGY | Facility: MEDICAL CENTER | Age: 62
End: 2022-04-21
Attending: PSYCHIATRY & NEUROLOGY
Payer: COMMERCIAL

## 2022-04-24 DIAGNOSIS — F51.01 PRIMARY INSOMNIA: ICD-10-CM

## 2022-04-25 NOTE — TELEPHONE ENCOUNTER
Received request via: Patient    Was the patient seen in the last year in this department? Yes    Does the patient have an active prescription (recently filled or refills available) for medication(s) requested? No   Last seen 1/21/2022

## 2022-04-26 DIAGNOSIS — E03.9 ACQUIRED HYPOTHYROIDISM: ICD-10-CM

## 2022-04-26 RX ORDER — TRAZODONE HYDROCHLORIDE 100 MG/1
200 TABLET ORAL
Qty: 180 TABLET | Refills: 3 | Status: SHIPPED | OUTPATIENT
Start: 2022-04-26 | End: 2023-03-16

## 2022-04-27 RX ORDER — LEVOTHYROXINE SODIUM 0.03 MG/1
25 TABLET ORAL
Qty: 90 TABLET | Refills: 1 | Status: SHIPPED | OUTPATIENT
Start: 2022-04-27 | End: 2022-09-05 | Stop reason: SDUPTHER

## 2022-04-27 NOTE — TELEPHONE ENCOUNTER
2Received request via: Patient    Was the patient seen in the last year in this department? Yes    Does the patient have an active prescription (recently filled or refills available) for medication(s) requested? No     LAST  [id number]

## 2022-04-28 ENCOUNTER — PATIENT OUTREACH (OUTPATIENT)
Dept: OTHER | Facility: MEDICAL CENTER | Age: 62
End: 2022-04-28
Payer: COMMERCIAL

## 2022-04-28 NOTE — PROGRESS NOTES
Pt called reporting he is ready to get scheduled for treatment and wasn't sure how to go about doing that.  Reviewed chart and let him know would update Dr Brady and radiation therapy will reach out to him for an appointment. Pt reporting his schedule is better know, so should be able to plan around work better at this time.    Updated Dr Brady.  He will have therapists reach out to him to set him up for mapping/simulation.

## 2022-05-02 ENCOUNTER — HOSPITAL ENCOUNTER (OUTPATIENT)
Dept: RADIATION ONCOLOGY | Facility: MEDICAL CENTER | Age: 62
End: 2022-05-31
Attending: RADIOLOGY
Payer: COMMERCIAL

## 2022-05-13 DIAGNOSIS — D33.3 ACOUSTIC NEUROMA (HCC): ICD-10-CM

## 2022-05-14 ENCOUNTER — HOSPITAL ENCOUNTER (OUTPATIENT)
Dept: LAB | Facility: MEDICAL CENTER | Age: 62
End: 2022-05-14
Attending: RADIOLOGY
Payer: COMMERCIAL

## 2022-05-14 ENCOUNTER — HOSPITAL ENCOUNTER (OUTPATIENT)
Dept: LAB | Facility: MEDICAL CENTER | Age: 62
End: 2022-05-14
Attending: FAMILY MEDICINE
Payer: COMMERCIAL

## 2022-05-14 DIAGNOSIS — R07.89 ATYPICAL CHEST PAIN: ICD-10-CM

## 2022-05-14 DIAGNOSIS — Z11.59 ENCOUNTER FOR HEPATITIS C SCREENING TEST FOR LOW RISK PATIENT: ICD-10-CM

## 2022-05-14 DIAGNOSIS — D33.3 ACOUSTIC NEUROMA (HCC): ICD-10-CM

## 2022-05-14 DIAGNOSIS — D33.3 VESTIBULAR SCHWANNOMA (HCC): ICD-10-CM

## 2022-05-14 DIAGNOSIS — E11.8 TYPE 2 DIABETES MELLITUS WITH COMPLICATION, WITHOUT LONG-TERM CURRENT USE OF INSULIN (HCC): ICD-10-CM

## 2022-05-14 LAB
ALBUMIN SERPL BCP-MCNC: 4.3 G/DL (ref 3.2–4.9)
ALBUMIN/GLOB SERPL: 1.5 G/DL
ALP SERPL-CCNC: 90 U/L (ref 30–99)
ALT SERPL-CCNC: 34 U/L (ref 2–50)
ANION GAP SERPL CALC-SCNC: 12 MMOL/L (ref 7–16)
AST SERPL-CCNC: 19 U/L (ref 12–45)
BASOPHILS # BLD AUTO: 0.4 % (ref 0–1.8)
BASOPHILS # BLD: 0.03 K/UL (ref 0–0.12)
BILIRUB SERPL-MCNC: 0.3 MG/DL (ref 0.1–1.5)
BUN SERPL-MCNC: 24 MG/DL (ref 8–22)
CALCIUM SERPL-MCNC: 9.1 MG/DL (ref 8.5–10.5)
CHLORIDE SERPL-SCNC: 103 MMOL/L (ref 96–112)
CHOLEST SERPL-MCNC: 103 MG/DL (ref 100–199)
CO2 SERPL-SCNC: 22 MMOL/L (ref 20–33)
CREAT SERPL-MCNC: 1.03 MG/DL (ref 0.5–1.4)
EOSINOPHIL # BLD AUTO: 0.23 K/UL (ref 0–0.51)
EOSINOPHIL NFR BLD: 2.9 % (ref 0–6.9)
ERYTHROCYTE [DISTWIDTH] IN BLOOD BY AUTOMATED COUNT: 46 FL (ref 35.9–50)
EST. AVERAGE GLUCOSE BLD GHB EST-MCNC: 177 MG/DL
FASTING STATUS PATIENT QL REPORTED: NORMAL
GFR SERPLBLD CREATININE-BSD FMLA CKD-EPI: 83 ML/MIN/1.73 M 2
GLOBULIN SER CALC-MCNC: 2.9 G/DL (ref 1.9–3.5)
GLUCOSE SERPL-MCNC: 221 MG/DL (ref 65–99)
HBA1C MFR BLD: 7.8 % (ref 4–5.6)
HCT VFR BLD AUTO: 47 % (ref 42–52)
HCV AB SER QL: NORMAL
HDLC SERPL-MCNC: 42 MG/DL
HGB BLD-MCNC: 15.3 G/DL (ref 14–18)
IMM GRANULOCYTES # BLD AUTO: 0.05 K/UL (ref 0–0.11)
IMM GRANULOCYTES NFR BLD AUTO: 0.6 % (ref 0–0.9)
LDLC SERPL CALC-MCNC: 34 MG/DL
LYMPHOCYTES # BLD AUTO: 1.69 K/UL (ref 1–4.8)
LYMPHOCYTES NFR BLD: 21.4 % (ref 22–41)
MCH RBC QN AUTO: 30.4 PG (ref 27–33)
MCHC RBC AUTO-ENTMCNC: 32.6 G/DL (ref 33.7–35.3)
MCV RBC AUTO: 93.3 FL (ref 81.4–97.8)
MONOCYTES # BLD AUTO: 0.78 K/UL (ref 0–0.85)
MONOCYTES NFR BLD AUTO: 9.9 % (ref 0–13.4)
NEUTROPHILS # BLD AUTO: 5.12 K/UL (ref 1.82–7.42)
NEUTROPHILS NFR BLD: 64.8 % (ref 44–72)
NRBC # BLD AUTO: 0 K/UL
NRBC BLD-RTO: 0 /100 WBC
PLATELET # BLD AUTO: 268 K/UL (ref 164–446)
PMV BLD AUTO: 10 FL (ref 9–12.9)
POTASSIUM SERPL-SCNC: 4.9 MMOL/L (ref 3.6–5.5)
PROT SERPL-MCNC: 7.2 G/DL (ref 6–8.2)
RBC # BLD AUTO: 5.04 M/UL (ref 4.7–6.1)
SODIUM SERPL-SCNC: 137 MMOL/L (ref 135–145)
TRIGL SERPL-MCNC: 136 MG/DL (ref 0–149)
WBC # BLD AUTO: 7.9 K/UL (ref 4.8–10.8)

## 2022-05-14 PROCEDURE — 82570 ASSAY OF URINE CREATININE: CPT

## 2022-05-14 PROCEDURE — 85025 COMPLETE CBC W/AUTO DIFF WBC: CPT

## 2022-05-14 PROCEDURE — 83036 HEMOGLOBIN GLYCOSYLATED A1C: CPT

## 2022-05-14 PROCEDURE — 80061 LIPID PANEL: CPT

## 2022-05-14 PROCEDURE — 80053 COMPREHEN METABOLIC PANEL: CPT

## 2022-05-14 PROCEDURE — 86803 HEPATITIS C AB TEST: CPT

## 2022-05-14 PROCEDURE — 36415 COLL VENOUS BLD VENIPUNCTURE: CPT

## 2022-05-14 PROCEDURE — 82043 UR ALBUMIN QUANTITATIVE: CPT

## 2022-05-16 ENCOUNTER — HOSPITAL ENCOUNTER (OUTPATIENT)
Dept: RADIATION ONCOLOGY | Facility: MEDICAL CENTER | Age: 62
End: 2022-05-16

## 2022-05-16 LAB
CREAT UR-MCNC: 58.8 MG/DL
MICROALBUMIN UR-MCNC: <1.2 MG/DL
MICROALBUMIN/CREAT UR: NORMAL MG/G (ref 0–30)

## 2022-05-16 PROCEDURE — 77470 SPECIAL RADIATION TREATMENT: CPT | Mod: 26 | Performed by: RADIOLOGY

## 2022-05-16 PROCEDURE — 77470 SPECIAL RADIATION TREATMENT: CPT | Performed by: RADIOLOGY

## 2022-05-16 PROCEDURE — 77334 RADIATION TREATMENT AID(S): CPT | Performed by: RADIOLOGY

## 2022-05-16 PROCEDURE — 77290 THER RAD SIMULAJ FIELD CPLX: CPT | Performed by: RADIOLOGY

## 2022-05-16 PROCEDURE — 77263 THER RADIOLOGY TX PLNG CPLX: CPT | Performed by: RADIOLOGY

## 2022-05-16 PROCEDURE — 77290 THER RAD SIMULAJ FIELD CPLX: CPT | Mod: 26 | Performed by: RADIOLOGY

## 2022-05-16 PROCEDURE — 77334 RADIATION TREATMENT AID(S): CPT | Mod: 26 | Performed by: RADIOLOGY

## 2022-05-17 PROCEDURE — 77370 RADIATION PHYSICS CONSULT: CPT | Performed by: RADIOLOGY

## 2022-05-18 NOTE — RESULT ENCOUNTER NOTE
Released to chika Panda,  Your labs show the A1c is going up! All other labs: cholesterol, kidney function are good. Higher A1c puts you at risk so please make an appt with me to discuss how to get this back down with medications, diet, exercise.   Yandy Madrigal M.D.

## 2022-05-19 PROCEDURE — 77295 3-D RADIOTHERAPY PLAN: CPT | Mod: 26 | Performed by: RADIOLOGY

## 2022-05-19 PROCEDURE — 77295 3-D RADIOTHERAPY PLAN: CPT | Performed by: RADIOLOGY

## 2022-05-19 PROCEDURE — 77300 RADIATION THERAPY DOSE PLAN: CPT | Mod: 26 | Performed by: RADIOLOGY

## 2022-05-19 PROCEDURE — 77334 RADIATION TREATMENT AID(S): CPT | Performed by: RADIOLOGY

## 2022-05-19 PROCEDURE — 77334 RADIATION TREATMENT AID(S): CPT | Mod: 26 | Performed by: RADIOLOGY

## 2022-05-19 PROCEDURE — 77300 RADIATION THERAPY DOSE PLAN: CPT | Performed by: RADIOLOGY

## 2022-05-23 ENCOUNTER — HOSPITAL ENCOUNTER (OUTPATIENT)
Dept: RADIATION ONCOLOGY | Facility: MEDICAL CENTER | Age: 62
End: 2022-05-23
Payer: COMMERCIAL

## 2022-05-23 LAB
CHEMOTHERAPY INFUSION START DATE: NORMAL
CHEMOTHERAPY RECORDS: 1800
CHEMOTHERAPY RECORDS: 6
CHEMOTHERAPY RECORDS: NORMAL
CHEMOTHERAPY RX CANCER: NORMAL
DATE 1ST CHEMO CANCER: NORMAL
RAD ONC ARIA COURSE LAST TREATMENT DATE: NORMAL
RAD ONC ARIA COURSE TREATMENT ELAPSED DAYS: NORMAL
RAD ONC ARIA REFERENCE POINT DOSAGE GIVEN TO DATE: 6
RAD ONC ARIA REFERENCE POINT DOSAGE GIVEN TO DATE: 7.22
RAD ONC ARIA REFERENCE POINT ID: NORMAL
RAD ONC ARIA REFERENCE POINT ID: NORMAL
RAD ONC ARIA REFERENCE POINT SESSION DOSAGE GIVEN: 6
RAD ONC ARIA REFERENCE POINT SESSION DOSAGE GIVEN: 7.22

## 2022-05-23 PROCEDURE — 77280 THER RAD SIMULAJ FIELD SMPL: CPT | Mod: 26 | Performed by: RADIOLOGY

## 2022-05-23 PROCEDURE — 77435 SBRT MANAGEMENT: CPT | Performed by: RADIOLOGY

## 2022-05-23 PROCEDURE — 77373 STRTCTC BDY RAD THER TX DLVR: CPT | Performed by: RADIOLOGY

## 2022-05-23 PROCEDURE — 77280 THER RAD SIMULAJ FIELD SMPL: CPT | Performed by: RADIOLOGY

## 2022-05-23 NOTE — TELEPHONE ENCOUNTER
Received request via: Patient    Was the patient seen in the last year in this department? Yes    Does the patient have an active prescription (recently filled or refills available) for medication(s) requested? No     Last Office Visit:01/21/2022  Last Labs:05/14/2022

## 2022-05-24 RX ORDER — PEN NEEDLE, DIABETIC 29 G X1/2"
NEEDLE, DISPOSABLE MISCELLANEOUS
Qty: 100 EACH | Refills: 3 | Status: SHIPPED | OUTPATIENT
Start: 2022-05-24 | End: 2023-08-24

## 2022-05-25 ENCOUNTER — HOSPITAL ENCOUNTER (OUTPATIENT)
Dept: RADIATION ONCOLOGY | Facility: MEDICAL CENTER | Age: 62
End: 2022-05-25
Payer: COMMERCIAL

## 2022-05-25 ENCOUNTER — HOSPITAL ENCOUNTER (OUTPATIENT)
Dept: RADIATION ONCOLOGY | Facility: MEDICAL CENTER | Age: 62
End: 2022-05-25
Attending: RADIOLOGY
Payer: COMMERCIAL

## 2022-05-25 VITALS
OXYGEN SATURATION: 96 % | DIASTOLIC BLOOD PRESSURE: 77 MMHG | SYSTOLIC BLOOD PRESSURE: 143 MMHG | TEMPERATURE: 97.4 F | BODY MASS INDEX: 37.11 KG/M2 | WEIGHT: 281.31 LBS | HEART RATE: 96 BPM

## 2022-05-25 DIAGNOSIS — D33.3 ACOUSTIC NEUROMA (HCC): ICD-10-CM

## 2022-05-25 LAB
CHEMOTHERAPY INFUSION START DATE: NORMAL
CHEMOTHERAPY RECORDS: 1800
CHEMOTHERAPY RECORDS: 6
CHEMOTHERAPY RECORDS: NORMAL
CHEMOTHERAPY RX CANCER: NORMAL
DATE 1ST CHEMO CANCER: NORMAL
RAD ONC ARIA COURSE LAST TREATMENT DATE: NORMAL
RAD ONC ARIA COURSE TREATMENT ELAPSED DAYS: NORMAL
RAD ONC ARIA REFERENCE POINT DOSAGE GIVEN TO DATE: 12
RAD ONC ARIA REFERENCE POINT DOSAGE GIVEN TO DATE: 14.44
RAD ONC ARIA REFERENCE POINT ID: NORMAL
RAD ONC ARIA REFERENCE POINT ID: NORMAL
RAD ONC ARIA REFERENCE POINT SESSION DOSAGE GIVEN: 6
RAD ONC ARIA REFERENCE POINT SESSION DOSAGE GIVEN: 7.22

## 2022-05-25 PROCEDURE — 77280 THER RAD SIMULAJ FIELD SMPL: CPT | Performed by: RADIOLOGY

## 2022-05-25 PROCEDURE — 77336 RADIATION PHYSICS CONSULT: CPT | Performed by: RADIOLOGY

## 2022-05-25 PROCEDURE — 77280 THER RAD SIMULAJ FIELD SMPL: CPT | Mod: 26 | Performed by: RADIOLOGY

## 2022-05-25 PROCEDURE — 77373 STRTCTC BDY RAD THER TX DLVR: CPT | Performed by: RADIOLOGY

## 2022-05-25 RX ORDER — DEXAMETHASONE 4 MG/1
TABLET ORAL
Qty: 6 TABLET | Refills: 0 | Status: SHIPPED | OUTPATIENT
Start: 2022-05-25 | End: 2022-05-30

## 2022-05-25 ASSESSMENT — FIBROSIS 4 INDEX: FIB4 SCORE: 0.73

## 2022-05-25 ASSESSMENT — PAIN SCALES - GENERAL: PAINLEVEL: 7=MODERATE-SEVERE PAIN

## 2022-05-25 NOTE — ON TREATMENT VISIT
ON TREATMENT  NOTE  RADIATION ONCOLOGY DEPARTMENT    Patient name:  Farzad Bryant Jr.    Primary Physician:  Yandy Madrigal M.D. MRN: [id number]  CSN: [id number]   Referring physician:  Masha Hdez P.* : 1960, 61 y.o.     ENCOUNTER DATE:  22    DIAGNOSIS:  Visit Diagnoses     ICD-10-CM   1. Acoustic neuroma (HCC)  D33.3       TREATMENT SUMMARY:  [facility] Treatment Information        Some values may be hidden. Unless noted otherwise, only the newest values recorded on each date are displayed.         [facility] Treatment Summary 22   Course First Treatment Date 2022   Course Last Treatment Date 2022   SRT R IAC Plan from Course C1_IAC_SRT   Fraction 2 of 3   Elapsed Course Days 2 @    Prescribed Fraction Dose 600 cGy   Prescribed Total Dose 1,800 cGy   SRT R IAC Reference Point from Course C1_IAC_SRT   Elapsed Course Days 2 @    Session Dose 600 cGy   Total Dose 1,200 cGy   SRT R IAC CP Reference Point from Course C1_IAC_SRT   Elapsed Course Days 2 @    Session Dose 722 cGy   Total Dose 1,444 cGy             SUBJECTIVE:  Moderate HA, left eye pain      VITAL SIGNS:  KPS: 100, Fully active, able to carry on all pre-disease performed without restriction (ECOG equivalent 0)  Encounter Vitals  Temperature: 36.3 °C (97.4 °F)  Blood Pressure: (!) 143/77  Pulse: 96  Pulse Oximetry: 96 %  Weight: (!) 128 kg (281 lb 4.9 oz)  Pain Score: 7=Moderate-Severe Pain  Pain Assessment 2022 3/14/2022 2022   Pain Score 7 3 4   Pain Loc Head Head Shoulder   Some recent data might be hidden          PHYSICAL EXAM:    Physical Exam  Skin:     Findings: No erythema.          Toxicity Assessment 2022   Toxicity Assessment Brain   Fatigue (lethargy, malaise, asthenia) None   Radiation Dermatitis None   Nausea None   Mouth Dryness Mild   Headache Moderate pain, pain or analgesics interfering with function, but not interfering with activities of daily  living   External Auditory Canal Normal   Inner Ear/Hearing Normal   Middle Ear/Hearing Normal   Dizziness/lightheadedness None   Memory loss Normal   Neuropathy - Motor Normal   Seizure None   Vertigo None       CURRENT MEDICATIONS:    Current Outpatient Medications:   •  dexamethasone (DECADRON) 4 MG Tab, Take 2 Tablets by mouth every day for 1 day, THEN 1 Tablet every day for 4 days., Disp: 6 Tablet, Rfl: 0  •  Insulin Pen Needle (RELION PEN NEEDLE 31G/8MM), Use with lantus daily, Disp: 100 Each, Rfl: 3  •  levothyroxine (EUTHYROX) 25 MCG Tab, Take 1 Tablet by mouth every morning on an empty stomach., Disp: 90 Tablet, Rfl: 1  •  traZODone (DESYREL) 100 MG Tab, Take 2 Tablets by mouth at bedtime as needed for Sleep., Disp: 180 Tablet, Rfl: 3  •  cetirizine (EQ ALLERGY RELIEF, CETIRIZINE,) 10 MG Tab, Take 1 Tablet by mouth every day., Disp: 90 Tablet, Rfl: 3  •  Semaglutide, 1 MG/DOSE, 4 MG/3ML Solution Pen-injector, Inject 1 mg under the skin every 7 days., Disp: 9 mL, Rfl: 3  •  Multiple Vitamins-Minerals (ZINC PO), Take 1 Each by mouth every day., Disp: , Rfl:   •  tizanidine (ZANAFLEX) 4 MG Tab, Take 1 Tablet by mouth 3 times a day. (Patient taking differently: Take 4 mg by mouth as needed.), Disp: 60 Tablet, Rfl: 0  •  Empagliflozin-metFORMIN HCl (SYNJARDY) 12.5-1000 MG Tab, Take 1 Tablet by mouth 2 times a day., Disp: 180 Tablet, Rfl: 1  •  glipiZIDE (GLUCOTROL) 5 MG Tab, Take 1 Tablet by mouth 2 times a day., Disp: 180 Tablet, Rfl: 1  •  losartan (COZAAR) 50 MG Tab, Take 1 Tablet by mouth every day., Disp: 90 Tablet, Rfl: 1  •  isosorbide mononitrate SR (IMDUR) 30 MG TABLET SR 24 HR, Take 1 Tablet by mouth every morning., Disp: 90 Tablet, Rfl: 1  •  atorvastatin (LIPITOR) 40 MG Tab, Take 1 Tablet by mouth every evening., Disp: 90 Tablet, Rfl: 1  •  escitalopram (LEXAPRO) 20 MG tablet, Take 1 Tablet by mouth every day., Disp: 90 Tablet, Rfl: 3  •  insulin glargine (LANTUS SOLOSTAR) 100 UNIT/ML Solution  Pen-injector injection, Inject 32 Units under the skin every evening., Disp: 30 mL, Rfl: 3  •  Multiple Vitamins-Minerals (ZINC PO), Take  by mouth., Disp: , Rfl:   •  aspirin EC (ECOTRIN) 81 MG Tablet Delayed Response, Take 81 mg by mouth every day., Disp: , Rfl:   •  vitamin D (CHOLECALCIFEROL) 1000 Unit (25 mcg) Tab, Take 1,000 Units by mouth every day., Disp: , Rfl:   •  Turmeric 500 MG Cap, Take 1,500 mg by mouth every day., Disp: , Rfl:   •  acetaminophen (TYLENOL) 500 MG Tab, Take 1,000 mg by mouth 2 times a day as needed for Fever., Disp: , Rfl:   •  Cinnamon 500 MG Cap, Take 1,000 mg by mouth every morning. \, Disp: , Rfl:   •  5-Hydroxytryptophan (5-HTP) 100 MG Cap, Take 200 mg by mouth every bedtime., Disp: , Rfl:   •  Omega-3 Fatty Acids (FISH OIL) 1000 MG Cap capsule, Take 2,000 mg by mouth every morning., Disp: , Rfl:   •  Melatonin 1 MG Cap, Take 1 mg by mouth every bedtime., Disp: , Rfl:   •  therapeutic multivitamin-minerals (THERAGRAN-M) Tab, Take 1 Tab by mouth every morning., Disp: , Rfl:     LABORATORY DATA:   Lab Results   Component Value Date/Time    SODIUM 137 05/14/2022 09:25 AM    POTASSIUM 4.9 05/14/2022 09:25 AM    CHLORIDE 103 05/14/2022 09:25 AM    CO2 22 05/14/2022 09:25 AM    GLUCOSE 221 (H) 05/14/2022 09:25 AM    BUN 24 (H) 05/14/2022 09:25 AM    CREATININE 1.03 05/14/2022 09:25 AM         Lab Results   Component Value Date/Time    WBC 7.9 05/14/2022 09:25 AM    HEMOGLOBIN 15.3 05/14/2022 09:25 AM    HEMATOCRIT 47.0 05/14/2022 09:25 AM    MCV 93.3 05/14/2022 09:25 AM    PLATELETCT 268 05/14/2022 09:25 AM        RADIOLOGY DATA:  No results found.    IMPRESSION:  Visit Diagnoses     ICD-10-CM   1. Acoustic neuroma (HCC)  D33.3     PLAN:  No change in treatment plan    Disposition:  Treatment plan reviewed. Questions answered. Continue therapy outlined. Given 5 day steroid course to help with headaches.    Umberto Brady M.D.    Orders Placed This Encounter   • dexamethasone  (DECADRON) 4 MG Tab

## 2022-05-27 ENCOUNTER — HOSPITAL ENCOUNTER (OUTPATIENT)
Dept: RADIATION ONCOLOGY | Facility: MEDICAL CENTER | Age: 62
End: 2022-05-27
Payer: COMMERCIAL

## 2022-05-27 LAB
CHEMOTHERAPY INFUSION START DATE: NORMAL
CHEMOTHERAPY INFUSION START DATE: NORMAL
CHEMOTHERAPY INFUSION STOP DATE: NORMAL
CHEMOTHERAPY RECORDS: 1800
CHEMOTHERAPY RECORDS: 1800
CHEMOTHERAPY RECORDS: 6
CHEMOTHERAPY RECORDS: 6
CHEMOTHERAPY RECORDS: NORMAL
CHEMOTHERAPY RX CANCER: NORMAL
CHEMOTHERAPY RX CANCER: NORMAL
DATE 1ST CHEMO CANCER: NORMAL
DATE 1ST CHEMO CANCER: NORMAL
RAD ONC ARIA COURSE LAST TREATMENT DATE: NORMAL
RAD ONC ARIA COURSE LAST TREATMENT DATE: NORMAL
RAD ONC ARIA COURSE TREATMENT ELAPSED DAYS: NORMAL
RAD ONC ARIA COURSE TREATMENT ELAPSED DAYS: NORMAL
RAD ONC ARIA REFERENCE POINT DOSAGE GIVEN TO DATE: 18
RAD ONC ARIA REFERENCE POINT DOSAGE GIVEN TO DATE: 18
RAD ONC ARIA REFERENCE POINT DOSAGE GIVEN TO DATE: 21.66
RAD ONC ARIA REFERENCE POINT DOSAGE GIVEN TO DATE: 21.66
RAD ONC ARIA REFERENCE POINT ID: NORMAL
RAD ONC ARIA REFERENCE POINT SESSION DOSAGE GIVEN: 6
RAD ONC ARIA REFERENCE POINT SESSION DOSAGE GIVEN: 7.22

## 2022-05-27 PROCEDURE — 77280 THER RAD SIMULAJ FIELD SMPL: CPT | Mod: 26 | Performed by: RADIOLOGY

## 2022-05-27 PROCEDURE — 77373 STRTCTC BDY RAD THER TX DLVR: CPT | Performed by: RADIOLOGY

## 2022-05-27 PROCEDURE — 77280 THER RAD SIMULAJ FIELD SMPL: CPT | Performed by: RADIOLOGY

## 2022-05-30 DIAGNOSIS — E11.8 TYPE 2 DIABETES MELLITUS WITH COMPLICATION, WITHOUT LONG-TERM CURRENT USE OF INSULIN (HCC): ICD-10-CM

## 2022-05-31 RX ORDER — EMPAGLIFLOZIN AND METFORMIN HYDROCHLORIDE 12.5; 1 MG/1; MG/1
1 TABLET ORAL 2 TIMES DAILY
Qty: 180 TABLET | Refills: 3 | Status: SHIPPED | OUTPATIENT
Start: 2022-05-31 | End: 2023-05-19 | Stop reason: SDUPTHER

## 2022-05-31 RX ORDER — GLIPIZIDE 5 MG/1
5 TABLET ORAL 2 TIMES DAILY
Qty: 180 TABLET | Refills: 3 | Status: SHIPPED | OUTPATIENT
Start: 2022-05-31 | End: 2023-05-19 | Stop reason: SDUPTHER

## 2022-05-31 NOTE — TELEPHONE ENCOUNTER
Received request via: Pharmacy    Was the patient seen in the last year in this department? Yes    Does the patient have an active prescription (recently filled or refills available) for medication(s) requested? No      Last Office Visit:01/21/2022  Last Labs:05/14/2022

## 2022-06-14 DIAGNOSIS — E11.8 TYPE 2 DIABETES MELLITUS WITH COMPLICATION, WITHOUT LONG-TERM CURRENT USE OF INSULIN (HCC): ICD-10-CM

## 2022-06-15 RX ORDER — INSULIN GLARGINE 100 [IU]/ML
INJECTION, SOLUTION SUBCUTANEOUS
Qty: 30 ML | Refills: 5 | Status: SHIPPED | OUTPATIENT
Start: 2022-06-15 | End: 2023-08-24

## 2022-06-27 ENCOUNTER — HOSPITAL ENCOUNTER (OUTPATIENT)
Dept: RADIATION ONCOLOGY | Facility: MEDICAL CENTER | Age: 62
End: 2022-06-30
Attending: RADIOLOGY
Payer: COMMERCIAL

## 2022-06-27 DIAGNOSIS — D33.3 ACOUSTIC NEUROMA (HCC): ICD-10-CM

## 2022-06-27 NOTE — PROGRESS NOTES
Telephone Appointment Visit   This telephone visit was initiated by the patient and they verbally consented.    Reason for Call:  Symptom Follow-up and Urgent Care/ ED Follow-up    TREATMENT SUMMARY:    Course: C1_IAC_SRT    Treatment Site Ref. ID Energy Dose/Fx (cGy) #Fx Dose Correction (cGy) Total Dose (cGy) Start Date End Date Elapsed Days   SRT R IAC SRT R IAC 6X- 3 / 3 0 1,800 5/23/2022 5/27/2022 4         HPI:    Patient is a pleasant 61-year-old male who presents with ringing in his ears and occasional headaches which have been ongoing since December 2020.  Patient was referred by his primary care doctor to an audiologist who did an audiogram he states severe hearing loss in the right and mild hearing loss on the left.  Patient underwent MRI brain October 11, 2021 which showed a 14 x 7 x 8 mm right internal auditory canal mass consistent with vestibular schwannoma.  Patient denies any facial weakness or numbness.  He cannot use a cell phone in either ear and speakerphone to hear.     3/14/22  Patient having worsening headaches had repeat MRI brain which shows stable vestibular schwannoma.  Patient was referred to [place] but did not want to go in person and there is a televisit video visit.  Patient has stable decreased hearing in right ear.    Interval History:  Patient doing well with no headaches or fatigue post treatment. Still persistent tinnitus in right ear.     Labs / Images Reviewed:   none    Assessment and Plan:     R IAC s/p SRT 5/27/22  Follow-up: 6 month MRI brain SRS    Total Time Spent (mins): 5mins    Umberto Brady M.D.

## 2022-07-17 DIAGNOSIS — I10 ESSENTIAL HYPERTENSION, BENIGN: ICD-10-CM

## 2022-07-18 RX ORDER — LOSARTAN POTASSIUM 50 MG/1
50 TABLET ORAL DAILY
Qty: 90 TABLET | Refills: 1 | Status: SHIPPED | OUTPATIENT
Start: 2022-07-18 | End: 2023-02-26 | Stop reason: SDUPTHER

## 2022-08-04 ENCOUNTER — OFFICE VISIT (OUTPATIENT)
Dept: MEDICAL GROUP | Facility: PHYSICIAN GROUP | Age: 62
End: 2022-08-04
Payer: COMMERCIAL

## 2022-08-04 VITALS
WEIGHT: 274 LBS | DIASTOLIC BLOOD PRESSURE: 76 MMHG | HEIGHT: 74 IN | HEART RATE: 96 BPM | RESPIRATION RATE: 14 BRPM | SYSTOLIC BLOOD PRESSURE: 122 MMHG | TEMPERATURE: 97.5 F | OXYGEN SATURATION: 94 % | BODY MASS INDEX: 35.16 KG/M2

## 2022-08-04 DIAGNOSIS — K43.9 HERNIA OF ANTERIOR ABDOMINAL WALL: ICD-10-CM

## 2022-08-04 DIAGNOSIS — E11.8 TYPE 2 DIABETES MELLITUS WITH COMPLICATION, WITHOUT LONG-TERM CURRENT USE OF INSULIN (HCC): ICD-10-CM

## 2022-08-04 DIAGNOSIS — M77.9 TENDONITIS: ICD-10-CM

## 2022-08-04 DIAGNOSIS — E03.9 ACQUIRED HYPOTHYROIDISM: ICD-10-CM

## 2022-08-04 DIAGNOSIS — Z23 NEED FOR VACCINATION: ICD-10-CM

## 2022-08-04 PROCEDURE — 99214 OFFICE O/P EST MOD 30 MIN: CPT | Mod: 25 | Performed by: FAMILY MEDICINE

## 2022-08-04 ASSESSMENT — FIBROSIS 4 INDEX: FIB4 SCORE: 0.73

## 2022-08-04 NOTE — ASSESSMENT & PLAN NOTE
A1c:   Lab Results   Component Value Date/Time    HBA1C 7.8 (H) 05/14/2022 0925    AVGLUC 177 05/14/2022 0925     Lipids:   Lab Results   Component Value Date/Time    CHOLSTRLTOT 103 05/14/2022 09:25 AM    TRIGLYCERIDE 136 05/14/2022 09:25 AM    HDL 42 05/14/2022 09:25 AM    LDL 34 05/14/2022 09:25 AM   ]  BMP:   Lab Results   Component Value Date/Time    SODIUM 137 05/14/2022 0925    POTASSIUM 4.9 05/14/2022 0925    CHLORIDE 103 05/14/2022 0925    CO2 22 05/14/2022 0925    GLUCOSE 221 (H) 05/14/2022 0925    BUN 24 (H) 05/14/2022 0925    CREATININE 1.03 05/14/2022 0925    CALCIUM 9.1 05/14/2022 0925    ANION 12.0 05/14/2022 0925     GFR:   Lab Results   Component Value Date/Time    IFAFR[place] >60 03/05/2021 0801    IFNOTAFR >60 03/05/2021 0801     Last eye exam: normal at [facility] 8/4/22  Foot exam: on ulcers no numbness  Medications: did better with blood sugars on trulicity.   Wanted to try ozempic for weight loss but now on 1 month medical leave from work due to neck pain so a little more sedentary and not having as much weight loss.

## 2022-08-04 NOTE — ASSESSMENT & PLAN NOTE
Chronic condition with pain of elbows.   Following work restrictions provided.   1. Due to tendonitis, not lifting more than 10 lbs with either hand.     Also has chronic right knee pain to lateral side of knee and superior portion of right knee  2. Due to knee pain, allow to work with knee brace on.   He does get hives on both legs due to knee brace even after washing it but it allows him to work without pain

## 2022-08-04 NOTE — ASSESSMENT & PLAN NOTE
Moderate sized hernia of anterior abdominal wall   Developed after laparoscopic cholecystectomy.   Ref to general surgery provided for repair.   Encouraged weight loss.

## 2022-08-04 NOTE — LETTER
August 4, 2022    To whom it may concern:     Farzad Bryant Jr. Is my patient in clinic. The following is medically recommended.     1. Due to tendonitis, not lifting more than 10 lbs with either hand.   2. Due to knee pain, allow to work with knee brace on.       Please contact me with any questions.     Thank you,          Yandy Madrigal M.D.

## 2022-08-05 PROCEDURE — 90471 IMMUNIZATION ADMIN: CPT | Performed by: FAMILY MEDICINE

## 2022-08-05 PROCEDURE — 90677 PCV20 VACCINE IM: CPT | Performed by: FAMILY MEDICINE

## 2022-08-05 NOTE — PROGRESS NOTES
Subjective:   Farzad Bryant Jr. is a 61 y.o. male here today for evaluation and management of:     Type 2 diabetes mellitus with complication, without long-term current use of insulin ([facility])  A1c:   Lab Results   Component Value Date/Time    HBA1C 7.8 (H) 05/14/2022 0925    AVGLUC 177 05/14/2022 0925     Lipids:   Lab Results   Component Value Date/Time    CHOLSTRLTOT 103 05/14/2022 09:25 AM    TRIGLYCERIDE 136 05/14/2022 09:25 AM    HDL 42 05/14/2022 09:25 AM    LDL 34 05/14/2022 09:25 AM   ]  BMP:   Lab Results   Component Value Date/Time    SODIUM 137 05/14/2022 0925    POTASSIUM 4.9 05/14/2022 0925    CHLORIDE 103 05/14/2022 0925    CO2 22 05/14/2022 0925    GLUCOSE 221 (H) 05/14/2022 0925    BUN 24 (H) 05/14/2022 0925    CREATININE 1.03 05/14/2022 0925    CALCIUM 9.1 05/14/2022 0925    ANION 12.0 05/14/2022 0925     GFR:   Lab Results   Component Value Date/Time    IFAFR[place] >60 03/05/2021 0801    IFNOTAFR >60 03/05/2021 0801     Last eye exam: normal at [facility] 8/4/22  Foot exam: on ulcers no numbness  Medications: did better with blood sugars on trulicity.   Wanted to try ozempic for weight loss but now on 1 month medical leave from work due to neck pain so a little more sedentary and not having as much weight loss.       Tendonitis  Chronic condition with pain of elbows.   Following work restrictions provided.   1. Due to tendonitis, not lifting more than 10 lbs with either hand.     Also has chronic right knee pain to lateral side of knee and superior portion of right knee  2. Due to knee pain, allow to work with knee brace on.   He does get hives on both legs due to knee brace even after washing it but it allows him to work without pain    Hernia of anterior abdominal wall  Moderate sized hernia of anterior abdominal wall   Developed after laparoscopic cholecystectomy.   Ref to general surgery provided for repair.   Encouraged weight loss.            Current medicines (including changes  today)  Current Outpatient Medications   Medication Sig Dispense Refill   • losartan (COZAAR) 50 MG Tab Take 1 Tablet by mouth every day. 90 Tablet 1   • LANTUS SOLOSTAR 100 UNIT/ML Solution Pen-injector injection INJECT 32 UNITS SUBCUTANEOUSLY IN THE EVENING 30 mL 5   • Empagliflozin-metFORMIN HCl (SYNJARDY) 12.5-1000 MG Tab Take 1 Tablet by mouth 2 times a day. 180 Tablet 3   • glipiZIDE (GLUCOTROL) 5 MG Tab Take 1 Tablet by mouth 2 times a day. 180 Tablet 3   • Insulin Pen Needle (RELION PEN NEEDLE 31G/8MM) Use with lantus daily 100 Each 3   • levothyroxine (EUTHYROX) 25 MCG Tab Take 1 Tablet by mouth every morning on an empty stomach. 90 Tablet 1   • traZODone (DESYREL) 100 MG Tab Take 2 Tablets by mouth at bedtime as needed for Sleep. 180 Tablet 3   • cetirizine (EQ ALLERGY RELIEF, CETIRIZINE,) 10 MG Tab Take 1 Tablet by mouth every day. 90 Tablet 3   • Semaglutide, 1 MG/DOSE, 4 MG/3ML Solution Pen-injector Inject 1 mg under the skin every 7 days. 9 mL 3   • tizanidine (ZANAFLEX) 4 MG Tab Take 1 Tablet by mouth 3 times a day. (Patient taking differently: Take 4 mg by mouth as needed.) 60 Tablet 0   • isosorbide mononitrate SR (IMDUR) 30 MG TABLET SR 24 HR Take 1 Tablet by mouth every morning. 90 Tablet 1   • atorvastatin (LIPITOR) 40 MG Tab Take 1 Tablet by mouth every evening. 90 Tablet 1   • escitalopram (LEXAPRO) 20 MG tablet Take 1 Tablet by mouth every day. 90 Tablet 3   • Multiple Vitamins-Minerals (ZINC PO) Take  by mouth.     • aspirin EC (ECOTRIN) 81 MG Tablet Delayed Response Take 81 mg by mouth every day.     • vitamin D (CHOLECALCIFEROL) 1000 Unit (25 mcg) Tab Take 1,000 Units by mouth every day.     • acetaminophen (TYLENOL) 500 MG Tab Take 1,000 mg by mouth 2 times a day as needed for Fever.     • Cinnamon 500 MG Cap Take 1,000 mg by mouth every morning. \     • Omega-3 Fatty Acids (FISH OIL) 1000 MG Cap capsule Take 2,000 mg by mouth every morning.     • Melatonin 1 MG Cap Take 1 mg by mouth  "every bedtime.     • therapeutic multivitamin-minerals (THERAGRAN-M) Tab Take 1 Tab by mouth every morning.     • 5-Hydroxytryptophan (5-HTP) 100 MG Cap Take 200 mg by mouth every bedtime.       No current facility-administered medications for this visit.     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    ROS  No chest pain, no shortness of breath, no abdominal pain       Objective:     /76   Pulse 96   Temp 36.4 °C (97.5 °F) (Temporal)   Resp 14   Ht 1.88 m (6' 2\")   Wt 124 kg (274 lb)   SpO2 94%  Body mass index is 35.18 kg/m².   Physical Exam:  Constitutional: Alert, no distress.  Skin: Warm, dry, good turgor, no rashes in visible areas.  Eye: Equal, round and reactive, conjunctiva clear, lids normal.  ENMT: Lips without lesions, good dentition, oropharynx clear.  Neck: Trachea midline, no masses, no thyromegaly. No cervical or supraclavicular lymphadenopathy  Respiratory: Unlabored respiratory effort, lungs clear to auscultation, no wheezes, no ronchi.  Cardiovascular: Normal S1, S2, no murmur, no edema.  Abdomen: Soft, non-tender, reducible ventral hernia moderate size, non tender, no hepatosplenomegaly.  Psych: Alert and oriented x3, normal affect and mood.        Assessment and Plan:   The following treatment plan was discussed    1. Type 2 diabetes mellitus with complication, without long-term current use of insulin (HCC)    - HEMOGLOBIN A1C; Future    2. Tendonitis      3. Hernia of anterior abdominal wall    - Referral to General Surgery    4. Need for vaccination    - Pneumococcal Conjugate Vaccine 20-Valent (19 yrs+)    5. Acquired hypothyroidism    - TSH WITH REFLEX TO FT4; Future      Followup: Return in about 3 months (around 11/4/2022) for DM, reveiw labs, ventral hernia.         "

## 2022-08-15 DIAGNOSIS — K43.9 HERNIA OF ANTERIOR ABDOMINAL WALL: ICD-10-CM

## 2022-08-19 ENCOUNTER — HOSPITAL ENCOUNTER (OUTPATIENT)
Dept: RADIOLOGY | Facility: MEDICAL CENTER | Age: 62
End: 2022-08-19
Attending: FAMILY MEDICINE
Payer: COMMERCIAL

## 2022-08-19 DIAGNOSIS — K43.9 HERNIA OF ANTERIOR ABDOMINAL WALL: ICD-10-CM

## 2022-08-19 PROCEDURE — 76705 ECHO EXAM OF ABDOMEN: CPT

## 2022-08-21 DIAGNOSIS — I10 ESSENTIAL HYPERTENSION, BENIGN: ICD-10-CM

## 2022-08-21 DIAGNOSIS — E78.5 DYSLIPIDEMIA: ICD-10-CM

## 2022-08-23 RX ORDER — ISOSORBIDE MONONITRATE 30 MG/1
30 TABLET, EXTENDED RELEASE ORAL EVERY MORNING
Qty: 90 TABLET | Refills: 3 | Status: SHIPPED | OUTPATIENT
Start: 2022-08-23 | End: 2023-02-17 | Stop reason: SDUPTHER

## 2022-08-23 RX ORDER — ATORVASTATIN CALCIUM 40 MG/1
40 TABLET, FILM COATED ORAL EVERY EVENING
Qty: 90 TABLET | Refills: 3 | Status: SHIPPED | OUTPATIENT
Start: 2022-08-23 | End: 2023-02-17 | Stop reason: SDUPTHER

## 2022-08-23 NOTE — TELEPHONE ENCOUNTER
Last ov 8/4/22    Received request via: Patient    Was the patient seen in the last year in this department? Yes    Does the patient have an active prescription (recently filled or refills available) for medication(s) requested? No

## 2022-08-31 ENCOUNTER — APPOINTMENT (OUTPATIENT)
Dept: SCHEDULING | Facility: IMAGING CENTER | Age: 62
End: 2022-08-31
Payer: COMMERCIAL

## 2022-09-05 DIAGNOSIS — E03.9 ACQUIRED HYPOTHYROIDISM: ICD-10-CM

## 2022-09-06 RX ORDER — LEVOTHYROXINE SODIUM 0.03 MG/1
25 TABLET ORAL
Qty: 90 TABLET | Refills: 3 | Status: SHIPPED | OUTPATIENT
Start: 2022-09-06 | End: 2022-10-23 | Stop reason: SDUPTHER

## 2022-09-06 NOTE — TELEPHONE ENCOUNTER
Received request via: Patient    Was the patient seen in the last year in this department? Yes    Does the patient have an active prescription (recently filled or refills available) for medication(s) requested? No    Last Office Visit:08/04/2022  Last Labs:05/14/2022

## 2022-09-24 NOTE — LETTER
March 14, 2018         Farzad Bryant  [address] [facility] [address]        Dear Farzad:    My calls to you have gone unanswered please see Cathy ZAMAN note below. PLEASE CALL [phone] TO GET AN APPOINTMENT TO ESTABLISH WITH A NEW DR SINCE DR ARIAS HAS LEFT THE OFFICE.     Below are the results from your recent visit:  Please let patient know that labs are back.  He will need to establish with a new PCP as Dr. Arias has left [facility].  His labs show his Fasting Glucose at 206 which is down from 6 months ago.  His A1c came in at 7.5 which has come down from 12.2.  Great work on this.  Continue with the Metformin and Glipizide as discussed with Dr. Arias.  Please reach out to scheduling office to get an appointment with a new PCP to help support you in your health goals.  BENJA Rodriguez  Resulted Orders   COMP METABOLIC PANEL   Result Value Ref Range    Sodium 136 135 - 145 mmol/L    Potassium 4.3 3.6 - 5.5 mmol/L    Chloride 105 96 - 112 mmol/L    Co2 20 20 - 33 mmol/L    Anion Gap 11.0 0.0 - 11.9    Glucose 206 (H) 65 - 99 mg/dL    Bun 39 (H) 8 - 22 mg/dL    Creatinine 1.12 0.50 - 1.40 mg/dL    Calcium 9.6 8.5 - 10.5 mg/dL    AST(SGOT) 20 12 - 45 U/L    ALT(SGPT) 34 2 - 50 U/L    Alkaline Phosphatase 60 30 - 99 U/L    Total Bilirubin 0.4 0.1 - 1.5 mg/dL    Albumin 4.5 3.2 - 4.9 g/dL    Total Protein 7.6 6.0 - 8.2 g/dL    Globulin 3.1 1.9 - 3.5 g/dL    A-G Ratio 1.5 g/dL    Narrative    Request patient fasting?->Yes   LIPID PROFILE   Result Value Ref Range    Cholesterol,Tot 185 100 - 199 mg/dL    Triglycerides 251 (H) 0 - 149 mg/dL    HDL 44 >=40 mg/dL    LDL 91 <100 mg/dL    Narrative    Request patient fasting?->Yes   HEMOGLOBIN A1C   Result Value Ref Range    Glycohemoglobin 7.5 (H) 0.0 - 5.6 %      Comment:      Increased risk for diabetes:  5.7 -6.4%  Diabetes:  >6.4%  Glycemic control for adults with diabetes:  <7.0%  The above interpretations are per ADA guidelines.  Diagnosis  of  diabetes mellitus on the basis of elevated Hemoglobin A1c  should be confirmed by repeating the Hb A1c test.      Est Avg Glucose 169 mg/dL      Comment:      The eAG calculation is based on the A1c-Derived Daily Glucose  (ADAG) study.  See the ADA's website for additional information.      Narrative    Request patient fasting?->Yes   ESTIMATED GFR   Result Value Ref Range    GFR If African American >60 >60 mL/min/1.73 m 2    GFR If Non African American >60 >60 mL/min/1.73 m 2    Narrative    Request patient fasting?->Yes       Electronically Signed   No

## 2022-09-25 DIAGNOSIS — F33.9 EPISODE OF RECURRENT MAJOR DEPRESSIVE DISORDER, UNSPECIFIED DEPRESSION EPISODE SEVERITY (HCC): ICD-10-CM

## 2022-09-26 RX ORDER — ESCITALOPRAM OXALATE 20 MG/1
20 TABLET ORAL DAILY
Qty: 90 TABLET | Refills: 3 | Status: SHIPPED | OUTPATIENT
Start: 2022-09-26 | End: 2022-12-26 | Stop reason: SDUPTHER

## 2022-10-04 DIAGNOSIS — Z12.11 SCREENING FOR COLON CANCER: ICD-10-CM

## 2022-10-04 NOTE — PROGRESS NOTES
Please advise patient to get cologuard done for colon cancer screening. Previous order  so I placed a new order today.   If he is unable to get cologuard done I recommend getting his colonoscopy done.   Yandy Madrigal M.D.

## 2022-10-23 DIAGNOSIS — E03.9 ACQUIRED HYPOTHYROIDISM: ICD-10-CM

## 2022-10-24 RX ORDER — LEVOTHYROXINE SODIUM 0.03 MG/1
25 TABLET ORAL
Qty: 90 TABLET | Refills: 3 | Status: SHIPPED | OUTPATIENT
Start: 2022-10-24 | End: 2023-09-24 | Stop reason: SDUPTHER

## 2022-10-24 NOTE — TELEPHONE ENCOUNTER
Received request via: Patient    Was the patient seen in the last year in this department? Yes    Does the patient have an active prescription (recently filled or refills available) for medication(s) requested? No    Last OV 8/4/2022  Next OV 11/28/2022

## 2022-11-09 ENCOUNTER — HOSPITAL ENCOUNTER (OUTPATIENT)
Dept: LAB | Facility: MEDICAL CENTER | Age: 62
End: 2022-11-09
Attending: FAMILY MEDICINE
Payer: COMMERCIAL

## 2022-11-09 DIAGNOSIS — E03.9 ACQUIRED HYPOTHYROIDISM: ICD-10-CM

## 2022-11-09 DIAGNOSIS — E11.8 TYPE 2 DIABETES MELLITUS WITH COMPLICATION, WITHOUT LONG-TERM CURRENT USE OF INSULIN (HCC): ICD-10-CM

## 2022-11-09 LAB
EST. AVERAGE GLUCOSE BLD GHB EST-MCNC: 143 MG/DL
HBA1C MFR BLD: 6.6 % (ref 4–5.6)
TSH SERPL DL<=0.005 MIU/L-ACNC: 3.1 UIU/ML (ref 0.38–5.33)

## 2022-11-09 PROCEDURE — 84443 ASSAY THYROID STIM HORMONE: CPT

## 2022-11-09 PROCEDURE — 83036 HEMOGLOBIN GLYCOSYLATED A1C: CPT

## 2022-11-09 PROCEDURE — 36415 COLL VENOUS BLD VENIPUNCTURE: CPT

## 2022-11-28 ENCOUNTER — APPOINTMENT (OUTPATIENT)
Dept: RADIOLOGY | Facility: MEDICAL CENTER | Age: 62
End: 2022-11-28
Attending: RADIOLOGY
Payer: COMMERCIAL

## 2022-12-26 DIAGNOSIS — F33.9 EPISODE OF RECURRENT MAJOR DEPRESSIVE DISORDER, UNSPECIFIED DEPRESSION EPISODE SEVERITY (HCC): ICD-10-CM

## 2022-12-27 NOTE — TELEPHONE ENCOUNTER
Received request via: Pharmacy    Was the patient seen in the last year in this department? Yes    Does the patient have an active prescription (recently filled or refills available) for medication(s) requested? No    Does the patient have residential Plus and need 100 day supply (blood pressure, diabetes and cholesterol meds only)? Patient does not have SCP      Last Office Visit:08/04/2022  Last Labs:11/09/2022

## 2022-12-28 RX ORDER — ESCITALOPRAM OXALATE 20 MG/1
20 TABLET ORAL DAILY
Qty: 90 TABLET | Refills: 3 | Status: SHIPPED | OUTPATIENT
Start: 2022-12-28

## 2023-02-10 DIAGNOSIS — Z12.5 SCREENING FOR MALIGNANT NEOPLASM OF PROSTATE: ICD-10-CM

## 2023-02-10 DIAGNOSIS — E11.8 TYPE 2 DIABETES MELLITUS WITH COMPLICATION, WITHOUT LONG-TERM CURRENT USE OF INSULIN (HCC): ICD-10-CM

## 2023-02-10 DIAGNOSIS — E03.9 ACQUIRED HYPOTHYROIDISM: ICD-10-CM

## 2023-02-17 DIAGNOSIS — E78.5 DYSLIPIDEMIA: ICD-10-CM

## 2023-02-17 DIAGNOSIS — J30.9 ALLERGIC RHINITIS, UNSPECIFIED SEASONALITY, UNSPECIFIED TRIGGER: ICD-10-CM

## 2023-02-17 DIAGNOSIS — I10 ESSENTIAL HYPERTENSION, BENIGN: ICD-10-CM

## 2023-02-17 RX ORDER — CETIRIZINE HYDROCHLORIDE 10 MG/1
10 TABLET ORAL DAILY
Qty: 90 TABLET | Refills: 3 | Status: SHIPPED | OUTPATIENT
Start: 2023-02-17 | End: 2024-02-07 | Stop reason: SDUPTHER

## 2023-02-17 RX ORDER — ATORVASTATIN CALCIUM 40 MG/1
40 TABLET, FILM COATED ORAL EVERY EVENING
Qty: 90 TABLET | Refills: 3 | Status: SHIPPED | OUTPATIENT
Start: 2023-02-17 | End: 2024-02-07 | Stop reason: SDUPTHER

## 2023-02-17 RX ORDER — ISOSORBIDE MONONITRATE 30 MG/1
30 TABLET, EXTENDED RELEASE ORAL EVERY MORNING
Qty: 90 TABLET | Refills: 3 | Status: SHIPPED | OUTPATIENT
Start: 2023-02-17 | End: 2024-02-07 | Stop reason: SDUPTHER

## 2023-02-17 NOTE — TELEPHONE ENCOUNTER
Last ov 8/4/22    Received request via: Patient    Was the patient seen in the last year in this department? Yes    Does the patient have an active prescription (recently filled or refills available) for medication(s) requested? No    Does the patient have care home Plus and need 100 day supply (blood pressure, diabetes and cholesterol meds only)? Patient does not have SCP

## 2023-02-26 DIAGNOSIS — I10 ESSENTIAL HYPERTENSION, BENIGN: ICD-10-CM

## 2023-02-28 RX ORDER — LOSARTAN POTASSIUM 50 MG/1
50 TABLET ORAL DAILY
Qty: 90 TABLET | Refills: 3 | Status: SHIPPED | OUTPATIENT
Start: 2023-02-28 | End: 2024-02-07 | Stop reason: SDUPTHER

## 2023-03-15 ENCOUNTER — HOSPITAL ENCOUNTER (OUTPATIENT)
Dept: LAB | Facility: MEDICAL CENTER | Age: 63
End: 2023-03-15
Attending: FAMILY MEDICINE
Payer: COMMERCIAL

## 2023-03-15 DIAGNOSIS — E11.8 TYPE 2 DIABETES MELLITUS WITH COMPLICATION, WITHOUT LONG-TERM CURRENT USE OF INSULIN (HCC): ICD-10-CM

## 2023-03-15 DIAGNOSIS — E03.9 ACQUIRED HYPOTHYROIDISM: ICD-10-CM

## 2023-03-15 DIAGNOSIS — Z12.5 SCREENING FOR MALIGNANT NEOPLASM OF PROSTATE: ICD-10-CM

## 2023-03-15 LAB
ALBUMIN SERPL BCP-MCNC: 4.3 G/DL (ref 3.2–4.9)
ALBUMIN/GLOB SERPL: 1.7 G/DL
ALP SERPL-CCNC: 78 U/L (ref 30–99)
ALT SERPL-CCNC: 27 U/L (ref 2–50)
ANION GAP SERPL CALC-SCNC: 11 MMOL/L (ref 7–16)
AST SERPL-CCNC: 17 U/L (ref 12–45)
BILIRUB SERPL-MCNC: 0.3 MG/DL (ref 0.1–1.5)
BUN SERPL-MCNC: 25 MG/DL (ref 8–22)
CALCIUM ALBUM COR SERPL-MCNC: 9.1 MG/DL (ref 8.5–10.5)
CALCIUM SERPL-MCNC: 9.3 MG/DL (ref 8.5–10.5)
CHLORIDE SERPL-SCNC: 109 MMOL/L (ref 96–112)
CHOLEST SERPL-MCNC: 117 MG/DL (ref 100–199)
CO2 SERPL-SCNC: 21 MMOL/L (ref 20–33)
CREAT SERPL-MCNC: 1.1 MG/DL (ref 0.5–1.4)
CREAT UR-MCNC: 66.63 MG/DL
EST. AVERAGE GLUCOSE BLD GHB EST-MCNC: 140 MG/DL
FASTING STATUS PATIENT QL REPORTED: NORMAL
GFR SERPLBLD CREATININE-BSD FMLA CKD-EPI: 76 ML/MIN/1.73 M 2
GLOBULIN SER CALC-MCNC: 2.6 G/DL (ref 1.9–3.5)
GLUCOSE SERPL-MCNC: 201 MG/DL (ref 65–99)
HBA1C MFR BLD: 6.5 % (ref 4–5.6)
HDLC SERPL-MCNC: 49 MG/DL
LDLC SERPL CALC-MCNC: 48 MG/DL
MICROALBUMIN UR-MCNC: <1.2 MG/DL
MICROALBUMIN/CREAT UR: NORMAL MG/G (ref 0–30)
POTASSIUM SERPL-SCNC: 4.6 MMOL/L (ref 3.6–5.5)
PROT SERPL-MCNC: 6.9 G/DL (ref 6–8.2)
PSA SERPL-MCNC: 0.18 NG/ML (ref 0–4)
SODIUM SERPL-SCNC: 141 MMOL/L (ref 135–145)
TRIGL SERPL-MCNC: 99 MG/DL (ref 0–149)
TSH SERPL DL<=0.005 MIU/L-ACNC: 2.88 UIU/ML (ref 0.38–5.33)

## 2023-03-15 PROCEDURE — 84443 ASSAY THYROID STIM HORMONE: CPT

## 2023-03-15 PROCEDURE — 83036 HEMOGLOBIN GLYCOSYLATED A1C: CPT

## 2023-03-15 PROCEDURE — 84153 ASSAY OF PSA TOTAL: CPT

## 2023-03-15 PROCEDURE — 36415 COLL VENOUS BLD VENIPUNCTURE: CPT

## 2023-03-15 PROCEDURE — 80053 COMPREHEN METABOLIC PANEL: CPT

## 2023-03-15 PROCEDURE — 80061 LIPID PANEL: CPT

## 2023-03-15 PROCEDURE — 82043 UR ALBUMIN QUANTITATIVE: CPT

## 2023-03-15 PROCEDURE — 82570 ASSAY OF URINE CREATININE: CPT

## 2023-03-16 DIAGNOSIS — F51.01 PRIMARY INSOMNIA: ICD-10-CM

## 2023-03-16 RX ORDER — TRAZODONE HYDROCHLORIDE 100 MG/1
200 TABLET ORAL
Qty: 180 TABLET | Refills: 3 | Status: SHIPPED | OUTPATIENT
Start: 2023-03-16 | End: 2023-04-23 | Stop reason: SDUPTHER

## 2023-03-16 NOTE — TELEPHONE ENCOUNTER
Received request via: Pharmacy    Was the patient seen in the last year in this department? Yes    Does the patient have an active prescription (recently filled or refills available) for medication(s) requested? No    Does the patient have correction Plus and need 100 day supply (blood pressure, diabetes and cholesterol meds only)? Patient does not have SCP      Last office Visit:08/04/2022  Last Labs:03/16/2023

## 2023-03-29 ENCOUNTER — OFFICE VISIT (OUTPATIENT)
Dept: MEDICAL GROUP | Facility: PHYSICIAN GROUP | Age: 63
End: 2023-03-29
Payer: COMMERCIAL

## 2023-03-29 VITALS
BODY MASS INDEX: 34.52 KG/M2 | DIASTOLIC BLOOD PRESSURE: 80 MMHG | HEART RATE: 89 BPM | RESPIRATION RATE: 16 BRPM | WEIGHT: 269 LBS | HEIGHT: 74 IN | TEMPERATURE: 97.2 F | OXYGEN SATURATION: 93 % | SYSTOLIC BLOOD PRESSURE: 138 MMHG

## 2023-03-29 DIAGNOSIS — D33.3 VESTIBULAR SCHWANNOMA (HCC): ICD-10-CM

## 2023-03-29 DIAGNOSIS — E11.8 TYPE 2 DIABETES MELLITUS WITH COMPLICATION, WITHOUT LONG-TERM CURRENT USE OF INSULIN (HCC): ICD-10-CM

## 2023-03-29 DIAGNOSIS — Z23 NEED FOR VACCINATION: ICD-10-CM

## 2023-03-29 DIAGNOSIS — F33.1 MODERATE EPISODE OF RECURRENT MAJOR DEPRESSIVE DISORDER (HCC): ICD-10-CM

## 2023-03-29 DIAGNOSIS — R07.89 ATYPICAL CHEST PAIN: ICD-10-CM

## 2023-03-29 PROCEDURE — 99214 OFFICE O/P EST MOD 30 MIN: CPT | Mod: 25 | Performed by: FAMILY MEDICINE

## 2023-03-29 PROCEDURE — 90471 IMMUNIZATION ADMIN: CPT | Performed by: FAMILY MEDICINE

## 2023-03-29 PROCEDURE — 90686 IIV4 VACC NO PRSV 0.5 ML IM: CPT | Performed by: FAMILY MEDICINE

## 2023-03-29 RX ORDER — ESCITALOPRAM OXALATE 10 MG/1
10 TABLET ORAL DAILY
Qty: 90 TABLET | Refills: 3 | Status: SHIPPED | OUTPATIENT
Start: 2023-03-29 | End: 2024-02-07 | Stop reason: SDUPTHER

## 2023-03-29 RX ORDER — PROCHLORPERAZINE 25 MG/1
SUPPOSITORY RECTAL
Qty: 1 EACH | Refills: 3 | Status: SHIPPED | OUTPATIENT
Start: 2023-03-29 | End: 2023-06-29 | Stop reason: SDUPTHER

## 2023-03-29 RX ORDER — EMPAGLIFLOZIN AND METFORMIN HYDROCHLORIDE 12.5; 1 MG/1; MG/1
TABLET ORAL
COMMUNITY
End: 2023-03-29

## 2023-03-29 RX ORDER — PROCHLORPERAZINE 25 MG/1
SUPPOSITORY RECTAL
Qty: 3 EACH | Refills: 11 | Status: SHIPPED | OUTPATIENT
Start: 2023-03-29 | End: 2023-04-23 | Stop reason: SDUPTHER

## 2023-03-29 RX ORDER — GLIPIZIDE 10 MG/1
TABLET ORAL
COMMUNITY
End: 2023-03-29

## 2023-03-29 RX ORDER — PROCHLORPERAZINE 25 MG/1
SUPPOSITORY RECTAL
Qty: 1 EACH | Refills: 0 | Status: SHIPPED | OUTPATIENT
Start: 2023-03-29 | End: 2024-02-13

## 2023-03-29 RX ORDER — ATORVASTATIN CALCIUM 10 MG/1
TABLET, FILM COATED ORAL
COMMUNITY
End: 2023-03-29

## 2023-03-29 RX ORDER — TRAZODONE HYDROCHLORIDE 100 MG/1
TABLET ORAL
COMMUNITY
End: 2023-03-29

## 2023-03-29 ASSESSMENT — ANXIETY QUESTIONNAIRES
6. BECOMING EASILY ANNOYED OR IRRITABLE: SEVERAL DAYS
3. WORRYING TOO MUCH ABOUT DIFFERENT THINGS: SEVERAL DAYS
2. NOT BEING ABLE TO STOP OR CONTROL WORRYING: SEVERAL DAYS
1. FEELING NERVOUS, ANXIOUS, OR ON EDGE: SEVERAL DAYS
5. BEING SO RESTLESS THAT IT IS HARD TO SIT STILL: SEVERAL DAYS
GAD7 TOTAL SCORE: 7
3. WORRYING TOO MUCH ABOUT DIFFERENT THINGS: SEVERAL DAYS
4. TROUBLE RELAXING: SEVERAL DAYS
1. FEELING NERVOUS, ANXIOUS, OR ON EDGE: SEVERAL DAYS
6. BECOMING EASILY ANNOYED OR IRRITABLE: SEVERAL DAYS
2. NOT BEING ABLE TO STOP OR CONTROL WORRYING: SEVERAL DAYS
7. FEELING AFRAID AS IF SOMETHING AWFUL MIGHT HAPPEN: SEVERAL DAYS
7. FEELING AFRAID AS IF SOMETHING AWFUL MIGHT HAPPEN: SEVERAL DAYS
5. BEING SO RESTLESS THAT IT IS HARD TO SIT STILL: SEVERAL DAYS
GAD7 TOTAL SCORE: 7
4. TROUBLE RELAXING: SEVERAL DAYS

## 2023-03-29 ASSESSMENT — PATIENT HEALTH QUESTIONNAIRE - PHQ9
SUM OF ALL RESPONSES TO PHQ QUESTIONS 1-9: 14
SUM OF ALL RESPONSES TO PHQ QUESTIONS 1-9: 14
SUM OF ALL RESPONSES TO PHQ9 QUESTIONS 1 AND 2: 2
3. TROUBLE FALLING OR STAYING ASLEEP OR SLEEPING TOO MUCH: NEARLY EVERY DAY
8. MOVING OR SPEAKING SO SLOWLY THAT OTHER PEOPLE COULD HAVE NOTICED. OR THE OPPOSITE, BEING SO FIGETY OR RESTLESS THAT YOU HAVE BEEN MOVING AROUND A LOT MORE THAN USUAL: SEVERAL DAYS
9. THOUGHTS THAT YOU WOULD BE BETTER OFF DEAD, OR OF HURTING YOURSELF: SEVERAL DAYS
5. POOR APPETITE OR OVEREATING: MORE THAN HALF THE DAYS
7. TROUBLE CONCENTRATING ON THINGS, SUCH AS READING THE NEWSPAPER OR WATCHING TELEVISION: SEVERAL DAYS
4. FEELING TIRED OR HAVING LITTLE ENERGY: MORE THAN HALF THE DAYS
CLINICAL INTERPRETATION OF PHQ2 SCORE: 2
5. POOR APPETITE OR OVEREATING: 2 - MORE THAN HALF THE DAYS
6. FEELING BAD ABOUT YOURSELF - OR THAT YOU ARE A FAILURE OR HAVE LET YOURSELF OR YOUR FAMILY DOWN: MORE THAN HALF THE DAYS
2. FEELING DOWN, DEPRESSED, IRRITABLE, OR HOPELESS: SEVERAL DAYS
1. LITTLE INTEREST OR PLEASURE IN DOING THINGS: SEVERAL DAYS

## 2023-03-29 ASSESSMENT — FIBROSIS 4 INDEX: FIB4 SCORE: 0.74

## 2023-03-29 NOTE — ASSESSMENT & PLAN NOTE
Less frequent and less intense chest pain after neck adjustment and less pain also radiating down his shoulder and arm.     He has atherosclerosis  Was seen in ER with treadmill stress test, angiogram/heart cath showed 40% block same as last time and had 4 days of testing done and was told it is not his heart.   He continues on asa 81 mg daily and atorvastatin 40 mg daily.

## 2023-03-29 NOTE — ASSESSMENT & PLAN NOTE
He is on lantus, ozempic, has dizziness at work, needs to be able to check his blood sugars when he has these symptoms to make sure not hypoglycemic or hyperglycemic.   rx for continuous glucose monitoring provided.   A1c:   Lab Results   Component Value Date/Time    HBA1C 6.5 (H) 03/15/2023 1059    AVGLUC 140 03/15/2023 1059     Lipids:   Lab Results   Component Value Date/Time    CHOLSTRLTOT 117 03/15/2023 10:59 AM    TRIGLYCERIDE 99 03/15/2023 10:59 AM    HDL 49 03/15/2023 10:59 AM    LDL 48 03/15/2023 10:59 AM   ]  BMP:   Lab Results   Component Value Date/Time    SODIUM 141 03/15/2023 1059    POTASSIUM 4.6 03/15/2023 1059    CHLORIDE 109 03/15/2023 1059    CO2 21 03/15/2023 1059    GLUCOSE 201 (H) 03/15/2023 1059    BUN 25 (H) 03/15/2023 1059    CREATININE 1.10 03/15/2023 1059    CALCIUM 9.3 03/15/2023 1059    ANION 11.0 03/15/2023 1059     GFR:   Lab Results   Component Value Date/Time    IFAFRICA >60 03/05/2021 0801    IFNOTAFR >60 03/05/2021 0801     Last eye exam: done in the last year in August  Foot exam: no ulcers or wounds on feet

## 2023-03-29 NOTE — PATIENT INSTRUCTIONS
Please get fasting labs, fasting for 8-10 hours before next visit. You can make an appointment for the lab or walk in.   Lab hours [facility] location: Monday to Friday 6 am - 4 pm, Saturday 7 am -noon  Even if you lose your lab paperwork, you can still come in to get your lab done.

## 2023-03-29 NOTE — ASSESSMENT & PLAN NOTE
Stress at work   He is on lexapro 20 mg, will add 10 mg for 30 mg total  He has occ passive SI no suicidal plan and thinking of grandkids always gets him out of SI  He declines ref to psychology currently.   PHQ 9 14

## 2023-03-29 NOTE — ASSESSMENT & PLAN NOTE
Has follow up MRI ordered.   He has gait abnormal, cannot walk straight, no acute changes with this.   He is followed by Dr. Mohamud once every 6 months.

## 2023-03-29 NOTE — PROGRESS NOTES
Subjective:   Farzad Bryant Jr. is a 62 y.o. male here today for evaluation and management of:     Type 2 diabetes mellitus with complication, without long-term current use of insulin (HCC)  He is on lantus, ozempic, has dizziness at work, needs to be able to check his blood sugars when he has these symptoms to make sure not hypoglycemic or hyperglycemic.   rx for continuous glucose monitoring provided.   A1c:   Lab Results   Component Value Date/Time    HBA1C 6.5 (H) 03/15/2023 1059    AVGLUC 140 03/15/2023 1059     Lipids:   Lab Results   Component Value Date/Time    CHOLSTRLTOT 117 03/15/2023 10:59 AM    TRIGLYCERIDE 99 03/15/2023 10:59 AM    HDL 49 03/15/2023 10:59 AM    LDL 48 03/15/2023 10:59 AM   ]  BMP:   Lab Results   Component Value Date/Time    SODIUM 141 03/15/2023 1059    POTASSIUM 4.6 03/15/2023 1059    CHLORIDE 109 03/15/2023 1059    CO2 21 03/15/2023 1059    GLUCOSE 201 (H) 03/15/2023 1059    BUN 25 (H) 03/15/2023 1059    CREATININE 1.10 03/15/2023 1059    CALCIUM 9.3 03/15/2023 1059    ANION 11.0 03/15/2023 1059     GFR:   Lab Results   Component Value Date/Time    IFAFRICA >60 03/05/2021 0801    IFNOTAFR >60 03/05/2021 0801     Last eye exam: done in the last year in August  Foot exam: no ulcers or wounds on feet        Atypical chest pain  Less frequent and less intense chest pain after neck adjustment and less pain also radiating down his shoulder and arm.     He has atherosclerosis  Was seen in ER with treadmill stress test, angiogram/heart cath showed 40% block same as last time and had 4 days of testing done and was told it is not his heart.   He continues on asa 81 mg daily and atorvastatin 40 mg daily.     Episode of recurrent major depressive disorder (HCC)  Stress at work   He is on lexapro 20 mg, will add 10 mg for 30 mg total  He has occ passive SI no suicidal plan and thinking of grandkids always gets him out of SI  He declines ref to psychology currently.   PHQ 9 14               Current medicines (including changes today)  Current Outpatient Medications   Medication Sig Dispense Refill    Continuous Blood Gluc  (DEXCOM G6 ) Device Use multiple times a day to check blood sugars: fasting, before and after meals and for symptoms of low and high blood sugars. 1 Each 0    Continuous Blood Gluc Sensor (DEXCOM G6 SENSOR) Misc Use multiple times a day to check blood sugars: fasting, before and after meals and for symptoms of low and high blood sugars. 3 Each 11    Continuous Blood Gluc Transmit (DEXCOM G6 TRANSMITTER) Misc Use multiple times a day to check blood sugars: fasting, before and after meals and for symptoms of low and high blood sugars. 1 Each 3    escitalopram (LEXAPRO) 10 MG Tab Take 1 Tablet by mouth every day. Take with 20 mg daily for 30 mg daily 90 Tablet 3    traZODone (DESYREL) 100 MG Tab TAKE 2 TABLETS BY MOUTH AT BEDTIME AS NEEDED FOR SLEEP 180 Tablet 3    losartan (COZAAR) 50 MG Tab Take 1 Tablet by mouth every day. 90 Tablet 3    cetirizine (EQ ALLERGY RELIEF, CETIRIZINE,) 10 MG Tab Take 1 Tablet by mouth every day. 90 Tablet 3    isosorbide mononitrate SR (IMDUR) 30 MG TABLET SR 24 HR Take 1 Tablet by mouth every morning. 90 Tablet 3    atorvastatin (LIPITOR) 40 MG Tab Take 1 Tablet by mouth every evening. 90 Tablet 3    escitalopram (LEXAPRO) 20 MG tablet Take 1 Tablet by mouth every day. 90 Tablet 3    Semaglutide, 1 MG/DOSE, 4 MG/3ML Solution Pen-injector Inject 1 mg under the skin every 7 days. 9 mL 3    levothyroxine (EUTHYROX) 25 MCG Tab Take 1 Tablet by mouth every morning on an empty stomach. 90 Tablet 3    LANTUS SOLOSTAR 100 UNIT/ML Solution Pen-injector injection INJECT 32 UNITS SUBCUTANEOUSLY IN THE EVENING 30 mL 5    Empagliflozin-metFORMIN HCl (SYNJARDY) 12.5-1000 MG Tab Take 1 Tablet by mouth 2 times a day. 180 Tablet 3    glipiZIDE (GLUCOTROL) 5 MG Tab Take 1 Tablet by mouth 2 times a day. 180 Tablet 3    Insulin Pen Needle (RELION  "PEN NEEDLE 31G/8MM) Use with lantus daily 100 Each 3    Multiple Vitamins-Minerals (ZINC PO) Take  by mouth.      aspirin EC (ECOTRIN) 81 MG Tablet Delayed Response Take 81 mg by mouth every day.      vitamin D (CHOLECALCIFEROL) 1000 Unit (25 mcg) Tab Take 1,000 Units by mouth every day.      acetaminophen (TYLENOL) 500 MG Tab Take 1,000 mg by mouth 2 times a day as needed for Fever.      Cinnamon 500 MG Cap Take 1,000 mg by mouth every morning. \      5-Hydroxytryptophan (5-HTP) 100 MG Cap Take 200 mg by mouth every bedtime.      Omega-3 Fatty Acids (FISH OIL) 1000 MG Cap capsule Take 2,000 mg by mouth every morning.      Melatonin 1 MG Cap Take 1 mg by mouth every bedtime.      therapeutic multivitamin-minerals (THERAGRAN-M) Tab Take 1 Tab by mouth every morning.       No current facility-administered medications for this visit.     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    ROS  No chest pain, no shortness of breath, no abdominal pain       Objective:     /80 (BP Location: Left arm, Patient Position: Sitting, BP Cuff Size: Adult long)   Pulse 89   Temp 36.2 °C (97.2 °F) (Temporal)   Resp 16   Ht 1.88 m (6' 2\")   Wt 122 kg (269 lb)   SpO2 93%  Body mass index is 34.54 kg/m².   Physical Exam:  Constitutional: Alert, no distress.  Skin: Warm, dry, good turgor, no rashes in visible areas.  Eye: Equal, round and reactive, conjunctiva clear, lids normal.  ENMT: Lips without lesions, good dentition, oropharynx clear.  Neck: Trachea midline, no masses, no thyromegaly. No cervical or supraclavicular lymphadenopathy  Respiratory: Unlabored respiratory effort, lungs clear to auscultation, no wheezes, no ronchi.  Cardiovascular: Normal S1, S2, no murmur, no edema.  Abdomen: Soft, non-tender, no masses, no hepatosplenomegaly.  Psych: Alert and oriented x3, " normal affect and mood.        Assessment and Plan:   The following treatment plan was discussed    1. Need for vaccination  - INFLUENZA VACCINE QUAD INJ (PF)    2. Type 2 diabetes mellitus with complication, without long-term current use of insulin (HCC)  - Diabetic Monofilament LE Exam  - Continuous Blood Gluc  (DEXCOM G6 ) Device; Use multiple times a day to check blood sugars: fasting, before and after meals and for symptoms of low and high blood sugars.  Dispense: 1 Each; Refill: 0  - Continuous Blood Gluc Sensor (DEXCOM G6 SENSOR) Misc; Use multiple times a day to check blood sugars: fasting, before and after meals and for symptoms of low and high blood sugars.  Dispense: 3 Each; Refill: 11  - Continuous Blood Gluc Transmit (DEXCOM G6 TRANSMITTER) Misc; Use multiple times a day to check blood sugars: fasting, before and after meals and for symptoms of low and high blood sugars.  Dispense: 1 Each; Refill: 3    3. Atypical chest pain    4. Moderate episode of recurrent major depressive disorder (HCC)    Other orders  - escitalopram (LEXAPRO) 10 MG Tab; Take 1 Tablet by mouth every day. Take with 20 mg daily for 30 mg daily  Dispense: 90 Tablet; Refill: 3      Followup: No follow-ups on file.

## 2023-04-25 NOTE — TELEPHONE ENCOUNTER
Already done     
Was the patient seen in the last year in this department? Yes    Does patient have an active prescription for medications requested? No     Received Request Via: Pharmacy      Pt met protocol?: Yes   Pt last ov 10/2019   Lab Results   Component Value Date/Time    HBA1C 6.9 (H) 02/15/2020 11:18 AM      Lab Results   Component Value Date/Time    SODIUM 137 07/26/2019 09:20 AM    POTASSIUM 4.6 07/26/2019 09:20 AM    CHLORIDE 105 07/26/2019 09:20 AM    CO2 21 07/26/2019 09:20 AM    GLUCOSE 218 (H) 07/26/2019 09:20 AM    BUN 28 (H) 07/26/2019 09:20 AM    CREATININE 1.22 07/26/2019 09:20 AM        
52.2

## 2023-05-10 ENCOUNTER — HOSPITAL ENCOUNTER (OUTPATIENT)
Dept: RADIOLOGY | Facility: MEDICAL CENTER | Age: 63
End: 2023-05-10
Attending: RADIOLOGY
Payer: COMMERCIAL

## 2023-05-10 DIAGNOSIS — D33.3 ACOUSTIC NEUROMA (HCC): ICD-10-CM

## 2023-05-10 PROCEDURE — 70553 MRI BRAIN STEM W/O & W/DYE: CPT

## 2023-05-10 PROCEDURE — 700117 HCHG RX CONTRAST REV CODE 255: Performed by: RADIOLOGY

## 2023-05-10 PROCEDURE — A9579 GAD-BASE MR CONTRAST NOS,1ML: HCPCS | Performed by: RADIOLOGY

## 2023-05-10 RX ADMIN — GADOTERIDOL 20 ML: 279.3 INJECTION, SOLUTION INTRAVENOUS at 13:59

## 2023-05-19 DIAGNOSIS — E11.8 TYPE 2 DIABETES MELLITUS WITH COMPLICATION, WITHOUT LONG-TERM CURRENT USE OF INSULIN (HCC): ICD-10-CM

## 2023-05-19 RX ORDER — EMPAGLIFLOZIN AND METFORMIN HYDROCHLORIDE 12.5; 1 MG/1; MG/1
1 TABLET ORAL 2 TIMES DAILY
Qty: 180 TABLET | Refills: 0 | Status: SHIPPED | OUTPATIENT
Start: 2023-05-19 | End: 2023-07-13

## 2023-05-19 RX ORDER — GLIPIZIDE 5 MG/1
5 TABLET ORAL 2 TIMES DAILY
Qty: 180 TABLET | Refills: 0 | Status: SHIPPED | OUTPATIENT
Start: 2023-05-19 | End: 2023-08-09

## 2023-05-19 NOTE — TELEPHONE ENCOUNTER
Received request via: Pharmacy    Was the patient seen in the last year in this department? Yes    Does the patient have an active prescription (recently filled or refills available) for medication(s) requested? No    Does the patient have [facility] and need 100 day supply (blood pressure, diabetes and cholesterol meds only)? Patient does not have SCP      Last Office Visit:03/29/2023  Last Labs:03/15/2023

## 2023-06-13 PROBLEM — K58.9 IRRITABLE BOWEL SYNDROME: Status: ACTIVE | Noted: 2023-06-13

## 2023-06-13 PROBLEM — K43.2 INCISIONAL HERNIA: Status: ACTIVE | Noted: 2023-06-13

## 2023-06-29 DIAGNOSIS — E11.8 TYPE 2 DIABETES MELLITUS WITH COMPLICATION, WITHOUT LONG-TERM CURRENT USE OF INSULIN (HCC): ICD-10-CM

## 2023-07-06 NOTE — TELEPHONE ENCOUNTER
Received request via: Patient    Was the patient seen in the last year in this department? Yes    Does the patient have an active prescription (recently filled or refills available) for medication(s) requested? No    Does the patient have [facility] and need 100 day supply (blood pressure, diabetes and cholesterol meds only)? Patient does not have SCP      Last office Visit:03/29/2023  Last Labs:03/15/2023

## 2023-07-07 RX ORDER — PROCHLORPERAZINE 25 MG/1
SUPPOSITORY RECTAL
Qty: 1 EACH | Refills: 3 | Status: SHIPPED | OUTPATIENT
Start: 2023-07-07 | End: 2024-02-13

## 2023-07-11 ENCOUNTER — HOSPITAL ENCOUNTER (OUTPATIENT)
Dept: LAB | Facility: MEDICAL CENTER | Age: 63
End: 2023-07-11
Attending: FAMILY MEDICINE
Payer: COMMERCIAL

## 2023-07-11 DIAGNOSIS — E11.8 TYPE 2 DIABETES MELLITUS WITH COMPLICATION, WITHOUT LONG-TERM CURRENT USE OF INSULIN (HCC): ICD-10-CM

## 2023-07-11 LAB
EST. AVERAGE GLUCOSE BLD GHB EST-MCNC: 180 MG/DL
HBA1C MFR BLD: 7.9 % (ref 4–5.6)

## 2023-07-11 PROCEDURE — 36415 COLL VENOUS BLD VENIPUNCTURE: CPT

## 2023-07-11 PROCEDURE — 83036 HEMOGLOBIN GLYCOSYLATED A1C: CPT

## 2023-07-13 DIAGNOSIS — E11.8 TYPE 2 DIABETES MELLITUS WITH COMPLICATION, WITHOUT LONG-TERM CURRENT USE OF INSULIN (HCC): ICD-10-CM

## 2023-08-08 DIAGNOSIS — E11.8 TYPE 2 DIABETES MELLITUS WITH COMPLICATION, WITHOUT LONG-TERM CURRENT USE OF INSULIN (HCC): ICD-10-CM

## 2023-08-08 NOTE — TELEPHONE ENCOUNTER
Last ov 3/29/23    Received request via: Pharmacy    Was the patient seen in the last year in this department? Yes    Does the patient have an active prescription (recently filled or refills available) for medication(s) requested? No    Does the patient have long-term Plus and need 100 day supply (blood pressure, diabetes and cholesterol meds only)? Patient does not have SCP

## 2023-08-09 RX ORDER — GLIPIZIDE 5 MG/1
5 TABLET ORAL 2 TIMES DAILY
Qty: 180 TABLET | Refills: 3 | Status: SHIPPED | OUTPATIENT
Start: 2023-08-09 | End: 2024-02-13

## 2023-08-18 DIAGNOSIS — E11.8 TYPE 2 DIABETES MELLITUS WITH COMPLICATION, WITHOUT LONG-TERM CURRENT USE OF INSULIN (HCC): ICD-10-CM

## 2023-08-24 RX ORDER — PEN NEEDLE, DIABETIC 29 G X1/2"
NEEDLE, DISPOSABLE MISCELLANEOUS
Qty: 100 EACH | Refills: 5 | Status: SHIPPED | OUTPATIENT
Start: 2023-08-24

## 2023-08-24 RX ORDER — INSULIN GLARGINE 100 [IU]/ML
INJECTION, SOLUTION SUBCUTANEOUS
Qty: 30 ML | Refills: 5 | Status: SHIPPED | OUTPATIENT
Start: 2023-08-24

## 2023-08-24 NOTE — TELEPHONE ENCOUNTER
Requested Prescriptions     Pending Prescriptions Disp Refills    LANTUS SOLOSTAR 100 UNIT/ML Solution Pen-injector injection [Pharmacy Med Name: Lantus SoloStar 100 UNIT/ML Subcutaneous Solution Pen-injector] 30 mL 0     Sig: INJECT 32 UNITS SUBCUTANEOUSLY IN THE EVENING      Last office visit: 3/29/23  Last lab: 7/11/23

## 2023-08-24 NOTE — TELEPHONE ENCOUNTER
Requested Prescriptions     Pending Prescriptions Disp Refills    Insulin Pen Needle (RELION PEN NEEDLE 31G/8MM) [Pharmacy Med Name: RELION PEN NEEDLE 31G/8MM  MIS] 100 Each 0     Sig: USE DAILY WITH LANTUS.

## 2023-08-29 ENCOUNTER — OCCUPATIONAL MEDICINE (OUTPATIENT)
Dept: URGENT CARE | Facility: CLINIC | Age: 63
End: 2023-08-29
Payer: COMMERCIAL

## 2023-08-29 VITALS
HEIGHT: 74 IN | SYSTOLIC BLOOD PRESSURE: 150 MMHG | BODY MASS INDEX: 35.08 KG/M2 | OXYGEN SATURATION: 95 % | TEMPERATURE: 98.5 F | DIASTOLIC BLOOD PRESSURE: 88 MMHG | WEIGHT: 273.37 LBS | RESPIRATION RATE: 16 BRPM | HEART RATE: 92 BPM

## 2023-08-29 DIAGNOSIS — M77.12 LATERAL EPICONDYLITIS OF BOTH ELBOWS: ICD-10-CM

## 2023-08-29 DIAGNOSIS — M77.8 BILATERAL ELBOW TENDONITIS: ICD-10-CM

## 2023-08-29 DIAGNOSIS — M77.11 LATERAL EPICONDYLITIS OF BOTH ELBOWS: ICD-10-CM

## 2023-08-29 PROCEDURE — 3077F SYST BP >= 140 MM HG: CPT | Performed by: PHYSICIAN ASSISTANT

## 2023-08-29 PROCEDURE — 99214 OFFICE O/P EST MOD 30 MIN: CPT | Performed by: PHYSICIAN ASSISTANT

## 2023-08-29 PROCEDURE — 3079F DIAST BP 80-89 MM HG: CPT | Performed by: PHYSICIAN ASSISTANT

## 2023-08-29 ASSESSMENT — FIBROSIS 4 INDEX: FIB4 SCORE: 0.74

## 2023-08-29 NOTE — PROGRESS NOTES
"Subjective     Farzad Bryant Jr. is a very pleasant 62 y.o. male who presents with Arm Pain (WC New, Pt reports he has been experiencing bilateral arm pain related to lifting 50lb boxes at [email or web address]. )      DOI: 8/28/2023.  Bilateral forearm and elbow strain.  Patient has chronic bilateral elbow tendinitis and lateral epicondylitis.  He is on permanent work restrictions via his PCP which include no lifting over 10 pounds.  However, yesterday he was forced to lift multiple 50 pound frames due to a emergency situation with the machine.  There was no obvious injury however today he woke up with significantly increased bilateral forearm and elbow pain in the area of his chronic symptoms.  He is having some weakness with intermittent pins-and-needles.  There is no obvious deformity, swelling, erythema or ecchymosis.  He has used NSAIDs with good relief.  No second job.  Previous injury: Chronic bilateral elbow tendinitis and lateral epicondylitis for multiple years managed conservatively with permanent work restrictions via PCP     Arm Pain         ROS           Objective     BP (!) 150/88   Pulse 92   Temp 36.9 °C (98.5 °F) (Temporal)   Resp 16   Ht 1.88 m (6' 2\")   Wt 124 kg (273 lb 5.9 oz)   SpO2 95%   BMI 35.10 kg/m²      Physical Exam    Diffuse soft tissue TTP from mid forearm to lateral epicondylitis bilaterally.  There is no significant deformity, swelling, erythema or ecchymosis.  Pain is most severe at the bilateral epicondylitis.  No bony tenderness.  Full passive and active range of motion of bilateral elbows and surrounding joints.   strengths and distal neurovascular intact.  Pain of bilateral forearms with gripping.                   Assessment & Plan     1. Bilateral elbow tendonitis        2. Lateral epicondylitis of both elbows          Acute on chronic flare of his chronic bilateral lateral epicondylitis and tendinitis.  No gross deformity, swelling, bruising, bony tenderness, range of " motion deficit or distal neurovascular compromise.  Work restrictions.  RICE therapy.  Follow-up 1 week.    Please note that this dictation was created using voice recognition software. I have made every reasonable attempt to correct obvious errors, but I expect that there are errors of grammar and possibly content that I did not discover before finalizing the note.

## 2023-08-29 NOTE — LETTER
[facility]  [address]  [place] [address]  Phone:  [phone] - Fax:  [phone]   [facility] Progress Report and Disability Certification  Date of Service: 8/29/2023   No Show:  No  Date / Time of Next Visit: 9/5/2023   Claim Information   Patient Name: Farzad Bryant Jr.  Claim Number:     Employer: [employer]  Date of Injury: 8/28/2023     Insurer / TPA: [employer] Workers [employer]  ID / SSN:     Occupation: eol tech  Diagnosis: Diagnoses of Bilateral elbow tendonitis and Lateral epicondylitis of both elbows were pertinent to this visit.    Medical Information   Related to Industrial Injury? Yes    Subjective Complaints:  DOI: 8/28/2023.  Bilateral forearm and elbow strain.  Patient has chronic bilateral elbow tendinitis and lateral epicondylitis.  He is on permanent work restrictions via his PCP which include no lifting over 10 pounds.  However, yesterday he was forced to lift multiple 50 pound frames due to a emergency situation with the machine.  There was no obvious injury however today he woke up with significantly increased bilateral forearm and elbow pain in the area of his chronic symptoms.  He is having some weakness with intermittent pins-and-needles.  There is no obvious deformity, swelling, erythema or ecchymosis.  He has used NSAIDs with good relief.  No second job.  Previous injury: Chronic bilateral elbow tendinitis and lateral epicondylitis for multiple years managed conservatively with permanent work restrictions via PCP   Objective Findings: Diffuse soft tissue TTP from mid forearm to lateral epicondylitis bilaterally.  There is no significant deformity, swelling, erythema or ecchymosis.  Pain is most severe at the bilateral epicondylitis.  No bony tenderness.  Full passive and active range of motion of bilateral elbows and surrounding joints.   strengths and distal neurovascular intact.  Pain of bilateral forearms  with gripping.   Pre-Existing Condition(s): Chronic bilateral elbow tendinitis and lateral epicondylitis for multiple years managed conservatively with permanent work restrictions via PCP     Assessment:   Initial Visit    Status: Additional Care Required  Permanent Disability:No    Plan: Medication    Diagnostics:      Comments:       Disability Information   Status: Released to Restricted Duty    From:  8/29/2023  Through: 9/5/2023 Restrictions are: Temporary   Physical Restrictions   Sitting:    Standing:    Stooping:    Bending:      Squatting:    Walking:    Climbing:    Pushing:      Pulling:    Other:    Reaching Above Shoulder (L):   Reaching Above Shoulder (R):       Reaching Below Shoulder (L):    Reaching Below Shoulder (R):      Not to exceed Weight Limits   Carrying(hrs):   Weight Limit(lb): < or = to 10 pounds Lifting(hrs):   Weight  Limit(lb): < or = to 10 pounds   Comments:      Repetitive Actions   Hands: i.e. Fine Manipulations from Grasping:     Feet: i.e. Operating Foot Controls:     Driving / Operate Machinery:     Health Care Provider’s Original or Electronic Signature  VALE [employer]AR[employer]. Health Care Provider’s Original or Electronic Signature    Stalin De La Vega DO MPH     Clinic Name / Location: [address], [address]  [place] [address] Clinic Phone Number: Dept: [phone]   Appointment Time: 11:00 Am Visit Start Time: 11:20 AM   Check-In Time:  11:00 Am Visit Discharge Time:  12:23pm   Original-Treating Physician or Chiropractor    Page 2-Insurer/TPA    Page 3-Employer    Page 4-Employee

## 2023-08-29 NOTE — LETTER
"EMPLOYEE’S CLAIM FOR COMPENSATION/ REPORT OF INITIAL TREATMENT  FORM C-4  PLEASE TYPE OR PRINT    EMPLOYEE’S CLAIM - PROVIDE ALL INFORMATION REQUESTED   First Name  Farzad Last Name  Annette Birthdate                    1960                Sex  male Claim Number (Insurer’s Use Only)   Home Address  [address] Age  62 y.o. 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 (if applicable)  clearing the oven    How did this injury or occupational disease occur? (Be specific and answer in detail. Use additional sheet if necessary)  clearing the oven by takng frames with parts off the coneveyor and looking for a place to put them   If you believe that you have an occupational disease, when did you first have knowledge of the disability and its relationship to your employment?   Witnesses to the Accident (if applicable)  Yonis Tesfaye      Nature of Injury or Occupational Disease  Strain  Part(s) of Body Injured or Affected  Wrist (L) and Hand (L), Wrist (R) and Hand (R),     I CERTIFY THAT THE ABOVE IS TRUE AND CORRECT TO T HE BEST OF MY " KNOWLEDGE AND THAT I HAVE PROVIDED THIS INFORMATION IN ORDER TO OBTAIN THE BENEFITS OF [place], OR CHAPTER 617 OF [place]).  I HEREBY AUTHORIZE ANY PHYSICIAN, CHIROPRACTOR, SURGEON, PRACTITIONER OR ANY OTHER PERSON, ANY HOSPITAL, INCLUDING [facility] OR [facility], ANY  MEDICAL SERVICE ORGANIZATION, ANY INSURANCE COMPANY, OR OTHER INSTITUTION OR ORGANIZATION TO RELEASE TO EACH OTHER, ANY MEDICAL OR OTHER INFORMATION, INCLUDING BENEFITS PAID OR PAYABLE, PERTINENT TO THIS INJURY OR DISEASE, EXCEPT INFORMATION RELATIVE TO DIAGNOSIS, TREATMENT AND/OR COUNSELING FOR AIDS, PSYCHOLOGICAL CONDITIONS, ALCOHOL OR CONTROLLED SUBSTANCES, FOR WHICH I MUST GIVE SPECIFIC AUTHORIZATION.  A PHOTOSTAT OF THIS AUTHORIZATION SHALL BE VALID AS THE ORIGINAL.       Date  8-   Place  [facility] Employee’s Original or  *Electronic Signature   THIS REPORT MUST BE COMPLETED AND MAILED WITHIN 3 WORKING DAYS OF TREATMENT   Place  [facility]  Name of Facility  [facility]   Date  8/29/2023 Diagnosis and Description of Injury or Occupational Disease  (M77.8) Bilateral elbow tendonitis  (M77.11,  M77.12) Lateral epicondylitis of both elbows Is there evidence that the injured employee was under the influence of alcohol and/or another controlled substance at the time of accident?  ? No ? Yes (if yes, please explain)   Hour  11:20 AM   Diagnoses of Bilateral elbow tendonitis and Lateral epicondylitis of both elbows were pertinent to this visit. No   Treatment  Acute on chronic flare of his bilateral elbow tendinitis and lateral epicondylitis.  No gross deformity, bony tenderness, range of motion deficit or distal neurovascular compromise.  Work restrictions  RICE therapy  Have you advised the patient to remain off work five days or     more?    X-Ray Findings      ? Yes Indicate dates:   From   To      From information  given by the employee, together with medical evidence, can        you directly connect this injury or occupational disease as job incurred?  Yes  Comments:Chronic bilateral elbow tendinitis and lateral epicondylitis for multiple years managed conservatively with permanent work restrictions via PCP ? No If no, is the injured employee capable of:  ? full duty  No ? modified duty  Yes   Is additional medical care by a physician indicated?  Yes If modified duty, specify any limitations / restrictions  No lifting more than 10 pounds   Do you know of any previous injury or disease contributing to this condition or occupational disease?  ? Yes ? No (Explain if yes)                          No   Date  8/29/2023 Print Health Care Provider's  Name  Jason Lopez P.A.-C. I certify that the employer’s copy of  this form was delivered to the employer on:   Address  [address] Insurer’s Use Only     [facility] Zip  [address]    Provider’s Tax ID Number  [id number] Telephone  Dept: [phone]             Health Care Provider’s Original or Electronic Signature  e-JASON Mccormack P.A.-C. Degree (MD,DO, DC,PAANA LILIA,APRN)  PAJeramyC      * Complete and attach Release of Information (Form C-4A) when injured employee signs C-4 Form electronically  ORIGINAL - TREATING HEALTHCARE PROVIDER PAGE 2 - INSURER/TPA PAGE 3 - EMPLOYER PAGE 4 - EMPLOYEE             Form C-4 (rev.08/21)           BRIEF DESCRIPTION OF RIGHTS AND BENEFITS  (Pursuant to [place] 616C.050)    Notice of Injury or Occupational Disease (Incident Report Form C-1): If an injury or occupational disease (OD) arises out of and in the course of employment, you must provide written notice to your employer as soon as practicable, but no later than 7 days after the accident or OD. Your employer shall maintain a sufficient supply of the required forms.    Claim for Compensation (Form C-4): If medical treatment is sought, the form C-4 is available at the place of  "initial treatment. A completed \"Claim for Compensation\" (Form C-4) must be filed within 90 days after an accident or OD. The treating physician or chiropractor must, within 3 working days after treatment, complete and mail to the employer, the employer's insurer and third-party , the Claim for Compensation.    Medical Treatment: If you require medical treatment for your on-the-job injury or OD, you may be required to select a physician or chiropractor from a list provided by your workers’ compensation insurer, if it has contracted with an Organization for Managed Care (MCO) or Preferred Provider Organization (PPO) or providers of health care. If your employer has not entered into a contract with an MCO or PPO, you may select a physician or chiropractor from the Panel of Physicians and Chiropractors. Any medical costs related to your industrial injury or OD will be paid by your insurer.    Temporary Total Disability (TTD): If your doctor has certified that you are unable to work for a period of at least 5 consecutive days, or 5 cumulative days in a 20-day period, or places restrictions on you that your employer does not accommodate, you may be entitled to TTD compensation.    Temporary Partial Disability (TPD): If the wage you receive upon reemployment is less than the compensation for TTD to which you are entitled, the insurer may be required to pay you TPD compensation to make up the difference. TPD can only be paid for a maximum of 24 months.    Permanent Partial Disability (PPD): When your medical condition is stable and there is an indication of a PPD as a result of your injury or OD, within 30 days, your insurer must arrange for an evaluation by a rating physician or chiropractor to determine the degree of your PPD. The amount of your PPD award depends on the date of injury, the results of the PPD evaluation, your age and wage.    Permanent Total Disability (PTD): If you are medically certified by " a treating physician or chiropractor as permanently and totally disabled and have been granted a PTD status by your insurer, you are entitled to receive monthly benefits not to exceed 66 2/3% of your average monthly wage. The amount of your PTD payments is subject to reduction if you previously received a lump-sum PPD award.    Vocational Rehabilitation Services: You may be eligible for vocational rehabilitation services if you are unable to return to the job due to a permanent physical impairment or permanent restrictions as a result of your injury or occupational disease.    Transportation and Per Agustin Reimbursement: You may be eligible for travel expenses and per agustin associated with medical treatment.    Reopening: You may be able to reopen your claim if your condition worsens after claim closure.     Appeal Process: If you disagree with a written determination issued by the insurer or the insurer does not respond to your request, you may appeal to the Department of Administration, , by following the instructions contained in your determination letter. You must appeal the determination within 70 days from the date of the determination letter at [address], [address], [place] [address], or [address], [place] [address]. If you disagree with the  decision, you may appeal to the Department of Administration, . You must file your appeal within 30 days from the date of the  decision letter at [address], [address], [place] [address], or [address], [place] [address]. If you disagree with a decision of an , you may file a petition for judicial review with the District Court. You must do so within 30 days of the Appeal Officer’s decision. You may be represented by an  at your own expense or you may contact the [facility] for possible  representation.    [place]  for Injured Workers (NAIW): If you disagree with a  decision, you may request that NAIW represent you without charge at an  Hearing. For information regarding denial of benefits, you may contact the NAIW at: [address], [address], [place] [address], [phone], or [address], [address], [place] [address], [phone]    To File a Complaint with the Division: If you wish to file a complaint with the  of the Division of Industrial Relations (DIR),  please contact the Workers’ Compensation Section, [address], [address], [place], [place] [address], telephone [phone], or [address], [address], [place], [place] [address], telephone [phone].    For assistance with Workers’ Compensation Issues: You may contact the [facility] Office for Consumer Health Assistance, [address], [address], [place], [place] [address], Toll Free 1-360.966.3894, Web site: http://[email or web address].[place].gov/Programs/[place] E-mail: [email or web address]              __________________________________________________________________                                    _________________            Employee Name / Signature                                                                                                                            Date                                                                                                                                                                                                                              D-2 (rev. 10/20)

## 2023-09-05 ENCOUNTER — OCCUPATIONAL MEDICINE (OUTPATIENT)
Dept: URGENT CARE | Facility: CLINIC | Age: 63
End: 2023-09-05
Payer: COMMERCIAL

## 2023-09-05 VITALS
WEIGHT: 273 LBS | TEMPERATURE: 98.3 F | OXYGEN SATURATION: 96 % | SYSTOLIC BLOOD PRESSURE: 128 MMHG | RESPIRATION RATE: 16 BRPM | DIASTOLIC BLOOD PRESSURE: 82 MMHG | HEART RATE: 98 BPM | HEIGHT: 74 IN | BODY MASS INDEX: 35.04 KG/M2

## 2023-09-05 DIAGNOSIS — M77.11 LATERAL EPICONDYLITIS OF BOTH ELBOWS: ICD-10-CM

## 2023-09-05 DIAGNOSIS — M77.12 LATERAL EPICONDYLITIS OF BOTH ELBOWS: ICD-10-CM
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
## 2023-09-05 PROCEDURE — 3074F SYST BP LT 130 MM HG: CPT

## 2023-09-05 PROCEDURE — 3079F DIAST BP 80-89 MM HG: CPT

## 2023-09-05 PROCEDURE — 99213 OFFICE O/P EST LOW 20 MIN: CPT

## 2023-09-05 ASSESSMENT — FIBROSIS 4 INDEX: FIB4 SCORE: 0.74

## 2023-09-05 NOTE — PROGRESS NOTES
Subjective:   Farzad Bryant Jr. is a very pleasant 62 y.o. male who presents for:    Chief Complaint   Patient presents with    Arm Pain     WC Fv, Pt reports he has not had much improvement.        HPI:    Copied from previous visit:     DOI: 8/28/2023.    Bilateral forearm and elbow strain.  Patient has chronic bilateral elbow tendinitis and lateral epicondylitis.  He is on permanent work restrictions via his PCP which include no lifting over 10 pounds.  However, yesterday he was forced to lift multiple 50 pound frames due to a emergency situation with the machine.  There was no obvious injury however today he woke up with significantly increased bilateral forearm and elbow pain in the area of his chronic symptoms.  He is having some weakness with intermittent pins-and-needles.  There is no obvious deformity, swelling, erythema or ecchymosis.  He has used NSAIDs with good relief.  No second job.  Previous injury: Chronic bilateral elbow tendinitis and lateral epicondylitis for multiple years managed conservatively with permanent work restrictions via PCP     Follow up #1: 9/5/23  The patient was seen in the  for Acute on chronic flare of his chronic bilateral lateral epicondylitis and tendinitis. He has not been at work for the past seven days. He reports that the pain in his bilateral forearms are still painful, but it has improved since his initial visit. The pain is described described as dull and is exacerbated with physical activity, especially heavy lifting. He reports pain in the left hand, but denies numbness and tingling. He has had intermittent spasms on the top of the left hand. He has been using ibuprofen PRN for discomfort, which he reports was helpful.     ROS:    Review of Systems   Musculoskeletal:         Right and left forearm pain, L hand pain    All other systems reviewed and are negative.      Medications:      Current Outpatient Medications   Medication Sig    Insulin Pen Needle  (RELION PEN NEEDLE 31G/8MM) USE DAILY WITH LANTUS.    Continuous Blood Gluc Transmit (DEXCOM G6 TRANSMITTER) Misc Use multiple times a day to check blood sugars: fasting, before and after meals and for symptoms of low and high blood sugars.    LANTUS SOLOSTAR 100 UNIT/ML Solution Pen-injector injection INJECT 32 UNITS SUBCUTANEOUSLY IN THE EVENING    glipiZIDE (GLUCOTROL) 5 MG Tab Take 1 tablet by mouth twice daily    Empagliflozin 25 MG Tab Take 1 Tablet by mouth every day.    metformin (GLUCOPHAGE) 1000 MG tablet Take 1 Tablet by mouth 2 times a day with meals.    traZODone (DESYREL) 100 MG Tab Take 2 Tablets by mouth at bedtime as needed for Sleep.    Continuous Blood Gluc Sensor (DEXCOM G6 SENSOR) Misc Use multiple times a day to check blood sugars: fasting, before and after meals and for symptoms of low and high blood sugars.    Continuous Blood Gluc  (DEXCOM G6 ) Device Use multiple times a day to check blood sugars: fasting, before and after meals and for symptoms of low and high blood sugars.    escitalopram (LEXAPRO) 10 MG Tab Take 1 Tablet by mouth every day. Take with 20 mg daily for 30 mg daily    losartan (COZAAR) 50 MG Tab Take 1 Tablet by mouth every day.    cetirizine (EQ ALLERGY RELIEF, CETIRIZINE,) 10 MG Tab Take 1 Tablet by mouth every day.    isosorbide mononitrate SR (IMDUR) 30 MG TABLET SR 24 HR Take 1 Tablet by mouth every morning.    atorvastatin (LIPITOR) 40 MG Tab Take 1 Tablet by mouth every evening.    escitalopram (LEXAPRO) 20 MG tablet Take 1 Tablet by mouth every day.    Semaglutide, 1 MG/DOSE, 4 MG/3ML Solution Pen-injector Inject 1 mg under the skin every 7 days.    levothyroxine (EUTHYROX) 25 MCG Tab Take 1 Tablet by mouth every morning on an empty stomach.    Multiple Vitamins-Minerals (ZINC PO) Take  by mouth.    aspirin EC (ECOTRIN) 81 MG Tablet Delayed Response Take 81 mg by mouth every day.    vitamin D (CHOLECALCIFEROL) 1000 Unit (25 mcg) Tab Take 1,000 Units  by mouth every day.    acetaminophen (TYLENOL) 500 MG Tab Take 1,000 mg by mouth 2 times a day as needed for Fever.    Cinnamon 500 MG Cap Take 1,000 mg by mouth every morning. \    5-Hydroxytryptophan (5-HTP) 100 MG Cap Take 200 mg by mouth every bedtime.    Omega-3 Fatty Acids (FISH OIL) 1000 MG Cap capsule Take 2,000 mg by mouth every morning.    Melatonin 1 MG Cap Take 1 mg by mouth every bedtime.    therapeutic multivitamin-minerals (THERAGRAN-M) Tab Take 1 Tab by mouth every morning.       Allergies:     No Known Allergies    Problem List:     Patient Active Problem List   Diagnosis    Acquired hypothyroidism    Type 2 diabetes mellitus with complication, without long-term current use of insulin (HCC)    Dyslipidemia    Primary insomnia    Episode of recurrent major depressive disorder (HCC)    Chronic shoulder pain    Obesity (BMI 30-39.9)    Dry skin    Costochondritis    Left wrist pain    Atypical chest pain    Abnormal nuclear cardiac imaging test    Essential hypertension, benign    Elevated blood pressure reading    Cough    Allergic rhinitis    Recurrent pain of right knee    Tendonitis    Hernia of anterior abdominal wall    Pneumonia due to COVID-19 virus    Chest pain    New onset tinnitus of right ear    Vestibular schwannoma (HCC)    Benign neoplasm of cranial nerve (HCC)    Acoustic neuroma (HCC)    Incisional hernia    Irritable bowel syndrome       Surgical History:    Past Surgical History:   Procedure Laterality Date    ARTHROPLASTY Left     hip replacement    CHOLECYSTECTOMY         Past Social Hx:     Social History     Socioeconomic History    Marital status:     Highest education level: Some college, no degree   Tobacco Use    Smoking status: Never    Smokeless tobacco: Former     Types: Chew     Quit date: 1/1/1986   Vaping Use    Vaping Use: Never used   Substance and Sexual Activity    Alcohol use: Yes     Comment: 1 per month     Drug use: No     Social Determinants of Health      Financial Resource Strain: Low Risk  (1/20/2022)    Overall Financial Resource Strain (CARDIA)     Difficulty of Paying Living Expenses: Not hard at all   Food Insecurity: No Food Insecurity (1/20/2022)    Hunger Vital Sign     Worried About Running Out of Food in the Last Year: Never true     Ran Out of Food in the Last Year: Never true   Transportation Needs: No Transportation Needs (1/20/2022)    PRAPARE - Transportation     Lack of Transportation (Medical): No     Lack of Transportation (Non-Medical): No   Physical Activity: Inactive (1/20/2022)    Exercise Vital Sign     Days of Exercise per Week: 0 days     Minutes of Exercise per Session: 0 min   Stress: Stress Concern Present (1/20/2022)    [place] [employer] - Occupational Stress Questionnaire     Feeling of Stress : To some extent   Social Connections: Moderately Isolated (1/20/2022)    Social Connection and Isolation Panel [NHANES]     Frequency of Communication with Friends and Family: Twice a week     Frequency of Social Gatherings with Friends and Family: Once a week     Attends Congregational Services: Never     Active Member of Clubs or Organizations: No     Attends Club or Organization Meetings: Patient refused     Marital Status:    Housing Stability: Low Risk  (1/20/2022)    Housing Stability Vital Sign     Unable to Pay for Housing in the Last Year: No     Number of Places Lived in the Last Year: 1     Unstable Housing in the Last Year: No        Past Family Hx:      Family History   Problem Relation Age of Onset    Diabetes Mother     Stroke Father        Problem list, medications, and allergies reviewed by myself today in Epic.     Objective:     Vitals:    09/05/23 1050   BP: 128/82   Pulse: 98   Resp: 16   Temp: 36.8 °C (98.3 °F)   SpO2: 96%       Physical Exam  Vitals reviewed.   Constitutional:       General: He is not in acute distress.     Appearance: Normal appearance. He is not ill-appearing, toxic-appearing  or diaphoretic.   HENT:      Head: Normocephalic and atraumatic.   Eyes:      Extraocular Movements: Extraocular movements intact.      Pupils: Pupils are equal, round, and reactive to light.   Cardiovascular:      Pulses: Normal pulses.      Heart sounds: Normal heart sounds.   Pulmonary:      Effort: Pulmonary effort is normal.   Musculoskeletal:         General: Normal range of motion.        Hands:       Cervical back: Normal range of motion.      Comments: Right/Left Forearms  TTP over lateral epicondyles bilaterally  RS: Pain extends from epicondyle to mid-forarm  LS: Pain extends from epicondyle to L wrist    Lateral resistance testing results in a reproduction of pain    There is no significant deformity, swelling, erythema or ecchymosis.     No bony tenderness.    Full passive and active range of motion of bilateral elbows and surrounding joints.     strengths 5/5 bilaterally  and distal neurovascular intact.    Pain of bilateral forearms with gripping.       Diffuse soft tissue TTP from mid forearm to lateral epicondylitis bilaterally.  There is no significant deformity, swelling, erythema or ecchymosis.  Pain is most severe at the bilateral epicondylitis.  No bony tenderness.  Full passive and active range of motion of bilateral elbows and surrounding joints.   strengths and distal neurovascular intact.  Pain of bilateral forearms with gripping.       Left wrist/hand  General: no gross deformity, ecchymosis, or erythema  Palpation: TTP over dorsal aspect of L wrist, radial joint line  ROM: wrist extension 60 degrees, flexion 60 degrees, radial deviation 50 degree, ulnar deviation 50 degrees  Strength: 5/5 flexion, 5/5 extension, 5/5   Special testing: -Tinel's, -Phalen's, -Finkelstein's  Neuro: median, radial, ulnar nerves intact on testing  Vascular: radial, ulnar pulses 2+ and symmetric, cap refill <2 sec    Skin:     General: Skin is warm and dry.   Neurological:      General: No focal  deficit present.      Mental Status: He is alert and oriented to person, place, and time. Mental status is at baseline.   Psychiatric:         Mood and Affect: Mood normal.         Behavior: Behavior normal.         Thought Content: Thought content normal.         Judgment: Judgment normal.                     Assessment/Plan:     Diagnosis and associated orders:     1. Lateral epicondylitis of both elbows  - Wrist Brace Cock Up Volar    2. Work related injury          Comments/MDM:     See D-39  Lifting restrictions in place..  Should not to lift anything over 10 pounds.  Volar brace applied to left wrist to allow the ECRB to rest and heal without bending the use of fingers altogether.    The patient is to wear the brace while at work and PRN  Encouraged gentle range of motion exercises to improve the symptoms associated with lateral epicondylitis  Warm compresses and the avoidance of repetitive activity discussed with patient  Return to clinic in 7 days for reevaluation         All questions answered. Patient verbalized understanding and is in agreement with this plan of care.     If symptoms are worsening or not improving in 3-5 days, follow-up with PCP or return to UC. Differential diagnosis, natural history, and supportive care discussed. AVS handout given and reviewed with patient. Patient educated on red flags and when to seek treatment back in ED or UC.     I personally reviewed prior external notes and test results pertinent to today's visit.  I have independently reviewed and interpreted all diagnostics ordered during this urgent care visit.     This dictation has been created using voice recognition software. The accuracy of the dictation is limited by the abilities of the software. I expect there may be some errors of grammar and possibly content. I made every attempt to manually correct the errors within my dictation. However, errors related to voice recognition software may still exist and should be  interpreted within the appropriate context.    This note was electronically signed by BENJA Mejia

## 2023-09-05 NOTE — LETTER
[facility]  [address] [facility], [address] [place] [address]  Phone:  [phone] - Fax:  [phone]   [facility] Progress Report and Disability Certification  Date of Service: 9/5/2023   No Show:  No  Date / Time of Next Visit: 9/12/2023   Claim Information   Patient Name: Farzad Bryant Jr.  Claim Number:     Employer: [employer]  Date of Injury: 8/28/2023     Insurer / TPA: Misc Workers Comp  ID / SSN:     Occupation: eol tech  Diagnosis: Diagnoses of Lateral epicondylitis of both elbows and Work related injury were pertinent to this visit.    Medical Information   Related to Industrial Injury? Yes    Subjective Complaints:  Copied from previous visit:     DOI: 8/28/2023.    Bilateral forearm and elbow strain.  Patient has chronic bilateral elbow tendinitis and lateral epicondylitis.  He is on permanent work restrictions via his PCP which include no lifting over 10 pounds.  However, yesterday he was forced to lift multiple 50 pound frames due to a emergency situation with the machine.  There was no obvious injury however today he woke up with significantly increased bilateral forearm and elbow pain in the area of his chronic symptoms.  He is having some weakness with intermittent pins-and-needles.  There is no obvious deformity, swelling, erythema or ecchymosis.  He has used NSAIDs with good relief.  No second job.  Previous injury: Chronic bilateral elbow tendinitis and lateral epicondylitis for multiple years managed conservatively with permanent work restrictions via PCP     Follow up #1: 9/5/23  The patient was seen in the  for Acute on chronic flare of his chronic bilateral lateral epicondylitis and tendinitis. He has not been at work for the past seven days. He reports that the pain in his bilateral forearms are still painful, but it has improved since his initial visit. The pain is described described as dull and is exacerbated with  physical activity, especially heavy lifting. He reports pain in the left hand, but denies numbness and tingling. He has had intermittent spasms on the top of the left hand. He has been using ibuprofen PRN for discomfort, which he reports was helpful.    Objective Findings: Musculoskeletal:         General: Normal range of motion.        Hands:        Cervical back: Normal range of motion.      Comments: Right/Left Forearms  TTP over lateral epicondyles bilaterally  RS: Pain extends from epicondyle to mid-forarm  LS: Pain extends from epicondyle to L wrist    Lateral resistance testing results in a reproduction of pain    There is no significant deformity, swelling, erythema or ecchymosis.     No bony tenderness.    Full passive and active range of motion of bilateral elbows and surrounding joints.     strengths 5/5 bilaterally  and distal neurovascular intact.    Pain of bilateral forearms with gripping.       Diffuse soft tissue TTP from mid forearm to lateral epicondylitis bilaterally.  There is no significant deformity, swelling, erythema or ecchymosis.  Pain is most severe at the bilateral epicondylitis.  No bony tenderness.  Full passive and active range of motion of bilateral elbows and surrounding joints.   strengths and distal neurovascular intact.  Pain of bilateral forearms with gripping.       Left wrist/hand  General: no gross deformity, ecchymosis, or erythema  Palpation: TTP over dorsal aspect of L wrist, radial joint line  ROM: wrist extension 60 degrees, flexion 60 degrees, radial deviation 50 degree, ulnar deviation 50 degrees  Strength: 5/5 flexion, 5/5 extension, 5/5   Special testing: -Tinel's, -Phalen's, -Finkelstein's  Neuro: median, radial, ulnar nerves intact on testing  Vascular: radial, ulnar pulses 2+ and symmetric, cap refill <2 sec     Pre-Existing Condition(s):     Assessment:   Condition Improved    Status: Additional Care Required  Permanent Disability:No    Plan:  MedicationPT    Diagnostics:      Comments:       Disability Information   Status: Released to Restricted Duty    From:  9/5/2023  Through: 9/12/2023 Restrictions are:     Physical Restrictions   Sitting:    Standing:    Stooping:    Bending:      Squatting:    Walking:    Climbing:    Pushing:      Pulling:    Other:    Reaching Above Shoulder (L):   Reaching Above Shoulder (R):       Reaching Below Shoulder (L):    Reaching Below Shoulder (R):      Not to exceed Weight Limits   Carrying(hrs):   Weight Limit(lb): < or = to 10 pounds Lifting(hrs):   Weight  Limit(lb): < or = to 10 pounds   Comments: Wrist brace while at work and PRN. Tennis elbow brace recommended. Patient will purchase this as there are no braces in the clinic. Start home PT. Restrictions in place. OTC Tylenol/ibuprofen PRN. Warm compresses to area.    Repetitive Actions   Hands: i.e. Fine Manipulations from Grasping:     Feet: i.e. Operating Foot Controls:     Driving / Operate Machinery:     Health Care Provider’s Original or Electronic Signature  BENJA Kahn Health Care Provider’s Original or Electronic Signature    Stalin De La Vega DO MPH     Clinic Name / Location: [address], [address]  [place] [address] Clinic Phone Number: Dept: [phone]   Appointment Time: 11:00 Am Visit Start Time: 10:48 AM   Check-In Time:  10:40 Am Visit Discharge Time:  11:35am   Original-Treating Physician or Chiropractor    Page 2-Insurer/TPA    Page 3-Employer    Page 4-Employee

## 2023-09-13 ENCOUNTER — OCCUPATIONAL MEDICINE (OUTPATIENT)
Dept: URGENT CARE | Facility: CLINIC | Age: 63
End: 2023-09-13
Payer: COMMERCIAL

## 2023-09-13 VITALS
HEIGHT: 74 IN | TEMPERATURE: 97.8 F | BODY MASS INDEX: 35.04 KG/M2 | HEART RATE: 90 BPM | SYSTOLIC BLOOD PRESSURE: 134 MMHG | OXYGEN SATURATION: 95 % | DIASTOLIC BLOOD PRESSURE: 86 MMHG | WEIGHT: 273 LBS | RESPIRATION RATE: 16 BRPM

## 2023-09-13 DIAGNOSIS — M77.12 LATERAL EPICONDYLITIS OF BOTH ELBOWS: ICD-10-CM

## 2023-09-13 DIAGNOSIS — M77.11 LATERAL EPICONDYLITIS OF BOTH ELBOWS: ICD-10-CM

## 2023-09-13 PROCEDURE — 3075F SYST BP GE 130 - 139MM HG: CPT | Performed by: STUDENT IN AN ORGANIZED HEALTH CARE EDUCATION/TRAINING PROGRAM

## 2023-09-13 PROCEDURE — 3079F DIAST BP 80-89 MM HG: CPT | Performed by: STUDENT IN AN ORGANIZED HEALTH CARE EDUCATION/TRAINING PROGRAM

## 2023-09-13 PROCEDURE — 99213 OFFICE O/P EST LOW 20 MIN: CPT | Performed by: STUDENT IN AN ORGANIZED HEALTH CARE EDUCATION/TRAINING PROGRAM

## 2023-09-13 ASSESSMENT — FIBROSIS 4 INDEX: FIB4 SCORE: 0.74

## 2023-09-13 NOTE — LETTER
[facility]  [address] [facility], [address]  [place] [address]  Phone:  [phone] - Fax:  [phone]   [facility] Progress Report and Disability Certification  Date of Service: 9/13/2023   No Show:  No  Date / Time of Next Visit: 9/20/2023 w/ [facility]   Claim Information   Patient Name: Farzad Bryant Jr.  Claim Number:     Employer: [employer]  Date of Injury: 8/28/2023     Insurer / TPA: Misc Workers Comp  ID / SSN:     Occupation: eol tech  Diagnosis: The encounter diagnosis was Lateral epicondylitis of both elbows.    Medical Information   Related to Industrial Injury? Yes    Subjective Complaints:  DOI: 8/28/2023.  Bilateral forearm and elbow strain.  Patient has chronic bilateral elbow tendinitis and lateral epicondylitis.  He is on permanent work restrictions via his PCP which include no lifting over 10 pounds.  However, yesterday he was forced to lift multiple 50 pound frames due to a emergency situation with the machine.  There was no obvious injury however today he woke up with significantly increased bilateral forearm and elbow pain in the area of his chronic symptoms.  He is having some weakness with intermittent pins-and-needles.  There is no obvious deformity, swelling, erythema or ecchymosis.  He has used NSAIDs with good relief.  No second job.  Previous injury: Chronic bilateral elbow tendinitis and lateral epicondylitis for multiple years managed conservatively with permanent work restrictions via PCP     Today's date: 9/13/2023.  Patient reports he has continued lateral elbow pain however symptoms are slightly better since last visit but is still not back to baseline.  He has been doing stretches at home and at work.  He is also using a brace and taking ibuprofen which help.  Also says he has been experiencing some numbness and tingling in his left hand.  He has never done formal physical therapy.   Objective Findings: Gen: no  acute distress, normal voice  Skin: dry, intact, moist mucosal membranes  Head: Atraumatic, normocephalic  Psych: normal affect, normal judgement, alert, awake  Musculoskeletal: Bilateral elbows without erythema, ecchymosis, edema or gross deformity.  Localized tenderness to palpation along the proximal extensor tendons aggravated with wrist extension against resistance.  No distal motor or sensory deficits.  Brisk capillary refill.     Pre-Existing Condition(s):     Assessment:   Condition Improved    Status: Additional Care Required  Permanent Disability:No    Plan:      Diagnostics:      Comments:       Disability Information   Status: Released to Restricted Duty    From:  9/13/2023  Through: 9/20/2023 Restrictions are: Permanent   Physical Restrictions   Sitting:    Standing:    Stooping:    Bending:      Squatting:    Walking:    Climbing:    Pushing:      Pulling:    Other:    Reaching Above Shoulder (L):   Reaching Above Shoulder (R):       Reaching Below Shoulder (L):    Reaching Below Shoulder (R):      Not to exceed Weight Limits   Carrying(hrs):   Weight Limit(lb): < or = to 10 pounds Lifting(hrs):   Weight  Limit(lb): < or = to 10 pounds   Comments: - Restrictions per D39 (10 pound weight limit)  -Continue stretches, wrist splint and NSAIDs  -Follow-up with occupational medicine at [address] [place] [address] in 1 week    Repetitive Actions   Hands: i.e. Fine Manipulations from Grasping:     Feet: i.e. Operating Foot Controls:     Driving / Operate Machinery:     Health Care Provider’s Original or Electronic Signature  Renan Guillen D.O. Health Care Provider’s Original or Electronic Signature    Stalin De La Vega DO MPH     Clinic Name / Location: [address] [place] [address] Clinic Phone Number: Dept: [phone]   Appointment Time: 11:00 Am Visit Start Time: 10:58 AM   Check-In Time:  10:51 Am Visit Discharge Time: 11:17 Am    Original-Treating Physician or Chiropractor     Page 2-Insurer/TPA    Page 3-Employer    Page 4-Employee

## 2023-09-13 NOTE — PROGRESS NOTES
"Subjective:     Farzad Bryant Jr. is a 62 y.o. male who presents for Arm Pain (WC Fv, bilateral arm/wrist/hand pain. Pt reports he has not had much improvement. )      DOI: 8/28/2023.  Bilateral forearm and elbow strain.  Patient has chronic bilateral elbow tendinitis and lateral epicondylitis.  He is on permanent work restrictions via his PCP which include no lifting over 10 pounds.  However, yesterday he was forced to lift multiple 50 pound frames due to a emergency situation with the machine.  There was no obvious injury however today he woke up with significantly increased bilateral forearm and elbow pain in the area of his chronic symptoms.  He is having some weakness with intermittent pins-and-needles.  There is no obvious deformity, swelling, erythema or ecchymosis.  He has used NSAIDs with good relief.  No second job.  Previous injury: Chronic bilateral elbow tendinitis and lateral epicondylitis for multiple years managed conservatively with permanent work restrictions via PCP     Today's date: 9/13/2023.  Patient reports he has continued lateral elbow pain however symptoms are slightly better since last visit but is still not back to baseline.  He has been doing stretches at home and at work.  He is also using a brace and taking ibuprofen which help.  Also says he has been experiencing some numbness and tingling in his left hand.  He has never done formal physical therapy.    PMH:   No pertinent past medical history to this problem  MEDS:  Medications were reviewed in EMR  ALLERGIES:  Allergies were reviewed in EMR  FH:   No pertinent family history to this problem       Objective:     /86   Pulse 90   Temp 36.6 °C (97.8 °F) (Temporal)   Resp 16   Ht 1.88 m (6' 2\")   Wt 124 kg (273 lb)   SpO2 95%   BMI 35.05 kg/m²     Gen: no acute distress, normal voice  Skin: dry, intact, moist mucosal membranes  Head: Atraumatic, normocephalic  Psych: normal affect, normal judgement, alert, " awake  Musculoskeletal: Bilateral elbows without erythema, ecchymosis, edema or gross deformity.  Localized tenderness to palpation along the proximal extensor tendons aggravated with wrist extension against resistance.  No distal motor or sensory deficits.  Brisk capillary refill.      Assessment/Plan:       1. Lateral epicondylitis of both elbows    Released to Restricted Duty FROM 9/13/2023 TO 9/20/2023  - Restrictions per D39 (10 pound weight limit)  -Continue stretches, wrist splint and NSAIDs  -Follow-up with occupational medicine at [address] [place] [address] in 1 week       Differential diagnosis, natural history, supportive care, and indications for immediate follow-up discussed.

## 2023-09-16 SDOH — ECONOMIC STABILITY: INCOME INSECURITY: HOW HARD IS IT FOR YOU TO PAY FOR THE VERY BASICS LIKE FOOD, HOUSING, MEDICAL CARE, AND HEATING?: NOT HARD AT ALL

## 2023-09-16 SDOH — ECONOMIC STABILITY: HOUSING INSECURITY: IN THE LAST 12 MONTHS, HOW MANY PLACES HAVE YOU LIVED?: 1

## 2023-09-16 SDOH — HEALTH STABILITY: PHYSICAL HEALTH: ON AVERAGE, HOW MANY MINUTES DO YOU ENGAGE IN EXERCISE AT THIS LEVEL?: 0 MIN

## 2023-09-16 SDOH — HEALTH STABILITY: MENTAL HEALTH
STRESS IS WHEN SOMEONE FEELS TENSE, NERVOUS, ANXIOUS, OR CAN'T SLEEP AT NIGHT BECAUSE THEIR MIND IS TROUBLED. HOW STRESSED ARE YOU?: RATHER MUCH

## 2023-09-16 SDOH — ECONOMIC STABILITY: FOOD INSECURITY: WITHIN THE PAST 12 MONTHS, YOU WORRIED THAT YOUR FOOD WOULD RUN OUT BEFORE YOU GOT MONEY TO BUY MORE.: NEVER TRUE

## 2023-09-16 SDOH — ECONOMIC STABILITY: INCOME INSECURITY: IN THE LAST 12 MONTHS, WAS THERE A TIME WHEN YOU WERE NOT ABLE TO PAY THE MORTGAGE OR RENT ON TIME?: NO

## 2023-09-16 SDOH — HEALTH STABILITY: PHYSICAL HEALTH: ON AVERAGE, HOW MANY DAYS PER WEEK DO YOU ENGAGE IN MODERATE TO STRENUOUS EXERCISE (LIKE A BRISK WALK)?: 3 DAYS

## 2023-09-16 SDOH — ECONOMIC STABILITY: FOOD INSECURITY: WITHIN THE PAST 12 MONTHS, THE FOOD YOU BOUGHT JUST DIDN'T LAST AND YOU DIDN'T HAVE MONEY TO GET MORE.: NEVER TRUE

## 2023-09-16 ASSESSMENT — SOCIAL DETERMINANTS OF HEALTH (SDOH)
HOW OFTEN DO YOU GET TOGETHER WITH FRIENDS OR RELATIVES?: PATIENT DECLINED
HOW OFTEN DO YOU ATTENT MEETINGS OF THE CLUB OR ORGANIZATION YOU BELONG TO?: PATIENT DECLINED
IN A TYPICAL WEEK, HOW MANY TIMES DO YOU TALK ON THE PHONE WITH FAMILY, FRIENDS, OR NEIGHBORS?: ONCE A WEEK
HOW OFTEN DO YOU HAVE SIX OR MORE DRINKS ON ONE OCCASION: LESS THAN MONTHLY
IN A TYPICAL WEEK, HOW MANY TIMES DO YOU TALK ON THE PHONE WITH FAMILY, FRIENDS, OR NEIGHBORS?: ONCE A WEEK
HOW OFTEN DO YOU ATTEND CHURCH OR RELIGIOUS SERVICES?: NEVER
HOW HARD IS IT FOR YOU TO PAY FOR THE VERY BASICS LIKE FOOD, HOUSING, MEDICAL CARE, AND HEATING?: NOT HARD AT ALL
WITHIN THE PAST 12 MONTHS, YOU WORRIED THAT YOUR FOOD WOULD RUN OUT BEFORE YOU GOT THE MONEY TO BUY MORE: NEVER TRUE
HOW OFTEN DO YOU ATTEND CHURCH OR RELIGIOUS SERVICES?: NEVER
HOW MANY DRINKS CONTAINING ALCOHOL DO YOU HAVE ON A TYPICAL DAY WHEN YOU ARE DRINKING: 1 OR 2
HOW OFTEN DO YOU GET TOGETHER WITH FRIENDS OR RELATIVES?: PATIENT DECLINED
HOW OFTEN DO YOU HAVE A DRINK CONTAINING ALCOHOL: 2-4 TIMES A MONTH
HOW OFTEN DO YOU ATTENT MEETINGS OF THE CLUB OR ORGANIZATION YOU BELONG TO?: PATIENT DECLINED
DO YOU BELONG TO ANY CLUBS OR ORGANIZATIONS SUCH AS CHURCH GROUPS UNIONS, FRATERNAL OR ATHLETIC GROUPS, OR SCHOOL GROUPS?: NO
DO YOU BELONG TO ANY CLUBS OR ORGANIZATIONS SUCH AS CHURCH GROUPS UNIONS, FRATERNAL OR ATHLETIC GROUPS, OR SCHOOL GROUPS?: NO

## 2023-09-16 ASSESSMENT — LIFESTYLE VARIABLES
SKIP TO QUESTIONS 9-10: 0
HOW OFTEN DO YOU HAVE SIX OR MORE DRINKS ON ONE OCCASION: LESS THAN MONTHLY
HOW MANY STANDARD DRINKS CONTAINING ALCOHOL DO YOU HAVE ON A TYPICAL DAY: 1 OR 2
HOW OFTEN DO YOU HAVE A DRINK CONTAINING ALCOHOL: 2-4 TIMES A MONTH
AUDIT-C TOTAL SCORE: 3

## 2023-09-19 ENCOUNTER — OFFICE VISIT (OUTPATIENT)
Dept: MEDICAL GROUP | Facility: PHYSICIAN GROUP | Age: 63
End: 2023-09-19
Payer: COMMERCIAL

## 2023-09-19 VITALS
OXYGEN SATURATION: 95 % | WEIGHT: 268 LBS | TEMPERATURE: 98 F | HEART RATE: 103 BPM | DIASTOLIC BLOOD PRESSURE: 70 MMHG | BODY MASS INDEX: 34.39 KG/M2 | HEIGHT: 74 IN | SYSTOLIC BLOOD PRESSURE: 134 MMHG

## 2023-09-19 DIAGNOSIS — J30.9 ALLERGIC RHINITIS, UNSPECIFIED SEASONALITY, UNSPECIFIED TRIGGER: ICD-10-CM

## 2023-09-19 DIAGNOSIS — E11.8 TYPE 2 DIABETES MELLITUS WITH COMPLICATION, WITHOUT LONG-TERM CURRENT USE OF INSULIN (HCC): ICD-10-CM

## 2023-09-19 DIAGNOSIS — I10 ESSENTIAL HYPERTENSION, BENIGN: ICD-10-CM

## 2023-09-19 DIAGNOSIS — F33.1 MODERATE EPISODE OF RECURRENT MAJOR DEPRESSIVE DISORDER (HCC): ICD-10-CM

## 2023-09-19 DIAGNOSIS — Z76.89 ENCOUNTER TO ESTABLISH CARE: ICD-10-CM

## 2023-09-19 DIAGNOSIS — E03.9 ACQUIRED HYPOTHYROIDISM: ICD-10-CM

## 2023-09-19 DIAGNOSIS — F51.01 PRIMARY INSOMNIA: ICD-10-CM

## 2023-09-19 DIAGNOSIS — M77.9 TENDONITIS: ICD-10-CM

## 2023-09-19 DIAGNOSIS — E78.5 DYSLIPIDEMIA: ICD-10-CM

## 2023-09-19 PROBLEM — R03.0 ELEVATED BLOOD PRESSURE READING: Status: RESOLVED | Noted: 2018-07-14 | Resolved: 2023-09-19

## 2023-09-19 PROBLEM — L85.3 DRY SKIN: Status: RESOLVED | Noted: 2018-03-28 | Resolved: 2023-09-19

## 2023-09-19 PROBLEM — R05.9 COUGH: Status: RESOLVED | Noted: 2018-11-29 | Resolved: 2023-09-19

## 2023-09-19 PROBLEM — R07.9 CHEST PAIN: Status: RESOLVED | Noted: 2021-03-02 | Resolved: 2023-09-19

## 2023-09-19 PROBLEM — M94.0 COSTOCHONDRITIS: Status: RESOLVED | Noted: 2018-07-11 | Resolved: 2023-09-19

## 2023-09-19 PROBLEM — U07.1 PNEUMONIA DUE TO COVID-19 VIRUS: Status: RESOLVED | Noted: 2020-12-11 | Resolved: 2023-09-19

## 2023-09-19 PROBLEM — J12.82 PNEUMONIA DUE TO COVID-19 VIRUS: Status: RESOLVED | Noted: 2020-12-11 | Resolved: 2023-09-19

## 2023-09-19 PROBLEM — H93.11: Status: RESOLVED | Noted: 2021-03-23 | Resolved: 2023-09-19

## 2023-09-19 PROCEDURE — 3075F SYST BP GE 130 - 139MM HG: CPT | Performed by: NURSE PRACTITIONER

## 2023-09-19 PROCEDURE — 99214 OFFICE O/P EST MOD 30 MIN: CPT | Performed by: NURSE PRACTITIONER

## 2023-09-19 PROCEDURE — 3078F DIAST BP <80 MM HG: CPT | Performed by: NURSE PRACTITIONER

## 2023-09-19 RX ORDER — DIPHENHYDRAMINE HCL 50 MG
50 CAPSULE ORAL
COMMUNITY

## 2023-09-19 ASSESSMENT — FIBROSIS 4 INDEX: FIB4 SCORE: 0.74

## 2023-09-19 NOTE — ASSESSMENT & PLAN NOTE
Blood pressure today 134/70. Continues losartan 50 mg daily and isosorbide 30 mg every morning. He is not checking his blood pressure at home. He has been having shortness of breath, chest pain and dizziness for 2 years that happens at work. He has been to the hospital x 2 and informed he is 40% blocked but no interventions recommended until 60% blocked. Last ER 2021 for chest pain and last cardiology visit 2018.

## 2023-09-19 NOTE — ASSESSMENT & PLAN NOTE
History of tendonitis. He needs a work restriction from his primary care. 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 He has aggravated recently but work does not accept workers comp letter.
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
## 2023-09-19 NOTE — ASSESSMENT & PLAN NOTE
Continues escitalopram 30 mg daily. He has not seen psychology or psychiatry. He has difficulty with his work schedule to schedule appointments. Denies self harm or SI today.

## 2023-09-19 NOTE — ASSESSMENT & PLAN NOTE
He is taking ozempic 1 mg weekly and lantus 32 units in the evening. He has started CGM and is checking blood sugars 4-5 times a day and getting readings  during the day and at home 200-300 depending on what he eats. He is also taking glipizide 5mg twice daily and Empagliflozin-metformin 12.5-1000 mg twice daily. Last A1C 7.9%, has not seen pharmacotherapy or endocrinology. Due for A1C.

## 2023-09-19 NOTE — LETTER
[facility]  BENJA Gomez  [address]  [place] [address]  Fax: [phone]   Authorization for Release/Disclosure of   Protected Health Information   Name: LEANDRA BRYANT JR. : 1960 SSN: [id number]   Address: [address] [place] [id number] Phone:    There are no phone numbers on file.   I authorize the entity listed below to release/disclose the PHI below to:   [facility]/BENJA Gomez and BENJA Gomez   Provider or Entity Name:     Address   City, State, Zip   Phone:      Fax:     Reason for request: continuity of care   Information to be released:    [  ] LAST COLONOSCOPY,  including any PATH REPORT and follow-up  [  ] LAST FIT/COLOGUARD RESULT [  ] LAST DEXA  [  ] LAST MAMMOGRAM  [  ] LAST PAP  [  ] LAST LABS [xx] RETINA EXAM REPORT  [  ] IMMUNIZATION RECORDS  [  ] Release all info      [  ] Check here and initial the line next to each item to release ALL health information INCLUDING  _____ Care and treatment for drug and / or alcohol abuse  _____ HIV testing, infection status, or AIDS  _____ Genetic Testing    DATES OF SERVICE OR TIME PERIOD TO BE DISCLOSED: _____________  I understand and acknowledge that:  * This Authorization may be revoked at any time by you in writing, except if your health information has already been used or disclosed.  * Your health information that will be used or disclosed as a result of you signing this authorization could be re-disclosed by the recipient. If this occurs, your re-disclosed health information may no longer be protected by State or Federal laws.  * You may refuse to sign this Authorization. Your refusal will not affect your ability to obtain treatment.  * This Authorization becomes effective upon signing and will  on (date) __________.      If no date is indicated, this Authorization will  one (1) year from the signature date.    Name: Leandra Bryant JrDebo  Signature:  Date:   9/19/2023     PLEASE FAX REQUESTED RECORDS BACK TO: [phone]
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
## 2023-09-19 NOTE — LETTER
September 19, 2023    Patient: Farzad Bryant Jr.  YOB: 1960      To Whom It May Concern:      Farzad Bryant Jr. was seen at [facility] on September 19, 2023 to discuss work restrictions      Please see the information attached regarding the visit.     The following is medically recommended.      1. Due to tendonitis, not lifting more than 10 lbs with either hand.   2. Due to knee pain, allow to work with knee brace on.         Please contact me with any questions.      Thank you,  Sincerely,         ARMANDO Gomez.

## 2023-09-20 NOTE — ASSESSMENT & PLAN NOTE
Continues atorvastatin 40 mg every evening and aspirin 81 mg daily. Last labs March 2023 with controlled cholesterol.

## 2023-09-23 ENCOUNTER — HOSPITAL ENCOUNTER (OUTPATIENT)
Dept: LAB | Facility: MEDICAL CENTER | Age: 63
End: 2023-09-23
Attending: NURSE PRACTITIONER
Payer: COMMERCIAL

## 2023-09-23 DIAGNOSIS — E11.8 TYPE 2 DIABETES MELLITUS WITH COMPLICATION, WITHOUT LONG-TERM CURRENT USE OF INSULIN (HCC): ICD-10-CM

## 2023-09-23 LAB
ANION GAP SERPL CALC-SCNC: 14 MMOL/L (ref 7–16)
BUN SERPL-MCNC: 23 MG/DL (ref 8–22)
CALCIUM SERPL-MCNC: 9 MG/DL (ref 8.5–10.5)
CHLORIDE SERPL-SCNC: 105 MMOL/L (ref 96–112)
CO2 SERPL-SCNC: 21 MMOL/L (ref 20–33)
CREAT SERPL-MCNC: 1.15 MG/DL (ref 0.5–1.4)
EST. AVERAGE GLUCOSE BLD GHB EST-MCNC: 200 MG/DL
GFR SERPLBLD CREATININE-BSD FMLA CKD-EPI: 72 ML/MIN/1.73 M 2
GLUCOSE SERPL-MCNC: 204 MG/DL (ref 65–99)
HBA1C MFR BLD: 8.6 % (ref 4–5.6)
POTASSIUM SERPL-SCNC: 4.6 MMOL/L (ref 3.6–5.5)
SODIUM SERPL-SCNC: 140 MMOL/L (ref 135–145)

## 2023-09-23 PROCEDURE — 83036 HEMOGLOBIN GLYCOSYLATED A1C: CPT

## 2023-09-23 PROCEDURE — 80048 BASIC METABOLIC PNL TOTAL CA: CPT

## 2023-09-23 PROCEDURE — 36415 COLL VENOUS BLD VENIPUNCTURE: CPT

## 2023-09-24 DIAGNOSIS — E03.9 ACQUIRED HYPOTHYROIDISM: ICD-10-CM

## 2023-09-25 RX ORDER — LEVOTHYROXINE SODIUM 0.03 MG/1
25 TABLET ORAL
Qty: 90 TABLET | Refills: 3 | Status: SHIPPED | OUTPATIENT
Start: 2023-09-25 | End: 2024-02-13

## 2023-09-25 NOTE — TELEPHONE ENCOUNTER
Requested Prescriptions     Signed Prescriptions Disp Refills    levothyroxine (EUTHYROX) 25 MCG Tab 90 Tablet 3     Sig: Take 1 Tablet by mouth every morning on an empty stomach.     Authorizing Provider: SHUKRI GERMAN A.P.R.N.

## 2023-09-27 ENCOUNTER — PATIENT MESSAGE (OUTPATIENT)
Dept: MEDICAL GROUP | Facility: PHYSICIAN GROUP | Age: 63
End: 2023-09-27

## 2023-09-27 ENCOUNTER — OCCUPATIONAL MEDICINE (OUTPATIENT)
Dept: OCCUPATIONAL MEDICINE | Facility: CLINIC | Age: 63
End: 2023-09-27
Payer: COMMERCIAL

## 2023-09-27 ENCOUNTER — APPOINTMENT (OUTPATIENT)
Dept: MEDICAL GROUP | Facility: PHYSICIAN GROUP | Age: 63
End: 2023-09-27
Payer: COMMERCIAL

## 2023-09-27 VITALS
WEIGHT: 268.6 LBS | RESPIRATION RATE: 16 BRPM | SYSTOLIC BLOOD PRESSURE: 134 MMHG | HEART RATE: 102 BPM | DIASTOLIC BLOOD PRESSURE: 78 MMHG | BODY MASS INDEX: 34.47 KG/M2 | TEMPERATURE: 97.3 F | HEIGHT: 74 IN | OXYGEN SATURATION: 94 %

## 2023-09-27 DIAGNOSIS — Z53.21 PROC/TRTMT NOT CRD OUT D/T PT LV BEF SEEN BY HLTH CARE PROV: ICD-10-CM

## 2023-09-27 DIAGNOSIS — E11.8 TYPE 2 DIABETES MELLITUS WITH COMPLICATION, WITHOUT LONG-TERM CURRENT USE OF INSULIN (HCC): ICD-10-CM

## 2023-09-27 PROCEDURE — 99999 PR NO CHARGE: CPT | Performed by: NURSE PRACTITIONER

## 2023-09-27 PROCEDURE — 3078F DIAST BP <80 MM HG: CPT | Performed by: NURSE PRACTITIONER

## 2023-09-27 PROCEDURE — 3075F SYST BP GE 130 - 139MM HG: CPT | Performed by: NURSE PRACTITIONER

## 2023-09-27 ASSESSMENT — FIBROSIS 4 INDEX: FIB4 SCORE: 0.74

## 2023-09-27 NOTE — PROGRESS NOTES
Patient claim has been denied.  He is instructed to follow-up with primary care or urgent care if he continues to need restrictions or evaluation and management symptoms.  He verbalizes his understanding.

## 2023-09-28 NOTE — PROGRESS NOTES
Requested Prescriptions     Signed Prescriptions Disp Refills    Empagliflozin-metFORMIN HCl 12.5-1000 MG Tab 180 Tablet 1     Sig: Take 1 Tablet by mouth 2 (two) times a day.     Authorizing Provider: SHUKRI GERMAN A.P.R.N.

## 2023-10-12 ENCOUNTER — OFFICE VISIT (OUTPATIENT)
Dept: MEDICAL GROUP | Facility: PHYSICIAN GROUP | Age: 63
End: 2023-10-12
Payer: COMMERCIAL

## 2023-10-12 VITALS
SYSTOLIC BLOOD PRESSURE: 124 MMHG | WEIGHT: 272 LBS | TEMPERATURE: 97.6 F | BODY MASS INDEX: 34.91 KG/M2 | OXYGEN SATURATION: 94 % | HEART RATE: 88 BPM | DIASTOLIC BLOOD PRESSURE: 70 MMHG | HEIGHT: 74 IN

## 2023-10-12 DIAGNOSIS — Z79.4 TYPE 2 DIABETES MELLITUS WITH HYPERGLYCEMIA, WITH LONG-TERM CURRENT USE OF INSULIN (HCC): ICD-10-CM

## 2023-10-12 DIAGNOSIS — E11.65 TYPE 2 DIABETES MELLITUS WITH HYPERGLYCEMIA, WITH LONG-TERM CURRENT USE OF INSULIN (HCC): ICD-10-CM

## 2023-10-12 DIAGNOSIS — Z23 NEED FOR VACCINATION: ICD-10-CM

## 2023-10-12 DIAGNOSIS — I10 ESSENTIAL HYPERTENSION, BENIGN: ICD-10-CM

## 2023-10-12 PROBLEM — E11.9 DIABETES (HCC): Status: ACTIVE | Noted: 2017-09-28

## 2023-10-12 PROCEDURE — 90686 IIV4 VACC NO PRSV 0.5 ML IM: CPT | Performed by: NURSE PRACTITIONER

## 2023-10-12 PROCEDURE — 90471 IMMUNIZATION ADMIN: CPT | Performed by: NURSE PRACTITIONER

## 2023-10-12 PROCEDURE — 99214 OFFICE O/P EST MOD 30 MIN: CPT | Mod: 25 | Performed by: NURSE PRACTITIONER

## 2023-10-12 PROCEDURE — 3074F SYST BP LT 130 MM HG: CPT | Performed by: NURSE PRACTITIONER

## 2023-10-12 PROCEDURE — 3078F DIAST BP <80 MM HG: CPT | Performed by: NURSE PRACTITIONER

## 2023-10-12 ASSESSMENT — FIBROSIS 4 INDEX: FIB4 SCORE: 0.74

## 2023-10-12 NOTE — PROGRESS NOTES
Subjective:     CC: lab results     HPI:   Farzad presents today with the following:    Type 2 diabetes mellitus with hyperglycemia, with long-term current use of insulin ([facility])  A1c has increased to 8.6% from previous 7.9%.  He continues with Ozempic 1 mg every 7 days, Lantus 32 units every evening, glipizide 5 mg twice daily and empagliflozin-metformin 12.5-1000 mg twice daily. His home blood sugars are 120-140 and depends on what he eats. He limits his carbs, eats meats, and vegetables.  We will increase his Ozempic and place referral to pharmacotherapy, endocrinology and diabetes education.  He also states that his wife is started on a supplement berberine for weight loss by her provider and she has had good results.  He would like to know if he can take berberine.    Essential hypertension, benign  Recent BMP shows improved BUN with normal electrolytes and kidney function.  Blood pressure today 124/70.  Continues losartan 50 mg daily and isosorbide 30 mg every morning.       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)     oral medication    High cholesterol     Hyperlipidemia     Hypertension        Social History     Tobacco Use    Smoking status: Never    Smokeless tobacco: Former     Types: Chew     Quit date: 1/1/1986   Vaping Use    Vaping Use: Never used   Substance Use Topics    Alcohol use: Yes     Comment: 1 per month     Drug use: No       Current Outpatient Medications Ordered in Epic   Medication Sig Dispense Refill    Semaglutide, 2 MG/DOSE, 8 MG/3ML Solution Pen-injector Inject 2 mg under the skin every 7 days. 0.75 ml = 2 mg 9 mL 1    Empagliflozin-metFORMIN HCl 12.5-1000 MG Tab Take 1 Tablet by mouth 2 (two) times a day. 180 Tablet 1    levothyroxine (EUTHYROX) 25 MCG Tab Take 1 Tablet by mouth every morning on an empty stomach. 90 Tablet 3    diphenhydrAMINE  (BENADRYL) 50 MG Cap Take 50 mg by mouth at bedtime as needed.      Insulin Pen Needle (RELION PEN NEEDLE 31G/8MM) USE DAILY WITH LANTUS. 100 Each 5    LANTUS SOLOSTAR 100 UNIT/ML Solution Pen-injector injection INJECT 32 UNITS SUBCUTANEOUSLY IN THE EVENING 30 mL 5    glipiZIDE (GLUCOTROL) 5 MG Tab Take 1 tablet by mouth twice daily 180 Tablet 3    Continuous Blood Gluc Transmit (DEXCOM G6 TRANSMITTER) Misc Use multiple times a day to check blood sugars: fasting, before and after meals and for symptoms of low and high blood sugars. 1 Each 3    traZODone (DESYREL) 100 MG Tab Take 2 Tablets by mouth at bedtime as needed for Sleep. 180 Tablet 3    Continuous Blood Gluc Sensor (DEXCOM G6 SENSOR) Misc Use multiple times a day to check blood sugars: fasting, before and after meals and for symptoms of low and high blood sugars. 3 Each 11    Continuous Blood Gluc  (DEXCOM G6 ) Device Use multiple times a day to check blood sugars: fasting, before and after meals and for symptoms of low and high blood sugars. 1 Each 0    escitalopram (LEXAPRO) 10 MG Tab Take 1 Tablet by mouth every day. Take with 20 mg daily for 30 mg daily 90 Tablet 3    losartan (COZAAR) 50 MG Tab Take 1 Tablet by mouth every day. 90 Tablet 3    cetirizine (EQ ALLERGY RELIEF, CETIRIZINE,) 10 MG Tab Take 1 Tablet by mouth every day. 90 Tablet 3    isosorbide mononitrate SR (IMDUR) 30 MG TABLET SR 24 HR Take 1 Tablet by mouth every morning. 90 Tablet 3    atorvastatin (LIPITOR) 40 MG Tab Take 1 Tablet by mouth every evening. 90 Tablet 3    escitalopram (LEXAPRO) 20 MG tablet Take 1 Tablet by mouth every day. 90 Tablet 3    Multiple Vitamins-Minerals (ZINC PO) Take  by mouth.      aspirin EC (ECOTRIN) 81 MG Tablet Delayed Response Take 81 mg by mouth every day.      vitamin D (CHOLECALCIFEROL) 1000 Unit (25 mcg) Tab Take 1,000 Units by mouth every day.      acetaminophen (TYLENOL) 500 MG Tab Take 1,000 mg by mouth 2 times a day as needed for  "Fever.      Cinnamon 500 MG Cap Take 1,000 mg by mouth every morning. \      5-Hydroxytryptophan (5-HTP) 100 MG Cap Take 200 mg by mouth every bedtime.      Omega-3 Fatty Acids (FISH OIL) 1000 MG Cap capsule Take 2,000 mg by mouth every morning.      Melatonin 1 MG Cap Take 1 mg by mouth every bedtime.      therapeutic multivitamin-minerals (THERAGRAN-M) Tab Take 1 Tab by mouth every morning.       No current [facility]-ordered facility-administered medications on file.       Allergies:  Patient has no known allergies.    Health Maintenance: Reviewed      Objective:     Vital signs reviewed  Exam:  /70 (BP Location: Left arm, Patient Position: Sitting, BP Cuff Size: Adult)   Pulse 88   Temp 36.4 °C (97.6 °F) (Temporal)   Ht 1.88 m (6' 2\")   Wt 123 kg (272 lb)   SpO2 94%   BMI 34.92 kg/m²  Body mass index is 34.92 kg/m².    Gen: Alert and oriented, No apparent distress.  Eyes:   Lids normal. Glasses in place.   Lungs: Normal effort, CTA bilaterally, no wheezes, rhonchi, or rales  CV: Regular rate and rhythm. No murmurs, rubs, or gallops.  Ext: No clubbing, cyanosis, edema.      Assessment & Plan:     62 y.o. male with the following -     Discussed and reviewed lab results from 9/23/2023.    1. Type 2 diabetes mellitus with hyperglycemia, with long-term current use of insulin (HCC)  Chronic exacerbated problem.  A1c has increased and not at goal.  Referrals placed to diabetes education, pharmacotherapy and endocrinology.  We are increasing his Ozempic to 2 mg weekly.  Continue Lantus 32 units every evening, glipizide 5 mg twice daily and empagliflozin-metformin 12.5-1000 mg twice daily.  Return in 3 months for A1c.  In regards to starting berberine discussed that this is a supplement and I would not recommend at this time.  I would recommend he follow-up with diabetes education first and also informed him that the Ozempic can help with weight loss.  - Referral to Diabetic Education  - Referral to Pharmacotherapy " Service  - Referral to Endocrinology  - Semaglutide, 2 MG/DOSE, 8 MG/3ML Solution Pen-injector; Inject 2 mg under the skin every 7 days. 0.75 ml = 2 mg  Dispense: 9 mL; Refill: 1    2. Essential hypertension, benign  Chronic stable problem.  Continue losartan 50 mg daily and isosorbide 30 mg every morning.     3. Need for vaccination  Acute uncomplicated problem.  He would like his influenza vaccine today. I have placed the below orders and discussed them with an approved delegating provider. The MA is performing the below orders under the direction of Dr. Rivero.  - INFLUENZA VACCINE QUAD INJ (PF)        Return in about 3 months (around 1/12/2024) for Diabetes, A1C.    Please note that this dictation was created using voice recognition software. I have made every reasonable attempt to correct obvious errors, but I expect that there are errors of grammar and possibly content that I did not discover before finalizing the note.

## 2023-10-12 NOTE — ASSESSMENT & PLAN NOTE
Recent BMP shows improved BUN with normal electrolytes and kidney function.  Blood pressure today 124/70.  Continues losartan 50 mg daily and isosorbide 30 mg every morning.

## 2023-10-12 NOTE — ASSESSMENT & PLAN NOTE
A1c has increased to 8.6% from previous 7.9%.  He continues with Ozempic 1 mg every 7 days, Lantus 32 units every evening, glipizide 5 mg twice daily and empagliflozin-metformin 12.5-1000 mg twice daily. His home blood sugars are 120-140 and depends on what he eats. He limits his carbs, eats meats, and vegetables.  We will increase his Ozempic and place referral to pharmacotherapy, endocrinology and diabetes education.  He also states that his wife is started on a supplement berberine for weight loss by her provider and she has had good results.  He would like to know if he can take berberine.

## 2023-10-31 ENCOUNTER — NON-PROVIDER VISIT (OUTPATIENT)
Dept: VASCULAR LAB | Facility: MEDICAL CENTER | Age: 63
End: 2023-10-31
Attending: INTERNAL MEDICINE
Payer: COMMERCIAL

## 2023-10-31 VITALS — SYSTOLIC BLOOD PRESSURE: 137 MMHG | DIASTOLIC BLOOD PRESSURE: 84 MMHG | HEART RATE: 85 BPM

## 2023-10-31 DIAGNOSIS — E11.65 TYPE 2 DIABETES MELLITUS WITH HYPERGLYCEMIA, WITH LONG-TERM CURRENT USE OF INSULIN (HCC): Primary | ICD-10-CM

## 2023-10-31 DIAGNOSIS — Z79.4 TYPE 2 DIABETES MELLITUS WITH HYPERGLYCEMIA, WITH LONG-TERM CURRENT USE OF INSULIN (HCC): Primary | ICD-10-CM

## 2023-10-31 PROCEDURE — 99213 OFFICE O/P EST LOW 20 MIN: CPT

## 2023-10-31 RX ORDER — INSULIN ASPART 100 [IU]/ML
INJECTION, SOLUTION INTRAVENOUS; SUBCUTANEOUS
Qty: 15 ML | Refills: 1 | Status: SHIPPED | OUTPATIENT
Start: 2023-10-31 | End: 2023-12-21

## 2023-10-31 NOTE — PROGRESS NOTES
Patient Consult Note      Primary care physician: BENJA Gomez    Reason for consult: Management of Uncontrolled Type 2 Diabetes    HPI:  Farzad Bryant Jr. is a 62 y.o. old patient who comes in today for evaluation of above stated problem.    Allergies:  Patient has no known allergies.    Current Diabetes Medication Regimen:  GLP-1 Agent: semaglutide (Ozempic) 1 mg weekly  Sulfonylurea: glipizide 5 mg, 1 tab PO BID  Other: Empagliflozin-Metformin 12.5-1000 mg, 1 tab PO BID  Basal Insulin: Lantus 32 units SQ QPM    Previous Diabetes Medications and Reason for Discontinuation:  None    Potential Barriers to Care:  Adherence: denies missed doses  Side effects: none  Affordability: Yes    SMBG:  Pt has home glucometer and performs proper testing technique: Yes  Discussed BG Goals: FBG 80 - 130, 2hPP < 180, A1c < 7%    Pt reports blood sugars:   Before Breakfast:   Before Lunch: 100-110  Before Dinner: 100-110  Before Bedtime (an hour, to an hour and a half): 240s Highest: 300    Hypoglycemia:  Hypoglycemia awareness: Yes  Nocturnal hypoglycemia: None  Hypoglycemia:  None  Pt's treatment of Hypoglycemia  Discussed 15:15 Rule    Lifestyle:  Current Exercise: Moves a lot with job (works about 7 days in 2 weeks)  Exercise Goal: 150 mins/week    Dietary: Low carb  Breakfast: Skips  Lunch: Celery beef stick, cheese,  Dinner: Hamburgers, pork tenderloin sandwiches, chicken  Snacks: Beef stick, cookies (about 2-3 times/week depending on what son bakes)  Drinks: Mountain dew zero sugar, water, tea    Preventative Management:  BP regimen (ACEi/ARB): Losartan 50 mg QD  BP <140/90: Yes  Statin: atorvastatin (Lipitor) 40 mg daily  LDL <100: Yes  Lab Results   Component Value Date/Time    CHOLSTRLTOT 117 03/15/2023 10:59 AM    LDL 48 03/15/2023 10:59 AM    HDL 49 03/15/2023 10:59 AM    TRIGLYCERIDE 99 03/15/2023 10:59 AM       Last Microalbumin/Cr:  Lab Results   Component Value Date/Time    MALBCRT see  below 03/15/2023 10:59 AM    MICROALBUR <1.2 03/15/2023 10:59 AM     Last A1c:  Lab Results   Component Value Date/Time    HBA1C 8.6 (H) 09/23/2023 09:26 AM      Last Retinal Scan: July    Labs:  Lab Results   Component Value Date/Time    SODIUM 140 09/23/2023 09:26 AM    POTASSIUM 4.6 09/23/2023 09:26 AM    CHLORIDE 105 09/23/2023 09:26 AM    CO2 21 09/23/2023 09:26 AM    GLUCOSE 204 (H) 09/23/2023 09:26 AM    BUN 23 (H) 09/23/2023 09:26 AM    CREATININE 1.15 09/23/2023 09:26 AM     Lab Results   Component Value Date/Time    ALKPHOSPHAT 78 03/15/2023 10:59 AM    ASTSGOT 17 03/15/2023 10:59 AM    ALTSGPT 27 03/15/2023 10:59 AM    TBILIRUBIN 0.3 03/15/2023 10:59 AM    INR 1.02 07/20/2018 01:00 PM    ALBUMIN 4.3 03/15/2023 10:59 AM        Physical Examination:  Vital signs: There were no vitals taken for this visit. There is no height or weight on file to calculate BMI.    Foot Exam:  Monofilament exam: Yes  Assessment and Plan:    1. DMII  Basic physiology of DMII was explained to patient as well as microvascular/macrovascular complications. The importance of increasing physical activity to improve diabetes control was discussed with the patient. Patient was also educated on changing diet and making better choices to help control blood sugar.  Discussed Goals: FBG 80 - 130, 2hPP < 180, a1c < 7.0%   Pts. Latest A1c has increased from 7.9 (07/11/2023) to 8.6 (09/23/2023)  Pt reports he has not been able to  his 2mg Ozempic d/t drug shortage and therefore has been taking 1 mg weekly.  Pt to continue Ozempic 1mg weekly until no longer on shortage  Fasting sugar levels are within goal. Post-prandial levels have been in the 240's  Discussed and counseled on starting bolus insulin to help aid postprandial BG levels (Novolog appears to be insurance preferred)  D/t pt taking BG levels an hour- hour and a half post eating, aim for goal of <200  Pt instructed to call clinic if BG continually high post meals and will  adjust dose as needed  Pt to start Novolog only before dinner d/t pt's breakfast, lunch, and snacks with minimal to no carbs  Pending BG control and availability of Ozempic 2mg, can revisit need for bolus insulin at future visits  Pt to stop taking Glipizide d/t possible decrease of efficacy and to prevent pancreatic burnout  Pt reports taking Glipizide for several years  Medication changes:  START Insulin Aspart (Novolog) 4 units before dinner  STOP Glipizide 5 mg BID  Continue:  Semaglutide (Ozempic) 1 mg weekly  Empagliflozin-Metformin 12.5-1000 mg, 1 tab PO BID  Basal Insulin: Lantus 32 units SQ QPM     Lifestyle changes:  Instructed pt to continue low-carb diet  Increase exercise as tolerated    Follow Up:  1 months    Malina Castrejon, PharmD  Trisha Roldan, Student Pharmacist    CC:   BENJA Gomez MD

## 2023-11-06 NOTE — PROGRESS NOTES
Chief Complaint:  Consult requested by BENJA Gomez for initial evaluation of Type 2 Diabetes Mellitus    HPI:   Farzad Bryant Jr. is a 62 y.o. male was referred to endocrinology service by PCP to establish care and T2DM management    DM history:  - diagnosed about 5+ years ago, Dx on screening, weight was 310 lb (BMI = 39.8 kg/m2)  - hospitalizations for DKA/HHS/severe hyperglycemia/severe hypoglycemia in the past: none  - prior therapy: Metformin -> Synjardy, was on Glipizide -> Novolog, Ozempic, Lantus  - known DM complications: denies  - latest HbA1C 8.6% in 9/2023  -  pertinent PMHx:   -- obesity, dyslipidemia, HTN, CAD, acustic neuroma  -- denies NAFLD; PAD/CHF/CVA   - pertinent FHx:  DM 2: mother, sister     Current therapy:  Empagliflozin/Metformin 12.5/1000 mg bid  Ozempic 1 mg weekly  Lantus 32 units  Novolog 4 units TID AC    Tolerates medications: well  Compliant: yes    Prior used medications:   Glipizide 5 mg bid - stopped, after Novolog was started    Other important medications:  ASA 81 mg  Atorvastatin 40 mg  Omega-3 FA 2000 mg  Losartan 50 mg  Isosorbide mononitrate 30 mg  Levothyroxine 25 mcg  Vit D 1000 IU    Typical day:  Wakes up - 5 am on work day,  on non work days - 10-11 am   Breakfast - never eat breakfast  Lunch - 12:30 on work days,  on non work days - might skip it - veggies, 1 atkins desert  Dinner - 7:30 pm on work days, on non work days - 6:30  Snacks - sometimes, at work - 0 sugar smoked sausages, non work days - pretzels, raisen, grape tomatoes  Desserts  - every night - ice cream almond bar  Goes to sleep - work days - 9 pm, non work days - up to 1-2 am     BG control:  CGM type - DexCom G6  Period - 10/26/23 - 11/8/23  Data were reviewed and discussed with the patient  Active time - 93%  ABG - 195 +/- 75 mg/dl  GMI - 8.0%  TIR - 50%  TAR - high - 29%, very high - 20%  TBR - low - < 1%, very low < 1%    Hypoglycemic episodes:  Hypoglycemic symptoms:  yes  Hypoglycemic unawareness: NA  Treatment: sandwich  Severe hypoglycemia: no  Has medical bracelet/necklace: no  Has glucagon emergency kit: no    Exercise:  Physical work    Past Medical History:  Patient Active Problem List    Diagnosis Date Noted    Incisional hernia 06/13/2023    Irritable bowel syndrome 06/13/2023    Acoustic neuroma (HCC) 03/14/2022    Benign neoplasm of cranial nerve (HCC) 02/07/2022    Vestibular schwannoma (HCC) 12/30/2021    Hernia of anterior abdominal wall 10/16/2020    Tendonitis 03/04/2020    Recurrent pain of right knee 10/30/2019    Allergic rhinitis 11/29/2018    Atypical chest pain 07/13/2018    Abnormal nuclear cardiac imaging test 07/13/2018    Essential hypertension, benign 07/13/2018    Left wrist pain 07/12/2018    Acquired hypothyroidism 09/28/2017    Type 2 diabetes mellitus with hyperglycemia, with long-term current use of insulin ([facility]) 09/28/2017    Dyslipidemia 09/28/2017    Primary insomnia 09/28/2017    Episode of recurrent major depressive disorder ([facility]) 09/28/2017    Chronic shoulder pain 09/28/2017    Obesity (BMI 30-39.9) 09/28/2017     Past Surgical History:  Past Surgical History:   Procedure Laterality Date    ARTHROPLASTY Left     hip replacement    CHOLECYSTECTOMY        Allergies:  Patient has no known allergies.     Current Medications:  Current Outpatient Medications:     insulin aspart (NOVOLOG FLEXPEN) 100 UNIT/ML injection PEN, Inject 4 units before meals or as directed (max 50 units/day), Disp: 15 mL, Rfl: 1    Semaglutide, 2 MG/DOSE, 8 MG/3ML Solution Pen-injector, Inject 2 mg under the skin every 7 days. 0.75 ml = 2 mg, Disp: 9 mL, Rfl: 1    Empagliflozin-metFORMIN HCl 12.5-1000 MG Tab, Take 1 Tablet by mouth 2 (two) times a day., Disp: 180 Tablet, Rfl: 1    levothyroxine (EUTHYROX) 25 MCG Tab, Take 1 Tablet by mouth every morning on an empty stomach., Disp: 90 Tablet, Rfl: 3    diphenhydrAMINE (BENADRYL) 50 MG Cap, Take 50 mg by mouth at bedtime as  needed., Disp: , Rfl:     Insulin Pen Needle (RELION PEN NEEDLE 31G/8MM), USE DAILY WITH LANTUS., Disp: 100 Each, Rfl: 5    LANTUS SOLOSTAR 100 UNIT/ML Solution Pen-injector injection, INJECT 32 UNITS SUBCUTANEOUSLY IN THE EVENING, Disp: 30 mL, Rfl: 5    glipiZIDE (GLUCOTROL) 5 MG Tab, Take 1 tablet by mouth twice daily, Disp: 180 Tablet, Rfl: 3    Continuous Blood Gluc Transmit (DEXCOM G6 TRANSMITTER) Misc, Use multiple times a day to check blood sugars: fasting, before and after meals and for symptoms of low and high blood sugars., Disp: 1 Each, Rfl: 3    traZODone (DESYREL) 100 MG Tab, Take 2 Tablets by mouth at bedtime as needed for Sleep., Disp: 180 Tablet, Rfl: 3    Continuous Blood Gluc Sensor (DEXCOM G6 SENSOR) Misc, Use multiple times a day to check blood sugars: fasting, before and after meals and for symptoms of low and high blood sugars., Disp: 3 Each, Rfl: 11    Continuous Blood Gluc  (DEXCOM G6 ) Device, Use multiple times a day to check blood sugars: fasting, before and after meals and for symptoms of low and high blood sugars., Disp: 1 Each, Rfl: 0    escitalopram (LEXAPRO) 10 MG Tab, Take 1 Tablet by mouth every day. Take with 20 mg daily for 30 mg daily, Disp: 90 Tablet, Rfl: 3    losartan (COZAAR) 50 MG Tab, Take 1 Tablet by mouth every day., Disp: 90 Tablet, Rfl: 3    cetirizine (EQ ALLERGY RELIEF, CETIRIZINE,) 10 MG Tab, Take 1 Tablet by mouth every day., Disp: 90 Tablet, Rfl: 3    isosorbide mononitrate SR (IMDUR) 30 MG TABLET SR 24 HR, Take 1 Tablet by mouth every morning., Disp: 90 Tablet, Rfl: 3    atorvastatin (LIPITOR) 40 MG Tab, Take 1 Tablet by mouth every evening., Disp: 90 Tablet, Rfl: 3    escitalopram (LEXAPRO) 20 MG tablet, Take 1 Tablet by mouth every day., Disp: 90 Tablet, Rfl: 3    Multiple Vitamins-Minerals (ZINC PO), Take  by mouth., Disp: , Rfl:     aspirin EC (ECOTRIN) 81 MG Tablet Delayed Response, Take 81 mg by mouth every day., Disp: , Rfl:     vitamin D  (CHOLECALCIFEROL) 1000 Unit (25 mcg) Tab, Take 1,000 Units by mouth every day., Disp: , Rfl:     acetaminophen (TYLENOL) 500 MG Tab, Take 1,000 mg by mouth 2 times a day as needed for Fever., Disp: , Rfl:     Cinnamon 500 MG Cap, Take 1,000 mg by mouth every morning. \, Disp: , Rfl:     5-Hydroxytryptophan (5-HTP) 100 MG Cap, Take 200 mg by mouth every bedtime., Disp: , Rfl:     Omega-3 Fatty Acids (FISH OIL) 1000 MG Cap capsule, Take 2,000 mg by mouth every morning., Disp: , Rfl:     Melatonin 1 MG Cap, Take 1 mg by mouth every bedtime., Disp: , Rfl:     therapeutic multivitamin-minerals (THERAGRAN-M) Tab, Take 1 Tab by mouth every morning., Disp: , Rfl:     Social History:  Social History     Socioeconomic History    Marital status:      Spouse name: Not on file    Number of children: Not on file    Years of education: Not on file    Highest education level: Some college, no degree   Occupational History    Not on file   Tobacco Use    Smoking status: Never    Smokeless tobacco: Former     Types: Chew     Quit date: 1/1/1986   Vaping Use    Vaping Use: Never used   Substance and Sexual Activity    Alcohol use: Yes     Comment: 1 per month     Drug use: No    Sexual activity: Not on file   Other Topics Concern    Not on file   Social History Narrative    Not on file     Social Determinants of Health     Financial Resource Strain: Low Risk  (9/16/2023)    Overall Financial Resource Strain (CARDIA)     Difficulty of Paying Living Expenses: Not hard at all   Food Insecurity: No Food Insecurity (9/16/2023)    Hunger Vital Sign     Worried About Running Out of Food in the Last Year: Never true     Ran Out of Food in the Last Year: Never true   Transportation Needs: No Transportation Needs (9/16/2023)    PRAPARE - Transportation     Lack of Transportation (Medical): No     Lack of Transportation (Non-Medical): No   Physical Activity: Inactive (9/16/2023)    Exercise Vital Sign     Days of Exercise per Week: 3 days  "    Minutes of Exercise per Session: 0 min   Stress: Stress Concern Present (9/16/2023)    [place] [employer] - Occupational Stress Questionnaire     Feeling of Stress : Rather much   Social Connections: Unknown (9/16/2023)    Social Connection and Isolation Panel [NHANES]     Frequency of Communication with Friends and Family: Once a week     Frequency of Social Gatherings with Friends and Family: Patient refused     Attends Mosque Services: Never     Active Member of Clubs or Organizations: No     Attends Club or Organization Meetings: Patient refused     Marital Status:    Intimate Partner Violence: Not on file   Housing Stability: Low Risk  (9/16/2023)    Housing Stability Vital Sign     Unable to Pay for Housing in the Last Year: No     Number of Places Lived in the Last Year: 1     Unstable Housing in the Last Year: No      Family History:   Family History   Problem Relation Age of Onset    Diabetes Mother     Stroke Father        PHYSICAL EXAM:   Vital signs: /66 (BP Location: Right arm, Patient Position: Sitting, BP Cuff Size: Large adult)   Pulse (!) 102   Ht 1.88 m (6' 2\")   Wt 122 kg (269 lb)   SpO2 94%   BMI 34.54 kg/m²   GENERAL: Well-developed, well-nourished  in no apparent distress.   EYE: No ocular and eyelid asymmetry, Anicteric sclerae,  PERRL, No exophthalmos or lidlag  HENT: Hearing grossly intact, Normocephalic, atraumatic.  NECK: Supple. Trachea midline.   CARDIOVASCULAR: Regular rate and rhythm.   LUNGS: No dyspnea  ABDOMEN: Soft, nondistended  EXTREMITIES: No clubbing, cyanosis, or edema.   NEUROLOGICAL: Cranial nerves II-XII are grossly intact No visible tremor with both outstretched hands  SKIN: No rashes, lesions. Turgor is normal.    Labs:  - reviewed  HbA1C/BG/Hb:  Lab Results   Component Value Date/Time    HBA1C 8.6 (H) 09/23/2023 0926    HBA1C 7.9 (H) 07/11/2023 1109    HBA1C 6.5 (H) 03/15/2023 1059    HBA1C 6.6 (H) 11/09/2022 1135    HBA1C " 7.8 (H) 05/14/2022 0925    AVGLUC 200 09/23/2023 0926    AVGLUC 180 07/11/2023 1109    AVGLUC 140 03/15/2023 1059    AVGLUC 143 11/09/2022 1135    AVGLUC 177 05/14/2022 0925     Lab Results   Component Value Date/Time    HEMOGLOBIN 15.3 05/14/2022 09:25 AM    HEMOGLOBIN 15.8 03/05/2021 08:01 AM    HEMOGLOBIN 13.7 (L) 03/02/2021 08:00 PM     Lab Results   Component Value Date/Time    GLUCOSE 204 (H) 09/23/2023 09:26 AM    GLUCOSE 201 (H) 03/15/2023 10:59 AM    GLUCOSE 221 (H) 05/14/2022 09:25 AM    GLUCOSE 149 (H) 03/05/2021 08:01 AM    GLUCOSE 150 (H) 03/02/2021 08:00 PM     Kidney function/MACR:  Lab Results   Component Value Date/Time    CREATININE 1.15 09/23/2023 09:26 AM    CREATININE 1.10 03/15/2023 10:59 AM     Lab Results   Component Value Date/Time    IFNOTAFR >60 03/05/2021 08:01 AM    IFNOTAFR >60 03/02/2021 08:00 PM     Lab Results   Component Value Date/Time    URCREAT 66.63 03/15/2023 1059    URCREAT 58.80 05/14/2022 0925    URCREAT 88.68 10/12/2020 1048    MICROALBUR <1.2 03/15/2023 1059    MICROALBUR <1.2 05/14/2022 0925    MICROALBUR <1.2 10/12/2020 1048    MALBCRT see below 03/15/2023 1059    MALBCRT see below 05/14/2022 0925    MALBCRT see below 10/12/2020 1048     LFTs:  Lab Results   Component Value Date/Time    ASTSGOT 17 03/15/2023 10:59 AM    ASTSGOT 19 05/14/2022 09:25 AM    ASTSGOT 15 03/02/2021 08:00 PM    ALTSGPT 27 03/15/2023 10:59 AM    ALTSGPT 34 05/14/2022 09:25 AM    ALTSGPT 22 03/02/2021 08:00 PM     Lipid panel:  Lab Results   Component Value Date/Time    TRIGLYCERIDE 99 03/15/2023 10:59 AM    TRIGLYCERIDE 136 05/14/2022 09:25 AM    TRIGLYCERIDE 143 04/30/2020 11:03 AM    HDL 49 03/15/2023 10:59 AM    HDL 42 05/14/2022 09:25 AM    HDL 43 04/30/2020 11:03 AM    LDL 48 03/15/2023 10:59 AM    LDL 34 05/14/2022 09:25 AM    LDL 47 04/30/2020 11:03 AM     Hypothyroidism management:  Lab Results   Component Value Date/Time    TSHULTRASEN 2.880 03/15/2023 1059       ASSESSMENT/PLAN:   1.  Type 2 diabetes mellitus with hyperglycemia, with long-term current use of insulin (HCC)  - duration x 5+ years   - on Max doses of Metformin, SGLT-2 inhibitor, middose GLP-1 agonist, basal insulin, low dose prandial insulin  - suboptimally controlled, last HbA1C 8.6% in 9/2023, GMI ~ 8.0%     Microvascular complications: denies peripheral neuropathy, nephropathy, retinopathy  Macrovascular complications: has CAD?, denies PAD, CVA  Associated comorbidities: obesity, dyslipidemia, HTN, CAD, acustic neuroma     DM complication screening:  Labs reviewed:  MACR - neg, last checked in 3/2023  Creat/GFR , last checked in 9/2023  LDL - 48 -  at target, last checked in 3/2023     Patient is taking statin: on Atorvastatin 40 mg + Omega 3 FA 2000 mg  Patient is taking ASA: yes  Patient is taking ACE/ARB: on Losartan 50 mg     Last eye exam: 6/4/23, no DR  Last foot exam: 3/29/23, monofilament test, has bunion, denies tingling, numbness, burning sensation, lesions, calluses.     Home medications:  Empagliflozin/Metformin 12.5/1000 mg bid  Ozempic 1 mg weekly  Lantus 32 units  Novolog 4 units TID AC     Discussion:  - uncontrolled T2DM while on Metformin, SGLT-2 inhibitor, GLP-1 agonist and MDI  - main problem - postprandial hyperglycemia persisting through the night after mostly 1 meal - dinner + snaking with raisins in chocolate and pretzels  - will transition from GLP-1 agonist to GLP-1/GIP agonist, try to taper off short acting insulin, agree with stopping IVY  - discussed diet adjustment  - check residual insulin secretion     Plan:  Discussed with the patient goals for DM management:  Fasting BG 90 - 130  2 hrs postprandial  - 180  HbA1C < 7.0%     Therapy adjustments:  - start Mounjaro 5 mg weekly  - stop Ozempic 1 mg weekly  - continue Latnus 32 units     - if any Bgs < 80 -> cut down dose of insulin by 2 units  - continue Empagliflozin/Metformin 12.5/1000 mg bid    3. Switch from DexCom G6 -> Jeanie 3 (better  price, good device).  4. Recommended to split large meal to smaller ones, avoid grapes, raisins in chocolate  5. Obtain C-peptide/glucose  6. Discussed hypoglycemia management.   7. Discussed foot care    - C-PEPTIDE; Future  - Comp Metabolic Panel; Future  - Continuous Blood Gluc Sensor (FREESTYLE SANJUANITA 3 SENSOR) Misc; 1 Each every 14 days.  Dispense: 2 Each; Refill: 12    1. Hypothyroidism (acquired)  - questionable diagnosis as patient is euthyroid on very low dose of LT4 - 25 mcg/day vs weight base calculated dose - 195 mcg/day  - will check TPO-Abs  - consider stopping the medication and repeat TFTs  Plan:  Obtain TPO-Abs  Consider stopping the medication and repeat TFTs  - THYROID PEROXIDASE  (TPO) AB; Future    2. Essential hypertension  - managed by PCP  - well controlled on Losartan 50 mg  - continue current regimen    3. Dyslipidemia  - LDL is at target range for DM and CAD  - on high dose statin  - continue current therapy, check lipid panel annually    4. Vitamin D deficiency  - on vit D replacement therapy  - will repeat vit D level    RTC: 1 mo    Total time (face-to-face and non-face-to face time):  60 min - discussion of diagnoses, treatment options, prognosis, medical charts, lab review, documentation.     Plan reviewed with the patient and agreed with plan.  All questions answered to patient's satisfaction.  Thank you kindly for allowing me to participate in the diabetes care plan for this patient.    Mildred James MD    CC:   BENJA Gomez

## 2023-11-08 ENCOUNTER — TELEPHONE (OUTPATIENT)
Dept: ENDOCRINOLOGY | Facility: MEDICAL CENTER | Age: 63
End: 2023-11-08

## 2023-11-08 ENCOUNTER — OFFICE VISIT (OUTPATIENT)
Dept: MEDICAL GROUP | Facility: PHYSICIAN GROUP | Age: 63
End: 2023-11-08
Payer: COMMERCIAL

## 2023-11-08 ENCOUNTER — OFFICE VISIT (OUTPATIENT)
Dept: ENDOCRINOLOGY | Facility: MEDICAL CENTER | Age: 63
End: 2023-11-08
Attending: STUDENT IN AN ORGANIZED HEALTH CARE EDUCATION/TRAINING PROGRAM
Payer: COMMERCIAL

## 2023-11-08 VITALS
DIASTOLIC BLOOD PRESSURE: 64 MMHG | OXYGEN SATURATION: 94 % | HEART RATE: 95 BPM | SYSTOLIC BLOOD PRESSURE: 132 MMHG | HEIGHT: 74 IN | TEMPERATURE: 96.8 F | BODY MASS INDEX: 34.27 KG/M2 | WEIGHT: 267 LBS

## 2023-11-08 VITALS
DIASTOLIC BLOOD PRESSURE: 66 MMHG | HEART RATE: 102 BPM | OXYGEN SATURATION: 94 % | HEIGHT: 74 IN | WEIGHT: 269 LBS | BODY MASS INDEX: 34.52 KG/M2 | SYSTOLIC BLOOD PRESSURE: 124 MMHG

## 2023-11-08 DIAGNOSIS — E55.9 VITAMIN D DEFICIENCY: ICD-10-CM

## 2023-11-08 DIAGNOSIS — I10 ESSENTIAL HYPERTENSION, BENIGN: ICD-10-CM

## 2023-11-08 DIAGNOSIS — Z02.89 ENCOUNTER FOR COMPLETION OF FORM WITH PATIENT: ICD-10-CM

## 2023-11-08 DIAGNOSIS — E11.65 TYPE 2 DIABETES MELLITUS WITH HYPERGLYCEMIA, WITH LONG-TERM CURRENT USE OF INSULIN (HCC): ICD-10-CM

## 2023-11-08 DIAGNOSIS — Z79.4 TYPE 2 DIABETES MELLITUS WITH HYPERGLYCEMIA, WITH LONG-TERM CURRENT USE OF INSULIN (HCC): ICD-10-CM

## 2023-11-08 DIAGNOSIS — E78.5 DYSLIPIDEMIA: ICD-10-CM

## 2023-11-08 DIAGNOSIS — E03.9 HYPOTHYROIDISM (ACQUIRED): ICD-10-CM

## 2023-11-08 DIAGNOSIS — I10 ESSENTIAL HYPERTENSION: ICD-10-CM

## 2023-11-08 PROCEDURE — 3078F DIAST BP <80 MM HG: CPT | Performed by: STUDENT IN AN ORGANIZED HEALTH CARE EDUCATION/TRAINING PROGRAM

## 2023-11-08 PROCEDURE — 99213 OFFICE O/P EST LOW 20 MIN: CPT | Performed by: NURSE PRACTITIONER

## 2023-11-08 PROCEDURE — 3078F DIAST BP <80 MM HG: CPT | Performed by: NURSE PRACTITIONER

## 2023-11-08 PROCEDURE — 3075F SYST BP GE 130 - 139MM HG: CPT | Performed by: NURSE PRACTITIONER

## 2023-11-08 PROCEDURE — 99205 OFFICE O/P NEW HI 60 MIN: CPT | Performed by: STUDENT IN AN ORGANIZED HEALTH CARE EDUCATION/TRAINING PROGRAM

## 2023-11-08 PROCEDURE — 3074F SYST BP LT 130 MM HG: CPT | Performed by: STUDENT IN AN ORGANIZED HEALTH CARE EDUCATION/TRAINING PROGRAM

## 2023-11-08 RX ORDER — BLOOD-GLUCOSE SENSOR
1 EACH MISCELLANEOUS
Qty: 2 EACH | Refills: 12 | Status: SHIPPED | OUTPATIENT
Start: 2023-11-08

## 2023-11-08 RX ORDER — TIRZEPATIDE 5 MG/.5ML
5 INJECTION, SOLUTION SUBCUTANEOUS
Qty: 2 ML | Refills: 3 | Status: SHIPPED | OUTPATIENT
Start: 2023-11-08 | End: 2023-11-28

## 2023-11-08 RX ORDER — ACYCLOVIR 400 MG/1
1 TABLET ORAL
Qty: 3 EACH | Refills: 11 | Status: SHIPPED | OUTPATIENT
Start: 2023-11-08 | End: 2023-11-08

## 2023-11-08 ASSESSMENT — FIBROSIS 4 INDEX
FIB4 SCORE: 0.74
FIB4 SCORE: 0.74

## 2023-11-08 NOTE — TELEPHONE ENCOUNTER
Received PA request via [employer] for Mounjaro 5MG/0.5ML pen-injectors.    $25-2mls/28D  OLD Copay: $150.00, NEW  Copay: $25.00, Voucher Amount Applied:  $125.00    $25-6mls/84D   OLD Copay: $450.00, NEW  Copay: $25.00, Voucher Amount Applied:  $425.00    Insurance:[facility]     Pharmacy on File  [facility] [id number] [facility] [address]  Phone: [phone]    Fax: [phone]     Is patient eligible to fill with [facility]? Yes-Not sending to outreach because of limited stock at [facility]    FA-Voucher applied/Co-pay card eligible

## 2023-11-08 NOTE — ASSESSMENT & PLAN NOTE
Blood pressure today 142/70 and on repeat 132/64.  Continues losartan 50 mg daily and isosorbide 30 mg every morning.  He saw the endocrinologist earlier today and his blood pressure is 124/66.

## 2023-11-08 NOTE — PROGRESS NOTES
Subjective:     CC: Paperwork     HPI:   Farzad presents today with the following:    Paperwork   He needs ADA paperwork completed for his tendonitis in bilateral elbows. 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 He will be off work from December-April.       Essential hypertension, benign  Blood pressure today 142/70 and on repeat 132/64.  Continues losartan 50 mg daily and isosorbide 30 mg every morning.  He saw the endocrinologist earlier today and his blood pressure is 124/66.      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    Breath shortness     Depression     depression    Diabetes (HCC)     oral medication    High cholesterol     Hyperlipidemia     Hypertension        Social History     Tobacco Use    Smoking status: Never    Smokeless tobacco: Former     Types: Chew     Quit date: 1/1/1986   Vaping Use    Vaping Use: Never used   Substance Use Topics    Alcohol use: Yes     Comment: occasional - 6 pack will last 1 month    Drug use: No       Current Outpatient Medications Ordered in Epic   Medication Sig Dispense Refill    Continuous Blood Gluc Sensor (FREESTYLE SANJUANITA 3 SENSOR) Misc 1 Each every 14 days. 2 Each 12    Tirzepatide (MOUNJARO) 5 MG/0.5ML Solution Pen-injector Inject 5 mg under the skin every 7 days. 2 mL 3    insulin aspart (NOVOLOG FLEXPEN) 100 UNIT/ML injection PEN Inject 4 units before meals or as directed (max 50 units/day) 15 mL 1    Empagliflozin-metFORMIN HCl 12.5-1000 MG Tab Take 1 Tablet by mouth 2 (two) times a day. 180 Tablet 1    levothyroxine (EUTHYROX) 25 MCG Tab Take 1 Tablet by mouth every morning on an empty stomach. 90 Tablet 3    diphenhydrAMINE (BENADRYL) 50 MG Cap Take 50 mg by mouth at bedtime as needed.      Insulin Pen Needle (RELION PEN NEEDLE 31G/8MM) USE DAILY WITH LANTUS. 100 Each 5     DONNAUS SOLOSTAR 100 UNIT/ML Solution Pen-injector injection INJECT 32 UNITS SUBCUTANEOUSLY IN THE EVENING 30 mL 5    Continuous Blood Gluc Transmit (DEXCOM G6 TRANSMITTER) Misc Use multiple times a day to check blood sugars: fasting, before and after meals and for symptoms of low and high blood sugars. 1 Each 3    traZODone (DESYREL) 100 MG Tab Take 2 Tablets by mouth at bedtime as needed for Sleep. 180 Tablet 3    Continuous Blood Gluc  (DEXCOM G6 ) Device Use multiple times a day to check blood sugars: fasting, before and after meals and for symptoms of low and high blood sugars. (Patient taking differently: Pt will be taking trish 3 soon) 1 Each 0    escitalopram (LEXAPRO) 10 MG Tab Take 1 Tablet by mouth every day. Take with 20 mg daily for 30 mg daily 90 Tablet 3    losartan (COZAAR) 50 MG Tab Take 1 Tablet by mouth every day. 90 Tablet 3    cetirizine (EQ ALLERGY RELIEF, CETIRIZINE,) 10 MG Tab Take 1 Tablet by mouth every day. 90 Tablet 3    isosorbide mononitrate SR (IMDUR) 30 MG TABLET SR 24 HR Take 1 Tablet by mouth every morning. 90 Tablet 3    atorvastatin (LIPITOR) 40 MG Tab Take 1 Tablet by mouth every evening. 90 Tablet 3    escitalopram (LEXAPRO) 20 MG tablet Take 1 Tablet by mouth every day. 90 Tablet 3    Multiple Vitamins-Minerals (ZINC PO) Take  by mouth.      aspirin EC (ECOTRIN) 81 MG Tablet Delayed Response Take 81 mg by mouth every day.      vitamin D (CHOLECALCIFEROL) 1000 Unit (25 mcg) Tab Take 1,000 Units by mouth every day.      acetaminophen (TYLENOL) 500 MG Tab Take 1,000 mg by mouth 2 times a day as needed for Fever.      Cinnamon 500 MG Cap Take 1,000 mg by mouth every morning. \      5-Hydroxytryptophan (5-HTP) 100 MG Cap Take 200 mg by mouth every bedtime.      Omega-3 Fatty Acids (FISH OIL) 1000 MG Cap capsule Take 2,000 mg by mouth every morning.      Melatonin 1 MG Cap Take 1 mg by mouth every bedtime.      therapeutic multivitamin-minerals (THERAGRAN-M) Tab Take 1  "Tab by mouth every morning.      glipiZIDE (GLUCOTROL) 5 MG Tab Take 1 tablet by mouth twice daily (Patient not taking: Reported on 11/8/2023) 180 Tablet 3     No current Epic-ordered facility-administered medications on file.       Allergies:  Patient has no known allergies.    Health Maintenance: reviewed      Objective:     Vital signs reviewed  Exam:  /64 (BP Location: Left arm, Patient Position: Sitting)   Pulse 95   Temp 36 °C (96.8 °F) (Temporal)   Ht 1.88 m (6' 2\")   Wt 121 kg (267 lb)   SpO2 94%   BMI 34.28 kg/m²  Body mass index is 34.28 kg/m².    Gen: Alert and oriented, No apparent distress.  Eyes:   Lids normal. Glasses in place.   Lungs: Normal effort, no audible wheezes  CV: Skin pink, warm and dry.  Ext: No clubbing, cyanosis, edema.      Assessment & Plan:     62 y.o. male with the following -     1. Encounter for completion of form with patient  Acute uncomplicated problem.  ADA accommodation paperwork filled out today.  We faxed the paperwork for patient today.    2. Essential hypertension, benign  Chronic stable problem.  On repeat by me his blood pressure is 132/64.  Continue losartan 50 mg daily and Isosorbide 30 mg every morning.       Return if symptoms worsen or fail to improve.    Please note that this dictation was created using voice recognition software. I have made every reasonable attempt to correct obvious errors, but I expect that there are errors of grammar and possibly content that I did not discover before finalizing the note.        "

## 2023-11-21 ENCOUNTER — TELEPHONE (OUTPATIENT)
Dept: VASCULAR LAB | Facility: MEDICAL CENTER | Age: 63
End: 2023-11-21
Payer: COMMERCIAL

## 2023-11-21 NOTE — TELEPHONE ENCOUNTER
Called patient to discuss BG readings, however patient was unavailable to discuss as he was at work. Reminded to keep f/u appt on 11/28/23 to discuss BG readings and DM medications. Patient agrees.    Malina Castrejon, CathyD, BCACP

## 2023-11-22 ENCOUNTER — HOSPITAL ENCOUNTER (OUTPATIENT)
Dept: LAB | Facility: MEDICAL CENTER | Age: 63
End: 2023-11-22
Attending: STUDENT IN AN ORGANIZED HEALTH CARE EDUCATION/TRAINING PROGRAM
Payer: COMMERCIAL

## 2023-11-22 DIAGNOSIS — Z79.4 TYPE 2 DIABETES MELLITUS WITH HYPERGLYCEMIA, WITH LONG-TERM CURRENT USE OF INSULIN (HCC): ICD-10-CM

## 2023-11-22 DIAGNOSIS — E03.9 HYPOTHYROIDISM (ACQUIRED): ICD-10-CM

## 2023-11-22 DIAGNOSIS — E55.9 VITAMIN D DEFICIENCY: ICD-10-CM

## 2023-11-22 DIAGNOSIS — E11.65 TYPE 2 DIABETES MELLITUS WITH HYPERGLYCEMIA, WITH LONG-TERM CURRENT USE OF INSULIN (HCC): ICD-10-CM

## 2023-11-22 LAB
25(OH)D3 SERPL-MCNC: 61 NG/ML (ref 30–100)
ALBUMIN SERPL BCP-MCNC: 4.5 G/DL (ref 3.2–4.9)
ALBUMIN/GLOB SERPL: 1.8 G/DL
ALP SERPL-CCNC: 76 U/L (ref 30–99)
ALT SERPL-CCNC: 30 U/L (ref 2–50)
ANION GAP SERPL CALC-SCNC: 14 MMOL/L (ref 7–16)
AST SERPL-CCNC: 21 U/L (ref 12–45)
BILIRUB SERPL-MCNC: 0.3 MG/DL (ref 0.1–1.5)
BUN SERPL-MCNC: 25 MG/DL (ref 8–22)
CALCIUM ALBUM COR SERPL-MCNC: 8.5 MG/DL (ref 8.5–10.5)
CALCIUM SERPL-MCNC: 8.9 MG/DL (ref 8.5–10.5)
CHLORIDE SERPL-SCNC: 104 MMOL/L (ref 96–112)
CO2 SERPL-SCNC: 20 MMOL/L (ref 20–33)
CREAT SERPL-MCNC: 0.98 MG/DL (ref 0.5–1.4)
FASTING STATUS PATIENT QL REPORTED: NORMAL
GFR SERPLBLD CREATININE-BSD FMLA CKD-EPI: 87 ML/MIN/1.73 M 2
GLOBULIN SER CALC-MCNC: 2.5 G/DL (ref 1.9–3.5)
GLUCOSE SERPL-MCNC: 205 MG/DL (ref 65–99)
POTASSIUM SERPL-SCNC: 4.3 MMOL/L (ref 3.6–5.5)
PROT SERPL-MCNC: 7 G/DL (ref 6–8.2)
SODIUM SERPL-SCNC: 138 MMOL/L (ref 135–145)
THYROPEROXIDASE AB SERPL-ACNC: 18 IU/ML (ref 0–9)

## 2023-11-22 PROCEDURE — 80053 COMPREHEN METABOLIC PANEL: CPT

## 2023-11-22 PROCEDURE — 36415 COLL VENOUS BLD VENIPUNCTURE: CPT

## 2023-11-22 PROCEDURE — 82306 VITAMIN D 25 HYDROXY: CPT

## 2023-11-22 PROCEDURE — 84681 ASSAY OF C-PEPTIDE: CPT

## 2023-11-22 PROCEDURE — 86376 MICROSOMAL ANTIBODY EACH: CPT

## 2023-11-25 LAB — C PEPTIDE SERPL-MCNC: 2.2 NG/ML (ref 0.5–3.3)

## 2023-11-28 ENCOUNTER — NON-PROVIDER VISIT (OUTPATIENT)
Dept: VASCULAR LAB | Facility: MEDICAL CENTER | Age: 63
End: 2023-11-28
Attending: INTERNAL MEDICINE
Payer: COMMERCIAL

## 2023-11-28 ENCOUNTER — TELEPHONE (OUTPATIENT)
Dept: VASCULAR LAB | Facility: MEDICAL CENTER | Age: 63
End: 2023-11-28

## 2023-11-28 DIAGNOSIS — E11.65 TYPE 2 DIABETES MELLITUS WITH HYPERGLYCEMIA, WITH LONG-TERM CURRENT USE OF INSULIN (HCC): ICD-10-CM

## 2023-11-28 DIAGNOSIS — Z79.4 TYPE 2 DIABETES MELLITUS WITH HYPERGLYCEMIA, WITH LONG-TERM CURRENT USE OF INSULIN (HCC): ICD-10-CM

## 2023-11-28 PROCEDURE — 99212 OFFICE O/P EST SF 10 MIN: CPT

## 2023-11-28 RX ORDER — TIRZEPATIDE 7.5 MG/.5ML
0.5 INJECTION, SOLUTION SUBCUTANEOUS
Qty: 2 ML | Refills: 5 | Status: SHIPPED | OUTPATIENT
Start: 2023-11-28 | End: 2023-12-06

## 2023-11-28 NOTE — TELEPHONE ENCOUNTER
Received New start PA request via [employer]  for MOUNJARO 7.5 MG/0.5ML Solution Pen-injector. (Quantity: 6 mls, Day Supply:84)     Insurance: [facility]  Member ID:  [id number]  BIN: [id number]  PCN: [id number]  [facility]     Ran Test claim via [place] & medication Pays for a $25 FOR 28 OR 84 DS copay. Will outreach to patient to offer specialty pharmacy services and or release to preferred pharmacy    MADAY Hlil, PhT  Pharmacy Liaison (Rx Coordinator)  P: [phone]  11/28/2023 11:41 AM

## 2023-11-28 NOTE — PROGRESS NOTES
Patient Consult Note      Primary care physician: BENJA Gomez    Reason for consult: Management of Uncontrolled Type 2 Diabetes    HPI:  Farzad Bryant Jr. is a 62 y.o. old patient who comes in today for evaluation of above stated problem.    Allergies:  Patient has no known allergies.    Current Diabetes Medication Regimen:  GLP-1 Agent: Mounjaro 5mg/0.5mL Q 7 days   Other: Empagliflozin-Metformin 12.5-1000 mg, 1 tab PO BID  Basal Insulin: Lantus 32 units SQ QPM  Prandial Insulin: Novolog 8 units before dinner     Previous Diabetes Medications and Reason for Discontinuation:  Ozempic 1mg - switched to Mounjaro via endo d/t shortage     Potential Barriers to Care:  Adherence: denies missed doses  Side effects: none  Affordability: Yes, but recently retired and has to transition to wife's insurance.     SMBG:  Pt has home glucometer and performs proper testing technique: Yes  Discussed BG Goals: FBG 80 - 130, 2hPP < 180, A1c < 7%    Pt reports blood sugars:   Before Breakfast: 140-150  Before Lunch: 160-170  Before Dinner: 160-170  Before Bedtime (an hour, to an hour and a half): 160-170    Hypoglycemia:  Hypoglycemia awareness: Yes  Nocturnal hypoglycemia: None  Hypoglycemia:  Experiences s/s of hypoglycemia at <115 and treated with a glass of orange juice.   Pt's treatment of Hypoglycemia  Discussed 15:15 Rule    Lifestyle:  Current Exercise: Recently retired and trying to figure out a new walking routine. Moving in the Spring.   Exercise Goal: 60 minutes/week    Dietary: Low carb  Breakfast: Skips  Lunch: Palmer with meat and cheese with low carb bread   Dinner: Beef or chicken with some sort of vegetable.   Snacks: Nuts, cheese, gets cravings at night which resorts to a low carb ice cream bar or atkins candy bar, grape tomatoes    Drinks: Mountain dew zero sugar, maybe one bottle of water a day, unsweetened ice tea    Preventative Management:  BP regimen (ACEi/ARB): Losartan 50 mg QD  BP  "<140/90: Yes  Statin: Atorvastatin (Lipitor) 40 mg daily  LDL <100: Yes  Lab Results   Component Value Date/Time    CHOLSTRLTOT 117 03/15/2023 10:59 AM    LDL 48 03/15/2023 10:59 AM    HDL 49 03/15/2023 10:59 AM    TRIGLYCERIDE 99 03/15/2023 10:59 AM       Last Microalbumin/Cr:  Lab Results   Component Value Date/Time    MALBCRT see below 03/15/2023 10:59 AM    MICROALBUR <1.2 03/15/2023 10:59 AM     Last A1c:  Lab Results   Component Value Date/Time    HBA1C 8.6 (H) 09/23/2023 09:26 AM        Labs:  Lab Results   Component Value Date/Time    SODIUM 138 11/22/2023 11:28 AM    POTASSIUM 4.3 11/22/2023 11:28 AM    CHLORIDE 104 11/22/2023 11:28 AM    CO2 20 11/22/2023 11:28 AM    GLUCOSE 205 (H) 11/22/2023 11:28 AM    BUN 25 (H) 11/22/2023 11:28 AM    CREATININE 0.98 11/22/2023 11:28 AM     Lab Results   Component Value Date/Time    ALKPHOSPHAT 76 11/22/2023 11:28 AM    ASTSGOT 21 11/22/2023 11:28 AM    ALTSGPT 30 11/22/2023 11:28 AM    TBILIRUBIN 0.3 11/22/2023 11:28 AM    INR 1.02 07/20/2018 01:00 PM    ALBUMIN 4.5 11/22/2023 11:28 AM        Physical Examination:  Vital signs: There were no vitals taken for this visit.     Foot Exam:  Monofilament exam: Completed 6 months ago     Eye Exam:   Completed in July 2023    Assessment and Plan:    1. DMII  Discussed Goals: FBG 80 - 130, 2hPP < 180, a1c < 7.0%   Pts. Latest A1c has increased from 7.9 (07/11/2023) to 8.6 (09/23/2023)  Due to Ozempic shortage, pt was switched to Mounjaro 5 mg/0.5mL. Pt received two doses of Mounjaro the last 2 weeks therefore, eligible to titrate up to 7.5 mg/0.5mL today.   Morning fasting sugars are still elevated between 140s -150s. Pt reported sugars before dinner are also still elevated and therefore, increased Novolog from 4 units before dinner to 8 units before dinner because \"4 was not enough\".   Pt stated he is tolerating regimen well and not experiencing any side effects at this time.   Counseled patient on the importance on portion " control to avoid side effects while using Mounjaro.   Pt stated he has trouble with before bed cravings, but educated pt on healthier options instead of cookies and cake baked by his son.   Pt agrees with new regimen.   Medication changes:  STOP: Mounjaro 5 mg/0.5mL Q7 days   START: Mounjaro 7.5 mg/0.5mL Q7 days   Continue:  Empagliflozin-Metformin 12.5-1000 mg, 1 tab PO BID  Basal Insulin: Lantus 32 units SQ QPM   Preprandial Insulin: Novolog 8 units before dinner     Lifestyle changes:  Diet: Limit baked goods intake. Continue to substitute snacks for healthier options.  Exercise: Increase as tolerated    Follow Up:  1 months    Lexi Johnson Student Pharmacist  Sandra Julien, PharmD      CC:   BENJA Gomez MD

## 2023-11-28 NOTE — TELEPHONE ENCOUNTER
Received an EMR message from [facility] to release Rx to Pharmacy on file: [facility] #[id number]--[address] [place] [id number]     MADAY Hill, PhT  Pharmacy Liaison (Rx Coordinator)  P: [phone]  11/28/2023 12:03 PM

## 2023-12-03 NOTE — PROGRESS NOTES
Follow up Endocrinology Visit  Initial consult/last visit on: 11/8/23  Referred by: BENJA Gomez     Chief complaint:  Farzad Bryant Jr. Is a very pleasant 63 y.o. male, who is here for follow up for T2DM management.     Interval history:   - during the last visit we transitioned patient from Ozempic to Mounjaro, which he increased from 5 mg -> 7.5 mg/week, tolerates well  - he still had to increase dose of prandial insulin for better glycemic control  - reviewed recent labs and CGM report  - he just recently retired, changed his insurance to [facility] [employer]YL[employer] from his wife    Current therapy:  Empagliflozin/Metformin 12.5/1000 mg bid  Mounjaro 5 mg -> 7.5 mg/week (7.5 mg/week was just given once)  Lantus 32 units  Novolog 4 -> 8 - 10  units TID AC    Tolerates medications: well  Compliant: yes    Prior used medications:   Glipizide 5 mg bid - stopped, after Novolog was started    Other important medications:  ASA 81 mg  Atorvastatin 40 mg  Omega-3 FA 2000 mg  Losartan 50 mg  Isosorbide mononitrate 30 mg  Levothyroxine 25 mcg  Vit D 1000 IU    Typical day:  Wakes up - 5 am on work day,  on non work days - 10-11 am   Breakfast - never eat breakfast  Lunch - 12:30 on work days,  on non work days - might skip it - veggies, 1 atkins desert  Dinner - 7:30 pm on work days, on non work days - 6:30  Snacks - sometimes, at work - 0 sugar smoked sausages, non work days - pretzels, raisen, grape tomatoes  Desserts  - every night - ice cream almond bar  Goes to sleep - work days - 9 pm, non work days - up to 1-2 am     BG control:  CGM type - DexCom G6 -> Jeanie 3   Period - 10/26/23 - 11/8/23 -> 11/23/23 - 12/6/23  Data were reviewed and discussed with the patient  Active time - 93 -> 97%  ABG - 195 -> 178 mg/dl  GV - 34.5%  GMI - 8.0 -> 7.6%  TIR - 50 -> 71%  TAR - high - 29 -> 19%, very high - 20 ->10%  TBR - low - < 1 ->0%, very low < 1 ->0%    Hypoglycemic episodes:  Hypoglycemic symptoms:  yes  Hypoglycemic unawareness: NA  Treatment: sandwich  Severe hypoglycemia: no  Has medical bracelet/necklace: no  Has glucagon emergency kit: no    Exercise:  Physical work      DM history:  - diagnosed about 5+ years ago, Dx on screening, weight was 310 lb (BMI = 39.8 kg/m2)  - hospitalizations for DKA/HHS/severe hyperglycemia/severe hypoglycemia in the past: none  - prior therapy: Metformin -> Synjardy, was on Glipizide -> Novolog, Ozempic, Lantus  - known DM complications: denies  - latest HbA1C 8.6% in 9/2023  -  pertinent PMHx:   -- obesity, dyslipidemia, HTN, CAD, acustic neuroma  -- denies NAFLD; PAD/CHF/CVA   - pertinent FHx:  DM 2: mother, sister     Current Medications:  Current Outpatient Medications:     Tirzepatide (MOUNJARO) 7.5 MG/0.5ML Solution Pen-injector, Inject 0.5 mL under the skin every 7 days., Disp: 2 mL, Rfl: 5    Continuous Blood Gluc Sensor (FREESTYLE SANJUANITA 3 SENSOR) Misc, 1 Each every 14 days., Disp: 2 Each, Rfl: 12    insulin aspart (NOVOLOG FLEXPEN) 100 UNIT/ML injection PEN, Inject 4 units before meals or as directed (max 50 units/day), Disp: 15 mL, Rfl: 1    Empagliflozin-metFORMIN HCl 12.5-1000 MG Tab, Take 1 Tablet by mouth 2 (two) times a day., Disp: 180 Tablet, Rfl: 1    levothyroxine (EUTHYROX) 25 MCG Tab, Take 1 Tablet by mouth every morning on an empty stomach., Disp: 90 Tablet, Rfl: 3    diphenhydrAMINE (BENADRYL) 50 MG Cap, Take 50 mg by mouth at bedtime as needed., Disp: , Rfl:     Insulin Pen Needle (RELION PEN NEEDLE 31G/8MM), USE DAILY WITH LANTUS., Disp: 100 Each, Rfl: 5    LANTUS SOLOSTAR 100 UNIT/ML Solution Pen-injector injection, INJECT 32 UNITS SUBCUTANEOUSLY IN THE EVENING, Disp: 30 mL, Rfl: 5    glipiZIDE (GLUCOTROL) 5 MG Tab, Take 1 tablet by mouth twice daily (Patient not taking: Reported on 11/8/2023), Disp: 180 Tablet, Rfl: 3    Continuous Blood Gluc Transmit (DEXCOM G6 TRANSMITTER) Misc, Use multiple times a day to check blood sugars: fasting, before and  after meals and for symptoms of low and high blood sugars., Disp: 1 Each, Rfl: 3    traZODone (DESYREL) 100 MG Tab, Take 2 Tablets by mouth at bedtime as needed for Sleep., Disp: 180 Tablet, Rfl: 3    Continuous Blood Gluc  (DEXCOM G6 ) Device, Use multiple times a day to check blood sugars: fasting, before and after meals and for symptoms of low and high blood sugars. (Patient taking differently: Pt will be taking trish 3 soon), Disp: 1 Each, Rfl: 0    escitalopram (LEXAPRO) 10 MG Tab, Take 1 Tablet by mouth every day. Take with 20 mg daily for 30 mg daily, Disp: 90 Tablet, Rfl: 3    losartan (COZAAR) 50 MG Tab, Take 1 Tablet by mouth every day., Disp: 90 Tablet, Rfl: 3    cetirizine (EQ ALLERGY RELIEF, CETIRIZINE,) 10 MG Tab, Take 1 Tablet by mouth every day., Disp: 90 Tablet, Rfl: 3    isosorbide mononitrate SR (IMDUR) 30 MG TABLET SR 24 HR, Take 1 Tablet by mouth every morning., Disp: 90 Tablet, Rfl: 3    atorvastatin (LIPITOR) 40 MG Tab, Take 1 Tablet by mouth every evening., Disp: 90 Tablet, Rfl: 3    escitalopram (LEXAPRO) 20 MG tablet, Take 1 Tablet by mouth every day., Disp: 90 Tablet, Rfl: 3    Multiple Vitamins-Minerals (ZINC PO), Take  by mouth., Disp: , Rfl:     aspirin EC (ECOTRIN) 81 MG Tablet Delayed Response, Take 81 mg by mouth every day., Disp: , Rfl:     vitamin D (CHOLECALCIFEROL) 1000 Unit (25 mcg) Tab, Take 1,000 Units by mouth every day., Disp: , Rfl:     acetaminophen (TYLENOL) 500 MG Tab, Take 1,000 mg by mouth 2 times a day as needed for Fever., Disp: , Rfl:     Cinnamon 500 MG Cap, Take 1,000 mg by mouth every morning. \, Disp: , Rfl:     5-Hydroxytryptophan (5-HTP) 100 MG Cap, Take 200 mg by mouth every bedtime., Disp: , Rfl:     Omega-3 Fatty Acids (FISH OIL) 1000 MG Cap capsule, Take 2,000 mg by mouth every morning., Disp: , Rfl:     Melatonin 1 MG Cap, Take 1 mg by mouth every bedtime., Disp: , Rfl:     therapeutic multivitamin-minerals (THERAGRAN-M) Tab, Take 1 Tab by  "mouth every morning., Disp: , Rfl:     PHYSICAL EXAM:   Vital signs: /62 (BP Location: Right arm, Patient Position: Sitting, BP Cuff Size: Large adult)   Pulse (!) 111   Ht 1.88 m (6' 2\")   Wt 121 kg (266 lb)   SpO2 93%   BMI 34.15 kg/m²   GENERAL: Well-developed, well-nourished  in no apparent distress.   EYE: No ocular and eyelid asymmetry, Anicteric sclerae,  PERRL, No exophthalmos or lidlag  HENT: Hearing grossly intact, Normocephalic, atraumatic.  NECK: Supple. Trachea midline.   CARDIOVASCULAR: Regular rate and rhythm.   LUNGS: No dyspnea  ABDOMEN: Soft, nondistended  EXTREMITIES: No clubbing, cyanosis, or edema.   NEUROLOGICAL: Cranial nerves II-XII are grossly intact No visible tremor with both outstretched hands  SKIN: No rashes, lesions. Turgor is normal.    Labs:  MOST RECENT LABS:  - reviewed          PRIOR PERTINENT LABS:  - reviewed  HbA1C/BG/Hb:  Lab Results   Component Value Date/Time    HBA1C 8.6 (H) 09/23/2023 0926    HBA1C 7.9 (H) 07/11/2023 1109    HBA1C 6.5 (H) 03/15/2023 1059    HBA1C 6.6 (H) 11/09/2022 1135    HBA1C 7.8 (H) 05/14/2022 0925    AVGLUC 200 09/23/2023 0926    AVGLUC 180 07/11/2023 1109    AVGLUC 140 03/15/2023 1059    AVGLUC 143 11/09/2022 1135    AVGLUC 177 05/14/2022 0925     Lab Results   Component Value Date/Time    HEMOGLOBIN 15.3 05/14/2022 09:25 AM    HEMOGLOBIN 15.8 03/05/2021 08:01 AM    HEMOGLOBIN 13.7 (L) 03/02/2021 08:00 PM     Lab Results   Component Value Date/Time    GLUCOSE 205 (H) 11/22/2023 11:28 AM    GLUCOSE 204 (H) 09/23/2023 09:26 AM    GLUCOSE 201 (H) 03/15/2023 10:59 AM    GLUCOSE 221 (H) 05/14/2022 09:25 AM    GLUCOSE 149 (H) 03/05/2021 08:01 AM     Kidney function/MACR:  Lab Results   Component Value Date/Time    CREATININE 0.98 11/22/2023 11:28 AM    CREATININE 1.15 09/23/2023 09:26 AM     Lab Results   Component Value Date/Time    IFNOTAFR >60 03/05/2021 08:01 AM    IFNOTAFR >60 03/02/2021 08:00 PM     Lab Results   Component Value Date/Time "    URCREAT 66.63 03/15/2023 1059    URCREAT 58.80 05/14/2022 0925    URCREAT 88.68 10/12/2020 1048    MICROALBUR <1.2 03/15/2023 1059    MICROALBUR <1.2 05/14/2022 0925    MICROALBUR <1.2 10/12/2020 1048    MALBCRT see below 03/15/2023 1059    MALBCRT see below 05/14/2022 0925    MALBCRT see below 10/12/2020 1048     LFTs:  Lab Results   Component Value Date/Time    ASTSGOT 21 11/22/2023 11:28 AM    ASTSGOT 17 03/15/2023 10:59 AM    ASTSGOT 19 05/14/2022 09:25 AM    ALTSGPT 30 11/22/2023 11:28 AM    ALTSGPT 27 03/15/2023 10:59 AM    ALTSGPT 34 05/14/2022 09:25 AM     Lipid panel:  Lab Results   Component Value Date/Time    TRIGLYCERIDE 99 03/15/2023 10:59 AM    TRIGLYCERIDE 136 05/14/2022 09:25 AM    TRIGLYCERIDE 143 04/30/2020 11:03 AM    HDL 49 03/15/2023 10:59 AM    HDL 42 05/14/2022 09:25 AM    HDL 43 04/30/2020 11:03 AM    LDL 48 03/15/2023 10:59 AM    LDL 34 05/14/2022 09:25 AM    LDL 47 04/30/2020 11:03 AM     Hypothyroidism management:  Lab Results   Component Value Date/Time    TSHULTRASEN 2.880 03/15/2023 1059       ASSESSMENT/PLAN:   1. Type 2 diabetes mellitus with hyperglycemia, with long-term current use of insulin (HCC)  - duration x 5+ years   - on Max doses of Metformin, SGLT-2 inhibitor, middose GLP-1/GIP agonist, basal insulin,  prandial insulin  - suboptimally controlled, last HbA1C 8.6% in 9/2023, GMI 8.0 -> 7.6%, TIR - 71%     Microvascular complications: denies peripheral neuropathy, nephropathy, retinopathy  Macrovascular complications: has CAD?, denies PAD, CVA  Associated comorbidities: obesity, dyslipidemia, HTN, CAD, acustic neuroma     DM complication screening:  Labs reviewed:  MACR - neg, last checked in 3/2023  Creat/GFR wnl, last checked in 9/2023  LDL - 48 -  at target, last checked in 3/2023     Patient is taking statin: on Atorvastatin 40 mg + Omega 3 FA 2000 mg  Patient is taking ASA: yes  Patient is taking ACE/ARB: on Losartan 50 mg     Last eye exam: 6/4/23, no DR  Last foot  exam: 3/29/23, monofilament test, has bunion, denies tingling, numbness, burning sensation, lesions, calluses.     Home medications:  Empagliflozin/Metformin 12.5/1000 mg bid  Mounjaro 5 mg -> 7.5 mg/week (7.5 mg/week was just given once)  Lantus 32 units  Novolog 4 -> 8 - 10  units TID AC     Discussion:  - glycemic control improved  - insulin secretion is preserved even somewhat decreased  - will further up titrate Mounjaro with hope of weaning off prandial insulin    Prior notes  - uncontrolled T2DM while on Metformin, SGLT-2 inhibitor, GLP-1 agonist and MDI  - main problem - postprandial hyperglycemia persisting through the night after mostly 1 meal - dinner + snaking with raisins in chocolate and pretzels  - will transition from GLP-1 agonist to GLP-1/GIP agonist, try to taper off short acting insulin, agree with stopping IVY  - discussed diet adjustment  - check residual insulin secretion     Plan:  Discussed with the patient goals for DM management:  Fasting BG 90 - 130  2 hrs postprandial  - 180  HbA1C < 7.0%     Therapy adjustments:  - increase Mounjaro 7.5  -> 10 mg weekly     -- given sample of Mounjaro 2.5 mg to add to 7.5 while he is in transition to   - continue Latnus 32 units  - continue Novolog 8-10 units AC TID    - consider decrease dose if BG are borderline low  - continue Empagliflozin/Metformin 12.5/1000 mg bid    3.Continue Jeanie 3.  4. Recommended to split large meal to smaller ones, avoid grapes, raisins in chocolate  5. Discussed hypoglycemia management - during the prior visit  6. Discussed foot care  - during the prior visit      1. Hypothyroidism (acquired)  - questionable diagnosis as patient is euthyroid on very low dose of LT4 - 25 mcg/day vs weight base calculated dose - 195 mcg/day  - TPO-Abs elevated, repeat again TFTs, if still euthyroid -> stop  the medication, but informed the patient with with elevated TPO-Abs he is at higher risk of developing hypothyroidism in a  future  Plan:  Obtain TSH with fT4  If euthyroid -> stop LT4 -> repeat TFTs in 2 mo    2. Essential hypertension  - managed by PCP  - well controlled on Losartan 50 mg  - continue current regimen    3. Dyslipidemia  - LDL is at target range for DM and CAD  - on high dose statin  - continue current therapy, check lipid panel annually    4. Vitamin D deficiency  - on vit D replacement therapy  - will repeat vit D level    RTC: 3 mo    Total time (face-to-face and non-face-to face time):  30 min - discussion of diagnoses, treatment options, prognosis, medical charts, lab review, documentation.     Plan reviewed with the patient and agreed with plan.  All questions answered to patient's satisfaction.  Thank you kindly for allowing me to participate in the diabetes care plan for this patient.    Mildred James MD    CC:   BENJA Gomez

## 2023-12-06 ENCOUNTER — PHARMACY VISIT (OUTPATIENT)
Dept: PHARMACY | Facility: MEDICAL CENTER | Age: 63
End: 2023-12-06
Payer: COMMERCIAL

## 2023-12-06 ENCOUNTER — OFFICE VISIT (OUTPATIENT)
Dept: ENDOCRINOLOGY | Facility: MEDICAL CENTER | Age: 63
End: 2023-12-06
Attending: STUDENT IN AN ORGANIZED HEALTH CARE EDUCATION/TRAINING PROGRAM
Payer: COMMERCIAL

## 2023-12-06 VITALS
DIASTOLIC BLOOD PRESSURE: 62 MMHG | SYSTOLIC BLOOD PRESSURE: 104 MMHG | WEIGHT: 266 LBS | BODY MASS INDEX: 34.14 KG/M2 | HEART RATE: 111 BPM | OXYGEN SATURATION: 93 % | HEIGHT: 74 IN

## 2023-12-06 DIAGNOSIS — E11.65 TYPE 2 DIABETES MELLITUS WITH HYPERGLYCEMIA, WITH LONG-TERM CURRENT USE OF INSULIN (HCC): ICD-10-CM

## 2023-12-06 DIAGNOSIS — Z79.4 TYPE 2 DIABETES MELLITUS WITH HYPERGLYCEMIA, WITH LONG-TERM CURRENT USE OF INSULIN (HCC): ICD-10-CM

## 2023-12-06 DIAGNOSIS — E03.9 HYPOTHYROIDISM (ACQUIRED): ICD-10-CM

## 2023-12-06 PROCEDURE — 95251 CONT GLUC MNTR ANALYSIS I&R: CPT | Performed by: STUDENT IN AN ORGANIZED HEALTH CARE EDUCATION/TRAINING PROGRAM

## 2023-12-06 PROCEDURE — RXMED WILLOW AMBULATORY MEDICATION CHARGE: Performed by: STUDENT IN AN ORGANIZED HEALTH CARE EDUCATION/TRAINING PROGRAM

## 2023-12-06 PROCEDURE — 99212 OFFICE O/P EST SF 10 MIN: CPT | Performed by: STUDENT IN AN ORGANIZED HEALTH CARE EDUCATION/TRAINING PROGRAM

## 2023-12-06 PROCEDURE — 3074F SYST BP LT 130 MM HG: CPT | Performed by: STUDENT IN AN ORGANIZED HEALTH CARE EDUCATION/TRAINING PROGRAM

## 2023-12-06 PROCEDURE — 99214 OFFICE O/P EST MOD 30 MIN: CPT | Performed by: STUDENT IN AN ORGANIZED HEALTH CARE EDUCATION/TRAINING PROGRAM

## 2023-12-06 PROCEDURE — 3078F DIAST BP <80 MM HG: CPT | Performed by: STUDENT IN AN ORGANIZED HEALTH CARE EDUCATION/TRAINING PROGRAM

## 2023-12-06 RX ORDER — TIRZEPATIDE 2.5 MG/.5ML
2.5 INJECTION, SOLUTION SUBCUTANEOUS
Qty: 2 ML | Refills: 0 | Status: SHIPPED | OUTPATIENT
Start: 2023-12-06 | End: 2024-02-13

## 2023-12-06 RX ORDER — TIRZEPATIDE 10 MG/.5ML
10 INJECTION, SOLUTION SUBCUTANEOUS
Qty: 2 ML | Refills: 4 | Status: SHIPPED | OUTPATIENT
Start: 2023-12-06 | End: 2024-02-13

## 2023-12-06 ASSESSMENT — FIBROSIS 4 INDEX: FIB4 SCORE: 0.89

## 2023-12-07 ENCOUNTER — TELEPHONE (OUTPATIENT)
Dept: ENDOCRINOLOGY | Facility: MEDICAL CENTER | Age: 63
End: 2023-12-07
Payer: COMMERCIAL

## 2023-12-07 NOTE — TELEPHONE ENCOUNTER
Received PA request via [employer] for Mounjaro 10MG/0.5ML pen-injectors.     $25-2mls/28D  OLD Copay: $150.00, NEW  Copay: $25.00, Voucher Amount Applied:  $125.00     $25-6mls/84D   OLD Copay: $450.00, NEW  Copay: $25.00, Voucher Amount Applied:  $425.00     Insurance:[facility]      Pharmacy on File  [facility] [id number] [facility] [address]  Phone: [phone]    Fax: [phone]      Is patient eligible to fill with [facility]? Yes-Not sending to outreach because of limited stock at [facility]     FA-Voucher applied/Co-pay card eligible

## 2023-12-08 ENCOUNTER — HOSPITAL ENCOUNTER (OUTPATIENT)
Dept: LAB | Facility: MEDICAL CENTER | Age: 63
End: 2023-12-08
Attending: STUDENT IN AN ORGANIZED HEALTH CARE EDUCATION/TRAINING PROGRAM
Payer: COMMERCIAL

## 2023-12-08 DIAGNOSIS — E03.9 HYPOTHYROIDISM (ACQUIRED): ICD-10-CM

## 2023-12-08 LAB
T4 FREE SERPL-MCNC: 1.15 NG/DL (ref 0.93–1.7)
TSH SERPL DL<=0.005 MIU/L-ACNC: 3.49 UIU/ML (ref 0.38–5.33)

## 2023-12-08 PROCEDURE — 36415 COLL VENOUS BLD VENIPUNCTURE: CPT

## 2023-12-08 PROCEDURE — 84443 ASSAY THYROID STIM HORMONE: CPT

## 2023-12-08 PROCEDURE — 84439 ASSAY OF FREE THYROXINE: CPT

## 2023-12-15 DIAGNOSIS — E03.9 HYPOTHYROIDISM (ACQUIRED): ICD-10-CM

## 2023-12-21 DIAGNOSIS — Z79.4 TYPE 2 DIABETES MELLITUS WITH HYPERGLYCEMIA, WITH LONG-TERM CURRENT USE OF INSULIN (HCC): ICD-10-CM

## 2023-12-21 DIAGNOSIS — E11.65 TYPE 2 DIABETES MELLITUS WITH HYPERGLYCEMIA, WITH LONG-TERM CURRENT USE OF INSULIN (HCC): ICD-10-CM

## 2023-12-21 RX ORDER — INSULIN ASPART 100 [IU]/ML
INJECTION, SOLUTION INTRAVENOUS; SUBCUTANEOUS
Qty: 15 ML | Refills: 0 | Status: SHIPPED | OUTPATIENT
Start: 2023-12-21 | End: 2024-01-23

## 2023-12-26 DIAGNOSIS — E11.8 TYPE 2 DIABETES MELLITUS WITH COMPLICATION, WITHOUT LONG-TERM CURRENT USE OF INSULIN (HCC): ICD-10-CM

## 2023-12-28 ENCOUNTER — NON-PROVIDER VISIT (OUTPATIENT)
Dept: VASCULAR LAB | Facility: MEDICAL CENTER | Age: 63
End: 2023-12-28
Attending: INTERNAL MEDICINE
Payer: COMMERCIAL

## 2023-12-28 DIAGNOSIS — E11.65 TYPE 2 DIABETES MELLITUS WITH HYPERGLYCEMIA, WITH LONG-TERM CURRENT USE OF INSULIN (HCC): ICD-10-CM

## 2023-12-28 DIAGNOSIS — Z79.4 TYPE 2 DIABETES MELLITUS WITH HYPERGLYCEMIA, WITH LONG-TERM CURRENT USE OF INSULIN (HCC): ICD-10-CM

## 2023-12-28 LAB
HBA1C MFR BLD: 7.6 % (ref ?–5.8)
POCT INT CON NEG: NEGATIVE
POCT INT CON POS: POSITIVE

## 2023-12-28 PROCEDURE — 83036 HEMOGLOBIN GLYCOSYLATED A1C: CPT

## 2023-12-28 PROCEDURE — 99212 OFFICE O/P EST SF 10 MIN: CPT

## 2023-12-28 NOTE — PROGRESS NOTES
Patient Consult Note      Primary care physician: BENJA Gomez    Reason for consult: Management of Uncontrolled Type 2 Diabetes    HPI:  Farzad Bryant Jr. is a 62 y.o. old patient who comes in today for evaluation of above stated problem.    Allergies:  Patient has no known allergies.    Current Diabetes Medication Regimen:  GLP-1 Agent: Mounjaro 10mg/0.5mL Q 7 days   Other: Empagliflozin-Metformin 12.5-1000 mg, 1 tab PO BID  Basal Insulin: Lantus 32 units SQ QPM  Prandial Insulin: Novolog 8-10 units before dinner     Previous Diabetes Medications and Reason for Discontinuation:  Ozempic 1mg - switched to Mounjaro via endo d/t shortage     Potential Barriers to Care:  Adherence: reports missed doses of Synjardy for the past week d/t insurance issues - was given samples by Endo  Side effects: none  Affordability: Yes, but recently retired and has to transition to wife's insurance    SMBG:  Pt has home glucometer and performs proper testing technique: Yes  Discussed BG Goals: FBG 80 - 130, 2hPP < 180, A1c < 7%    Pt reports blood sugars:   Before Breakfast: 120-140  Before Dinner: 160-170  After Dinner ~1hr: 200-250  Before Bedtime (an hour, to an hour and a half): 180    Hypoglycemia:  Hypoglycemia awareness: Yes  Nocturnal hypoglycemia: None  Hypoglycemia:  Experiences s/s of hypoglycemia at < 110, but no longer treats with juice  Pt's treatment of Hypoglycemia  Discussed 15:15 Rule    Lifestyle:  Current Exercise: Recently retired and trying to figure out a new walking routine. Moving in the Spring.   Exercise Goal: 60 minutes/week    Dietary: Low carb  Breakfast: Skips  Lunch: cherry tomatoes  Dinner: Beef or chicken with some sort of vegetable.   Snacks: Nuts, cheese, gets cravings at night which resorts to a low carb ice cream bar or atkins candy bar, grape tomatoes    Drinks: Mountain dew zero sugar, maybe one bottle of water a day, unsweetened ice tea    Preventative Management:  BP  regimen (ACEi/ARB): Losartan 50 mg QD  BP <140/90: Yes  Statin: Atorvastatin (Lipitor) 40 mg daily  LDL <100: Yes  Lab Results   Component Value Date/Time    CHOLSTRLTOT 117 03/15/2023 10:59 AM    LDL 48 03/15/2023 10:59 AM    HDL 49 03/15/2023 10:59 AM    TRIGLYCERIDE 99 03/15/2023 10:59 AM       Last Microalbumin/Cr:  Lab Results   Component Value Date/Time    MALBCRT see below 03/15/2023 10:59 AM    MICROALBUR <1.2 03/15/2023 10:59 AM     Last A1c:  Lab Results   Component Value Date/Time    HBA1C 7.6 (A) 12/28/2023 12:00 AM        Labs:  Lab Results   Component Value Date/Time    SODIUM 138 11/22/2023 11:28 AM    POTASSIUM 4.3 11/22/2023 11:28 AM    CHLORIDE 104 11/22/2023 11:28 AM    CO2 20 11/22/2023 11:28 AM    GLUCOSE 205 (H) 11/22/2023 11:28 AM    BUN 25 (H) 11/22/2023 11:28 AM    CREATININE 0.98 11/22/2023 11:28 AM     Lab Results   Component Value Date/Time    ALKPHOSPHAT 76 11/22/2023 11:28 AM    ASTSGOT 21 11/22/2023 11:28 AM    ALTSGPT 30 11/22/2023 11:28 AM    TBILIRUBIN 0.3 11/22/2023 11:28 AM    INR 1.02 07/20/2018 01:00 PM    ALBUMIN 4.5 11/22/2023 11:28 AM        Physical Examination:  Vital signs: There were no vitals taken for this visit.     Foot Exam:  Monofilament exam: Completed 6 months ago     Eye Exam:   Completed in July 2023    Assessment and Plan:    1. DMII  Discussed Goals: FBG 80 - 130, 2hPP < 180, a1c < 7.0%   Pt's A1c has decreased from  8.6% to 7.6% today  Pt currently tolerating regimen - Endo increased Mounjaro to 10mg 2-3 weeks ago - will hold off on increasing now  He switched to his wife's insurance and was having difficulty getting Synjardy covered during the switch. But he was given samples and a coupon from Endo to help get it covered  Medication changes:  none  Continue:  Mounjaro 10mg q 7 days  Empagliflozin-Metformin 12.5-1000 mg, 1 tab PO BID  Lantus 32 units SQ QPM   Novolog 8-10 units before dinner     Lifestyle changes:  Diet: Limit baked goods intake. Continue to  substitute snacks for healthier options.  Exercise: Increase as tolerated    Follow Up:  6 weeks to assess if pt's ready to increase Mounjaro  Of note, pt is planning to move to [place], [place] in the Spring    Sandra Julien, CathyD

## 2024-01-23 DIAGNOSIS — Z79.4 TYPE 2 DIABETES MELLITUS WITH HYPERGLYCEMIA, WITH LONG-TERM CURRENT USE OF INSULIN (HCC): ICD-10-CM

## 2024-01-23 DIAGNOSIS — E11.65 TYPE 2 DIABETES MELLITUS WITH HYPERGLYCEMIA, WITH LONG-TERM CURRENT USE OF INSULIN (HCC): ICD-10-CM

## 2024-01-23 RX ORDER — INSULIN ASPART 100 [IU]/ML
INJECTION, SOLUTION INTRAVENOUS; SUBCUTANEOUS
Qty: 15 ML | Refills: 0 | OUTPATIENT
Start: 2024-01-23

## 2024-01-23 RX ORDER — INSULIN ASPART 100 [IU]/ML
INJECTION, SOLUTION INTRAVENOUS; SUBCUTANEOUS
Qty: 15 ML | Refills: 0 | Status: SHIPPED | OUTPATIENT
Start: 2024-01-23 | End: 2024-02-28

## 2024-02-07 DIAGNOSIS — E78.5 DYSLIPIDEMIA: ICD-10-CM

## 2024-02-07 DIAGNOSIS — J30.9 ALLERGIC RHINITIS, UNSPECIFIED SEASONALITY, UNSPECIFIED TRIGGER: ICD-10-CM

## 2024-02-07 DIAGNOSIS — I10 ESSENTIAL HYPERTENSION, BENIGN: ICD-10-CM

## 2024-02-08 ENCOUNTER — APPOINTMENT (OUTPATIENT)
Dept: MEDICAL GROUP | Facility: PHYSICIAN GROUP | Age: 64
End: 2024-02-08
Payer: COMMERCIAL

## 2024-02-08 NOTE — TELEPHONE ENCOUNTER
Requested Prescriptions     Pending Prescriptions Disp Refills    cetirizine (EQ ALLERGY RELIEF, CETIRIZINE,) 10 MG Tab 90 Tablet 3     Sig: Take 1 Tablet by mouth every day.    isosorbide mononitrate SR (IMDUR) 30 MG TABLET SR 24 HR 90 Tablet 3     Sig: Take 1 Tablet by mouth every morning.    atorvastatin (LIPITOR) 40 MG Tab 90 Tablet 3     Sig: Take 1 Tablet by mouth every evening.    losartan (COZAAR) 50 MG Tab 90 Tablet 3     Sig: Take 1 Tablet by mouth every day.    escitalopram (LEXAPRO) 10 MG Tab 90 Tablet 3     Sig: Take 1 Tablet by mouth every day. Take with 20 mg daily for 30 mg daily      Last office visit: 11/8/23  Last lab: 12/8/23

## 2024-02-12 RX ORDER — ISOSORBIDE MONONITRATE 30 MG/1
30 TABLET, EXTENDED RELEASE ORAL EVERY MORNING
Qty: 90 TABLET | Refills: 0 | Status: SHIPPED | OUTPATIENT
Start: 2024-02-12

## 2024-02-12 RX ORDER — CETIRIZINE HYDROCHLORIDE 10 MG/1
10 TABLET ORAL DAILY
Qty: 90 TABLET | Refills: 0 | Status: SHIPPED | OUTPATIENT
Start: 2024-02-12

## 2024-02-12 RX ORDER — LOSARTAN POTASSIUM 50 MG/1
50 TABLET ORAL DAILY
Qty: 90 TABLET | Refills: 0 | Status: SHIPPED | OUTPATIENT
Start: 2024-02-12

## 2024-02-12 RX ORDER — ATORVASTATIN CALCIUM 40 MG/1
40 TABLET, FILM COATED ORAL EVERY EVENING
Qty: 90 TABLET | Refills: 0 | Status: SHIPPED | OUTPATIENT
Start: 2024-02-12

## 2024-02-12 RX ORDER — ESCITALOPRAM OXALATE 10 MG/1
10 TABLET ORAL DAILY
Qty: 90 TABLET | Refills: 0 | Status: SHIPPED | OUTPATIENT
Start: 2024-02-12

## 2024-02-12 NOTE — TELEPHONE ENCOUNTER
Requested Prescriptions     Pending Prescriptions Disp Refills    cetirizine (EQ ALLERGY RELIEF, CETIRIZINE,) 10 MG Tab 90 Tablet 0     Sig: Take 1 Tablet by mouth every day.    isosorbide mononitrate SR (IMDUR) 30 MG TABLET SR 24 HR 90 Tablet 0     Sig: Take 1 Tablet by mouth every morning.    atorvastatin (LIPITOR) 40 MG Tab 90 Tablet 0     Sig: Take 1 Tablet by mouth every evening.    losartan (COZAAR) 50 MG Tab 90 Tablet 0     Sig: Take 1 Tablet by mouth every day.    escitalopram (LEXAPRO) 10 MG Tab 90 Tablet 0     Sig: Take 1 Tablet by mouth every day. Take with 20 mg daily for 30 mg daily       Nelli Kinney A.P.R.N.

## 2024-02-13 ENCOUNTER — NON-PROVIDER VISIT (OUTPATIENT)
Dept: VASCULAR LAB | Facility: MEDICAL CENTER | Age: 64
End: 2024-02-13
Attending: INTERNAL MEDICINE
Payer: COMMERCIAL

## 2024-02-13 DIAGNOSIS — Z79.4 TYPE 2 DIABETES MELLITUS WITH HYPERGLYCEMIA, WITH LONG-TERM CURRENT USE OF INSULIN (HCC): ICD-10-CM

## 2024-02-13 DIAGNOSIS — E11.65 TYPE 2 DIABETES MELLITUS WITH HYPERGLYCEMIA, WITH LONG-TERM CURRENT USE OF INSULIN (HCC): ICD-10-CM

## 2024-02-13 PROCEDURE — 99212 OFFICE O/P EST SF 10 MIN: CPT

## 2024-02-13 RX ORDER — TIRZEPATIDE 12.5 MG/.5ML
0.5 INJECTION, SOLUTION SUBCUTANEOUS
Qty: 6 ML | Refills: 1 | Status: SHIPPED | OUTPATIENT
Start: 2024-02-13 | End: 2024-03-06 | Stop reason: SDUPTHER

## 2024-02-13 NOTE — PROGRESS NOTES
Patient Consult Note      Primary care physician: BENJA Gomez    Reason for consult: Management of Uncontrolled Type 2 Diabetes    HPI:  Farzad Bryant Jr. is a 62 y.o. old patient who comes in today for evaluation of above stated problem.    Allergies:  Patient has no known allergies.    Current Diabetes Medication Regimen:  GLP-1 Agent: Mounjaro 10mg/0.5mL Q 7 days   Other: Empagliflozin-Metformin 12.5-1000 mg, 1 tab PO BID  Basal Insulin: Lantus 32-34 units SQ QPM  Prandial Insulin: Novolog 10-12 units before dinner     Previous Diabetes Medications and Reason for Discontinuation:  Ozempic 1mg - switched to Mounjaro via endo d/t shortage     Potential Barriers to Care:  Adherence: reports missed doses of Synjardy for the past week d/t insurance issues - was given samples by Endo  Side effects: none  Affordability: Yes, but recently retired and has to transition to wife's insurance    SMBG:  Pt has home glucometer and performs proper testing technique: Yes  Discussed BG Goals: FBG 80 - 130, 2hPP < 180, A1c < 7%    Pt reports blood sugars:   The past 2 weeks: TTR 77%, prior to that 90% TTR  Before Breakfast: 170-180  Before Dinner: 140-150  After Dinner ~1hr: 250-290  Before Bedtime (an hour, to an hour and a half): 160-180    Hypoglycemia:  Hypoglycemia awareness: Yes  Nocturnal hypoglycemia: None  Hypoglycemia:  Experiences s/s of hypoglycemia at < 110, but no longer treats with juice  Pt's treatment of Hypoglycemia  Discussed 15:15 Rule    Lifestyle:  Current Exercise: Recently retired and trying to figure out a new walking routine. Moving in the Spring.   Exercise Goal: 60 minutes/week    Dietary: Low carb  Breakfast: Skips  Lunch: cherry tomatoes  Dinner: Beef or chicken with some sort of vegetable.   Snacks: Nuts, cheese, gets cravings at night which resorts to a low carb ice cream bar or atkins candy bar, grape tomatoes, salted popcorn, dark almonds   Drinks: Mountain dew zero sugar,  maybe one bottle of water a day, unsweetened ice tea    Preventative Management:  BP regimen (ACEi/ARB): Losartan 50 mg QD  BP <140/90: Yes  Statin: Atorvastatin (Lipitor) 40 mg daily  LDL <100: Yes  Lab Results   Component Value Date/Time    CHOLSTRLTOT 117 03/15/2023 10:59 AM    LDL 48 03/15/2023 10:59 AM    HDL 49 03/15/2023 10:59 AM    TRIGLYCERIDE 99 03/15/2023 10:59 AM       Last Microalbumin/Cr:  Lab Results   Component Value Date/Time    MALBCRT see below 03/15/2023 10:59 AM    MICROALBUR <1.2 03/15/2023 10:59 AM     Last A1c:  Lab Results   Component Value Date/Time    HBA1C 7.6 (A) 12/28/2023 12:00 AM        Labs:  Lab Results   Component Value Date/Time    SODIUM 138 11/22/2023 11:28 AM    POTASSIUM 4.3 11/22/2023 11:28 AM    CHLORIDE 104 11/22/2023 11:28 AM    CO2 20 11/22/2023 11:28 AM    GLUCOSE 205 (H) 11/22/2023 11:28 AM    BUN 25 (H) 11/22/2023 11:28 AM    CREATININE 0.98 11/22/2023 11:28 AM     Lab Results   Component Value Date/Time    ALKPHOSPHAT 76 11/22/2023 11:28 AM    ASTSGOT 21 11/22/2023 11:28 AM    ALTSGPT 30 11/22/2023 11:28 AM    TBILIRUBIN 0.3 11/22/2023 11:28 AM    INR 1.02 07/20/2018 01:00 PM    ALBUMIN 4.5 11/22/2023 11:28 AM        Physical Examination:  Vital signs: There were no vitals taken for this visit.     Foot Exam:  Monofilament exam: Completed 6 months ago     Eye Exam:   Completed in July 2023    Assessment and Plan:    1. DMII  Discussed Goals: FBG 80 - 130, 2hPP < 180, a1c < 7.0%   Pt's A1c has decreased from  8.6% to 7.6% 12/28/23  Pt currently tolerating regimen  His SMBG have worsened over the last 2 weeks. The only change is recent life stressors surrounding his dog and moving to [facility] which definitely can contribute to his increased BG.    Medication changes:  Increase Mounjaro to 12.5mg q 7 days  Titrate Lantus and Novolog by 2 units every 2 days for FBG of  and PPG <180  Continue:  Empagliflozin-Metformin 12.5-1000 mg, 1 tab PO BID    Lifestyle  changes:  Diet: Limit baked goods intake. Continue to substitute snacks for healthier options.  Exercise: Increase as tolerated    Follow Up:  6 weeks for a1c  Of note, pt is planning to move to [place], [place] in the Spring    Sandra Julien, CathyD

## 2024-02-14 ENCOUNTER — TELEPHONE (OUTPATIENT)
Dept: VASCULAR LAB | Facility: MEDICAL CENTER | Age: 64
End: 2024-02-14
Payer: COMMERCIAL

## 2024-02-15 ENCOUNTER — APPOINTMENT (OUTPATIENT)
Dept: VASCULAR LAB | Facility: MEDICAL CENTER | Age: 64
End: 2024-02-15
Payer: COMMERCIAL

## 2024-02-15 NOTE — TELEPHONE ENCOUNTER
Received a fax from [facility] requesting for PA.     Ran a test claim already with updated insurance and confirmed no PA required at this time.     Called [facility] @ [phone] and spoke with Leeroy to verify medication availability. Per Leeroy, dose/strength of this mounjaro is currently on back order.     Notified and updated clinic staff for the status.     MADAY Hill, PhT  Vascular Pharmacy Liaison (Rx Coordinator)  P: [phone]  2/14/2024 4:36 PM

## 2024-02-15 NOTE — TELEPHONE ENCOUNTER
Received New start PA request via fax for MOUNJARO 12.5MG/0.5ML SOLUTION PEN INJECTOR . (Quantity:6 mls, Day Supply:84)     Insurance: [facility]   Member ID:  [id number]  BIN: [id number]  PCN: [id number]  Group: [id number]     Ran Test claim via [facility] & medication   Pays for a $286.07/84DS ; $107.61/28DS copay. Rx was already released to [facility] pharmacy #[id number]---[address] [place] [address].    Caryn Hudson, MADAY, PhT  Vascular Pharmacy Liaison (Rx Coordinator)  P: [phone]  2/14/2024 4:32 PM

## 2024-02-27 ENCOUNTER — HOSPITAL ENCOUNTER (OUTPATIENT)
Dept: LAB | Facility: MEDICAL CENTER | Age: 64
End: 2024-02-27
Attending: STUDENT IN AN ORGANIZED HEALTH CARE EDUCATION/TRAINING PROGRAM
Payer: COMMERCIAL

## 2024-02-27 DIAGNOSIS — Z79.4 TYPE 2 DIABETES MELLITUS WITH HYPERGLYCEMIA, WITH LONG-TERM CURRENT USE OF INSULIN (HCC): ICD-10-CM

## 2024-02-27 DIAGNOSIS — E03.9 HYPOTHYROIDISM (ACQUIRED): ICD-10-CM

## 2024-02-27 DIAGNOSIS — E11.65 TYPE 2 DIABETES MELLITUS WITH HYPERGLYCEMIA, WITH LONG-TERM CURRENT USE OF INSULIN (HCC): ICD-10-CM

## 2024-02-27 LAB
T4 FREE SERPL-MCNC: 1.01 NG/DL (ref 0.93–1.7)
TSH SERPL DL<=0.005 MIU/L-ACNC: 4.71 UIU/ML (ref 0.38–5.33)

## 2024-02-27 PROCEDURE — 36415 COLL VENOUS BLD VENIPUNCTURE: CPT

## 2024-02-27 PROCEDURE — 84443 ASSAY THYROID STIM HORMONE: CPT

## 2024-02-27 PROCEDURE — 84439 ASSAY OF FREE THYROXINE: CPT

## 2024-02-28 ENCOUNTER — PATIENT MESSAGE (OUTPATIENT)
Dept: MEDICAL GROUP | Facility: PHYSICIAN GROUP | Age: 64
End: 2024-02-28
Payer: COMMERCIAL

## 2024-02-28 DIAGNOSIS — E11.8 TYPE 2 DIABETES MELLITUS WITH COMPLICATION, WITHOUT LONG-TERM CURRENT USE OF INSULIN (HCC): ICD-10-CM

## 2024-02-28 RX ORDER — INSULIN ASPART 100 [IU]/ML
INJECTION, SOLUTION INTRAVENOUS; SUBCUTANEOUS
Qty: 15 ML | Refills: 0 | Status: SHIPPED | OUTPATIENT
Start: 2024-02-28 | End: 2024-03-27

## 2024-02-29 ENCOUNTER — PATIENT MESSAGE (OUTPATIENT)
Dept: MEDICAL GROUP | Facility: PHYSICIAN GROUP | Age: 64
End: 2024-02-29
Payer: COMMERCIAL

## 2024-02-29 DIAGNOSIS — E11.8 TYPE 2 DIABETES MELLITUS WITH COMPLICATION, WITHOUT LONG-TERM CURRENT USE OF INSULIN (HCC): ICD-10-CM

## 2024-02-29 NOTE — PROGRESS NOTES
Requested Prescriptions     Signed Prescriptions Disp Refills    Empagliflozin-metFORMIN HCl ER 12.5-1000 MG TABLET SR 24  Tablet 1     Sig: Take 1 Tablet by mouth 2 (two) times a day.     Authorizing Provider: SHUKRI GERMAN A.P.R.N.

## 2024-02-29 NOTE — TELEPHONE ENCOUNTER
Received request via: Patient    Was the patient seen in the last year in this department? Yes    Does the patient have an active prescription (recently filled or refills available) for medication(s) requested? No    [facility]    Does the patient have [facility] Plus and need 100 day supply (blood pressure, diabetes and cholesterol meds only)? Patient does not have SCP

## 2024-03-04 DIAGNOSIS — E11.8 TYPE 2 DIABETES MELLITUS WITH COMPLICATION, WITHOUT LONG-TERM CURRENT USE OF INSULIN (HCC): ICD-10-CM

## 2024-03-04 NOTE — PROGRESS NOTES
Follow up Endocrinology Visit  Initial consult/last visit on: 11/8/23  Referred by: BENJA Gomez     Chief complaint:  Farzad Bryant Jr. Is a very pleasant 63 y.o. male, who is here for follow up for T2DM management.     Interval history:   - overall doing well  - in a process of relocation to [place]  - continues to gain weight despite using higher doses of Mounjaro, diet    Wt Readings from Last 5 Encounters:   03/06/24 123 kg (272 lb)   12/06/23 121 kg (266 lb)   11/08/23 121 kg (267 lb)   11/08/23 122 kg (269 lb)   10/12/23 123 kg (272 lb)     Prior notes:  11/8/23  - during the last visit we transitioned patient from Ozempic to Mounjaro, which he increased from 5 mg -> 7.5 mg/week, tolerates well  - he still had to increase dose of prandial insulin for better glycemic control  - reviewed recent labs and CGM report  - he just recently retired, changed his insurance to [facility] [employer] from his wife    Current therapy:  Mounjaro 10 mg weekly  Lantus 30 units  Novolog 10 - 12 units AC TID with large CHO containing meals  Synjardy 12.5/1000 bid    Tolerates medications: well  Compliant: yes    Prior used medications:   Glipizide 5 mg bid - stopped, after Novolog was started    Other important medications:  ASA 81 mg  Atorvastatin 40 mg  Omega-3 FA 2000 mg  Losartan 50 mg  Isosorbide mononitrate 30 mg  Levothyroxine 25 mcg  Vit D 1000 IU    Typical day:  Wakes up - 5 am on work day,  on non work days - 10-11 am   Breakfast - never eat breakfast  Lunch - 12:30 on work days,  on non work days - might skip it - veggies, 1 atkins desert  Dinner - 7:30 pm on work days, on non work days - 6:30  Snacks - sometimes, at work - 0 sugar smoked sausages, non work days - pretzels, raisen, grape tomatoes  Desserts  - every night - ice cream almond bar  Goes to sleep - work days - 9 pm, non work days - up to 1-2 am     BG control:  CGM type - DexCom G6 -> Jeanie 3   Period - 10/26/23 - 11/8/23 -> 11/23/23 -  12/6/23 -> 2/22/24 - 3/6/24  Data were reviewed and discussed with the patient  Active time - 93 -> 97 -> 96%  ABG - 195 -> 178 -> 161 mg/dl  GV - 34.5 -> 24.1 %  GMI - 8.0 -> 7.6 -> 7.2 %  TIR - 50 -> 71 -> 79 %  TAR - high - 29 -> 19 -> 17%, very high - 20 ->10 -> 4%  TBR - low - < 1 ->0%, very low < 1 ->0%   2/22/24 - 3/6/24:      Hypoglycemic episodes:  Hypoglycemic symptoms: yes  Hypoglycemic unawareness: NA  Treatment: sandwich  Severe hypoglycemia: no  Has medical bracelet/necklace: no  Has glucagon emergency kit: no    Exercise:  Physical work    DM history:  - diagnosed about 5+ years ago, Dx on screening, weight was 310 lb (BMI = 39.8 kg/m2)  - hospitalizations for DKA/HHS/severe hyperglycemia/severe hypoglycemia in the past: none  - prior therapy: Metformin -> Synjardy, was on Glipizide -> Novolog, Ozempic, Lantus  - known DM complications: denies  - latest HbA1C 8.6% in 9/2023  -  pertinent PMHx:   -- obesity, dyslipidemia, HTN, CAD, acustic neuroma  -- denies NAFLD; PAD/CHF/CVA   - pertinent FHx:  DM 2: mother, sister     Current Medications:  Current Outpatient Medications:     Empagliflozin-metFORMIN HCl ER 12.5-1000 MG TABLET SR 24 HR, Take 1 Tablet by mouth 2 (two) times a day., Disp: 180 Tablet, Rfl: 1    insulin aspart (NOVOLOG FLEXPEN RELION) 100 UNIT/ML injection PEN, INJECT 8 TO 10 UNITS SUBCUTANEOUSLY BEFORE MEALS OR AS DIRECTED. MAX 50 UNITS A DAY, Disp: 15 mL, Rfl: 0    Tirzepatide (MOUNJARO) 12.5 MG/0.5ML Solution Pen-injector, Inject 0.5 mL under the skin every 7 days., Disp: 6 mL, Rfl: 1    cetirizine (EQ ALLERGY RELIEF, CETIRIZINE,) 10 MG Tab, Take 1 Tablet by mouth every day., Disp: 90 Tablet, Rfl: 0    isosorbide mononitrate SR (IMDUR) 30 MG TABLET SR 24 HR, Take 1 Tablet by mouth every morning., Disp: 90 Tablet, Rfl: 0    atorvastatin (LIPITOR) 40 MG Tab, Take 1 Tablet by mouth every evening., Disp: 90 Tablet, Rfl: 0    losartan (COZAAR) 50 MG Tab, Take 1 Tablet by mouth every day.,  "Disp: 90 Tablet, Rfl: 0    escitalopram (LEXAPRO) 10 MG Tab, Take 1 Tablet by mouth every day. Take with 20 mg daily for 30 mg daily, Disp: 90 Tablet, Rfl: 0    Continuous Blood Gluc Sensor (FREESTYLE SANJUANITA 3 SENSOR) Misc, 1 Each every 14 days., Disp: 2 Each, Rfl: 12    diphenhydrAMINE (BENADRYL) 50 MG Cap, Take 50 mg by mouth at bedtime as needed., Disp: , Rfl:     Insulin Pen Needle (RELION PEN NEEDLE 31G/8MM), USE DAILY WITH LANTUS., Disp: 100 Each, Rfl: 5    LANTUS SOLOSTAR 100 UNIT/ML Solution Pen-injector injection, INJECT 32 UNITS SUBCUTANEOUSLY IN THE EVENING, Disp: 30 mL, Rfl: 5    traZODone (DESYREL) 100 MG Tab, Take 2 Tablets by mouth at bedtime as needed for Sleep., Disp: 180 Tablet, Rfl: 3    escitalopram (LEXAPRO) 20 MG tablet, Take 1 Tablet by mouth every day., Disp: 90 Tablet, Rfl: 3    Multiple Vitamins-Minerals (ZINC PO), Take  by mouth., Disp: , Rfl:     aspirin EC (ECOTRIN) 81 MG Tablet Delayed Response, Take 81 mg by mouth every day., Disp: , Rfl:     vitamin D (CHOLECALCIFEROL) 1000 Unit (25 mcg) Tab, Take 1,000 Units by mouth every day., Disp: , Rfl:     acetaminophen (TYLENOL) 500 MG Tab, Take 1,000 mg by mouth 2 times a day as needed for Fever., Disp: , Rfl:     Cinnamon 500 MG Cap, Take 1,000 mg by mouth every morning. \, Disp: , Rfl:     5-Hydroxytryptophan (5-HTP) 100 MG Cap, Take 200 mg by mouth every bedtime., Disp: , Rfl:     Omega-3 Fatty Acids (FISH OIL) 1000 MG Cap capsule, Take 2,000 mg by mouth every morning., Disp: , Rfl:     Melatonin 1 MG Cap, Take 1 mg by mouth every bedtime., Disp: , Rfl:     therapeutic multivitamin-minerals (THERAGRAN-M) Tab, Take 1 Tab by mouth every morning., Disp: , Rfl:     PHYSICAL EXAM:   Vital signs: /54 (BP Location: Right arm, Patient Position: Sitting, BP Cuff Size: Large adult)   Pulse (!) 101   Ht 1.88 m (6' 2\")   Wt 123 kg (272 lb)   SpO2 97%   BMI 34.92 kg/m²   GENERAL: Well-developed, well-nourished  in no apparent distress.   EYE: " No ocular and eyelid asymmetry, Anicteric sclerae,  PERRL, No exophthalmos or lidlag  HENT: Hearing grossly intact, Normocephalic, atraumatic.  NECK: Supple. Trachea midline.   CARDIOVASCULAR: Regular rate and rhythm.   LUNGS: No dyspnea  ABDOMEN: Soft, nondistended  EXTREMITIES: No clubbing, cyanosis, or edema.   NEUROLOGICAL: Cranial nerves II-XII are grossly intact No visible tremor with both outstretched hands  SKIN: No rashes, lesions. Turgor is normal.  FOOT: Normal sensation to monofilament testing, normal pulses, no ulcers.  Dystrophic, long, curved toenails some with sharp borders. Slightly dry skin. No lesions, no calluses.     Labs:  MOST RECENT LABS:  - reviewed          PRIOR PERTINENT LABS:  - reviewed              HbA1C/BG/Hb:  Lab Results   Component Value Date/Time    HBA1C 8.6 (H) 09/23/2023 0926    HBA1C 7.9 (H) 07/11/2023 1109    HBA1C 6.5 (H) 03/15/2023 1059    HBA1C 6.6 (H) 11/09/2022 1135    HBA1C 7.8 (H) 05/14/2022 0925    AVGLUC 200 09/23/2023 0926    AVGLUC 180 07/11/2023 1109    AVGLUC 140 03/15/2023 1059    AVGLUC 143 11/09/2022 1135    AVGLUC 177 05/14/2022 0925     Lab Results   Component Value Date/Time    HEMOGLOBIN 15.3 05/14/2022 09:25 AM    HEMOGLOBIN 15.8 03/05/2021 08:01 AM    HEMOGLOBIN 13.7 (L) 03/02/2021 08:00 PM     Lab Results   Component Value Date/Time    GLUCOSE 205 (H) 11/22/2023 11:28 AM    GLUCOSE 204 (H) 09/23/2023 09:26 AM    GLUCOSE 201 (H) 03/15/2023 10:59 AM    GLUCOSE 221 (H) 05/14/2022 09:25 AM    GLUCOSE 149 (H) 03/05/2021 08:01 AM     Kidney function/MACR:  Lab Results   Component Value Date/Time    CREATININE 0.98 11/22/2023 11:28 AM    CREATININE 1.15 09/23/2023 09:26 AM     Lab Results   Component Value Date/Time    IFNOTAFR >60 03/05/2021 08:01 AM    IFNOTAFR >60 03/02/2021 08:00 PM     Lab Results   Component Value Date/Time    URCREAT 66.63 03/15/2023 1059    URCREAT 58.80 05/14/2022 0925    URCREAT 88.68 10/12/2020 1048    MICROALBUR <1.2 03/15/2023  1059    MICROALBUR <1.2 05/14/2022 0925    MICROALBUR <1.2 10/12/2020 1048    MALBCRT see below 03/15/2023 1059    MALBCRT see below 05/14/2022 0925    MALBCRT see below 10/12/2020 1048     LFTs:  Lab Results   Component Value Date/Time    ASTSGOT 21 11/22/2023 11:28 AM    ASTSGOT 17 03/15/2023 10:59 AM    ASTSGOT 19 05/14/2022 09:25 AM    ALTSGPT 30 11/22/2023 11:28 AM    ALTSGPT 27 03/15/2023 10:59 AM    ALTSGPT 34 05/14/2022 09:25 AM     Lipid panel:  Lab Results   Component Value Date/Time    TRIGLYCERIDE 99 03/15/2023 10:59 AM    TRIGLYCERIDE 136 05/14/2022 09:25 AM    TRIGLYCERIDE 143 04/30/2020 11:03 AM    HDL 49 03/15/2023 10:59 AM    HDL 42 05/14/2022 09:25 AM    HDL 43 04/30/2020 11:03 AM    LDL 48 03/15/2023 10:59 AM    LDL 34 05/14/2022 09:25 AM    LDL 47 04/30/2020 11:03 AM     Hypothyroidism management:  Lab Results   Component Value Date/Time    TSHULTRASEN 2.880 03/15/2023 1059       ASSESSMENT/PLAN:   1. Type 2 diabetes mellitus with hyperglycemia, with long-term current use of insulin (HCC)  - duration x 5+ years   - on Max doses of Metformin, SGLT-2 inhibitor, higher dose of GLP-1/GIP agonist, basal insulin,  prandial insulin  -  HbA1C 8.6% in 9/2023 -> 7.6% in 12/2023, GMI (last 2 weeks of Jeanie 3 report) - 7.2%     Microvascular complications: denies peripheral neuropathy, nephropathy, retinopathy  Macrovascular complications: has CAD?, denies PAD, CVA  Associated comorbidities: obesity, dyslipidemia, HTN, CAD, acustic neuroma     DM complication screening:  Labs reviewed:  MACR - neg, last checked in 3/2023  Creat/GFR wnl, last checked in 9/2023  LDL - 48 -  at target, last checked in 3/2023     Patient is taking statin: on Atorvastatin 40 mg + Omega 3 FA 2000 mg  Patient is taking ASA: yes  Patient is taking ACE/ARB: on Losartan 50 mg     Last eye exam: 6/4/23, no DR  Last foot exam: 36/24, normal monofilament test, dystrophic changes of toenails, denies tingling, numbness, burning sensation,  lesions, calluses.     Home medications:  Mounjaro 10 mg weekly  Synjardy 12.5/1000 bid  Novolog 10 - 12 units AC TID with large CHO containing meals  Lantus 30 units     Discussion:  - glycemic control improved  - insulin secretion is preserved even somewhat decreased  - will further up titrate Mounjaro with hope of weaning off prandial insulin  - it is reasonable to rule out Cushing's syndrome even suspicion is not very high  Prior notes  - uncontrolled T2DM while on Metformin, SGLT-2 inhibitor, GLP-1 agonist and MDI  - main problem - postprandial hyperglycemia persisting through the night after mostly 1 meal - dinner + snaking with raisins in chocolate and pretzels  - will transition from GLP-1 agonist to GLP-1/GIP agonist, try to taper off short acting insulin, agree with stopping IVY  - discussed diet adjustment  - check residual insulin secretion     Plan:  Discussed with the patient goals for DM management:  Fasting BG 90 - 130  2 hrs postprandial  - 180  HbA1C < 7.0%     Therapy adjustments:  - increase Mounjaro 10 -> 12.5 mg weekly  - continue Latnus 30 units  - continue Novolog 8-12 units AC  with large meals    - consider decrease dose if BG are borderline low  - continue Empagliflozin/Metformin 12.5/1000 mg bid    3.Continue Jeanie 3.  4. Recommended to split large meal to smaller ones, avoid grapes, raisins in chocolate  5. Discussed hypoglycemia management - during the prior visit  6. Discussed foot care  - during the prior visit  7. Refer to podiatrist to address long toenails with sharp borders  8. Proceed with DST  9. Repeat MACR    2. Hypothyroidism (acquired), questionable, euthyroid off LT4  - questionable diagnosis as patient is euthyroid on very low dose of LT4 - 25 mcg/day vs weight base calculated dose - 195 mcg/day  - euthyroid off LT4 x 2 mo  -  informed the patient with with elevated TPO-Abs he is at higher risk of developing hypothyroidism in a future  Plan:  Periodic check of TFTs q  6-12 mo, or earlier if new hypothyroid symptoms    3. Essential hypertension  - managed by PCP  - well controlled on Losartan 50 mg  - continue current regimen    4. Dyslipidemia  - LDL is at target range for DM and CAD  - on high dose statin  - continue current therapy  Plan:  Repeat lipid panel    4. Vitamin D deficiency  - on vit D replacement therapy  - last vit D level in 11/2023 within normal range    RTC: 2 mo    Total time (face-to-face and non-face-to face time):  40 min - extensive  discussion of diagnoses, treatment options, prognosis, medical charts, lab review, documentation.  Plan reviewed with the patient and agreed with plan.  All questions answered to patient's satisfaction.  Thank you kindly for allowing me to participate in the diabetes care plan for this patient.    Mildred James MD    CC:   BENJA Gomez

## 2024-03-06 ENCOUNTER — OFFICE VISIT (OUTPATIENT)
Dept: ENDOCRINOLOGY | Facility: MEDICAL CENTER | Age: 64
End: 2024-03-06
Attending: STUDENT IN AN ORGANIZED HEALTH CARE EDUCATION/TRAINING PROGRAM
Payer: COMMERCIAL

## 2024-03-06 ENCOUNTER — PHARMACY VISIT (OUTPATIENT)
Dept: PHARMACY | Facility: MEDICAL CENTER | Age: 64
End: 2024-03-06
Payer: COMMERCIAL

## 2024-03-06 VITALS
BODY MASS INDEX: 34.91 KG/M2 | DIASTOLIC BLOOD PRESSURE: 54 MMHG | OXYGEN SATURATION: 97 % | HEART RATE: 101 BPM | HEIGHT: 74 IN | SYSTOLIC BLOOD PRESSURE: 131 MMHG | WEIGHT: 272 LBS

## 2024-03-06 DIAGNOSIS — Z79.4 TYPE 2 DIABETES MELLITUS WITH HYPERGLYCEMIA, WITH LONG-TERM CURRENT USE OF INSULIN (HCC): ICD-10-CM

## 2024-03-06 DIAGNOSIS — E78.5 DYSLIPIDEMIA: ICD-10-CM

## 2024-03-06 DIAGNOSIS — E11.65 TYPE 2 DIABETES MELLITUS WITH HYPERGLYCEMIA, WITH LONG-TERM CURRENT USE OF INSULIN (HCC): ICD-10-CM

## 2024-03-06 DIAGNOSIS — E55.9 VITAMIN D DEFICIENCY: ICD-10-CM

## 2024-03-06 DIAGNOSIS — E03.9 HYPOTHYROIDISM (ACQUIRED): ICD-10-CM

## 2024-03-06 PROCEDURE — 3078F DIAST BP <80 MM HG: CPT | Performed by: STUDENT IN AN ORGANIZED HEALTH CARE EDUCATION/TRAINING PROGRAM

## 2024-03-06 PROCEDURE — 3075F SYST BP GE 130 - 139MM HG: CPT | Performed by: STUDENT IN AN ORGANIZED HEALTH CARE EDUCATION/TRAINING PROGRAM

## 2024-03-06 PROCEDURE — RXMED WILLOW AMBULATORY MEDICATION CHARGE: Performed by: STUDENT IN AN ORGANIZED HEALTH CARE EDUCATION/TRAINING PROGRAM

## 2024-03-06 PROCEDURE — 99215 OFFICE O/P EST HI 40 MIN: CPT | Performed by: STUDENT IN AN ORGANIZED HEALTH CARE EDUCATION/TRAINING PROGRAM

## 2024-03-06 PROCEDURE — 99212 OFFICE O/P EST SF 10 MIN: CPT | Performed by: STUDENT IN AN ORGANIZED HEALTH CARE EDUCATION/TRAINING PROGRAM

## 2024-03-06 RX ORDER — DEXAMETHASONE 1 MG
1 TABLET ORAL
Qty: 1 TABLET | Refills: 0 | Status: SHIPPED | OUTPATIENT
Start: 2024-03-06

## 2024-03-06 RX ORDER — TIRZEPATIDE 12.5 MG/.5ML
0.5 INJECTION, SOLUTION SUBCUTANEOUS
Qty: 6 ML | Refills: 1 | Status: SHIPPED | OUTPATIENT
Start: 2024-03-06

## 2024-03-06 ASSESSMENT — FIBROSIS 4 INDEX: FIB4 SCORE: 0.89

## 2024-03-11 ENCOUNTER — HOSPITAL ENCOUNTER (OUTPATIENT)
Dept: LAB | Facility: MEDICAL CENTER | Age: 64
End: 2024-03-11
Attending: STUDENT IN AN ORGANIZED HEALTH CARE EDUCATION/TRAINING PROGRAM
Payer: COMMERCIAL

## 2024-03-11 DIAGNOSIS — Z79.4 TYPE 2 DIABETES MELLITUS WITH HYPERGLYCEMIA, WITH LONG-TERM CURRENT USE OF INSULIN (HCC): ICD-10-CM

## 2024-03-11 DIAGNOSIS — E11.65 TYPE 2 DIABETES MELLITUS WITH HYPERGLYCEMIA, WITH LONG-TERM CURRENT USE OF INSULIN (HCC): ICD-10-CM

## 2024-03-11 LAB
ALBUMIN SERPL BCP-MCNC: 4.6 G/DL (ref 3.2–4.9)
ALBUMIN/GLOB SERPL: 1.6 G/DL
ALP SERPL-CCNC: 77 U/L (ref 30–99)
ALT SERPL-CCNC: 29 U/L (ref 2–50)
ANION GAP SERPL CALC-SCNC: 14 MMOL/L (ref 7–16)
AST SERPL-CCNC: 24 U/L (ref 12–45)
BILIRUB SERPL-MCNC: 0.7 MG/DL (ref 0.1–1.5)
BUN SERPL-MCNC: 31 MG/DL (ref 8–22)
CALCIUM ALBUM COR SERPL-MCNC: 9.1 MG/DL (ref 8.5–10.5)
CALCIUM SERPL-MCNC: 9.6 MG/DL (ref 8.5–10.5)
CHLORIDE SERPL-SCNC: 105 MMOL/L (ref 96–112)
CHOLEST SERPL-MCNC: 98 MG/DL (ref 100–199)
CO2 SERPL-SCNC: 18 MMOL/L (ref 20–33)
CORTIS SERPL-MCNC: 3.5 UG/DL (ref 0–23)
CREAT SERPL-MCNC: 1.07 MG/DL (ref 0.5–1.4)
CREAT UR-MCNC: 58.33 MG/DL
FASTING STATUS PATIENT QL REPORTED: NORMAL
GFR SERPLBLD CREATININE-BSD FMLA CKD-EPI: 78 ML/MIN/1.73 M 2
GLOBULIN SER CALC-MCNC: 2.8 G/DL (ref 1.9–3.5)
GLUCOSE SERPL-MCNC: 149 MG/DL (ref 65–99)
HDLC SERPL-MCNC: 50 MG/DL
LDLC SERPL CALC-MCNC: 28 MG/DL
MICROALBUMIN UR-MCNC: <1.2 MG/DL
MICROALBUMIN/CREAT UR: NORMAL MG/G (ref 0–30)
POTASSIUM SERPL-SCNC: 4.5 MMOL/L (ref 3.6–5.5)
PROT SERPL-MCNC: 7.4 G/DL (ref 6–8.2)
SODIUM SERPL-SCNC: 137 MMOL/L (ref 135–145)
TRIGL SERPL-MCNC: 98 MG/DL (ref 0–149)

## 2024-03-11 PROCEDURE — 36415 COLL VENOUS BLD VENIPUNCTURE: CPT

## 2024-03-11 PROCEDURE — 80053 COMPREHEN METABOLIC PANEL: CPT

## 2024-03-11 PROCEDURE — 82533 TOTAL CORTISOL: CPT

## 2024-03-11 PROCEDURE — 80061 LIPID PANEL: CPT

## 2024-03-11 PROCEDURE — 82043 UR ALBUMIN QUANTITATIVE: CPT

## 2024-03-11 PROCEDURE — 80299 QUANTITATIVE ASSAY DRUG: CPT

## 2024-03-11 PROCEDURE — 82570 ASSAY OF URINE CREATININE: CPT

## 2024-03-13 DIAGNOSIS — I10 ESSENTIAL HYPERTENSION: ICD-10-CM

## 2024-03-13 DIAGNOSIS — Z79.4 TYPE 2 DIABETES MELLITUS WITH HYPERGLYCEMIA, WITH LONG-TERM CURRENT USE OF INSULIN (HCC): ICD-10-CM

## 2024-03-13 DIAGNOSIS — E11.65 TYPE 2 DIABETES MELLITUS WITH HYPERGLYCEMIA, WITH LONG-TERM CURRENT USE OF INSULIN (HCC): ICD-10-CM

## 2024-03-14 ENCOUNTER — APPOINTMENT (OUTPATIENT)
Dept: MEDICAL GROUP | Facility: PHYSICIAN GROUP | Age: 64
End: 2024-03-14
Payer: COMMERCIAL

## 2024-03-15 LAB — TEST NAME 95000: NORMAL

## 2024-03-18 ENCOUNTER — HOSPITAL ENCOUNTER (OUTPATIENT)
Dept: LAB | Facility: MEDICAL CENTER | Age: 64
End: 2024-03-18
Attending: STUDENT IN AN ORGANIZED HEALTH CARE EDUCATION/TRAINING PROGRAM
Payer: COMMERCIAL

## 2024-03-21 ENCOUNTER — HOSPITAL ENCOUNTER (OUTPATIENT)
Facility: MEDICAL CENTER | Age: 64
End: 2024-03-21
Attending: STUDENT IN AN ORGANIZED HEALTH CARE EDUCATION/TRAINING PROGRAM
Payer: COMMERCIAL

## 2024-03-21 PROCEDURE — 82533 TOTAL CORTISOL: CPT

## 2024-03-22 ENCOUNTER — HOSPITAL ENCOUNTER (OUTPATIENT)
Facility: MEDICAL CENTER | Age: 64
End: 2024-03-22
Attending: STUDENT IN AN ORGANIZED HEALTH CARE EDUCATION/TRAINING PROGRAM
Payer: COMMERCIAL

## 2024-03-22 PROCEDURE — 82533 TOTAL CORTISOL: CPT

## 2024-03-23 ENCOUNTER — HOSPITAL ENCOUNTER (OUTPATIENT)
Facility: MEDICAL CENTER | Age: 64
End: 2024-03-23
Attending: STUDENT IN AN ORGANIZED HEALTH CARE EDUCATION/TRAINING PROGRAM
Payer: COMMERCIAL

## 2024-03-23 DIAGNOSIS — I10 ESSENTIAL HYPERTENSION: ICD-10-CM

## 2024-03-23 DIAGNOSIS — E11.65 TYPE 2 DIABETES MELLITUS WITH HYPERGLYCEMIA, WITH LONG-TERM CURRENT USE OF INSULIN (HCC): ICD-10-CM

## 2024-03-23 DIAGNOSIS — Z79.4 TYPE 2 DIABETES MELLITUS WITH HYPERGLYCEMIA, WITH LONG-TERM CURRENT USE OF INSULIN (HCC): ICD-10-CM

## 2024-03-23 PROCEDURE — 82533 TOTAL CORTISOL: CPT

## 2024-03-24 DIAGNOSIS — Z79.4 TYPE 2 DIABETES MELLITUS WITH HYPERGLYCEMIA, WITH LONG-TERM CURRENT USE OF INSULIN (HCC): ICD-10-CM

## 2024-03-24 DIAGNOSIS — E11.65 TYPE 2 DIABETES MELLITUS WITH HYPERGLYCEMIA, WITH LONG-TERM CURRENT USE OF INSULIN (HCC): ICD-10-CM

## 2024-03-26 LAB — CORTIS SAL-MCNC: 0.04 UG/DL

## 2024-03-27 RX ORDER — INSULIN ASPART 100 [IU]/ML
INJECTION, SOLUTION INTRAVENOUS; SUBCUTANEOUS
Qty: 15 ML | Refills: 1 | Status: SHIPPED | OUTPATIENT
Start: 2024-03-27

## 2024-03-28 LAB
CORTIS SAL-MCNC: 0.03 UG/DL
CORTIS SAL-MCNC: <0.025 UG/DL

## 2024-04-24 ENCOUNTER — APPOINTMENT (OUTPATIENT)
Dept: MEDICAL GROUP | Facility: PHYSICIAN GROUP | Age: 64
End: 2024-04-24
Payer: COMMERCIAL

## 2024-05-23 DIAGNOSIS — Z79.4 TYPE 2 DIABETES MELLITUS WITH HYPERGLYCEMIA, WITH LONG-TERM CURRENT USE OF INSULIN (HCC): ICD-10-CM

## 2024-05-23 DIAGNOSIS — E11.65 TYPE 2 DIABETES MELLITUS WITH HYPERGLYCEMIA, WITH LONG-TERM CURRENT USE OF INSULIN (HCC): ICD-10-CM

## 2024-05-23 RX ORDER — TIRZEPATIDE 12.5 MG/.5ML
0.5 INJECTION, SOLUTION SUBCUTANEOUS
Qty: 6 ML | Refills: 1 | Status: SHIPPED | OUTPATIENT
Start: 2024-05-23 | End: 2024-05-30

## 2024-05-30 ENCOUNTER — TELEPHONE (OUTPATIENT)
Dept: ENDOCRINOLOGY | Facility: MEDICAL CENTER | Age: 64
End: 2024-05-30
Payer: COMMERCIAL

## 2024-05-30 DIAGNOSIS — E11.8 TYPE 2 DIABETES MELLITUS WITH COMPLICATION, WITHOUT LONG-TERM CURRENT USE OF INSULIN (HCC): ICD-10-CM

## 2024-05-30 DIAGNOSIS — Z79.4 TYPE 2 DIABETES MELLITUS WITH HYPERGLYCEMIA, WITH LONG-TERM CURRENT USE OF INSULIN (HCC): ICD-10-CM

## 2024-05-30 DIAGNOSIS — E11.65 TYPE 2 DIABETES MELLITUS WITH HYPERGLYCEMIA, WITH LONG-TERM CURRENT USE OF INSULIN (HCC): ICD-10-CM

## 2024-05-30 RX ORDER — TIRZEPATIDE 12.5 MG/.5ML
0.5 INJECTION, SOLUTION SUBCUTANEOUS
Qty: 2 ML | Refills: 6 | Status: SHIPPED | OUTPATIENT
Start: 2024-05-30

## 2024-05-30 RX ORDER — INSULIN GLARGINE 100 [IU]/ML
INJECTION, SOLUTION SUBCUTANEOUS
Qty: 30 ML | Refills: 5 | Status: SHIPPED | OUTPATIENT
Start: 2024-05-30

## 2024-05-30 RX ORDER — EMPAGLIFLOZIN, METFORMIN HYDROCHLORIDE 12.5; 1 MG/1; MG/1
2 TABLET, EXTENDED RELEASE ORAL DAILY
Qty: 180 TABLET | Refills: 1 | Status: SHIPPED | OUTPATIENT
Start: 2024-05-30

## 2024-05-30 RX ORDER — INSULIN ASPART 100 [IU]/ML
INJECTION, SOLUTION INTRAVENOUS; SUBCUTANEOUS
Qty: 15 ML | Refills: 1 | Status: SHIPPED | OUTPATIENT
Start: 2024-05-30

## 2024-05-30 RX ORDER — BLOOD-GLUCOSE SENSOR
1 EACH MISCELLANEOUS
Qty: 2 EACH | Refills: 12 | Status: SHIPPED | OUTPATIENT
Start: 2024-05-30

## 2024-05-30 NOTE — TELEPHONE ENCOUNTER
Prior Authorization for Mounjaro 12.5mg/0.5ml  (Quantity: 2, Days: 28) has been submitted via Fax: [phone] [employer]    Insurance: [employer]    Will follow up in 24-48 business hours.     Thank you,  Yamile Dykes [facility]  Endocrinology Coordinator   [phone]

## 2024-05-31 NOTE — TELEPHONE ENCOUNTER
Prior Authorization for Mounjaro 12.5mg/0.5mL Sopn  has been APPROVED for a quantity of 2mL , day supply 28  Prior Authorization reference number: [id number]  Insurance: [employer]  Effective dates: 5/31/2024- 5/31/2025  Copay: unknown-receive message that claim has been processed outside of coverage dates  Is patient eligible to fill with [facility]? Yes  Next Steps:  Patient has [place] out of [place] and is currently filling prescription at pharmacy: [facility] [address] [place] [address]    Thank you,  Jodi Ruiz [facility]  Endocrinology Pharmacy Coordinator

## 2024-07-20 DIAGNOSIS — E11.65 TYPE 2 DIABETES MELLITUS WITH HYPERGLYCEMIA, WITH LONG-TERM CURRENT USE OF INSULIN (HCC): ICD-10-CM

## 2024-07-20 DIAGNOSIS — Z79.4 TYPE 2 DIABETES MELLITUS WITH HYPERGLYCEMIA, WITH LONG-TERM CURRENT USE OF INSULIN (HCC): ICD-10-CM

## 2024-07-21 RX ORDER — INSULIN ASPART 100 [IU]/ML
INJECTION, SOLUTION INTRAVENOUS; SUBCUTANEOUS
Qty: 15 ML | Refills: 0 | Status: SHIPPED | OUTPATIENT
Start: 2024-07-21

## 2024-08-29 DIAGNOSIS — Z79.4 TYPE 2 DIABETES MELLITUS WITH HYPERGLYCEMIA, WITH LONG-TERM CURRENT USE OF INSULIN (HCC): ICD-10-CM

## 2024-08-29 DIAGNOSIS — E11.65 TYPE 2 DIABETES MELLITUS WITH HYPERGLYCEMIA, WITH LONG-TERM CURRENT USE OF INSULIN (HCC): ICD-10-CM

## 2024-09-03 DIAGNOSIS — Z79.4 TYPE 2 DIABETES MELLITUS WITH HYPERGLYCEMIA, WITH LONG-TERM CURRENT USE OF INSULIN (HCC): ICD-10-CM

## 2024-09-03 DIAGNOSIS — E11.65 TYPE 2 DIABETES MELLITUS WITH HYPERGLYCEMIA, WITH LONG-TERM CURRENT USE OF INSULIN (HCC): ICD-10-CM

## 2024-09-03 RX ORDER — INSULIN DEGLUDEC 100 U/ML
40 INJECTION, SOLUTION SUBCUTANEOUS DAILY
Qty: 15 ML | Refills: 2 | Status: SHIPPED | OUTPATIENT
Start: 2024-09-03

## 2024-12-20 DIAGNOSIS — E11.65 TYPE 2 DIABETES MELLITUS WITH HYPERGLYCEMIA, WITH LONG-TERM CURRENT USE OF INSULIN (HCC): ICD-10-CM

## 2024-12-20 DIAGNOSIS — Z79.4 TYPE 2 DIABETES MELLITUS WITH HYPERGLYCEMIA, WITH LONG-TERM CURRENT USE OF INSULIN (HCC): ICD-10-CM

## 2024-12-23 RX ORDER — INSULIN DEGLUDEC 100 U/ML
INJECTION, SOLUTION SUBCUTANEOUS
Qty: 15 ML | Refills: 0 | Status: SHIPPED | OUTPATIENT
Start: 2024-12-23